# Patient Record
Sex: FEMALE | Race: WHITE | NOT HISPANIC OR LATINO | Employment: OTHER | URBAN - METROPOLITAN AREA
[De-identification: names, ages, dates, MRNs, and addresses within clinical notes are randomized per-mention and may not be internally consistent; named-entity substitution may affect disease eponyms.]

---

## 2017-01-01 ENCOUNTER — LAB VISIT (OUTPATIENT)
Dept: LAB | Facility: HOSPITAL | Age: 77
End: 2017-01-01
Attending: FAMILY MEDICINE
Payer: MEDICARE

## 2017-01-01 ENCOUNTER — IMMUNIZATION (OUTPATIENT)
Dept: INTERNAL MEDICINE | Facility: CLINIC | Age: 77
End: 2017-01-01
Payer: MEDICARE

## 2017-01-01 ENCOUNTER — SURGERY (OUTPATIENT)
Age: 77
End: 2017-01-01

## 2017-01-01 ENCOUNTER — OFFICE VISIT (OUTPATIENT)
Dept: INTERNAL MEDICINE | Facility: CLINIC | Age: 77
End: 2017-01-01
Payer: MEDICARE

## 2017-01-01 ENCOUNTER — HOSPITAL ENCOUNTER (OUTPATIENT)
Dept: RADIOLOGY | Facility: HOSPITAL | Age: 77
Discharge: HOME | End: 2017-11-14
Attending: INTERNAL MEDICINE
Payer: MEDICARE

## 2017-01-01 ENCOUNTER — LAB VISIT (OUTPATIENT)
Dept: LAB | Facility: HOSPITAL | Age: 77
End: 2017-01-01
Attending: INTERNAL MEDICINE
Payer: MEDICARE

## 2017-01-01 ENCOUNTER — TELEPHONE (OUTPATIENT)
Dept: INTERNAL MEDICINE | Facility: CLINIC | Age: 77
End: 2017-01-01

## 2017-01-01 ENCOUNTER — OFFICE VISIT (OUTPATIENT)
Dept: GASTROENTEROLOGY | Facility: CLINIC | Age: 77
End: 2017-01-01
Payer: MEDICARE

## 2017-01-01 ENCOUNTER — ANESTHESIA EVENT (OUTPATIENT)
Dept: CARDIOLOGY | Facility: HOSPITAL | Age: 77
End: 2017-01-01
Payer: MEDICARE

## 2017-01-01 ENCOUNTER — CLINICAL SUPPORT (OUTPATIENT)
Dept: CARDIOLOGY | Facility: CLINIC | Age: 77
End: 2017-01-01
Payer: MEDICARE

## 2017-01-01 ENCOUNTER — TELEPHONE (OUTPATIENT)
Dept: RADIOLOGY | Facility: HOSPITAL | Age: 77
End: 2017-01-01

## 2017-01-01 ENCOUNTER — OFFICE VISIT (OUTPATIENT)
Dept: PULMONOLOGY | Facility: CLINIC | Age: 77
End: 2017-01-01
Payer: MEDICARE

## 2017-01-01 ENCOUNTER — HOSPITAL ENCOUNTER (OUTPATIENT)
Facility: HOSPITAL | Age: 77
Discharge: HOME | End: 2017-06-26
Attending: INTERNAL MEDICINE | Admitting: INTERNAL MEDICINE
Payer: MEDICARE

## 2017-01-01 ENCOUNTER — TELEPHONE (OUTPATIENT)
Dept: PULMONOLOGY | Facility: CLINIC | Age: 77
End: 2017-01-01

## 2017-01-01 ENCOUNTER — TELEPHONE (OUTPATIENT)
Dept: CARDIOLOGY | Facility: CLINIC | Age: 77
End: 2017-01-01

## 2017-01-01 ENCOUNTER — PROCEDURE VISIT (OUTPATIENT)
Dept: PULMONOLOGY | Facility: CLINIC | Age: 77
End: 2017-01-01
Payer: MEDICARE

## 2017-01-01 ENCOUNTER — OFFICE VISIT (OUTPATIENT)
Dept: CARDIOLOGY | Facility: CLINIC | Age: 77
End: 2017-01-01
Payer: MEDICARE

## 2017-01-01 ENCOUNTER — CLINICAL SUPPORT (OUTPATIENT)
Dept: CARDIOLOGY | Facility: CLINIC | Age: 77
End: 2017-01-01
Attending: INTERNAL MEDICINE
Payer: MEDICARE

## 2017-01-01 ENCOUNTER — ANESTHESIA (OUTPATIENT)
Dept: CARDIOLOGY | Facility: HOSPITAL | Age: 77
End: 2017-01-01
Payer: MEDICARE

## 2017-01-01 ENCOUNTER — PATIENT MESSAGE (OUTPATIENT)
Dept: GASTROENTEROLOGY | Facility: CLINIC | Age: 77
End: 2017-01-01

## 2017-01-01 ENCOUNTER — HOSPITAL ENCOUNTER (OUTPATIENT)
Dept: RADIOLOGY | Facility: HOSPITAL | Age: 77
Discharge: HOME | End: 2017-08-16
Attending: INTERNAL MEDICINE
Payer: MEDICARE

## 2017-01-01 ENCOUNTER — HOSPITAL ENCOUNTER (OUTPATIENT)
Dept: RADIOLOGY | Facility: HOSPITAL | Age: 77
Discharge: HOME | End: 2017-10-03
Attending: INTERNAL MEDICINE
Payer: MEDICARE

## 2017-01-01 ENCOUNTER — TELEPHONE (OUTPATIENT)
Dept: GASTROENTEROLOGY | Facility: CLINIC | Age: 77
End: 2017-01-01

## 2017-01-01 ENCOUNTER — OFFICE VISIT (OUTPATIENT)
Dept: RHEUMATOLOGY | Facility: CLINIC | Age: 77
End: 2017-01-01
Payer: MEDICARE

## 2017-01-01 ENCOUNTER — APPOINTMENT (OUTPATIENT)
Dept: RADIOLOGY | Facility: CLINIC | Age: 77
End: 2017-01-01
Attending: INTERNAL MEDICINE
Payer: MEDICARE

## 2017-01-01 ENCOUNTER — OFFICE VISIT (OUTPATIENT)
Dept: OPHTHALMOLOGY | Facility: CLINIC | Age: 77
End: 2017-01-01
Payer: MEDICARE

## 2017-01-01 VITALS
TEMPERATURE: 98 F | WEIGHT: 125.25 LBS | HEART RATE: 56 BPM | BODY MASS INDEX: 23.05 KG/M2 | HEIGHT: 62 IN | SYSTOLIC BLOOD PRESSURE: 138 MMHG | DIASTOLIC BLOOD PRESSURE: 66 MMHG

## 2017-01-01 VITALS
HEIGHT: 62 IN | SYSTOLIC BLOOD PRESSURE: 149 MMHG | BODY MASS INDEX: 23.19 KG/M2 | DIASTOLIC BLOOD PRESSURE: 70 MMHG | TEMPERATURE: 98 F | HEART RATE: 85 BPM | RESPIRATION RATE: 18 BRPM | OXYGEN SATURATION: 95 % | WEIGHT: 126 LBS

## 2017-01-01 VITALS
HEIGHT: 62 IN | OXYGEN SATURATION: 94 % | RESPIRATION RATE: 18 BRPM | SYSTOLIC BLOOD PRESSURE: 120 MMHG | BODY MASS INDEX: 23.55 KG/M2 | WEIGHT: 128 LBS | DIASTOLIC BLOOD PRESSURE: 64 MMHG | HEART RATE: 61 BPM

## 2017-01-01 VITALS — RESPIRATION RATE: 18 BRPM

## 2017-01-01 VITALS
WEIGHT: 129 LBS | HEIGHT: 63 IN | BODY MASS INDEX: 22.86 KG/M2 | HEART RATE: 61 BPM | SYSTOLIC BLOOD PRESSURE: 122 MMHG | DIASTOLIC BLOOD PRESSURE: 68 MMHG

## 2017-01-01 VITALS
BODY MASS INDEX: 23.53 KG/M2 | DIASTOLIC BLOOD PRESSURE: 50 MMHG | HEART RATE: 60 BPM | HEIGHT: 62 IN | SYSTOLIC BLOOD PRESSURE: 162 MMHG | WEIGHT: 127.88 LBS

## 2017-01-01 VITALS
DIASTOLIC BLOOD PRESSURE: 68 MMHG | HEART RATE: 83 BPM | SYSTOLIC BLOOD PRESSURE: 150 MMHG | BODY MASS INDEX: 23.29 KG/M2 | OXYGEN SATURATION: 96 % | HEIGHT: 62 IN | WEIGHT: 126.56 LBS

## 2017-01-01 VITALS
DIASTOLIC BLOOD PRESSURE: 40 MMHG | HEART RATE: 55 BPM | WEIGHT: 129.63 LBS | BODY MASS INDEX: 23.71 KG/M2 | SYSTOLIC BLOOD PRESSURE: 124 MMHG

## 2017-01-01 VITALS
DIASTOLIC BLOOD PRESSURE: 68 MMHG | WEIGHT: 133 LBS | HEIGHT: 62 IN | HEART RATE: 56 BPM | SYSTOLIC BLOOD PRESSURE: 150 MMHG | BODY MASS INDEX: 24.51 KG/M2 | RESPIRATION RATE: 18 BRPM | BODY MASS INDEX: 24.48 KG/M2 | WEIGHT: 133.19 LBS | OXYGEN SATURATION: 98 % | HEIGHT: 62 IN

## 2017-01-01 VITALS
SYSTOLIC BLOOD PRESSURE: 122 MMHG | DIASTOLIC BLOOD PRESSURE: 62 MMHG | HEIGHT: 62 IN | BODY MASS INDEX: 22.96 KG/M2 | WEIGHT: 124.75 LBS | HEART RATE: 78 BPM

## 2017-01-01 VITALS
WEIGHT: 125.69 LBS | HEART RATE: 58 BPM | HEIGHT: 62 IN | BODY MASS INDEX: 23.13 KG/M2 | DIASTOLIC BLOOD PRESSURE: 66 MMHG | TEMPERATURE: 98 F | SYSTOLIC BLOOD PRESSURE: 126 MMHG

## 2017-01-01 DIAGNOSIS — I35.1 NONRHEUMATIC AORTIC VALVE INSUFFICIENCY: ICD-10-CM

## 2017-01-01 DIAGNOSIS — I50.9 CONGESTIVE HEART FAILURE, UNSPECIFIED CONGESTIVE HEART FAILURE CHRONICITY, UNSPECIFIED CONGESTIVE HEART FAILURE TYPE: ICD-10-CM

## 2017-01-01 DIAGNOSIS — I48.0 PAROXYSMAL ATRIAL FIBRILLATION: ICD-10-CM

## 2017-01-01 DIAGNOSIS — K21.00 GASTROESOPHAGEAL REFLUX DISEASE WITH ESOPHAGITIS: ICD-10-CM

## 2017-01-01 DIAGNOSIS — Z95.1 S/P CABG (CORONARY ARTERY BYPASS GRAFT): ICD-10-CM

## 2017-01-01 DIAGNOSIS — J45.909 UNCOMPLICATED ASTHMA, UNSPECIFIED ASTHMA SEVERITY: ICD-10-CM

## 2017-01-01 DIAGNOSIS — N18.30 STAGE 3 CHRONIC KIDNEY DISEASE: ICD-10-CM

## 2017-01-01 DIAGNOSIS — I48.20 CHRONIC ATRIAL FIBRILLATION: Chronic | ICD-10-CM

## 2017-01-01 DIAGNOSIS — I48.20 CHRONIC ATRIAL FIBRILLATION: Primary | Chronic | ICD-10-CM

## 2017-01-01 DIAGNOSIS — I48.19 PERSISTENT ATRIAL FIBRILLATION: ICD-10-CM

## 2017-01-01 DIAGNOSIS — M81.0 OSTEOPOROSIS, POSTMENOPAUSAL: ICD-10-CM

## 2017-01-01 DIAGNOSIS — I10 ESSENTIAL HYPERTENSION: Chronic | ICD-10-CM

## 2017-01-01 DIAGNOSIS — Z96.1 PSEUDOPHAKIA: ICD-10-CM

## 2017-01-01 DIAGNOSIS — I48.0 PAF (PAROXYSMAL ATRIAL FIBRILLATION): ICD-10-CM

## 2017-01-01 DIAGNOSIS — I25.810 CORONARY ARTERY DISEASE INVOLVING CORONARY BYPASS GRAFT OF NATIVE HEART WITHOUT ANGINA PECTORIS: Primary | Chronic | ICD-10-CM

## 2017-01-01 DIAGNOSIS — M35.09 SJOGREN'S SYNDROME WITH OTHER ORGAN INVOLVEMENT: ICD-10-CM

## 2017-01-01 DIAGNOSIS — R74.8 ELEVATED ALKALINE PHOSPHATASE LEVEL: ICD-10-CM

## 2017-01-01 DIAGNOSIS — I48.19 PERSISTENT ATRIAL FIBRILLATION: Primary | ICD-10-CM

## 2017-01-01 DIAGNOSIS — Z51.81 MEDICATION MONITORING ENCOUNTER: ICD-10-CM

## 2017-01-01 DIAGNOSIS — M35.09 SJOGREN'S SYNDROME WITH OTHER ORGAN INVOLVEMENT: Primary | ICD-10-CM

## 2017-01-01 DIAGNOSIS — R74.8 ELEVATED SERUM ALKALINE PHOSPHATASE LEVEL: Primary | Chronic | ICD-10-CM

## 2017-01-01 DIAGNOSIS — I50.32 CHRONIC DIASTOLIC HEART FAILURE: Chronic | ICD-10-CM

## 2017-01-01 DIAGNOSIS — Z98.890 S/P CAROTID ENDARTERECTOMY: ICD-10-CM

## 2017-01-01 DIAGNOSIS — I25.810 CORONARY ARTERY DISEASE INVOLVING CORONARY BYPASS GRAFT OF NATIVE HEART WITHOUT ANGINA PECTORIS: Chronic | ICD-10-CM

## 2017-01-01 DIAGNOSIS — E11.9 TYPE 2 DIABETES MELLITUS WITHOUT COMPLICATION, WITHOUT LONG-TERM CURRENT USE OF INSULIN: ICD-10-CM

## 2017-01-01 DIAGNOSIS — I48.3 TYPICAL ATRIAL FLUTTER: Chronic | ICD-10-CM

## 2017-01-01 DIAGNOSIS — Z00.00 MEDICARE ANNUAL WELLNESS VISIT, SUBSEQUENT: ICD-10-CM

## 2017-01-01 DIAGNOSIS — E78.5 HYPERLIPIDEMIA, UNSPECIFIED HYPERLIPIDEMIA TYPE: ICD-10-CM

## 2017-01-01 DIAGNOSIS — Z79.01 LONG-TERM (CURRENT) USE OF ANTICOAGULANTS: ICD-10-CM

## 2017-01-01 DIAGNOSIS — J44.89 ASTHMA WITH COPD: Primary | ICD-10-CM

## 2017-01-01 DIAGNOSIS — Z99.81 HYPOXEMIA REQUIRING SUPPLEMENTAL OXYGEN: Primary | ICD-10-CM

## 2017-01-01 DIAGNOSIS — Z79.899 ENCOUNTER FOR EYE EXAM DUE TO HIGH RISK MEDICATION: ICD-10-CM

## 2017-01-01 DIAGNOSIS — N17.9 AKI (ACUTE KIDNEY INJURY): ICD-10-CM

## 2017-01-01 DIAGNOSIS — M35.01 SJOGREN'S SYNDROME WITH KERATOCONJUNCTIVITIS SICCA: ICD-10-CM

## 2017-01-01 DIAGNOSIS — R06.02 SHORTNESS OF BREATH ON EXERTION: ICD-10-CM

## 2017-01-01 DIAGNOSIS — R74.8 ELEVATED ALKALINE PHOSPHATASE LEVEL: Primary | ICD-10-CM

## 2017-01-01 DIAGNOSIS — M35.00 SJOGREN'S SYNDROME: ICD-10-CM

## 2017-01-01 DIAGNOSIS — I25.10 CORONARY ARTERY DISEASE INVOLVING NATIVE CORONARY ARTERY, ANGINA PRESENCE UNSPECIFIED, UNSPECIFIED WHETHER NATIVE OR TRANSPLANTED HEART: ICD-10-CM

## 2017-01-01 DIAGNOSIS — K62.5 RECTAL BLEEDING: ICD-10-CM

## 2017-01-01 DIAGNOSIS — S30.1XXS ABDOMINAL WALL HEMATOMA, SEQUELA: ICD-10-CM

## 2017-01-01 DIAGNOSIS — Z98.61 S/P PTCA (PERCUTANEOUS TRANSLUMINAL CORONARY ANGIOPLASTY): ICD-10-CM

## 2017-01-01 DIAGNOSIS — Z79.01 ON ANTICOAGULANT THERAPY: ICD-10-CM

## 2017-01-01 DIAGNOSIS — J45.909 UNCOMPLICATED ASTHMA, UNSPECIFIED ASTHMA SEVERITY: Primary | ICD-10-CM

## 2017-01-01 DIAGNOSIS — I48.0 PAF (PAROXYSMAL ATRIAL FIBRILLATION): Primary | ICD-10-CM

## 2017-01-01 DIAGNOSIS — S30.1XXD ABDOMINAL WALL HEMATOMA, SUBSEQUENT ENCOUNTER: ICD-10-CM

## 2017-01-01 DIAGNOSIS — M81.0 OSTEOPOROSIS, POSTMENOPAUSAL: Primary | ICD-10-CM

## 2017-01-01 DIAGNOSIS — J44.89 ASTHMA WITH COPD: ICD-10-CM

## 2017-01-01 DIAGNOSIS — I49.8 PERIODIC HEART FLUTTER: ICD-10-CM

## 2017-01-01 DIAGNOSIS — R00.1 BRADYCARDIA: ICD-10-CM

## 2017-01-01 DIAGNOSIS — I65.23 BILATERAL CAROTID ARTERY STENOSIS: ICD-10-CM

## 2017-01-01 DIAGNOSIS — R09.02 HYPOXEMIA REQUIRING SUPPLEMENTAL OXYGEN: ICD-10-CM

## 2017-01-01 DIAGNOSIS — D62 ACUTE BLOOD LOSS ANEMIA: ICD-10-CM

## 2017-01-01 DIAGNOSIS — R74.8 ELEVATED ALKALINE PHOSPHATASE MEASUREMENT: ICD-10-CM

## 2017-01-01 DIAGNOSIS — J32.0 CHRONIC MAXILLARY SINUSITIS: ICD-10-CM

## 2017-01-01 DIAGNOSIS — I50.1 PULMONARY EDEMA CARDIAC CAUSE: ICD-10-CM

## 2017-01-01 DIAGNOSIS — M17.0 PRIMARY OSTEOARTHRITIS OF BOTH KNEES: ICD-10-CM

## 2017-01-01 DIAGNOSIS — I48.91 ATRIAL FIBRILLATION, UNSPECIFIED TYPE: ICD-10-CM

## 2017-01-01 DIAGNOSIS — K64.9 HEMORRHOIDS, UNSPECIFIED HEMORRHOID TYPE: ICD-10-CM

## 2017-01-01 DIAGNOSIS — I25.10 CORONARY ARTERY DISEASE, ANGINA PRESENCE UNSPECIFIED, UNSPECIFIED VESSEL OR LESION TYPE, UNSPECIFIED WHETHER NATIVE OR TRANSPLANTED HEART: ICD-10-CM

## 2017-01-01 DIAGNOSIS — R09.02 HYPOXEMIA REQUIRING SUPPLEMENTAL OXYGEN: Primary | ICD-10-CM

## 2017-01-01 DIAGNOSIS — R74.8 ELEVATED SERUM ALKALINE PHOSPHATASE LEVEL: Chronic | ICD-10-CM

## 2017-01-01 DIAGNOSIS — D84.9 IMMUNOCOMPROMISED: ICD-10-CM

## 2017-01-01 DIAGNOSIS — I48.91 ATRIAL FIBRILLATION STATUS POST CARDIOVERSION: ICD-10-CM

## 2017-01-01 DIAGNOSIS — H04.123 DRY EYES, BILATERAL: ICD-10-CM

## 2017-01-01 DIAGNOSIS — Z99.81 HYPOXEMIA REQUIRING SUPPLEMENTAL OXYGEN: ICD-10-CM

## 2017-01-01 DIAGNOSIS — I50.32 CHRONIC DIASTOLIC HEART FAILURE: Primary | Chronic | ICD-10-CM

## 2017-01-01 DIAGNOSIS — F33.1 MODERATE EPISODE OF RECURRENT MAJOR DEPRESSIVE DISORDER: ICD-10-CM

## 2017-01-01 DIAGNOSIS — D64.9 ANEMIA, UNSPECIFIED TYPE: ICD-10-CM

## 2017-01-01 DIAGNOSIS — K21.9 GASTROESOPHAGEAL REFLUX DISEASE, ESOPHAGITIS PRESENCE NOT SPECIFIED: ICD-10-CM

## 2017-01-01 LAB
ALBUMIN SERPL BCP-MCNC: 4 G/DL
ALBUMIN SERPL BCP-MCNC: 4.2 G/DL
ALP BONE CFR SERPL: 19 U/L (ref 5–58)
ALP BONE SERPL-CCNC: 12 % (ref 16–56)
ALP INTEST CFR SERPL: 0 U/L
ALP INTEST SERPL-CCNC: 0 %
ALP LIVER CFR SERPL: 140 U/L (ref 5–93)
ALP LIVER SERPL-CCNC: 88 % (ref 44–84)
ALP SERPL-CCNC: 159 U/L (ref 33–130)
ALP SERPL-CCNC: 160 U/L
ALP SERPL-CCNC: 219 U/L
ALT SERPL W/O P-5'-P-CCNC: 30 U/L
ALT SERPL W/O P-5'-P-CCNC: 36 U/L
ANION GAP SERPL CALC-SCNC: 11 MMOL/L
ANION GAP SERPL CALC-SCNC: 11 MMOL/L
ANION GAP SERPL CALC-SCNC: 9 MMOL/L
AORTIC VALVE REGURGITATION: ABNORMAL
AST SERPL-CCNC: 32 U/L
AST SERPL-CCNC: 37 U/L
BASOPHILS # BLD AUTO: 0.04 K/UL
BASOPHILS # BLD AUTO: 0.05 K/UL
BASOPHILS NFR BLD: 0.8 %
BASOPHILS NFR BLD: 0.8 %
BILIRUB SERPL-MCNC: 0.8 MG/DL
BILIRUB SERPL-MCNC: 1 MG/DL
BNP SERPL-MCNC: 2662 PG/ML
BUN SERPL-MCNC: 18 MG/DL
BUN SERPL-MCNC: 23 MG/DL
BUN SERPL-MCNC: 25 MG/DL
CALCIUM SERPL-MCNC: 9.8 MG/DL
CALCIUM SERPL-MCNC: 9.9 MG/DL
CALCIUM SERPL-MCNC: 9.9 MG/DL
CHLORIDE SERPL-SCNC: 104 MMOL/L
CHLORIDE SERPL-SCNC: 104 MMOL/L
CHLORIDE SERPL-SCNC: 106 MMOL/L
CO2 SERPL-SCNC: 25 MMOL/L
CO2 SERPL-SCNC: 27 MMOL/L
CO2 SERPL-SCNC: 28 MMOL/L
CREAT SERPL-MCNC: 1.2 MG/DL
CREAT SERPL-MCNC: 1.2 MG/DL
CREAT SERPL-MCNC: 1.5 MG/DL
DIASTOLIC DYSFUNCTION: NO
DIASTOLIC DYSFUNCTION: YES
DIFFERENTIAL METHOD: ABNORMAL
DIFFERENTIAL METHOD: ABNORMAL
EOSINOPHIL # BLD AUTO: 0.2 K/UL
EOSINOPHIL # BLD AUTO: 0.2 K/UL
EOSINOPHIL NFR BLD: 3.4 %
EOSINOPHIL NFR BLD: 3.5 %
ERYTHROCYTE [DISTWIDTH] IN BLOOD BY AUTOMATED COUNT: 15.6 %
ERYTHROCYTE [DISTWIDTH] IN BLOOD BY AUTOMATED COUNT: 15.8 %
EST. GFR  (AFRICAN AMERICAN): 38.2 ML/MIN/1.73 M^2
EST. GFR  (AFRICAN AMERICAN): 50 ML/MIN/1.73 M^2
EST. GFR  (AFRICAN AMERICAN): 50 ML/MIN/1.73 M^2
EST. GFR  (NON AFRICAN AMERICAN): 33.1 ML/MIN/1.73 M^2
EST. GFR  (NON AFRICAN AMERICAN): 43 ML/MIN/1.73 M^2
EST. GFR  (NON AFRICAN AMERICAN): 43.4 ML/MIN/1.73 M^2
ESTIMATED AVG GLUCOSE: 131 MG/DL
ESTIMATED PA SYSTOLIC PRESSURE: 49.73
FLECAINIDE SERPL-MCNC: 1 MCG/ML (ref 0.2–1)
GLUCOSE SERPL-MCNC: 129 MG/DL
GLUCOSE SERPL-MCNC: 92 MG/DL
GLUCOSE SERPL-MCNC: 93 MG/DL
HBA1C MFR BLD HPLC: 6.2 %
HCT VFR BLD AUTO: 36.1 %
HCT VFR BLD AUTO: 36.9 %
HGB BLD-MCNC: 11.2 G/DL
HGB BLD-MCNC: 11.6 G/DL
LYMPHOCYTES # BLD AUTO: 1 K/UL
LYMPHOCYTES # BLD AUTO: 1 K/UL
LYMPHOCYTES NFR BLD: 15.1 %
LYMPHOCYTES NFR BLD: 19.6 %
MCH RBC QN AUTO: 30 PG
MCH RBC QN AUTO: 30.4 PG
MCHC RBC AUTO-ENTMCNC: 31 G/DL
MCHC RBC AUTO-ENTMCNC: 31.4 G/DL
MCV RBC AUTO: 95 FL
MCV RBC AUTO: 98 FL
MITRAL VALVE REGURGITATION: ABNORMAL
MONOCYTES # BLD AUTO: 0.5 K/UL
MONOCYTES # BLD AUTO: 0.6 K/UL
MONOCYTES NFR BLD: 10.4 %
MONOCYTES NFR BLD: 9.9 %
NEUTROPHILS # BLD AUTO: 3.2 K/UL
NEUTROPHILS # BLD AUTO: 4.6 K/UL
NEUTROPHILS NFR BLD: 65.5 %
NEUTROPHILS NFR BLD: 70.8 %
PLATELET # BLD AUTO: 178 K/UL
PLATELET # BLD AUTO: 179 K/UL
PMV BLD AUTO: 12.5 FL
PMV BLD AUTO: 12.8 FL
POTASSIUM SERPL-SCNC: 4 MMOL/L
POTASSIUM SERPL-SCNC: 4 MMOL/L
POTASSIUM SERPL-SCNC: 4.4 MMOL/L
PROT SERPL-MCNC: 6.9 G/DL
PROT SERPL-MCNC: 7.3 G/DL
RBC # BLD AUTO: 3.68 M/UL
RBC # BLD AUTO: 3.87 M/UL
RETIRED EF AND QEF - SEE NOTES: 55 (ref 55–65)
SODIUM SERPL-SCNC: 141 MMOL/L
SODIUM SERPL-SCNC: 142 MMOL/L
SODIUM SERPL-SCNC: 142 MMOL/L
TRICUSPID VALVE REGURGITATION: ABNORMAL
WBC # BLD AUTO: 4.89 K/UL
WBC # BLD AUTO: 6.48 K/UL

## 2017-01-01 PROCEDURE — 1125F AMNT PAIN NOTED PAIN PRSNT: CPT | Mod: ,,, | Performed by: FAMILY MEDICINE

## 2017-01-01 PROCEDURE — 1157F ADVNC CARE PLAN IN RCRD: CPT | Mod: ,,, | Performed by: FAMILY MEDICINE

## 2017-01-01 PROCEDURE — 99999 PR PBB SHADOW E&M-EST. PATIENT-LVL III: CPT | Mod: PBBFAC,,, | Performed by: INTERNAL MEDICINE

## 2017-01-01 PROCEDURE — 36415 COLL VENOUS BLD VENIPUNCTURE: CPT | Mod: PO

## 2017-01-01 PROCEDURE — 1157F ADVNC CARE PLAN IN RCRD: CPT | Mod: ,,, | Performed by: INTERNAL MEDICINE

## 2017-01-01 PROCEDURE — 99215 OFFICE O/P EST HI 40 MIN: CPT | Mod: S$PBB,,, | Performed by: FAMILY MEDICINE

## 2017-01-01 PROCEDURE — 71020 XR CHEST PA AND LATERAL: CPT | Mod: 26,,, | Performed by: RADIOLOGY

## 2017-01-01 PROCEDURE — 94620 PR PULMONARY STRESS TESTING,SIMPLE: CPT | Mod: 26,S$PBB,, | Performed by: INTERNAL MEDICINE

## 2017-01-01 PROCEDURE — 80053 COMPREHEN METABOLIC PANEL: CPT

## 2017-01-01 PROCEDURE — 63600175 PHARM REV CODE 636 W HCPCS: Performed by: NURSE ANESTHETIST, CERTIFIED REGISTERED

## 2017-01-01 PROCEDURE — 99212 OFFICE O/P EST SF 10 MIN: CPT | Mod: PBBFAC,PO | Performed by: OPHTHALMOLOGY

## 2017-01-01 PROCEDURE — 99215 OFFICE O/P EST HI 40 MIN: CPT | Mod: S$PBB,,, | Performed by: INTERNAL MEDICINE

## 2017-01-01 PROCEDURE — 92960 CARDIOVERSION ELECTRIC EXT: CPT

## 2017-01-01 PROCEDURE — 99214 OFFICE O/P EST MOD 30 MIN: CPT | Mod: S$PBB,,, | Performed by: INTERNAL MEDICINE

## 2017-01-01 PROCEDURE — 92960 CARDIOVERSION ELECTRIC EXT: CPT | Mod: ,,, | Performed by: INTERNAL MEDICINE

## 2017-01-01 PROCEDURE — 85025 COMPLETE CBC W/AUTO DIFF WBC: CPT

## 2017-01-01 PROCEDURE — 99214 OFFICE O/P EST MOD 30 MIN: CPT | Mod: S$PBB,,, | Performed by: PHYSICIAN ASSISTANT

## 2017-01-01 PROCEDURE — 94060 EVALUATION OF WHEEZING: CPT | Mod: 26,S$PBB,, | Performed by: INTERNAL MEDICINE

## 2017-01-01 PROCEDURE — 93018 CV STRESS TEST I&R ONLY: CPT | Mod: S$PBB,,, | Performed by: INTERNAL MEDICINE

## 2017-01-01 PROCEDURE — 93010 ELECTROCARDIOGRAM REPORT: CPT | Mod: S$PBB,,, | Performed by: INTERNAL MEDICINE

## 2017-01-01 PROCEDURE — A9502 TC99M TETROFOSMIN: HCPCS | Mod: PO

## 2017-01-01 PROCEDURE — 99214 OFFICE O/P EST MOD 30 MIN: CPT | Mod: 25,S$PBB,, | Performed by: NURSE PRACTITIONER

## 2017-01-01 PROCEDURE — 76705 ECHO EXAM OF ABDOMEN: CPT | Mod: TC,PO

## 2017-01-01 PROCEDURE — 1159F MED LIST DOCD IN RCRD: CPT | Mod: ,,, | Performed by: FAMILY MEDICINE

## 2017-01-01 PROCEDURE — 80048 BASIC METABOLIC PNL TOTAL CA: CPT

## 2017-01-01 PROCEDURE — 93306 TTE W/DOPPLER COMPLETE: CPT | Mod: PBBFAC,PO | Performed by: INTERNAL MEDICINE

## 2017-01-01 PROCEDURE — 3078F DIAST BP <80 MM HG: CPT | Mod: ,,, | Performed by: FAMILY MEDICINE

## 2017-01-01 PROCEDURE — 1159F MED LIST DOCD IN RCRD: CPT | Mod: ,,, | Performed by: INTERNAL MEDICINE

## 2017-01-01 PROCEDURE — 1157F ADVNC CARE PLAN IN RCRD: CPT | Mod: ,,, | Performed by: PHYSICIAN ASSISTANT

## 2017-01-01 PROCEDURE — 3074F SYST BP LT 130 MM HG: CPT | Mod: ,,, | Performed by: FAMILY MEDICINE

## 2017-01-01 PROCEDURE — 99213 OFFICE O/P EST LOW 20 MIN: CPT | Mod: PBBFAC,PO | Performed by: INTERNAL MEDICINE

## 2017-01-01 PROCEDURE — 94620 PR PULMONARY STRESS TESTING,SIMPLE: CPT | Mod: PBBFAC

## 2017-01-01 PROCEDURE — G0008 ADMIN INFLUENZA VIRUS VAC: HCPCS | Mod: PBBFAC,PO

## 2017-01-01 PROCEDURE — 99215 OFFICE O/P EST HI 40 MIN: CPT | Mod: 25,S$PBB,, | Performed by: INTERNAL MEDICINE

## 2017-01-01 PROCEDURE — 1126F AMNT PAIN NOTED NONE PRSNT: CPT | Mod: ,,, | Performed by: INTERNAL MEDICINE

## 2017-01-01 PROCEDURE — 99214 OFFICE O/P EST MOD 30 MIN: CPT | Mod: S$PBB,,, | Performed by: FAMILY MEDICINE

## 2017-01-01 PROCEDURE — 93010 ELECTROCARDIOGRAM REPORT: CPT | Mod: ,,, | Performed by: INTERNAL MEDICINE

## 2017-01-01 PROCEDURE — 92012 INTRM OPH EXAM EST PATIENT: CPT | Mod: S$PBB,,, | Performed by: OPHTHALMOLOGY

## 2017-01-01 PROCEDURE — 36415 COLL VENOUS BLD VENIPUNCTURE: CPT

## 2017-01-01 PROCEDURE — 93005 ELECTROCARDIOGRAM TRACING: CPT | Mod: PBBFAC,PO | Performed by: FAMILY MEDICINE

## 2017-01-01 PROCEDURE — 3008F BODY MASS INDEX DOCD: CPT | Mod: ,,, | Performed by: FAMILY MEDICINE

## 2017-01-01 PROCEDURE — 83036 HEMOGLOBIN GLYCOSYLATED A1C: CPT

## 2017-01-01 PROCEDURE — 71020 XR CHEST PA AND LATERAL: CPT | Mod: TC,PO

## 2017-01-01 PROCEDURE — 94060 EVALUATION OF WHEEZING: CPT | Mod: PBBFAC,PO | Performed by: GENERAL PRACTICE

## 2017-01-01 PROCEDURE — 93005 ELECTROCARDIOGRAM TRACING: CPT | Mod: PBBFAC,PO | Performed by: INTERNAL MEDICINE

## 2017-01-01 PROCEDURE — 99999 PR PBB SHADOW E&M-EST. PATIENT-LVL III: CPT | Mod: PBBFAC,,, | Performed by: FAMILY MEDICINE

## 2017-01-01 PROCEDURE — 83880 ASSAY OF NATRIURETIC PEPTIDE: CPT

## 2017-01-01 PROCEDURE — 37000009 HC ANESTHESIA EA ADD 15 MINS: Performed by: INTERNAL MEDICINE

## 2017-01-01 PROCEDURE — 99999 PR PBB SHADOW E&M-EST. PATIENT-LVL II: CPT | Mod: PBBFAC,,, | Performed by: INTERNAL MEDICINE

## 2017-01-01 PROCEDURE — 93010 ELECTROCARDIOGRAM REPORT: CPT | Mod: S$PBB,,, | Performed by: NUCLEAR MEDICINE

## 2017-01-01 PROCEDURE — 37000008 HC ANESTHESIA 1ST 15 MINUTES: Performed by: INTERNAL MEDICINE

## 2017-01-01 PROCEDURE — 93005 ELECTROCARDIOGRAM TRACING: CPT

## 2017-01-01 PROCEDURE — 99999 PR PBB SHADOW E&M-EST. PATIENT-LVL III: CPT | Mod: PBBFAC,,, | Performed by: PHYSICIAN ASSISTANT

## 2017-01-01 PROCEDURE — 99215 OFFICE O/P EST HI 40 MIN: CPT | Mod: PBBFAC,25,PO | Performed by: INTERNAL MEDICINE

## 2017-01-01 PROCEDURE — 3074F SYST BP LT 130 MM HG: CPT | Mod: ,,, | Performed by: INTERNAL MEDICINE

## 2017-01-01 PROCEDURE — 93016 CV STRESS TEST SUPVJ ONLY: CPT | Mod: S$PBB,,, | Performed by: INTERNAL MEDICINE

## 2017-01-01 PROCEDURE — 99215 OFFICE O/P EST HI 40 MIN: CPT | Mod: PBBFAC,25 | Performed by: NURSE PRACTITIONER

## 2017-01-01 PROCEDURE — 78452 HT MUSCLE IMAGE SPECT MULT: CPT | Mod: 26,,, | Performed by: INTERNAL MEDICINE

## 2017-01-01 PROCEDURE — 92134 CPTRZ OPH DX IMG PST SGM RTA: CPT | Mod: PBBFAC,PO | Performed by: OPHTHALMOLOGY

## 2017-01-01 PROCEDURE — 96372 THER/PROPH/DIAG INJ SC/IM: CPT | Mod: PBBFAC,PO

## 2017-01-01 PROCEDURE — 25000003 PHARM REV CODE 250

## 2017-01-01 PROCEDURE — 77080 DXA BONE DENSITY AXIAL: CPT | Mod: 26,,, | Performed by: INTERNAL MEDICINE

## 2017-01-01 PROCEDURE — 84075 ASSAY ALKALINE PHOSPHATASE: CPT

## 2017-01-01 PROCEDURE — 99213 OFFICE O/P EST LOW 20 MIN: CPT | Mod: PBBFAC,25,PO | Performed by: PHYSICIAN ASSISTANT

## 2017-01-01 PROCEDURE — 3075F SYST BP GE 130 - 139MM HG: CPT | Mod: ,,, | Performed by: FAMILY MEDICINE

## 2017-01-01 PROCEDURE — 3078F DIAST BP <80 MM HG: CPT | Mod: ,,, | Performed by: INTERNAL MEDICINE

## 2017-01-01 PROCEDURE — 1125F AMNT PAIN NOTED PAIN PRSNT: CPT | Mod: ,,, | Performed by: PHYSICIAN ASSISTANT

## 2017-01-01 PROCEDURE — 78452 HT MUSCLE IMAGE SPECT MULT: CPT | Mod: TC,PO

## 2017-01-01 PROCEDURE — 99213 OFFICE O/P EST LOW 20 MIN: CPT | Mod: PBBFAC,PO | Performed by: FAMILY MEDICINE

## 2017-01-01 PROCEDURE — 1159F MED LIST DOCD IN RCRD: CPT | Mod: ,,, | Performed by: PHYSICIAN ASSISTANT

## 2017-01-01 PROCEDURE — 63600175 PHARM REV CODE 636 W HCPCS

## 2017-01-01 PROCEDURE — 76705 ECHO EXAM OF ABDOMEN: CPT | Mod: 26,,, | Performed by: RADIOLOGY

## 2017-01-01 PROCEDURE — 99213 OFFICE O/P EST LOW 20 MIN: CPT | Mod: PBBFAC,PO,25 | Performed by: FAMILY MEDICINE

## 2017-01-01 PROCEDURE — 99999 PR PBB SHADOW E&M-EST. PATIENT-LVL V: CPT | Mod: PBBFAC,,, | Performed by: NURSE PRACTITIONER

## 2017-01-01 PROCEDURE — 25000003 PHARM REV CODE 250: Performed by: NURSE ANESTHETIST, CERTIFIED REGISTERED

## 2017-01-01 PROCEDURE — 94762 N-INVAS EAR/PLS OXIMTRY CONT: CPT | Mod: PBBFAC

## 2017-01-01 PROCEDURE — 99999 PR PBB SHADOW E&M-EST. PATIENT-LVL V: CPT | Mod: PBBFAC,,, | Performed by: INTERNAL MEDICINE

## 2017-01-01 PROCEDURE — 99999 PR PBB SHADOW E&M-EST. PATIENT-LVL II: CPT | Mod: PBBFAC,,, | Performed by: OPHTHALMOLOGY

## 2017-01-01 RX ORDER — ALBUTEROL SULFATE 90 UG/1
2 AEROSOL, METERED RESPIRATORY (INHALATION) EVERY 6 HOURS PRN
Qty: 1 INHALER | Refills: 11 | Status: SHIPPED | OUTPATIENT
Start: 2017-01-01 | End: 2018-01-01 | Stop reason: SDUPTHER

## 2017-01-01 RX ORDER — PROPOFOL 10 MG/ML
VIAL (ML) INTRAVENOUS
Status: DISCONTINUED | OUTPATIENT
Start: 2017-01-01 | End: 2017-01-01

## 2017-01-01 RX ORDER — FUROSEMIDE 20 MG/1
20 TABLET ORAL DAILY
Qty: 90 TABLET | Refills: 3 | Status: SHIPPED | OUTPATIENT
Start: 2017-01-01 | End: 2018-01-01 | Stop reason: SDUPTHER

## 2017-01-01 RX ORDER — BIOTIN 1 MG
1000 TABLET ORAL 3 TIMES DAILY
COMMUNITY

## 2017-01-01 RX ORDER — MINERAL OIL
180 ENEMA (ML) RECTAL DAILY
COMMUNITY
End: 2018-01-01

## 2017-01-01 RX ORDER — NITROGLYCERIN 0.4 MG/1
0.4 TABLET SUBLINGUAL EVERY 5 MIN PRN
Qty: 25 TABLET | Refills: 6 | Status: SHIPPED | OUTPATIENT
Start: 2017-01-01

## 2017-01-01 RX ORDER — ALBUTEROL SULFATE 0.83 MG/ML
SOLUTION RESPIRATORY (INHALATION)
Qty: 300 EACH | Refills: 12 | Status: SHIPPED | OUTPATIENT
Start: 2017-01-01 | End: 2018-01-01 | Stop reason: SDUPTHER

## 2017-01-01 RX ORDER — BUDESONIDE AND FORMOTEROL FUMARATE DIHYDRATE 160; 4.5 UG/1; UG/1
2 AEROSOL RESPIRATORY (INHALATION) EVERY 12 HOURS
Qty: 1 INHALER | Refills: 12 | Status: SHIPPED | OUTPATIENT
Start: 2017-01-01 | End: 2018-01-01 | Stop reason: SDUPTHER

## 2017-01-01 RX ORDER — POTASSIUM CHLORIDE 750 MG/1
10 TABLET, EXTENDED RELEASE ORAL DAILY
Qty: 90 TABLET | Refills: 3 | Status: SHIPPED | OUTPATIENT
Start: 2017-01-01

## 2017-01-01 RX ORDER — FLECAINIDE ACETATE 100 MG/1
TABLET ORAL
Qty: 60 TABLET | Refills: 6 | Status: SHIPPED | OUTPATIENT
Start: 2017-01-01

## 2017-01-01 RX ORDER — PANTOPRAZOLE SODIUM 40 MG/1
40 TABLET, DELAYED RELEASE ORAL 2 TIMES DAILY
Qty: 60 TABLET | Refills: 6 | Status: SHIPPED | OUTPATIENT
Start: 2017-01-01 | End: 2018-01-01

## 2017-01-01 RX ORDER — LIDOCAINE HYDROCHLORIDE 10 MG/ML
INJECTION INFILTRATION; PERINEURAL
Status: DISCONTINUED | OUTPATIENT
Start: 2017-01-01 | End: 2017-01-01

## 2017-01-01 RX ORDER — HYDROXYCHLOROQUINE SULFATE 200 MG/1
TABLET, FILM COATED ORAL
Qty: 90 TABLET | Refills: 1 | Status: SHIPPED | OUTPATIENT
Start: 2017-01-01 | End: 2018-01-01

## 2017-01-01 RX ORDER — SODIUM CHLORIDE, SODIUM LACTATE, POTASSIUM CHLORIDE, CALCIUM CHLORIDE 600; 310; 30; 20 MG/100ML; MG/100ML; MG/100ML; MG/100ML
INJECTION, SOLUTION INTRAVENOUS CONTINUOUS PRN
Status: DISCONTINUED | OUTPATIENT
Start: 2017-01-01 | End: 2017-01-01

## 2017-01-01 RX ORDER — FLECAINIDE ACETATE 100 MG/1
100 TABLET ORAL EVERY 12 HOURS
COMMUNITY
End: 2017-01-01 | Stop reason: SDUPTHER

## 2017-01-01 RX ADMIN — PROPOFOL 30 MG: 10 INJECTION, EMULSION INTRAVENOUS at 12:06

## 2017-01-01 RX ADMIN — LIDOCAINE HYDROCHLORIDE 60 MG: 10 INJECTION, SOLUTION INFILTRATION; PERINEURAL at 12:06

## 2017-01-01 RX ADMIN — PROPOFOL 60 MG: 10 INJECTION, EMULSION INTRAVENOUS at 12:06

## 2017-01-01 RX ADMIN — SODIUM CHLORIDE, SODIUM LACTATE, POTASSIUM CHLORIDE, AND CALCIUM CHLORIDE: 600; 310; 30; 20 INJECTION, SOLUTION INTRAVENOUS at 12:06

## 2017-01-01 RX ADMIN — DENOSUMAB 60 MG: 60 INJECTION SUBCUTANEOUS at 10:07

## 2017-01-02 DIAGNOSIS — M35.00 SJOGREN'S SYNDROME: ICD-10-CM

## 2017-01-02 RX ORDER — ATORVASTATIN CALCIUM 40 MG/1
TABLET, FILM COATED ORAL
Qty: 90 TABLET | Refills: 0 | Status: SHIPPED | OUTPATIENT
Start: 2017-01-02 | End: 2017-04-11 | Stop reason: SDUPTHER

## 2017-01-03 RX ORDER — HYDROXYCHLOROQUINE SULFATE 200 MG/1
TABLET, FILM COATED ORAL
Qty: 90 TABLET | Refills: 0 | Status: SHIPPED | OUTPATIENT
Start: 2017-01-03 | End: 2017-04-11 | Stop reason: SDUPTHER

## 2017-01-06 ENCOUNTER — LAB VISIT (OUTPATIENT)
Dept: LAB | Facility: HOSPITAL | Age: 77
End: 2017-01-06
Attending: FAMILY MEDICINE
Payer: MEDICARE

## 2017-01-06 DIAGNOSIS — M35.00 SJOGREN'S SYNDROME: ICD-10-CM

## 2017-01-06 LAB
ALBUMIN SERPL BCP-MCNC: 4 G/DL
ALP SERPL-CCNC: 89 U/L
ALT SERPL W/O P-5'-P-CCNC: 27 U/L
ANION GAP SERPL CALC-SCNC: 8 MMOL/L
AST SERPL-CCNC: 30 U/L
BASOPHILS # BLD AUTO: 0.09 K/UL
BASOPHILS NFR BLD: 1.5 %
BILIRUB SERPL-MCNC: 0.6 MG/DL
BUN SERPL-MCNC: 20 MG/DL
CALCIUM SERPL-MCNC: 10.3 MG/DL
CHLORIDE SERPL-SCNC: 102 MMOL/L
CO2 SERPL-SCNC: 30 MMOL/L
CREAT SERPL-MCNC: 1.2 MG/DL
CRP SERPL-MCNC: 0.4 MG/L
DIFFERENTIAL METHOD: ABNORMAL
EOSINOPHIL # BLD AUTO: 0.5 K/UL
EOSINOPHIL NFR BLD: 8.8 %
ERYTHROCYTE [DISTWIDTH] IN BLOOD BY AUTOMATED COUNT: 17.9 %
ERYTHROCYTE [SEDIMENTATION RATE] IN BLOOD BY WESTERGREN METHOD: 15 MM/HR
EST. GFR  (AFRICAN AMERICAN): 50.4 ML/MIN/1.73 M^2
EST. GFR  (NON AFRICAN AMERICAN): 43.7 ML/MIN/1.73 M^2
GLUCOSE SERPL-MCNC: 115 MG/DL
HCT VFR BLD AUTO: 36.9 %
HGB BLD-MCNC: 11.5 G/DL
LYMPHOCYTES # BLD AUTO: 1.8 K/UL
LYMPHOCYTES NFR BLD: 29.4 %
MCH RBC QN AUTO: 27.5 PG
MCHC RBC AUTO-ENTMCNC: 31.2 %
MCV RBC AUTO: 88 FL
MONOCYTES # BLD AUTO: 0.6 K/UL
MONOCYTES NFR BLD: 9.6 %
NEUTROPHILS # BLD AUTO: 3.1 K/UL
NEUTROPHILS NFR BLD: 50.7 %
PLATELET # BLD AUTO: 198 K/UL
PMV BLD AUTO: 10.9 FL
POTASSIUM SERPL-SCNC: 4.5 MMOL/L
PROT SERPL-MCNC: 7.7 G/DL
RBC # BLD AUTO: 4.18 M/UL
SODIUM SERPL-SCNC: 140 MMOL/L
WBC # BLD AUTO: 6.13 K/UL

## 2017-01-06 PROCEDURE — 36415 COLL VENOUS BLD VENIPUNCTURE: CPT | Mod: PO

## 2017-01-06 PROCEDURE — 80053 COMPREHEN METABOLIC PANEL: CPT

## 2017-01-06 PROCEDURE — 86140 C-REACTIVE PROTEIN: CPT

## 2017-01-06 PROCEDURE — 85651 RBC SED RATE NONAUTOMATED: CPT

## 2017-01-06 PROCEDURE — 85025 COMPLETE CBC W/AUTO DIFF WBC: CPT

## 2017-01-10 ENCOUNTER — OFFICE VISIT (OUTPATIENT)
Dept: RHEUMATOLOGY | Facility: CLINIC | Age: 77
End: 2017-01-10
Payer: MEDICARE

## 2017-01-10 VITALS
HEIGHT: 62 IN | WEIGHT: 134.5 LBS | BODY MASS INDEX: 24.75 KG/M2 | SYSTOLIC BLOOD PRESSURE: 134 MMHG | HEART RATE: 75 BPM | DIASTOLIC BLOOD PRESSURE: 57 MMHG

## 2017-01-10 DIAGNOSIS — M81.0 OSTEOPOROSIS, POSTMENOPAUSAL: Primary | ICD-10-CM

## 2017-01-10 DIAGNOSIS — M35.00 SJOGREN'S SYNDROME: ICD-10-CM

## 2017-01-10 DIAGNOSIS — M19.042 PRIMARY OSTEOARTHRITIS OF BOTH HANDS: Chronic | ICD-10-CM

## 2017-01-10 DIAGNOSIS — M54.10 RADICULOPATHY AFFECTING UPPER EXTREMITY: Chronic | ICD-10-CM

## 2017-01-10 DIAGNOSIS — M54.2 CHRONIC NECK PAIN: Chronic | ICD-10-CM

## 2017-01-10 DIAGNOSIS — M19.041 PRIMARY OSTEOARTHRITIS OF BOTH HANDS: Chronic | ICD-10-CM

## 2017-01-10 DIAGNOSIS — Z51.81 MEDICATION MONITORING ENCOUNTER: ICD-10-CM

## 2017-01-10 DIAGNOSIS — G89.29 CHRONIC NECK PAIN: Chronic | ICD-10-CM

## 2017-01-10 PROCEDURE — 99213 OFFICE O/P EST LOW 20 MIN: CPT | Mod: S$PBB,,, | Performed by: INTERNAL MEDICINE

## 2017-01-10 PROCEDURE — 96372 THER/PROPH/DIAG INJ SC/IM: CPT | Mod: PBBFAC,PO

## 2017-01-10 PROCEDURE — 99999 PR PBB SHADOW E&M-EST. PATIENT-LVL III: CPT | Mod: PBBFAC,,, | Performed by: INTERNAL MEDICINE

## 2017-01-10 PROCEDURE — 99213 OFFICE O/P EST LOW 20 MIN: CPT | Mod: PBBFAC,PO | Performed by: INTERNAL MEDICINE

## 2017-01-10 RX ADMIN — DENOSUMAB 60 MG: 60 INJECTION SUBCUTANEOUS at 10:01

## 2017-01-10 NOTE — MR AVS SNAPSHOT
[facility]  [address] [place] [address]  Phone: [phone]  Fax: [phone]                  Marcel Montalvo   1/10/2017 9:00 AM   Office Visit    Description:  Female : 1940   Provider:  Scotty Lewis MD   Department:  [facility] - Rheumatology           Reason for Visit     Osteoarthritis     Osteoporosis     Pain           Diagnoses this Visit        Comments    Osteoporosis, postmenopausal    -  Primary     Sjogren's syndrome         Medication monitoring encounter         Primary osteoarthritis of both hands         Radiculopathy affecting upper extremity         Chronic neck pain                To Do List           Future Appointments        Provider Department Dept Phone    3/20/2017 8:00 AM FIELDS, VISUAL-ONE [facility] [phone]    3/20/2017 8:30 AM Varghese Arambula MD [facility] [phone]    3/30/2017 10:00 AM [facility] XR2 [facility] [phone]    3/30/2017 10:20 AM PULMONARY LAB, [facility]- Pulmonary Function Svcs [phone]    3/30/2017 10:40 AM Artem Walter MD [facility] [phone]      Goals (5 Years of Data)     None      Follow-Up and Disposition     Return in about 6 months (around 7/10/2017) for prolia, bmp, vera, dexa before next appt.      [facility]     [facility] - 24/ Assistance  Registered nurses in the [facility] provide clinical advisement, health education, appointment booking, and other advisory services.  Call for this free service at [phone].             Medications           Message regarding Medications     Verify the changes and/or additions to your medication regime listed below are the same as discussed with your clinician today.  If any of these changes or additions are incorrect, please notify your healthcare provider.             Verify that the below list of medications is an accurate representation of the medications  you are currently taking.  If none reported, the list may be blank. If incorrect, please contact your healthcare provider. Carry this list with you in case of emergency.           Current Medications     albuterol (PROAIR HFA) 90 mcg/actuation inhaler Inhale 2 puffs into the lungs every 6 (six) hours as needed.    aspirin (ECOTRIN) 81 MG EC tablet Take 1 tablet (81 mg total) by mouth once daily.    atorvastatin (LIPITOR) 40 MG tablet TAKE ONE TABLET BY MOUTH NIGHTLY    azelastine (ASTELIN) 137 mcg (0.1 %) nasal spray 2 sprays (274 mcg total) by Nasal route 2 (two) times daily.    budesonide-formoterol 160-4.5 mcg (SYMBICORT) 160-4.5 mcg/actuation HFAA Inhale 2 puffs into the lungs every 12 (twelve) hours. Wash out mouth after using    BYSTOLIC 5 mg Tab TAKE ONE TABLET BY MOUTH TWICE DAILY    carboxymethylcellulose (REFRESH PLUS) 0.5 % Dpet Place 1 drop into both eyes 3 (three) times daily as needed.    cevimeline (EVOXAC) 30 mg capsule Take 1 capsule (30 mg total) by mouth 3 (three) times daily.    diclofenac sodium (VOLTAREN) 1 % Gel Apply 2 g topically once daily.    escitalopram oxalate (LEXAPRO) 10 MG tablet TAKE ONE-HALF TO ONE TABLET BY MOUTH EVERY DAY    fluticasone (FLONASE) 50 mcg/actuation nasal spray 2 sprays by Each Nare route once daily.    guaifenesin (MUCINEX) 600 mg 12 hr tablet Take 2 tablets (1,200 mg total) by mouth 2 (two) times daily.    hydroxychloroquine (PLAQUENIL) 200 mg tablet TAKE ONE TABLET BY MOUTH ONCE DAILY    pantoprazole (PROTONIX) 40 MG tablet Take 1 tablet (40 mg total) by mouth once daily.    phenobarb-hyoscy-atropine-scop (BELLADONNA-PHENOBARBITAL) 16.2-0.1037 -0.0194 mg/5 mL Elix Take 5 mLs by mouth daily as needed (abdominal pain).    potassium chloride (KLOR-CON) 10 MEQ TbSR Take 1 tablet (10 mEq total) by mouth once daily.    pramoxine 1 % Foam Place rectally 3 (three) times daily as needed.    PREMARIN vaginal cream PLACE 1 GRAM VAGINALLY TWICE A WEEK    RESTASIS 0.05 %  "ophthalmic emulsion INSTILL ONE DROP INTO EACH EYE TWICE DAILY    rivaroxaban (XARELTO) 20 mg Tab Take 1 tablet (20 mg total) by mouth daily with dinner or evening meal.    tramadol (ULTRAM) 50 mg tablet Take 1 tablet (50 mg total) by mouth 3 (three) times daily as needed.    triamcinolone acetonide 0.1% (KENALOG) 0.1 % ointment AAA bid prn    vitamin D 1000 units Tab Take 2,000 Units by mouth once daily.    benzonatate (TESSALON) 100 MG capsule Take 1 capsule (100 mg total) by mouth 3 (three) times daily as needed for Cough.    dofetilide (TIKOSYN) 125 MCG Cap Take 1 capsule (125 mcg total) by mouth every 12 (twelve) hours.    fish oil-omega-3 fatty acids 300-1,000 mg capsule Take 2 g by mouth once daily.    furosemide (LASIX) 20 MG tablet Take 1 tablet (20 mg total) by mouth as directed.    hyoscyamine (ANASPAZ) 0.125 mg TbDL Take 0.125 mg by mouth every 6 (six) hours as needed for Cramping.    nitrofurantoin (MACRODANTIN) 100 MG capsule TAKE ONE CAPSULE BY MOUTH TWICE DAILY    nitroGLYCERIN (NITROSTAT) 0.4 MG SL tablet Place 1 tablet (0.4 mg total) under the tongue every 5 (five) minutes as needed for Chest pain.           Clinical Reference Information           Vital Signs - Last Recorded  Most recent update: 1/10/2017  9:14 AM by Caleb Carolina LPN    BP Pulse Ht Wt BMI    (!) 134/57 75 5' 2" (1.575 m) 61 kg (134 lb 7.7 oz) 24.6 kg/m2      Blood Pressure          Most Recent Value    BP  (!)  134/57      Allergies as of 1/10/2017     Codeine    Lisinopril    Pacerone [Amiodarone]    Sulfa (Sulfonamide Antibiotics)    Celecoxib    Ciprofloxacin    Colesevelam    Doxycycline    Statins-hmg-coa Reductase Inhibitors      Immunizations Administered on Date of Encounter - 1/10/2017     None      Orders Placed During Today's Visit      Normal Orders This Visit    Ambulatory consult to Physical Therapy     Prior Authorization Order     Future Labs/Procedures Expected by Expires    DXA Bone Density Spine And " Hip_Axial Skeleton  7/10/2017 (Approximate) 3/12/2018    Recurring Lab Work Interval Expires    Basic metabolic panel   3/11/2018      Administrations This Visit     denosumab (PROLIA) injection 60 mg     Admin Date Action Dose Route Administered By             01/10/2017 Given 60 mg Subcutaneous Caleb Carolina, LPN                      Instructions    Biotin for your hair loss    Cont vit D 2000units daily    Cont the plaquenil 200 mg daily     calmag tablets one daily for muscle cramps, also could try tonic water at night 3-4 ounces    Will return in 6 mos for new prolia shot and bone scan    Call your cardiologist if dizzy spells cont or worsen

## 2017-01-10 NOTE — PATIENT INSTRUCTIONS
Biotin for your hair loss    Cont vit D 2000units daily    Cont the plaquenil 200 mg daily     calmag tablets one daily for muscle cramps, also could try tonic water at night 3-4 ounces    Will return in 6 mos for new prolia shot and bone scan    Call your cardiologist if dizzy spells cont or worsen

## 2017-01-10 NOTE — PROGRESS NOTES
Administered 1cc Prolia 60mg/cc to RLQ of abdomen. Labs reviewed: Calcium 10.3 , Creatinine 1.2 . Pt tolerated well. No acute reaction noted to site. Pt instructed on S/S to report. Pt verbalized understanding.     Lot:[id number]  Exp:12/31/2018  Manu:Haresh

## 2017-01-10 NOTE — PROGRESS NOTES
Subjective:       Patient ID: Marcel Montalvo is a 76 y.o. female.    Chief Complaint: Osteoarthritis; Osteoporosis; and Pain    HPI Comments: Mrs. Montalvo is here for follow up today for Sjogrens, Osteoporosis, and osteoarthritis.  She has a positive JACKELYN with negative sjogrens antibodies. She is currently taking HCQ 200mg daily and evoxac 30 mg 3x daily, as well as using restasis eye drops.  She took a break from the evoxac because she wasn't sure if it was causing her stomach upset plus she was having a cardiac ablation (afib/flutter) done and wanted to hold some of her meds while she was taking Tikosyn.  She recently resumed the evoxac and so far is tolerating it well.   Regarding her Osteoporosis she is on Prolia q 6 mos.   Her last Dexa in 2/2015 showed OP with a neck mean T score of -2.5.  She has been on prolia for about a year, previously on fosamax but had significant joint pain and swelling on this medication so was switched to Prolia. She tolerates the injection well.  No recent falls or fractures.    She complains today of neck pain with radiation into her right shoulder and arm, she says she knows she has arthritis in her neck and isn't ready for epidural steroid injections yet.  She also has been having some increased muscle cramps lately. She also states she has had dizzy spells recently while she is standing.  She admits to not drinking much water.  She has bilateral hand morning stiffness that lasts less than 30 minutes.  No new rashes, chest pain, shortness of breath.  No issues with her vision. She started losing some of her hair and wants to know if there is a vitamin she can take for this.     Review of Systems   Constitutional:        Dizzy spells   HENT:        Dry mouth   Eyes:        Dry eyes     Cardiovascular: Negative.    Endocrine: Negative.    Genitourinary: Negative.    Musculoskeletal:        Muscle cramps   Neurological: Positive for dizziness.         Objective:     Visit Vitals    BP  "(!) 134/57    Pulse 75    Ht 5' 2" (1.575 m)    Wt 61 kg (134 lb 7.7 oz)    BMI 24.6 kg/m2        Physical Exam   Constitutional: She is oriented to person, place, and time and well-developed, well-nourished, and in no distress. No distress.   HENT:   Head: Normocephalic.   Eyes: EOM are normal. Pupils are equal, round, and reactive to light.   Neck: Normal range of motion. Neck supple. No thyromegaly present.   No LAD noted   Cardiovascular: Normal rate, regular rhythm and normal heart sounds.  Exam reveals no gallop and no friction rub.    No murmur heard.  Pulmonary/Chest: Breath sounds normal. She has no wheezes. She has no rales. She exhibits no tenderness.   Abdominal: Soft. There is no tenderness. There is no rebound.       Right Side Rheumatological Exam     Examination finds the shoulder, elbow, wrist and knee normal.    Hip Exam   Tenderness Location: no tenderness    Left Side Rheumatological Exam     Examination finds the shoulder, elbow, wrist and knee normal.      Lymphadenopathy:     She has no cervical adenopathy.   Neurological: She is alert and oriented to person, place, and time. She displays normal reflexes. She exhibits normal muscle tone. Gait normal.   Skin: Skin is warm and dry. No rash noted. No erythema.     Psychiatric: Mood, memory and affect normal.   Musculoskeletal: Normal range of motion. She exhibits no edema or tenderness.   maki hands with some Oa changes to dips bilaterally.  Good motion to her maki wrists, mcps, pips.  Good rom to maki knees.           Results for orders placed or performed in visit on 01/06/17   CBC auto differential   Result Value Ref Range    WBC 6.13 3.90 - 12.70 K/uL    RBC 4.18 4.00 - 5.40 M/uL    Hemoglobin 11.5 (L) 12.0 - 16.0 g/dL    Hematocrit 36.9 (L) 37.0 - 48.5 %    MCV 88 82 - 98 fL    MCH 27.5 27.0 - 31.0 pg    MCHC 31.2 (L) 32.0 - 36.0 %    RDW 17.9 (H) 11.5 - 14.5 %    Platelets 198 150 - 350 K/uL    MPV 10.9 9.2 - 12.9 fL    Gran # 3.1 1.8 - " 7.7 K/uL    Lymph # 1.8 1.0 - 4.8 K/uL    Mono # 0.6 0.3 - 1.0 K/uL    Eos # 0.5 0.0 - 0.5 K/uL    Baso # 0.09 0.00 - 0.20 K/uL    Gran% 50.7 38.0 - 73.0 %    Lymph% 29.4 18.0 - 48.0 %    Mono% 9.6 4.0 - 15.0 %    Eosinophil% 8.8 (H) 0.0 - 8.0 %    Basophil% 1.5 0.0 - 1.9 %    Differential Method Automated    Comprehensive metabolic panel   Result Value Ref Range    Sodium 140 136 - 145 mmol/L    Potassium 4.5 3.5 - 5.1 mmol/L    Chloride 102 95 - 110 mmol/L    CO2 30 (H) 23 - 29 mmol/L    Glucose 115 (H) 70 - 110 mg/dL    BUN, Bld 20 8 - 23 mg/dL    Creatinine 1.2 0.5 - 1.4 mg/dL    Calcium 10.3 8.7 - 10.5 mg/dL    Total Protein 7.7 6.0 - 8.4 g/dL    Albumin 4.0 3.5 - 5.2 g/dL    Total Bilirubin 0.6 0.1 - 1.0 mg/dL    Alkaline Phosphatase 89 55 - 135 U/L    AST 30 10 - 40 U/L    ALT 27 10 - 44 U/L    Anion Gap 8 8 - 16 mmol/L    eGFR if African American 50.4 (A) >60 mL/min/1.73 m^2    eGFR if non  43.7 (A) >60 mL/min/1.73 m^2   Sedimentation rate, manual   Result Value Ref Range    Sed Rate 15 0 - 20 mm/Hr   C-reactive protein   Result Value Ref Range    CRP 0.4 0.0 - 8.2 mg/L        Assessment:       1. Osteoporosis, postmenopausal    2. Sjogren's syndrome    3. Medication monitoring encounter    4. Primary osteoarthritis of both hands    5. Radiculopathy affecting upper extremity    6. Chronic neck pain          1. Osteoporosis:  On prolia q 6 mos, previously on fosamax but stopped due to side effects, due today for injection, DEXA 2015 Tscore neck mean -2.5; takes 2000units vit D daily  2. Sjogrens syndrome (+kandy low titer, neg ssa, neg ssb): stable on HCQ 200mg daily, evoxac 30mg daily, restasis eye drops  3. Med monitoring: ok to get prolia, no toxicity from meds- see above- Calcium nl  4. Neck pain with radiation into right arm:  Takes tylenol prn for pain, DDD in c spine  5.  OA of maki hands stable, no acute swollen or tender joints  Plan:       1.  OK for prolia today, cont q 6 mos, new dexa  before next visit in july   2. Continue hcq 200 mg daily and evoxac 30mg tid, if start to develop upset stomach dc evoxac, will cont hcq for now  3. rec tonic water 3-4 oz at bedtime or calmag for muscle cramps  4.  Referral to Physical Therapy for balance training  5.  Check with cardiologist may need to eval for dizziness, drink more water    Return in 6 mos for prolia, bmp, and bone scan, see vera, will review dexa next visit

## 2017-03-08 ENCOUNTER — OFFICE VISIT (OUTPATIENT)
Dept: CARDIOLOGY | Facility: CLINIC | Age: 77
End: 2017-03-08
Payer: MEDICARE

## 2017-03-08 VITALS
HEIGHT: 62 IN | SYSTOLIC BLOOD PRESSURE: 158 MMHG | WEIGHT: 135.13 LBS | DIASTOLIC BLOOD PRESSURE: 84 MMHG | HEART RATE: 72 BPM | BODY MASS INDEX: 24.87 KG/M2

## 2017-03-08 DIAGNOSIS — Z79.01 LONG-TERM (CURRENT) USE OF ANTICOAGULANTS: ICD-10-CM

## 2017-03-08 DIAGNOSIS — Z98.890 S/P CAROTID ENDARTERECTOMY: ICD-10-CM

## 2017-03-08 DIAGNOSIS — R00.2 PALPITATIONS: Primary | ICD-10-CM

## 2017-03-08 DIAGNOSIS — E11.9 TYPE 2 DIABETES MELLITUS WITHOUT COMPLICATION, WITHOUT LONG-TERM CURRENT USE OF INSULIN: ICD-10-CM

## 2017-03-08 DIAGNOSIS — I65.23 BILATERAL CAROTID ARTERY STENOSIS: ICD-10-CM

## 2017-03-08 DIAGNOSIS — I48.19 PERSISTENT ATRIAL FIBRILLATION: ICD-10-CM

## 2017-03-08 DIAGNOSIS — E11.9 DIABETES MELLITUS TYPE 2 WITHOUT RETINOPATHY: ICD-10-CM

## 2017-03-08 DIAGNOSIS — I35.1 NONRHEUMATIC AORTIC VALVE INSUFFICIENCY: ICD-10-CM

## 2017-03-08 DIAGNOSIS — Z79.01 ON ANTICOAGULANT THERAPY: ICD-10-CM

## 2017-03-08 DIAGNOSIS — Z98.61 S/P PTCA (PERCUTANEOUS TRANSLUMINAL CORONARY ANGIOPLASTY): ICD-10-CM

## 2017-03-08 DIAGNOSIS — Z95.1 S/P CABG (CORONARY ARTERY BYPASS GRAFT): ICD-10-CM

## 2017-03-08 DIAGNOSIS — I25.810 CORONARY ARTERY DISEASE INVOLVING CORONARY BYPASS GRAFT OF NATIVE HEART WITHOUT ANGINA PECTORIS: ICD-10-CM

## 2017-03-08 DIAGNOSIS — I10 ESSENTIAL HYPERTENSION: ICD-10-CM

## 2017-03-08 PROCEDURE — 99214 OFFICE O/P EST MOD 30 MIN: CPT | Mod: S$PBB,,, | Performed by: INTERNAL MEDICINE

## 2017-03-08 PROCEDURE — 99213 OFFICE O/P EST LOW 20 MIN: CPT | Mod: PBBFAC,PO,25 | Performed by: INTERNAL MEDICINE

## 2017-03-08 PROCEDURE — 93010 ELECTROCARDIOGRAM REPORT: CPT | Mod: S$PBB,,, | Performed by: INTERNAL MEDICINE

## 2017-03-08 PROCEDURE — 99999 PR PBB SHADOW E&M-EST. PATIENT-LVL III: CPT | Mod: PBBFAC,,, | Performed by: INTERNAL MEDICINE

## 2017-03-08 PROCEDURE — 93005 ELECTROCARDIOGRAM TRACING: CPT | Mod: PBBFAC,PO | Performed by: INTERNAL MEDICINE

## 2017-03-08 NOTE — MR AVS SNAPSHOT
[facility] - Card[facility]  [address] [facility] [place] [address]  Phone: [phone]  Fax: [phone]                  Marcel Montalvo   3/8/2017 9:15 AM   Office Visit    Description:  Female : 1940   Provider:  Kinjal Fltecher MD   Department:  [facility] - Card[facility]           Reason for Visit     Palpitations     Shortness of Breath     Fatigue           Diagnoses this Visit        Comments    Palpitations    -  Primary     Nonrheumatic aortic valve insufficiency         S/P PTCA (percutaneous transluminal coronary angioplasty)         Long-term (current) use of anticoagulants         Essential hypertension         S/P CABG (coronary artery bypass graft)         S/P carotid endarterectomy         Persistent atrial fibrillation         Bilateral carotid artery stenosis         Coronary artery disease involving coronary bypass graft of native heart without angina pectoris         Type 2 diabetes mellitus without complication, without long-term current use of insulin         On anticoagulant therapy         Diabetes mellitus type 2 without retinopathy                To Do List           Future Appointments        Provider Department Dept Phone    3/20/2017 8:00 AM FIELDS, VISUAL-ONE [facility] Ophthalmology [phone]    3/20/2017 8:30 AM Varghese Arambula MD [facility] Ophthalmology [phone]    3/30/2017 10:00 AM [facility] XR2 [facility] [phone]    3/30/2017 10:20 AM PULMONARY LAB, [facility]- Pulmonary Function Svcs [phone]    3/30/2017 10:40 AM Artem Walter MD [facility] - Pulmonary Services [phone]      Goals (5 Years of Data)     None      Follow-Up and Disposition     Return in about 6 months (around 2017).      [facility]     [facility] -  Assistance  Registered nurses in the [facility] provide clinical advisement, health education, appointment booking, and other advisory services.  Call for this free service at  [phone].             Medications           Message regarding Medications     Verify the changes and/or additions to your medication regime listed below are the same as discussed with your clinician today.  If any of these changes or additions are incorrect, please notify your healthcare provider.        STOP taking these medications     cevimeline (EVOXAC) 30 mg capsule Take 1 capsule (30 mg total) by mouth 3 (three) times daily.    fish oil-omega-3 fatty acids 300-1,000 mg capsule Take 2 g by mouth once daily.    nitrofurantoin (MACRODANTIN) 100 MG capsule TAKE ONE CAPSULE BY MOUTH TWICE DAILY           Verify that the below list of medications is an accurate representation of the medications you are currently taking.  If none reported, the list may be blank. If incorrect, please contact your healthcare provider. Carry this list with you in case of emergency.           Current Medications     albuterol (PROAIR HFA) 90 mcg/actuation inhaler Inhale 2 puffs into the lungs every 6 (six) hours as needed.    aspirin (ECOTRIN) 81 MG EC tablet Take 1 tablet (81 mg total) by mouth once daily.    atorvastatin (LIPITOR) 40 MG tablet TAKE ONE TABLET BY MOUTH NIGHTLY    azelastine (ASTELIN) 137 mcg (0.1 %) nasal spray 2 sprays (274 mcg total) by Nasal route 2 (two) times daily.    benzonatate (TESSALON) 100 MG capsule Take 1 capsule (100 mg total) by mouth 3 (three) times daily as needed for Cough.    budesonide-formoterol 160-4.5 mcg (SYMBICORT) 160-4.5 mcg/actuation HFAA Inhale 2 puffs into the lungs every 12 (twelve) hours. Wash out mouth after using    BYSTOLIC 5 mg Tab TAKE ONE TABLET BY MOUTH TWICE DAILY    carboxymethylcellulose (REFRESH PLUS) 0.5 % Dpet Place 1 drop into both eyes 3 (three) times daily as needed.    diclofenac sodium (VOLTAREN) 1 % Gel Apply 2 g topically once daily.    escitalopram oxalate (LEXAPRO) 10 MG tablet TAKE ONE-HALF TO ONE TABLET BY MOUTH EVERY DAY    fluticasone (FLONASE) 50  "mcg/actuation nasal spray 2 sprays by Each Nare route once daily.    furosemide (LASIX) 20 MG tablet Take 1 tablet (20 mg total) by mouth as directed.    hydroxychloroquine (PLAQUENIL) 200 mg tablet TAKE ONE TABLET BY MOUTH ONCE DAILY    hyoscyamine (ANASPAZ) 0.125 mg TbDL Take 0.125 mg by mouth every 6 (six) hours as needed for Cramping.    nitroGLYCERIN (NITROSTAT) 0.4 MG SL tablet Place 1 tablet (0.4 mg total) under the tongue every 5 (five) minutes as needed for Chest pain.    pantoprazole (PROTONIX) 40 MG tablet Take 1 tablet (40 mg total) by mouth once daily.    phenobarb-hyoscy-atropine-scop (BELLADONNA-PHENOBARBITAL) 16.2-0.1037 -0.0194 mg/5 mL Elix Take 5 mLs by mouth daily as needed (abdominal pain).    potassium chloride (KLOR-CON) 10 MEQ TbSR Take 1 tablet (10 mEq total) by mouth once daily.    pramoxine 1 % Foam Place rectally 3 (three) times daily as needed.    PREMARIN vaginal cream PLACE 1 GRAM VAGINALLY TWICE A WEEK    RESTASIS 0.05 % ophthalmic emulsion INSTILL ONE DROP INTO EACH EYE TWICE DAILY    rivaroxaban (XARELTO) 20 mg Tab Take 1 tablet (20 mg total) by mouth daily with dinner or evening meal.    tramadol (ULTRAM) 50 mg tablet Take 1 tablet (50 mg total) by mouth 3 (three) times daily as needed.    triamcinolone acetonide 0.1% (KENALOG) 0.1 % ointment AAA bid prn    vitamin D 1000 units Tab Take 2,000 Units by mouth once daily.    guaifenesin (MUCINEX) 600 mg 12 hr tablet Take 2 tablets (1,200 mg total) by mouth 2 (two) times daily.           Clinical Reference Information           Your Vitals Were     BP Pulse Height Weight BMI    158/84 (BP Location: Left arm, Patient Position: Sitting) 72 5' 2" (1.575 m) 61.3 kg (135 lb 2.3 oz) 24.72 kg/m2      Blood Pressure          Most Recent Value    Right Arm BP - Sitting  170/82    Left Arm BP - Sitting  158/84    BP  (!)  158/84      Allergies as of 3/8/2017     Codeine    Lisinopril    Pacerone [Amiodarone]    Sulfa (Sulfonamide Antibiotics)    " Celecoxib    Ciprofloxacin    Colesevelam    Doxycycline    Statins-hmg-coa Reductase Inhibitors      Immunizations Administered on Date of Encounter - 3/8/2017     None      Orders Placed During Today's Visit      Normal Orders This Visit    IN OFFICE EKG 12-LEAD (to Muse)       Language Assistance Services     ATTENTION: Language assistance services are available, free of charge. Please call [phone].      ATENCIÓN: Si habla español, tiene a marte disposición servicios gratuitos de asistencia lingüística. Llame al [phone].     CHÚ Ý: N?u b?n nói Ti?ng Vi?t, có các d?ch v? h? tr? ngôn ng? mi?n phí dành cho b?n. G?i s? [phone].         [facility] complies with applicable Federal civil rights laws and does not discriminate on the basis of race, color, national origin, age, disability, or sex.

## 2017-03-08 NOTE — PROGRESS NOTES
Subjective:   Patient ID:  Marcel Montalvo is a 76 y.o. female who presents for follow up of Palpitations; Shortness of Breath; and Fatigue      HPI  A 77 yo female with h/o aflutter ablation afib s/p cardioversion has been off tikosyn since December. She has been feeling bad tired fatigued short of breath with decrease exercise tolerance over the past few weeks. She thinks this occurred 2 weeks post stopping tikosyn.has chest pain that she took nitro s/l for it last dose 2 weeks ago. She has no syncope near syncope. She ahs no  Issues sleeping at nite no orthopnea pnd. She has heart burn.no leg swelling except intermittently has joint pain.she is not  compliant with diet . Has elevated bp because she is here in the office(bp usually goes high).  Past Medical History:   Diagnosis Date    *Atrial fibrillation     AF (atrial fibrillation)     Palpation: atrial fibrillation, on Coumadin and followed by cardiologist.    Anticoagulant long-term use     Anxiety     Aortic insufficiency 3/19/2014    Aortic regurgitation     Aortic regurgitation/tricuspid regurgitation per MARIO in April 2007.     Asthma     Carotid artery stenosis     CEA on the left by Dr. Maciel    Coronary artery disease     status post CABG X 3.     Diabetes mellitus     GERD (gastroesophageal reflux disease)     H. pylori infection     treated    Hyperlipidemia     intolerant to statins.    Hypertension 8/7/2013    IBS (irritable bowel syndrome)     Leg pain 3/19/2014    Long-term (current) use of anticoagulants 8/7/2013    Osteoarthritis     Osteoarthritis     Osteopenia     DEXA scan done on 06/20/12     Pancreatitis     resolved    S/P CABG (coronary artery bypass graft) 8/7/2013    S/P carotid endarterectomy 8/7/2013    S/P PTCA (percutaneous transluminal coronary angioplasty) 3/19/2014    Sjogren's syndrome     Stroke     Vitamin D deficiency disease        Past Surgical History:   Procedure Laterality Date    ABDOMINAL  SURGERY      APPENDECTOMY      bilateral cataract surgery      CARPAL TUNNEL RELEASE      RT    CATARACT EXTRACTION      CEA      left    CHOLECYSTECTOMY      CORONARY ARTERY BYPASS GRAFT      2 stents    EYE SURGERY      HYSTERECTOMY      TONSILLECTOMY         Social History   Substance Use Topics    Smoking status: Former Smoker     Packs/day: 0.50     Years: 25.00     Quit date: 12/17/1988    Smokeless tobacco: Never Used    Alcohol use 0.0 oz/week     0 Standard drinks or equivalent per week      Comment: wine occasionally       Family History   Problem Relation Age of Onset    Alzheimer's disease Mother     Hyperlipidemia Mother     Cancer Father      lung    Heart disease Maternal Uncle     Cancer Brother      stomach    Cancer Paternal Grandmother     Cancer Paternal Grandfather      stomach       Current Outpatient Prescriptions   Medication Sig    albuterol (PROAIR HFA) 90 mcg/actuation inhaler Inhale 2 puffs into the lungs every 6 (six) hours as needed.    aspirin (ECOTRIN) 81 MG EC tablet Take 1 tablet (81 mg total) by mouth once daily.    atorvastatin (LIPITOR) 40 MG tablet TAKE ONE TABLET BY MOUTH NIGHTLY    azelastine (ASTELIN) 137 mcg (0.1 %) nasal spray 2 sprays (274 mcg total) by Nasal route 2 (two) times daily.    benzonatate (TESSALON) 100 MG capsule Take 1 capsule (100 mg total) by mouth 3 (three) times daily as needed for Cough.    budesonide-formoterol 160-4.5 mcg (SYMBICORT) 160-4.5 mcg/actuation HFAA Inhale 2 puffs into the lungs every 12 (twelve) hours. Wash out mouth after using    BYSTOLIC 5 mg Tab TAKE ONE TABLET BY MOUTH TWICE DAILY (Patient taking differently: TAKE ONE TABLET BY MOUTH DAILY)    carboxymethylcellulose (REFRESH PLUS) 0.5 % Dpet Place 1 drop into both eyes 3 (three) times daily as needed.    diclofenac sodium (VOLTAREN) 1 % Gel Apply 2 g topically once daily.    escitalopram oxalate (LEXAPRO) 10 MG tablet TAKE ONE-HALF TO ONE TABLET BY MOUTH  EVERY DAY    fluticasone (FLONASE) 50 mcg/actuation nasal spray 2 sprays by Each Nare route once daily.    furosemide (LASIX) 20 MG tablet Take 1 tablet (20 mg total) by mouth as directed.    hydroxychloroquine (PLAQUENIL) 200 mg tablet TAKE ONE TABLET BY MOUTH ONCE DAILY    hyoscyamine (ANASPAZ) 0.125 mg TbDL Take 0.125 mg by mouth every 6 (six) hours as needed for Cramping.    nitroGLYCERIN (NITROSTAT) 0.4 MG SL tablet Place 1 tablet (0.4 mg total) under the tongue every 5 (five) minutes as needed for Chest pain.    pantoprazole (PROTONIX) 40 MG tablet Take 1 tablet (40 mg total) by mouth once daily.    phenobarb-hyoscy-atropine-scop (BELLADONNA-PHENOBARBITAL) 16.2-0.1037 -0.0194 mg/5 mL Elix Take 5 mLs by mouth daily as needed (abdominal pain).    potassium chloride (KLOR-CON) 10 MEQ TbSR Take 1 tablet (10 mEq total) by mouth once daily.    pramoxine 1 % Foam Place rectally 3 (three) times daily as needed.    PREMARIN vaginal cream PLACE 1 GRAM VAGINALLY TWICE A WEEK    RESTASIS 0.05 % ophthalmic emulsion INSTILL ONE DROP INTO EACH EYE TWICE DAILY    rivaroxaban (XARELTO) 20 mg Tab Take 1 tablet (20 mg total) by mouth daily with dinner or evening meal.    tramadol (ULTRAM) 50 mg tablet Take 1 tablet (50 mg total) by mouth 3 (three) times daily as needed.    triamcinolone acetonide 0.1% (KENALOG) 0.1 % ointment AAA bid prn    vitamin D 1000 units Tab Take 2,000 Units by mouth once daily.    guaifenesin (MUCINEX) 600 mg 12 hr tablet Take 2 tablets (1,200 mg total) by mouth 2 (two) times daily. (Patient taking differently: Take 1,200 mg by mouth 2 (two) times daily. As needed for flares)     Current Facility-Administered Medications   Medication    denosumab (PROLIA) injection 60 mg     Current Outpatient Prescriptions on File Prior to Visit   Medication Sig    albuterol (PROAIR HFA) 90 mcg/actuation inhaler Inhale 2 puffs into the lungs every 6 (six) hours as needed.    aspirin (ECOTRIN) 81 MG EC  tablet Take 1 tablet (81 mg total) by mouth once daily.    atorvastatin (LIPITOR) 40 MG tablet TAKE ONE TABLET BY MOUTH NIGHTLY    azelastine (ASTELIN) 137 mcg (0.1 %) nasal spray 2 sprays (274 mcg total) by Nasal route 2 (two) times daily.    benzonatate (TESSALON) 100 MG capsule Take 1 capsule (100 mg total) by mouth 3 (three) times daily as needed for Cough.    budesonide-formoterol 160-4.5 mcg (SYMBICORT) 160-4.5 mcg/actuation HFAA Inhale 2 puffs into the lungs every 12 (twelve) hours. Wash out mouth after using    BYSTOLIC 5 mg Tab TAKE ONE TABLET BY MOUTH TWICE DAILY (Patient taking differently: TAKE ONE TABLET BY MOUTH DAILY)    carboxymethylcellulose (REFRESH PLUS) 0.5 % Dpet Place 1 drop into both eyes 3 (three) times daily as needed.    diclofenac sodium (VOLTAREN) 1 % Gel Apply 2 g topically once daily.    escitalopram oxalate (LEXAPRO) 10 MG tablet TAKE ONE-HALF TO ONE TABLET BY MOUTH EVERY DAY    fluticasone (FLONASE) 50 mcg/actuation nasal spray 2 sprays by Each Nare route once daily.    furosemide (LASIX) 20 MG tablet Take 1 tablet (20 mg total) by mouth as directed.    hydroxychloroquine (PLAQUENIL) 200 mg tablet TAKE ONE TABLET BY MOUTH ONCE DAILY    hyoscyamine (ANASPAZ) 0.125 mg TbDL Take 0.125 mg by mouth every 6 (six) hours as needed for Cramping.    nitroGLYCERIN (NITROSTAT) 0.4 MG SL tablet Place 1 tablet (0.4 mg total) under the tongue every 5 (five) minutes as needed for Chest pain.    pantoprazole (PROTONIX) 40 MG tablet Take 1 tablet (40 mg total) by mouth once daily.    phenobarb-hyoscy-atropine-scop (BELLADONNA-PHENOBARBITAL) 16.2-0.1037 -0.0194 mg/5 mL Elix Take 5 mLs by mouth daily as needed (abdominal pain).    potassium chloride (KLOR-CON) 10 MEQ TbSR Take 1 tablet (10 mEq total) by mouth once daily.    pramoxine 1 % Foam Place rectally 3 (three) times daily as needed.    PREMARIN vaginal cream PLACE 1 GRAM VAGINALLY TWICE A WEEK    RESTASIS 0.05 % ophthalmic  emulsion INSTILL ONE DROP INTO EACH EYE TWICE DAILY    rivaroxaban (XARELTO) 20 mg Tab Take 1 tablet (20 mg total) by mouth daily with dinner or evening meal.    tramadol (ULTRAM) 50 mg tablet Take 1 tablet (50 mg total) by mouth 3 (three) times daily as needed.    triamcinolone acetonide 0.1% (KENALOG) 0.1 % ointment AAA bid prn    vitamin D 1000 units Tab Take 2,000 Units by mouth once daily.    guaifenesin (MUCINEX) 600 mg 12 hr tablet Take 2 tablets (1,200 mg total) by mouth 2 (two) times daily. (Patient taking differently: Take 1,200 mg by mouth 2 (two) times daily. As needed for flares)    [DISCONTINUED] cevimeline (EVOXAC) 30 mg capsule Take 1 capsule (30 mg total) by mouth 3 (three) times daily.    [DISCONTINUED] fish oil-omega-3 fatty acids 300-1,000 mg capsule Take 2 g by mouth once daily.    [DISCONTINUED] nitrofurantoin (MACRODANTIN) 100 MG capsule TAKE ONE CAPSULE BY MOUTH TWICE DAILY     Current Facility-Administered Medications on File Prior to Visit   Medication    denosumab (PROLIA) injection 60 mg     Review of patient's allergies indicates:   Allergen Reactions    Codeine Nausea And Vomiting    Lisinopril      Other reaction(s): cough    Pacerone [amiodarone] Nausea And Vomiting    Sulfa (sulfonamide antibiotics) Nausea And Vomiting    Celecoxib Palpitations    Ciprofloxacin Hives, Itching and Rash    Colesevelam Rash and Nausea And Vomiting    Doxycycline Rash    Statins-hmg-coa reductase inhibitors Rash     Myalgia, stomach upset       ROS  Constitution: Negative for chills, decreased appetite, fever, weakness and malaise/fatigue.   HENT: Positive for headaches. Negative for congestion, hoarse voice and sore throat.   Post-nasal drip  Eyes: Negative for blurred vision and discharge.   Cardiovascular: Positive for dyspnea on exertion chest pain and palpitation . Negative for , claudication, cyanosis, leg swelling, near-syncope, orthopnea, and paroxysmal nocturnal dyspnea.  "  Respiratory: Positive for cough and shortness of breath. Negative for hemoptysis, snoring, sputum production and wheezing.   Endocrine: Negative for cold intolerance and heat intolerance.   Hot flashes  Hematologic/Lymphatic: Negative for bleeding problem. Does not bruise/bleed easily.   Skin: Negative for rash.   Musculoskeletal: Positive for arthritis, back pain, joint pain and neck pain. Negative for joint swelling, muscle cramps, muscle weakness and myalgias.   Chest discomfort muscular in nature, soreness   Gastrointestinal: Negative for abdominal pain, constipation, diarrhea, heartburn, melena and nausea. Positive reflux symptoms.  Genitourinary: Negative for hematuria.   Neurological: Negative for dizziness, focal weakness, light-headedness, loss of balance, numbness, paresthesias and seizures.   Psychiatric/Behavioral: Negative for memory loss. The patient does not have insomnia.   Allergic/Immunologic: Negative for hives.   Objective:   Physical Exam  Vitals:    03/08/17 0951 03/08/17 0958   BP: (!) 170/82 (!) 158/84   BP Location: Right arm Left arm   Patient Position: Sitting Sitting   Pulse: 72    Weight: 61.3 kg (135 lb 2.3 oz)    Height: 5' 2" (1.575 m)    Constitutional: She is oriented to person, place, and time. She appears well-developed and well-nourished. She does not appear ill. No distress.   HENT:   Head: Normocephalic and atraumatic.   Eyes: EOM are normal. Pupils are equal, round, and reactive to light. Right eye exhibits no discharge. Left eye exhibits no discharge. No scleral icterus.   Neck: Neck supple. Normal carotid pulses, no hepatojugular reflux and no JVD present. Carotid bruit is not present. No tracheal deviation present. No thyromegaly present.   Scar cea well healed.   Cardiovascular: Normal rate, irregularily irregular rhythm, intact distal pulses and normal pulses. Exam reveals no gallop and no friction rub.   Murmur heard.   High-pitched blowing decrescendo early diastolic " murmur is present with a grade of 1/6 at the upper right sternal border radiating to the apex   Pulses:   Carotid pulses are on the right side with bruit, and on the left side with bruit.   Femoral pulses are on the right side with bruit, and on the left side with bruit.  Abdominal bruits noted.   Pulmonary/Chest: Effort normal and breath sounds normal. No respiratory distress. She has no wheezes. She has no rhonchi. She has no rales. She exhibits no tenderness.   Scar cabg well healed.   Abdominal: Soft. Normal appearance, normal aorta and bowel sounds are normal. She exhibits no distension, no abdominal bruit, no ascites and no pulsatile midline mass. There is no hepatomegaly. There is no tenderness.   Musculoskeletal: She exhibits no edema.   Right shoulder: She exhibits no deformity.   Neurological: She is alert and oriented to person, place, and time. She has normal strength. No cranial nerve deficit. Coordination normal.   Skin: Skin is warm and dry. No rash noted. She is not diaphoretic. No cyanosis or erythema. Nails show no clubbing.   Psychiatric: She has a normal mood and affect. Her speech is normal and behavior is normal.   Lab Results   Component Value Date    CHOL 187 03/14/2016    CHOL 199 01/05/2016    CHOL 203 (H) 09/18/2015     Lab Results   Component Value Date    HDL 55 03/14/2016    HDL 64 01/05/2016    HDL 57 09/18/2015     Lab Results   Component Value Date    LDLCALC 103.0 03/14/2016    LDLCALC 109.8 01/05/2016    LDLCALC 120.2 09/18/2015     Lab Results   Component Value Date    TRIG 145 03/14/2016    TRIG 126 01/05/2016    TRIG 129 09/18/2015     Lab Results   Component Value Date    CHOLHDL 29.4 03/14/2016    CHOLHDL 32.2 01/05/2016    CHOLHDL 28.1 09/18/2015       Chemistry        Component Value Date/Time     01/06/2017 0836    K 4.5 01/06/2017 0836     01/06/2017 0836    CO2 30 (H) 01/06/2017 0836    BUN 20 01/06/2017 0836    CREATININE 1.2 01/06/2017 0836     (H)  01/06/2017 0836        Component Value Date/Time    CALCIUM 10.3 01/06/2017 0836    ALKPHOS 89 01/06/2017 0836    AST 30 01/06/2017 0836    ALT 27 01/06/2017 0836    BILITOT 0.6 01/06/2017 0836          Lab Results   Component Value Date    TSH 3.720 09/06/2016     Lab Results   Component Value Date    INR 1.8 (H) 09/27/2016    INR 1.4 (H) 09/26/2016    INR 1.3 (H) 09/26/2016     Lab Results   Component Value Date    WBC 6.13 01/06/2017    HGB 11.5 (L) 01/06/2017    HCT 36.9 (L) 01/06/2017    MCV 88 01/06/2017     01/06/2017     BMP  Sodium   Date Value Ref Range Status   01/06/2017 140 136 - 145 mmol/L Final     Potassium   Date Value Ref Range Status   01/06/2017 4.5 3.5 - 5.1 mmol/L Final     Chloride   Date Value Ref Range Status   01/06/2017 102 95 - 110 mmol/L Final     CO2   Date Value Ref Range Status   01/06/2017 30 (H) 23 - 29 mmol/L Final     BUN, Bld   Date Value Ref Range Status   01/06/2017 20 8 - 23 mg/dL Final     Creatinine   Date Value Ref Range Status   01/06/2017 1.2 0.5 - 1.4 mg/dL Final     Calcium   Date Value Ref Range Status   01/06/2017 10.3 8.7 - 10.5 mg/dL Final     Anion Gap   Date Value Ref Range Status   01/06/2017 8 8 - 16 mmol/L Final     eGFR if    Date Value Ref Range Status   01/06/2017 50.4 (A) >60 mL/min/1.73 m^2 Final     eGFR if non    Date Value Ref Range Status   01/06/2017 43.7 (A) >60 mL/min/1.73 m^2 Final     Comment:     Calculation used to obtain the estimated glomerular filtration  rate (eGFR) is the CKD-EPI equation. Since race is unknown   in our information system, the eGFR values for   -American and Non--American patients are given   for each creatinine result.       CrCl cannot be calculated (Patient has no serum creatinine result on file.).    Assessment:     1. Palpitations    2. Nonrheumatic aortic valve insufficiency    3. S/P PTCA (percutaneous transluminal coronary angioplasty)    4. Long-term (current)  use of anticoagulants    5. Essential hypertension    6. S/P CABG (coronary artery bypass graft)    7. S/P carotid endarterectomy    8. Persistent atrial fibrillation    9. Bilateral carotid artery stenosis    10. Coronary artery disease involving coronary bypass graft of native heart without angina pectoris    11. Type 2 diabetes mellitus without complication, without long-term current use of insulin    12. On anticoagulant therapy    13. Diabetes mellitus type 2 without retinopathy      Back in symptomatic afib. Will continue anticoagulation get her compliant with salt intake and discuss with ep cardioversion with using tikosymn again.  Plan:   Continue current therapy  Low salt diet   Discuss with DR PRASAD NEXT STEP IN TEHRAPY WITH TIKOSYN LOADING AGAIN AND CARDIOVERSION VS CARDIOVERSION.VS ABLATION

## 2017-03-09 DIAGNOSIS — I48.0 PAF (PAROXYSMAL ATRIAL FIBRILLATION): Primary | ICD-10-CM

## 2017-03-09 RX ORDER — DOFETILIDE 0.12 MG/1
125 CAPSULE ORAL EVERY 12 HOURS
Qty: 60 CAPSULE | Refills: 6 | Status: SHIPPED | OUTPATIENT
Start: 2017-03-09 | End: 2017-03-14 | Stop reason: ALTCHOICE

## 2017-03-09 RX ORDER — DOFETILIDE 0.12 MG/1
125 CAPSULE ORAL EVERY 12 HOURS
COMMUNITY
End: 2017-03-09 | Stop reason: SDUPTHER

## 2017-03-09 NOTE — TELEPHONE ENCOUNTER
----- Message from Kinjal Fletcher MD sent at 3/8/2017  8:48 PM CST -----  Ok KIRT WE CAN MAKE IT HAPPEN PLEASE.. PLEASE CALL PATIENT   ----- Message -----     From: Kirt Cardoso LPN     Sent: 3/8/2017   6:27 PM       To: Kinjal Fletcher MD        ----- Message -----     From: Virgilio Perez MD     Sent: 3/8/2017   6:06 PM       To: Kirt Cardoso LPN    We can restart Tikosyn 125 bid  First dose Monday in clinic. ECG before and 2 hrs after   Do the same Tuesday and wenesday  ----- Message -----     From: Kirt Cardoso LPN     Sent: 3/8/2017  10:28 AM       To: MD Dr. Cabrera Solis  Please call Dr. Fletcher regarding this lady.  She's in A-fib now  Had to ablations for Fib vs Flutter and CV with Tikosyn stopped in December.  Currently on Bystolic    Thanks,   Kirt

## 2017-03-13 ENCOUNTER — CLINICAL SUPPORT (OUTPATIENT)
Dept: CARDIOLOGY | Facility: CLINIC | Age: 77
DRG: 291 | End: 2017-03-13
Payer: MEDICARE

## 2017-03-13 ENCOUNTER — DOCUMENTATION ONLY (OUTPATIENT)
Dept: CARDIOLOGY | Facility: CLINIC | Age: 77
End: 2017-03-13

## 2017-03-13 DIAGNOSIS — I48.0 PAF (PAROXYSMAL ATRIAL FIBRILLATION): ICD-10-CM

## 2017-03-13 PROCEDURE — 93005 ELECTROCARDIOGRAM TRACING: CPT | Mod: PBBFAC,PO | Performed by: NUCLEAR MEDICINE

## 2017-03-13 PROCEDURE — 93010 ELECTROCARDIOGRAM REPORT: CPT | Mod: 76,S$PBB,, | Performed by: NUCLEAR MEDICINE

## 2017-03-13 PROCEDURE — 93010 ELECTROCARDIOGRAM REPORT: CPT | Mod: S$PBB,,, | Performed by: NUCLEAR MEDICINE

## 2017-03-13 NOTE — PROGRESS NOTES
9:00 AM  Patient here to begin Tikosyn Therapy -Ekg performed prior to starting 125 mg Tikosyn every 12 hours.  Copy of Ekg faxed to both Dr. Fletcher and Cabrera.  9:50 AM   Patient advised will proceed with taking Tikosyn 125 mg and wait for repeat Ekg in 2 hours  11:45 AM   140/70 right arm  150/74 left arm and pulse 72  Ekg performed and patient instructed to return in the morning as planned and continue Tikosyn every 12 hours

## 2017-03-14 ENCOUNTER — HOSPITAL ENCOUNTER (INPATIENT)
Facility: HOSPITAL | Age: 77
LOS: 4 days | Discharge: HOME | DRG: 291 | End: 2017-03-18
Attending: SPECIALIST | Admitting: EMERGENCY MEDICINE
Payer: MEDICARE

## 2017-03-14 DIAGNOSIS — Z79.01 LONG-TERM (CURRENT) USE OF ANTICOAGULANTS: ICD-10-CM

## 2017-03-14 DIAGNOSIS — I48.19 PERSISTENT ATRIAL FIBRILLATION: ICD-10-CM

## 2017-03-14 DIAGNOSIS — K64.9 HEMORRHOIDS, UNSPECIFIED HEMORRHOID TYPE: ICD-10-CM

## 2017-03-14 DIAGNOSIS — Z98.890 S/P CAROTID ENDARTERECTOMY: ICD-10-CM

## 2017-03-14 DIAGNOSIS — F41.1 GAD (GENERALIZED ANXIETY DISORDER): ICD-10-CM

## 2017-03-14 DIAGNOSIS — J96.01 ACUTE RESPIRATORY FAILURE WITH HYPOXIA: ICD-10-CM

## 2017-03-14 DIAGNOSIS — D84.9 IMMUNOCOMPROMISED: ICD-10-CM

## 2017-03-14 DIAGNOSIS — Z95.1 S/P CABG (CORONARY ARTERY BYPASS GRAFT): ICD-10-CM

## 2017-03-14 DIAGNOSIS — M17.0 PRIMARY OSTEOARTHRITIS OF BOTH KNEES: Chronic | ICD-10-CM

## 2017-03-14 DIAGNOSIS — Z79.01 ON ANTICOAGULANT THERAPY: ICD-10-CM

## 2017-03-14 DIAGNOSIS — M54.10 RADICULOPATHY AFFECTING UPPER EXTREMITY: Chronic | ICD-10-CM

## 2017-03-14 DIAGNOSIS — H10.13 ALLERGIC CONJUNCTIVITIS OF BOTH EYES: ICD-10-CM

## 2017-03-14 DIAGNOSIS — G89.29 CHRONIC NECK PAIN: Chronic | ICD-10-CM

## 2017-03-14 DIAGNOSIS — H40.003 GLAUCOMA SUSPECT OF BOTH EYES: ICD-10-CM

## 2017-03-14 DIAGNOSIS — K62.5 RECTAL BLEEDING: ICD-10-CM

## 2017-03-14 DIAGNOSIS — Z96.1 LENS REPLACED BY OTHER MEANS: ICD-10-CM

## 2017-03-14 DIAGNOSIS — R09.02 HYPOXEMIA: ICD-10-CM

## 2017-03-14 DIAGNOSIS — M35.00 SJOGREN'S SYNDROME: ICD-10-CM

## 2017-03-14 DIAGNOSIS — I50.9 HEART FAILURE: ICD-10-CM

## 2017-03-14 DIAGNOSIS — Z96.1 PSEUDOPHAKIA: ICD-10-CM

## 2017-03-14 DIAGNOSIS — I25.810 CORONARY ARTERY DISEASE INVOLVING CORONARY BYPASS GRAFT OF NATIVE HEART WITHOUT ANGINA PECTORIS: ICD-10-CM

## 2017-03-14 DIAGNOSIS — E11.9 DIABETES MELLITUS TYPE 2 WITHOUT RETINOPATHY: ICD-10-CM

## 2017-03-14 DIAGNOSIS — Z98.61 S/P PTCA (PERCUTANEOUS TRANSLUMINAL CORONARY ANGIOPLASTY): ICD-10-CM

## 2017-03-14 DIAGNOSIS — M54.2 CHRONIC NECK PAIN: Chronic | ICD-10-CM

## 2017-03-14 DIAGNOSIS — M19.041 PRIMARY OSTEOARTHRITIS OF BOTH HANDS: Chronic | ICD-10-CM

## 2017-03-14 DIAGNOSIS — R10.9 ABDOMINAL SPASMS: ICD-10-CM

## 2017-03-14 DIAGNOSIS — I50.21 ACUTE SYSTOLIC CONGESTIVE HEART FAILURE: Primary | ICD-10-CM

## 2017-03-14 DIAGNOSIS — I65.23 BILATERAL CAROTID ARTERY STENOSIS: ICD-10-CM

## 2017-03-14 DIAGNOSIS — E78.5 HYPERLIPIDEMIA, UNSPECIFIED HYPERLIPIDEMIA TYPE: ICD-10-CM

## 2017-03-14 DIAGNOSIS — Z51.81 MEDICATION MONITORING ENCOUNTER: ICD-10-CM

## 2017-03-14 DIAGNOSIS — I35.1 NONRHEUMATIC AORTIC VALVE INSUFFICIENCY: ICD-10-CM

## 2017-03-14 DIAGNOSIS — M81.0 OSTEOPOROSIS, POSTMENOPAUSAL: ICD-10-CM

## 2017-03-14 DIAGNOSIS — N95.2 ATROPHIC VAGINITIS: ICD-10-CM

## 2017-03-14 DIAGNOSIS — E11.9 TYPE 2 DIABETES MELLITUS WITHOUT COMPLICATION, WITHOUT LONG-TERM CURRENT USE OF INSULIN: ICD-10-CM

## 2017-03-14 DIAGNOSIS — Z79.899 ENCOUNTER FOR LONG-TERM (CURRENT) USE OF OTHER MEDICATIONS: ICD-10-CM

## 2017-03-14 DIAGNOSIS — I10 ESSENTIAL HYPERTENSION: ICD-10-CM

## 2017-03-14 DIAGNOSIS — T15.11XA FOREIGN BODY IN CONJUNCTIVAL SAC, RIGHT, INITIAL ENCOUNTER: ICD-10-CM

## 2017-03-14 DIAGNOSIS — S05.01XA CORNEAL ABRASION, RIGHT, INITIAL ENCOUNTER: ICD-10-CM

## 2017-03-14 DIAGNOSIS — M19.042 PRIMARY OSTEOARTHRITIS OF BOTH HANDS: Chronic | ICD-10-CM

## 2017-03-14 DIAGNOSIS — I48.3 TYPICAL ATRIAL FLUTTER: ICD-10-CM

## 2017-03-14 DIAGNOSIS — H04.123 BILATERAL DRY EYES: ICD-10-CM

## 2017-03-14 DIAGNOSIS — M25.50 MULTIPLE JOINT PAIN: ICD-10-CM

## 2017-03-14 LAB
ALBUMIN SERPL BCP-MCNC: 4.2 G/DL
ALLENS TEST: ABNORMAL
ALP SERPL-CCNC: 104 U/L
ALT SERPL W/O P-5'-P-CCNC: 38 U/L
ANION GAP SERPL CALC-SCNC: 10 MMOL/L
APTT BLDCRRT: 31.1 SEC
APTT BLDCRRT: 31.6 SEC
APTT BLDCRRT: 60.1 SEC
AST SERPL-CCNC: 47 U/L
BASOPHILS # BLD AUTO: 0.02 K/UL
BASOPHILS # BLD AUTO: 0.03 K/UL
BASOPHILS NFR BLD: 0.3 %
BASOPHILS NFR BLD: 0.4 %
BILIRUB SERPL-MCNC: 0.8 MG/DL
BILIRUB UR QL STRIP: NEGATIVE
BNP SERPL-MCNC: 1543 PG/ML
BUN SERPL-MCNC: 25 MG/DL
CALCIUM SERPL-MCNC: 9.3 MG/DL
CHLORIDE SERPL-SCNC: 103 MMOL/L
CK SERPL-CCNC: 168 U/L
CLARITY UR: CLEAR
CO2 SERPL-SCNC: 25 MMOL/L
COLOR UR: YELLOW
CREAT SERPL-MCNC: 1.2 MG/DL
DELSYS: ABNORMAL
DIFFERENTIAL METHOD: ABNORMAL
DIFFERENTIAL METHOD: ABNORMAL
EOSINOPHIL # BLD AUTO: 0 K/UL
EOSINOPHIL # BLD AUTO: 0.1 K/UL
EOSINOPHIL NFR BLD: 0 %
EOSINOPHIL NFR BLD: 1.6 %
ERYTHROCYTE [DISTWIDTH] IN BLOOD BY AUTOMATED COUNT: 16.2 %
ERYTHROCYTE [DISTWIDTH] IN BLOOD BY AUTOMATED COUNT: 16.5 %
EST. GFR  (AFRICAN AMERICAN): 50 ML/MIN/1.73 M^2
EST. GFR  (NON AFRICAN AMERICAN): 44 ML/MIN/1.73 M^2
FIO2: 21
FLUAV AG SPEC QL IA: NEGATIVE
FLUBV AG SPEC QL IA: NEGATIVE
GLUCOSE SERPL-MCNC: 170 MG/DL
GLUCOSE UR QL STRIP: NEGATIVE
HCO3 UR-SCNC: 24.8 MMOL/L (ref 24–28)
HCT VFR BLD AUTO: 35.9 %
HCT VFR BLD AUTO: 36.3 %
HGB BLD-MCNC: 11.3 G/DL
HGB BLD-MCNC: 11.6 G/DL
HGB UR QL STRIP: NEGATIVE
INR PPP: 1.3
INR PPP: 1.5
KETONES UR QL STRIP: ABNORMAL
LACTATE SERPL-SCNC: 1.3 MMOL/L
LEUKOCYTE ESTERASE UR QL STRIP: NEGATIVE
LYMPHOCYTES # BLD AUTO: 0.3 K/UL
LYMPHOCYTES # BLD AUTO: 0.5 K/UL
LYMPHOCYTES NFR BLD: 4.5 %
LYMPHOCYTES NFR BLD: 6.4 %
MAGNESIUM SERPL-MCNC: 1.8 MG/DL
MCH RBC QN AUTO: 29.7 PG
MCH RBC QN AUTO: 30.3 PG
MCHC RBC AUTO-ENTMCNC: 31.1 %
MCHC RBC AUTO-ENTMCNC: 32.3 %
MCV RBC AUTO: 94 FL
MCV RBC AUTO: 95 FL
MODE: ABNORMAL
MONOCYTES # BLD AUTO: 0.2 K/UL
MONOCYTES # BLD AUTO: 0.9 K/UL
MONOCYTES NFR BLD: 10.8 %
MONOCYTES NFR BLD: 2.8 %
NEUTROPHILS # BLD AUTO: 5.7 K/UL
NEUTROPHILS # BLD AUTO: 6.5 K/UL
NEUTROPHILS NFR BLD: 80.8 %
NEUTROPHILS NFR BLD: 92.4 %
NITRITE UR QL STRIP: NEGATIVE
PCO2 BLDA: 37.2 MMHG (ref 35–45)
PH SMN: 7.43 [PH] (ref 7.35–7.45)
PH UR STRIP: 6 [PH] (ref 5–8)
PLATELET # BLD AUTO: 166 K/UL
PLATELET # BLD AUTO: 166 K/UL
PLATELET # BLD AUTO: 167 K/UL
PMV BLD AUTO: 11.1 FL
PMV BLD AUTO: 11.5 FL
PMV BLD AUTO: 11.5 FL
PO2 BLDA: 56 MMHG (ref 80–100)
POC BE: 0 MMOL/L
POC SATURATED O2: 90 % (ref 95–100)
POCT GLUCOSE: 209 MG/DL (ref 70–110)
POCT GLUCOSE: 281 MG/DL (ref 70–110)
POTASSIUM SERPL-SCNC: 4.6 MMOL/L
PROT SERPL-MCNC: 7.5 G/DL
PROT UR QL STRIP: ABNORMAL
PROTHROMBIN TIME: 13.8 SEC
PROTHROMBIN TIME: 15.6 SEC
RBC # BLD AUTO: 3.81 M/UL
RBC # BLD AUTO: 3.83 M/UL
SAMPLE: ABNORMAL
SITE: ABNORMAL
SODIUM SERPL-SCNC: 138 MMOL/L
SP GR UR STRIP: 1.02 (ref 1–1.03)
SPECIMEN SOURCE: NORMAL
TROPONIN I SERPL DL<=0.01 NG/ML-MCNC: 0.02 NG/ML
TROPONIN I SERPL DL<=0.01 NG/ML-MCNC: 0.02 NG/ML
URN SPEC COLLECT METH UR: ABNORMAL
UROBILINOGEN UR STRIP-ACNC: NEGATIVE EU/DL
WBC # BLD AUTO: 6.17 K/UL
WBC # BLD AUTO: 8.07 K/UL

## 2017-03-14 PROCEDURE — 99285 EMERGENCY DEPT VISIT HI MDM: CPT | Mod: 25

## 2017-03-14 PROCEDURE — 21400001 HC TELEMETRY ROOM

## 2017-03-14 PROCEDURE — 63600175 PHARM REV CODE 636 W HCPCS: Performed by: SPECIALIST

## 2017-03-14 PROCEDURE — 84484 ASSAY OF TROPONIN QUANT: CPT

## 2017-03-14 PROCEDURE — 36415 COLL VENOUS BLD VENIPUNCTURE: CPT

## 2017-03-14 PROCEDURE — 25000003 PHARM REV CODE 250: Performed by: SPECIALIST

## 2017-03-14 PROCEDURE — 25000003 PHARM REV CODE 250: Performed by: NURSE PRACTITIONER

## 2017-03-14 PROCEDURE — 94640 AIRWAY INHALATION TREATMENT: CPT

## 2017-03-14 PROCEDURE — 83880 ASSAY OF NATRIURETIC PEPTIDE: CPT

## 2017-03-14 PROCEDURE — 82803 BLOOD GASES ANY COMBINATION: CPT

## 2017-03-14 PROCEDURE — 87400 INFLUENZA A/B EACH AG IA: CPT | Mod: 59

## 2017-03-14 PROCEDURE — 85730 THROMBOPLASTIN TIME PARTIAL: CPT | Mod: 91

## 2017-03-14 PROCEDURE — 82550 ASSAY OF CK (CPK): CPT

## 2017-03-14 PROCEDURE — 93010 ELECTROCARDIOGRAM REPORT: CPT | Mod: ,,, | Performed by: INTERNAL MEDICINE

## 2017-03-14 PROCEDURE — 85730 THROMBOPLASTIN TIME PARTIAL: CPT

## 2017-03-14 PROCEDURE — 83735 ASSAY OF MAGNESIUM: CPT

## 2017-03-14 PROCEDURE — 85610 PROTHROMBIN TIME: CPT | Mod: 91

## 2017-03-14 PROCEDURE — 83036 HEMOGLOBIN GLYCOSYLATED A1C: CPT

## 2017-03-14 PROCEDURE — 96375 TX/PRO/DX INJ NEW DRUG ADDON: CPT

## 2017-03-14 PROCEDURE — 85610 PROTHROMBIN TIME: CPT

## 2017-03-14 PROCEDURE — 25000003 PHARM REV CODE 250: Performed by: PHYSICIAN ASSISTANT

## 2017-03-14 PROCEDURE — 81003 URINALYSIS AUTO W/O SCOPE: CPT

## 2017-03-14 PROCEDURE — 96374 THER/PROPH/DIAG INJ IV PUSH: CPT

## 2017-03-14 PROCEDURE — 80053 COMPREHEN METABOLIC PANEL: CPT

## 2017-03-14 PROCEDURE — 85025 COMPLETE CBC W/AUTO DIFF WBC: CPT | Mod: 91

## 2017-03-14 PROCEDURE — 63600175 PHARM REV CODE 636 W HCPCS: Performed by: NURSE PRACTITIONER

## 2017-03-14 PROCEDURE — 99900035 HC TECH TIME PER 15 MIN (STAT)

## 2017-03-14 PROCEDURE — 99223 1ST HOSP IP/OBS HIGH 75: CPT | Mod: ,,, | Performed by: INTERNAL MEDICINE

## 2017-03-14 PROCEDURE — 83605 ASSAY OF LACTIC ACID: CPT

## 2017-03-14 PROCEDURE — 25000242 PHARM REV CODE 250 ALT 637 W/ HCPCS: Performed by: NURSE PRACTITIONER

## 2017-03-14 PROCEDURE — 36600 WITHDRAWAL OF ARTERIAL BLOOD: CPT

## 2017-03-14 PROCEDURE — 87040 BLOOD CULTURE FOR BACTERIA: CPT

## 2017-03-14 RX ORDER — INSULIN ASPART 100 [IU]/ML
0-5 INJECTION, SOLUTION INTRAVENOUS; SUBCUTANEOUS
Status: DISCONTINUED | OUTPATIENT
Start: 2017-03-14 | End: 2017-03-18 | Stop reason: HOSPADM

## 2017-03-14 RX ORDER — ACETAMINOPHEN 325 MG/1
650 TABLET ORAL EVERY 6 HOURS PRN
Status: DISCONTINUED | OUTPATIENT
Start: 2017-03-14 | End: 2017-03-18 | Stop reason: HOSPADM

## 2017-03-14 RX ORDER — IBUPROFEN 200 MG
16 TABLET ORAL
Status: DISCONTINUED | OUTPATIENT
Start: 2017-03-14 | End: 2017-03-18 | Stop reason: HOSPADM

## 2017-03-14 RX ORDER — HEPARIN SODIUM,PORCINE/D5W 25000/250
16 INTRAVENOUS SOLUTION INTRAVENOUS CONTINUOUS
Status: DISCONTINUED | OUTPATIENT
Start: 2017-03-14 | End: 2017-03-15

## 2017-03-14 RX ORDER — ONDANSETRON 2 MG/ML
4 INJECTION INTRAMUSCULAR; INTRAVENOUS EVERY 8 HOURS PRN
Status: DISCONTINUED | OUTPATIENT
Start: 2017-03-14 | End: 2017-03-18 | Stop reason: HOSPADM

## 2017-03-14 RX ORDER — ASPIRIN 325 MG
325 TABLET ORAL DAILY
Status: DISCONTINUED | OUTPATIENT
Start: 2017-03-14 | End: 2017-03-18 | Stop reason: HOSPADM

## 2017-03-14 RX ORDER — NEBIVOLOL 5 MG/1
5 TABLET ORAL DAILY
Status: DISCONTINUED | OUTPATIENT
Start: 2017-03-14 | End: 2017-03-18 | Stop reason: HOSPADM

## 2017-03-14 RX ORDER — FUROSEMIDE 10 MG/ML
40 INJECTION INTRAMUSCULAR; INTRAVENOUS 2 TIMES DAILY
Status: DISCONTINUED | OUTPATIENT
Start: 2017-03-14 | End: 2017-03-15

## 2017-03-14 RX ORDER — IBUPROFEN 200 MG
24 TABLET ORAL
Status: DISCONTINUED | OUTPATIENT
Start: 2017-03-14 | End: 2017-03-18 | Stop reason: HOSPADM

## 2017-03-14 RX ORDER — FAMOTIDINE 10 MG/ML
20 INJECTION INTRAVENOUS DAILY
Status: DISCONTINUED | OUTPATIENT
Start: 2017-03-14 | End: 2017-03-15

## 2017-03-14 RX ORDER — IPRATROPIUM BROMIDE AND ALBUTEROL SULFATE 2.5; .5 MG/3ML; MG/3ML
3 SOLUTION RESPIRATORY (INHALATION) EVERY 6 HOURS PRN
Status: DISCONTINUED | OUTPATIENT
Start: 2017-03-14 | End: 2017-03-15

## 2017-03-14 RX ORDER — FUROSEMIDE 10 MG/ML
40 INJECTION INTRAMUSCULAR; INTRAVENOUS ONCE
Status: COMPLETED | OUTPATIENT
Start: 2017-03-14 | End: 2017-03-14

## 2017-03-14 RX ORDER — ATORVASTATIN CALCIUM 40 MG/1
40 TABLET, FILM COATED ORAL NIGHTLY
Status: DISCONTINUED | OUTPATIENT
Start: 2017-03-14 | End: 2017-03-18 | Stop reason: HOSPADM

## 2017-03-14 RX ORDER — GLUCAGON 1 MG
1 KIT INJECTION
Status: DISCONTINUED | OUTPATIENT
Start: 2017-03-14 | End: 2017-03-18 | Stop reason: HOSPADM

## 2017-03-14 RX ORDER — METHYLPREDNISOLONE SOD SUCC 125 MG
80 VIAL (EA) INJECTION EVERY 8 HOURS
Status: DISCONTINUED | OUTPATIENT
Start: 2017-03-14 | End: 2017-03-15

## 2017-03-14 RX ADMIN — NITROGLYCERIN 0.5 INCH: 20 OINTMENT TOPICAL at 12:03

## 2017-03-14 RX ADMIN — HEPARIN SODIUM AND DEXTROSE 16 UNITS/KG/HR: 10000; 5 INJECTION INTRAVENOUS at 02:03

## 2017-03-14 RX ADMIN — ACETAMINOPHEN 650 MG: 325 TABLET ORAL at 08:03

## 2017-03-14 RX ADMIN — FAMOTIDINE 20 MG: 10 INJECTION, SOLUTION INTRAVENOUS at 09:03

## 2017-03-14 RX ADMIN — NEBIVOLOL HYDROCHLORIDE 5 MG: 5 TABLET ORAL at 09:03

## 2017-03-14 RX ADMIN — FUROSEMIDE 40 MG: 10 INJECTION, SOLUTION INTRAMUSCULAR; INTRAVENOUS at 09:03

## 2017-03-14 RX ADMIN — ASPIRIN 325 MG ORAL TABLET 325 MG: 325 PILL ORAL at 09:03

## 2017-03-14 RX ADMIN — METHYLPREDNISOLONE SODIUM SUCCINATE 80 MG: 125 INJECTION, POWDER, FOR SOLUTION INTRAMUSCULAR; INTRAVENOUS at 09:03

## 2017-03-14 RX ADMIN — INSULIN ASPART 3 UNITS: 100 INJECTION, SOLUTION INTRAVENOUS; SUBCUTANEOUS at 06:03

## 2017-03-14 RX ADMIN — NITROGLYCERIN 0.5 INCH: 20 OINTMENT TOPICAL at 05:03

## 2017-03-14 RX ADMIN — FUROSEMIDE 40 MG: 10 INJECTION, SOLUTION INTRAMUSCULAR; INTRAVENOUS at 08:03

## 2017-03-14 RX ADMIN — METHYLPREDNISOLONE SODIUM SUCCINATE 125 MG: 125 INJECTION, POWDER, FOR SOLUTION INTRAMUSCULAR; INTRAVENOUS at 09:03

## 2017-03-14 RX ADMIN — INSULIN ASPART 1 UNITS: 100 INJECTION, SOLUTION INTRAVENOUS; SUBCUTANEOUS at 10:03

## 2017-03-14 RX ADMIN — ATORVASTATIN CALCIUM 40 MG: 40 TABLET, FILM COATED ORAL at 08:03

## 2017-03-14 RX ADMIN — IPRATROPIUM BROMIDE AND ALBUTEROL SULFATE 3 ML: .5; 3 SOLUTION RESPIRATORY (INHALATION) at 07:03

## 2017-03-14 NOTE — ASSESSMENT & PLAN NOTE
-Pt admitted to Inpatient status  -Cardiology following  -IV Lasix (BNP 1543)  -Strict I/Os  -daily weights  -Echo results pending  -supplemental oxygen  -will follow serial troponins

## 2017-03-14 NOTE — SUBJECTIVE & OBJECTIVE
Past Medical History:   Diagnosis Date    *Atrial fibrillation     AF (atrial fibrillation)     Palpation: atrial fibrillation, on Coumadin and followed by cardiologist.    Anticoagulant long-term use     Anxiety     Aortic insufficiency 3/19/2014    Aortic regurgitation     Aortic regurgitation/tricuspid regurgitation per MARIO in April 2007.     Asthma     Carotid artery stenosis     CEA on the left by Dr. Maciel    Coronary artery disease     status post CABG X 3.     Diabetes mellitus     GERD (gastroesophageal reflux disease)     H. pylori infection     treated    Hyperlipidemia     intolerant to statins.    Hypertension 8/7/2013    IBS (irritable bowel syndrome)     Leg pain 3/19/2014    Long-term (current) use of anticoagulants 8/7/2013    Osteoarthritis     Osteoarthritis     Osteopenia     DEXA scan done on 06/20/12     Pancreatitis     resolved    S/P CABG (coronary artery bypass graft) 8/7/2013    S/P carotid endarterectomy 8/7/2013    S/P PTCA (percutaneous transluminal coronary angioplasty) 3/19/2014    Sjogren's syndrome     Stroke     Vitamin D deficiency disease        Past Surgical History:   Procedure Laterality Date    ABDOMINAL SURGERY      APPENDECTOMY      bilateral cataract surgery      CARPAL TUNNEL RELEASE      RT    CATARACT EXTRACTION      CEA      left    CHOLECYSTECTOMY      CORONARY ARTERY BYPASS GRAFT      2 stents    EYE SURGERY      HYSTERECTOMY      TONSILLECTOMY         Review of patient's allergies indicates:   Allergen Reactions    Codeine Nausea And Vomiting    Lisinopril      Other reaction(s): cough    Pacerone [amiodarone] Nausea And Vomiting    Sulfa (sulfonamide antibiotics) Nausea And Vomiting    Celecoxib Palpitations    Ciprofloxacin Hives, Itching and Rash    Colesevelam Rash and Nausea And Vomiting    Doxycycline Rash    Statins-hmg-coa reductase inhibitors Rash     Myalgia(can take atorvastatin ok -no rhabdo)per nurse josé antonio  and patient  / stomach upset       No current facility-administered medications on file prior to encounter.      Current Outpatient Prescriptions on File Prior to Encounter   Medication Sig    aspirin (ECOTRIN) 81 MG EC tablet Take 1 tablet (81 mg total) by mouth once daily.    atorvastatin (LIPITOR) 40 MG tablet TAKE ONE TABLET BY MOUTH NIGHTLY    azelastine (ASTELIN) 137 mcg (0.1 %) nasal spray 2 sprays (274 mcg total) by Nasal route 2 (two) times daily.    benzonatate (TESSALON) 100 MG capsule Take 1 capsule (100 mg total) by mouth 3 (three) times daily as needed for Cough.    budesonide-formoterol 160-4.5 mcg (SYMBICORT) 160-4.5 mcg/actuation HFAA Inhale 2 puffs into the lungs every 12 (twelve) hours. Wash out mouth after using    BYSTOLIC 5 mg Tab TAKE ONE TABLET BY MOUTH TWICE DAILY (Patient taking differently: TAKE ONE TABLET BY MOUTH DAILY)    diclofenac sodium (VOLTAREN) 1 % Gel Apply 2 g topically once daily.    escitalopram oxalate (LEXAPRO) 10 MG tablet TAKE ONE-HALF TO ONE TABLET BY MOUTH EVERY DAY    fluticasone (FLONASE) 50 mcg/actuation nasal spray 2 sprays by Each Nare route once daily.    furosemide (LASIX) 20 MG tablet Take 1 tablet (20 mg total) by mouth as directed.    hydroxychloroquine (PLAQUENIL) 200 mg tablet TAKE ONE TABLET BY MOUTH ONCE DAILY    nitroGLYCERIN (NITROSTAT) 0.4 MG SL tablet Place 1 tablet (0.4 mg total) under the tongue every 5 (five) minutes as needed for Chest pain.    pantoprazole (PROTONIX) 40 MG tablet Take 1 tablet (40 mg total) by mouth once daily.    potassium chloride (KLOR-CON) 10 MEQ TbSR Take 1 tablet (10 mEq total) by mouth once daily.    [DISCONTINUED] dofetilide (TIKOSYN) 125 MCG Cap Take 1 capsule (125 mcg total) by mouth every 12 (twelve) hours.    albuterol (PROAIR HFA) 90 mcg/actuation inhaler Inhale 2 puffs into the lungs every 6 (six) hours as needed.    carboxymethylcellulose (REFRESH PLUS) 0.5 % Dpet Place 1 drop into both eyes 3  (three) times daily as needed.    guaifenesin (MUCINEX) 600 mg 12 hr tablet Take 2 tablets (1,200 mg total) by mouth 2 (two) times daily. (Patient taking differently: Take 1,200 mg by mouth 2 (two) times daily. As needed for flares)    hyoscyamine (ANASPAZ) 0.125 mg TbDL Take 0.125 mg by mouth every 6 (six) hours as needed for Cramping.    phenobarb-hyoscy-atropine-scop (BELLADONNA-PHENOBARBITAL) 16.2-0.1037 -0.0194 mg/5 mL Elix Take 5 mLs by mouth daily as needed (abdominal pain).    pramoxine 1 % Foam Place rectally 3 (three) times daily as needed.    PREMARIN vaginal cream PLACE 1 GRAM VAGINALLY TWICE A WEEK    RESTASIS 0.05 % ophthalmic emulsion INSTILL ONE DROP INTO EACH EYE TWICE DAILY    rivaroxaban (XARELTO) 20 mg Tab Take 1 tablet (20 mg total) by mouth daily with dinner or evening meal.    tramadol (ULTRAM) 50 mg tablet Take 1 tablet (50 mg total) by mouth 3 (three) times daily as needed.    triamcinolone acetonide 0.1% (KENALOG) 0.1 % ointment AAA bid prn    vitamin D 1000 units Tab Take 2,000 Units by mouth once daily.     Family History     Problem Relation (Age of Onset)    Alzheimer's disease Mother    Cancer Father, Brother, Paternal Grandmother, Paternal Grandfather    Heart disease Maternal Uncle    Hyperlipidemia Mother        Social History Main Topics    Smoking status: Former Smoker     Packs/day: 0.50     Years: 25.00     Quit date: 12/17/1988    Smokeless tobacco: Never Used    Alcohol use 0.0 oz/week     0 Standard drinks or equivalent per week      Comment: wine occasionally    Drug use: No    Sexual activity: Yes     Partners: Male     Review of Systems   Constitutional: Positive for activity change and fever. Negative for chills and fatigue.   HENT: Negative for congestion, rhinorrhea, sinus pressure, sneezing, sore throat and trouble swallowing.    Eyes: Negative for redness, itching and visual disturbance.   Respiratory: Positive for chest tightness and shortness of  breath. Negative for cough.    Cardiovascular: Negative for chest pain, palpitations and leg swelling.   Gastrointestinal: Positive for nausea. Negative for abdominal distention, abdominal pain, diarrhea and vomiting.   Endocrine: Negative for cold intolerance and heat intolerance.   Genitourinary: Negative for decreased urine volume, difficulty urinating, dysuria, flank pain, frequency and urgency.   Musculoskeletal: Positive for myalgias. Negative for arthralgias and back pain.   Skin: Negative for color change and wound.   Allergic/Immunologic: Negative for environmental allergies and food allergies.   Neurological: Negative for dizziness, syncope, weakness and headaches.   Hematological: Does not bruise/bleed easily.   Psychiatric/Behavioral: Negative for agitation, confusion and sleep disturbance. The patient is not nervous/anxious.      Objective:     Vital Signs (Most Recent):  Temp: 98.1 °F (36.7 °C) (03/14/17 1500)  Pulse: 103 (03/14/17 1500)  Resp: 16 (03/14/17 1500)  BP: (!) 144/74 (03/14/17 1500)  SpO2: 95 % (03/14/17 1500) Vital Signs (24h Range):  Temp:  [98.1 °F (36.7 °C)-98.3 °F (36.8 °C)] 98.1 °F (36.7 °C)  Pulse:  [] 103  Resp:  [15-22] 16  SpO2:  [85 %-97 %] 95 %  BP: (125-176)/(57-81) 144/74     Weight: 56.4 kg (124 lb 5.4 oz)  Body mass index is 22.74 kg/(m^2).    Physical Exam   Constitutional: She is oriented to person, place, and time. She appears well-developed and well-nourished.   HENT:   Head: Normocephalic.   Nose: Nose normal.   Mouth/Throat: Oropharynx is clear and moist.   Eyes: Conjunctivae and EOM are normal.   Neck: Normal range of motion. JVD present.   Cardiovascular: An irregularly irregular rhythm present.   Pulses:       Dorsalis pedis pulses are 2+ on the right side, and 2+ on the left side.        Posterior tibial pulses are 2+ on the right side, and 2+ on the left side.   Pulmonary/Chest: Effort normal. No respiratory distress. She has wheezes.   Abdominal: Soft.  Bowel sounds are normal. She exhibits no distension. There is no tenderness.   Musculoskeletal: Normal range of motion. She exhibits no edema.   Neurological: She is alert and oriented to person, place, and time.   Skin: Skin is warm and dry.        Significant Labs:   CBC:   Recent Labs  Lab 03/14/17  0628 03/14/17  1401   WBC 8.07 6.17   HGB 11.3* 11.6*   HCT 36.3* 35.9*    166  166     CMP:   Recent Labs  Lab 03/14/17  0628      K 4.6      CO2 25   *   BUN 25*   CREATININE 1.2   CALCIUM 9.3   PROT 7.5   ALBUMIN 4.2   BILITOT 0.8   ALKPHOS 104   AST 47*   ALT 38   ANIONGAP 10   EGFRNONAA 44*     Troponin:   Recent Labs  Lab 03/14/17  0628 03/14/17  1401   TROPONINI 0.016 0.024       Significant Imaging:   Imaging Results         X-Ray Chest 1 View (Final result) Result time:  03/14/17 08:01:10    Final result by CHRISTIANO Tejada Sr., MD (03/14/17 08:01:10)    Impression:        1.  There has been interval development of a mild amount of interstitial opacities seen in both lungs with Kerley B lines visualized bilaterally.  This is characteristic of pulmonary edema.  2.  There is mild cardiomegaly.    3.  There is opacification of the left costophrenic angle.  There is blunting of the right costophrenic angle.  These are characteristic of tiny pleural effusions.  4.  Surgical changes      Electronically signed by: CHRISTIANO TEJADA MD  Date:     03/14/17  Time:    08:01     Narrative:    One-view chest x-ray    Clinical History: Shortness of breath    Finding: Comparison was made to a prior examination performed on 10/2/2015.  There are multiple sternotomy wires in place.  There are surgical clips projected over the mediastinum and the left side of the neck.  There is mild cardiomegaly.  There has been interval development of a mild amount of interstitial opacities seen in both lungs with Kerley B lines visualized bilaterally.  There is no pneumothorax.  There is opacification of the left  costophrenic angle.  There is blunting of the right costophrenic angle.

## 2017-03-14 NOTE — ASSESSMENT & PLAN NOTE
-Cardiology cosulted  -recently started on Tikosyn  -Tikosyn and Eliquis stopped  -Heparin infusion continued  -Nebivolol continued  -Echo results pending  -will need LHC when euvolemic per Cardiology

## 2017-03-14 NOTE — ED NOTES
Pt. Assisted to bedside commode, pt. Reports feeling better than before, pt. Appears not to be as SOB with exertion. Will continue to monitor.

## 2017-03-14 NOTE — ED PROVIDER NOTES
"SCRIBE #1 NOTE: I, Matias Carballocoran, am scribing for, and in the presence of, Kayy Moreno MD. I have scribed the entire note.      History      Chief Complaint   Patient presents with    Shortness of Breath     pt reports she received treatment of tikosyn yesterday; today she woke up with shortness of breath, bad cough and wheezing and body aches       Review of patient's allergies indicates:   Allergen Reactions    Codeine Nausea And Vomiting    Lisinopril      Other reaction(s): cough    Pacerone [amiodarone] Nausea And Vomiting    Sulfa (sulfonamide antibiotics) Nausea And Vomiting    Celecoxib Palpitations    Ciprofloxacin Hives, Itching and Rash    Colesevelam Rash and Nausea And Vomiting    Doxycycline Rash    Statins-hmg-coa reductase inhibitors Rash     Myalgia, stomach upset        HPI   HPI    3/14/2017, 6:25 AM   History obtained from the patient      History of Present Illness: Marcel Montalvo is a 76 y.o. female patient who presents to the Emergency Department for SOB which onset gradually this AM when she woke up. Symptoms are constant and moderate in severity. Sx are exacerbated by nothing and relieved by nothing. Pt denies being on any oxygen at home and states she received a treatment of Tikosyn yesterday. Pt also states "she she just wasn't feeling good" 2 days ago and reports a fever (TMAX 101) yesterday. Associated sxs include nonproductive cough, wheezing, and generalized body aches. Patient denies any N/V/D, chills, abd pain, CP, chest tightness, palpitations, leg swelling, weakness/numbness, lightheadedness, dizziness, congestion, rhinorrhea, ear pain, sore throat, dysuria, difficulty urinating and all other sxs at this time. No further complaints or concerns at this time.       Arrival mode: Personal vehicle     PCP: Alexandra Moreno MD       Past Medical History:  Past Medical History:   Diagnosis Date    *Atrial fibrillation     AF (atrial fibrillation)     Palpation: atrial " fibrillation, on Coumadin and followed by cardiologist.    Anticoagulant long-term use     Anxiety     Aortic insufficiency 3/19/2014    Aortic regurgitation     Aortic regurgitation/tricuspid regurgitation per MARIO in April 2007.     Asthma     Carotid artery stenosis     CEA on the left by Dr. Maciel    Coronary artery disease     status post CABG X 3.     Diabetes mellitus     GERD (gastroesophageal reflux disease)     H. pylori infection     treated    Hyperlipidemia     intolerant to statins.    Hypertension 8/7/2013    IBS (irritable bowel syndrome)     Leg pain 3/19/2014    Long-term (current) use of anticoagulants 8/7/2013    Osteoarthritis     Osteoarthritis     Osteopenia     DEXA scan done on 06/20/12     Pancreatitis     resolved    S/P CABG (coronary artery bypass graft) 8/7/2013    S/P carotid endarterectomy 8/7/2013    S/P PTCA (percutaneous transluminal coronary angioplasty) 3/19/2014    Sjogren's syndrome     Stroke     Vitamin D deficiency disease        Past Surgical History:  Past Surgical History:   Procedure Laterality Date    ABDOMINAL SURGERY      APPENDECTOMY      bilateral cataract surgery      CARPAL TUNNEL RELEASE      RT    CATARACT EXTRACTION      CEA      left    CHOLECYSTECTOMY      CORONARY ARTERY BYPASS GRAFT      2 stents    EYE SURGERY      HYSTERECTOMY      TONSILLECTOMY           Family History:  Family History   Problem Relation Age of Onset    Alzheimer's disease Mother     Hyperlipidemia Mother     Cancer Father      lung    Heart disease Maternal Uncle     Cancer Brother      stomach    Cancer Paternal Grandmother     Cancer Paternal Grandfather      stomach       Social History:  Social History     Social History Main Topics    Smoking status: Former Smoker     Packs/day: 0.50     Years: 25.00     Quit date: 12/17/1988    Smokeless tobacco: Never Used    Alcohol use 0.0 oz/week     0 Standard drinks or equivalent per week       Comment: wine occasionally    Drug use: No    Sexual activity: Yes     Partners: Male       ROS   Review of Systems   Constitutional: Positive for fever. Negative for chills.        (+)gneralized body aches (+)malaise   HENT: Negative for congestion and sore throat.    Respiratory: Positive for cough, shortness of breath and wheezing. Negative for chest tightness.    Cardiovascular: Negative for chest pain.   Gastrointestinal: Negative for abdominal pain, constipation, diarrhea, nausea and vomiting.   Genitourinary: Negative for difficulty urinating and dysuria.   Musculoskeletal: Negative for back pain and neck pain.   Skin: Negative for rash.   Neurological: Negative for dizziness, weakness, light-headedness, numbness and headaches.   Psychiatric/Behavioral: Negative for agitation and confusion.   All other systems reviewed and are negative.      Physical Exam    Initial Vitals   BP Pulse Resp Temp SpO2   03/14/17 0614 03/14/17 0614 03/14/17 0614 03/14/17 0614 03/14/17 0614   161/72 109 20 98.3 °F (36.8 °C) 85 %      Physical Exam  Nursing Notes and Vital Signs Reviewed.  Constitutional: Patient is in moderate distress. Awake and alert. Well-developed and well-nourished.  Head: Atraumatic. Normocephalic.  Eyes: PERRL. EOM intact. Conjunctivae are not pale. No scleral icterus.  ENT: Mucous membranes are moist. Oropharynx is clear and symmetric.    Neck: Supple. Full ROM. No lymphadenopathy.  Cardiovascular: Regular rate. Regular rhythm. No murmurs, rubs, or gallops. Distal pulses are 2+ and symmetric.  Pulmonary/Chest: Moderate respiratory distress. Coarse breath sounds and diffuse wheezing noted bilaterally. No rales or rhonchi appreciated.  Abdominal: Soft and non-distended.  There is no tenderness.  No rebound, guarding, or rigidity. Good bowel sounds.  Musculoskeletal: Moves all extremities. No obvious deformities. No edema. No calf tenderness.  Skin: Warm and dry.  Neurological:  Alert, awake, and  "appropriate.  Normal speech.  No acute focal neurological deficits are appreciated.  Psychiatric: Normal affect. Good eye contact. Appropriate in content.    ED Course    Procedures  ED Vital Signs:  Vitals:    03/14/17 0614 03/14/17 0633 03/14/17 0640 03/14/17 0641   BP: (!) 161/72 (!) 176/76     Pulse: 109  103 98   Resp: 20   (!) 22   Temp: 98.3 °F (36.8 °C)      TempSrc: Oral      SpO2: (!) 85%   96%   Weight: 61.2 kg (135 lb)      Height: 5' 2" (1.575 m)       03/14/17 0801 03/14/17 0932 03/14/17 1016 03/14/17 1246   BP: (!) 152/81 (!) 153/72 139/78 (!) 141/59   Pulse: 92 99 (!) 117 106   Resp: 18 20 20 (!) 22   Temp:       TempSrc:       SpO2: 97% 96% 95% 97%   Weight:       Height:        03/14/17 1301 03/14/17 1346 03/14/17 1401 03/14/17 1440   BP: 132/74 (!) 125/57 (!) 140/79 (!) 146/72   Pulse: 102 100 106 98   Resp: 15 20 19 (!) 22   Temp:    98.1 °F (36.7 °C)   TempSrc:    Oral   SpO2: 97% 96% 95% 95%   Weight:    56.4 kg (124 lb 5.4 oz)   Height:        03/14/17 1500   BP: (!) 144/74   Pulse: 103   Resp: 16   Temp: 98.1 °F (36.7 °C)   TempSrc: Oral   SpO2: 95%   Weight:    Height:        Abnormal Lab Results:  Labs Reviewed   CBC W/ AUTO DIFFERENTIAL - Abnormal; Notable for the following:        Result Value    RBC 3.81 (*)     Hemoglobin 11.3 (*)     Hematocrit 36.3 (*)     MCHC 31.1 (*)     RDW 16.5 (*)     Lymph # 0.5 (*)     Gran% 80.8 (*)     Lymph% 6.4 (*)     All other components within normal limits   COMPREHENSIVE METABOLIC PANEL - Abnormal; Notable for the following:     Glucose 170 (*)     BUN, Bld 25 (*)     AST 47 (*)     eGFR if  50 (*)     eGFR if non  44 (*)     All other components within normal limits   URINALYSIS - Abnormal; Notable for the following:     Protein, UA Trace (*)     Ketones, UA 1+ (*)     All other components within normal limits   PROTIME-INR - Abnormal; Notable for the following:     Prothrombin Time 15.6 (*)     INR 1.5 (*)     All " other components within normal limits   B-TYPE NATRIURETIC PEPTIDE - Abnormal; Notable for the following:     BNP 1543 (*)     All other components within normal limits   CBC W/ AUTO DIFFERENTIAL - Abnormal; Notable for the following:     RBC 3.83 (*)     Hemoglobin 11.6 (*)     Hematocrit 35.9 (*)     RDW 16.2 (*)     Lymph # 0.3 (*)     Mono # 0.2 (*)     Gran% 92.4 (*)     Lymph% 4.5 (*)     Mono% 2.8 (*)     All other components within normal limits    Narrative:     INR if patient is on warfarin prior to heparin therapy if not  ordered already   ISTAT PROCEDURE - Abnormal; Notable for the following:     POC PO2 56 (*)     POC SATURATED O2 90 (*)     All other components within normal limits   CULTURE, BLOOD   MAGNESIUM   CK   TROPONIN I   APTT   LACTIC ACID, PLASMA   PLATELET COUNT    Narrative:     INR if patient is on warfarin prior to heparin therapy if not  ordered already   TROPONIN I        All Lab Results:  Results for orders placed or performed during the hospital encounter of 03/14/17   CBC auto differential   Result Value Ref Range    WBC 8.07 3.90 - 12.70 K/uL    RBC 3.81 (L) 4.00 - 5.40 M/uL    Hemoglobin 11.3 (L) 12.0 - 16.0 g/dL    Hematocrit 36.3 (L) 37.0 - 48.5 %    MCV 95 82 - 98 fL    MCH 29.7 27.0 - 31.0 pg    MCHC 31.1 (L) 32.0 - 36.0 %    RDW 16.5 (H) 11.5 - 14.5 %    Platelets 167 150 - 350 K/uL    MPV 11.1 9.2 - 12.9 fL    Gran # 6.5 1.8 - 7.7 K/uL    Lymph # 0.5 (L) 1.0 - 4.8 K/uL    Mono # 0.9 0.3 - 1.0 K/uL    Eos # 0.1 0.0 - 0.5 K/uL    Baso # 0.03 0.00 - 0.20 K/uL    Gran% 80.8 (H) 38.0 - 73.0 %    Lymph% 6.4 (L) 18.0 - 48.0 %    Mono% 10.8 4.0 - 15.0 %    Eosinophil% 1.6 0.0 - 8.0 %    Basophil% 0.4 0.0 - 1.9 %    Differential Method Automated    Comprehensive metabolic panel   Result Value Ref Range    Sodium 138 136 - 145 mmol/L    Potassium 4.6 3.5 - 5.1 mmol/L    Chloride 103 95 - 110 mmol/L    CO2 25 23 - 29 mmol/L    Glucose 170 (H) 70 - 110 mg/dL    BUN, Bld 25 (H) 8 - 23  mg/dL    Creatinine 1.2 0.5 - 1.4 mg/dL    Calcium 9.3 8.7 - 10.5 mg/dL    Total Protein 7.5 6.0 - 8.4 g/dL    Albumin 4.2 3.5 - 5.2 g/dL    Total Bilirubin 0.8 0.1 - 1.0 mg/dL    Alkaline Phosphatase 104 55 - 135 U/L    AST 47 (H) 10 - 40 U/L    ALT 38 10 - 44 U/L    Anion Gap 10 8 - 16 mmol/L    eGFR if African American 50 (A) >60 mL/min/1.73 m^2    eGFR if non African American 44 (A) >60 mL/min/1.73 m^2   Magnesium   Result Value Ref Range    Magnesium 1.8 1.6 - 2.6 mg/dL   CK   Result Value Ref Range     20 - 180 U/L   Troponin I   Result Value Ref Range    Troponin I 0.016 0.000 - 0.026 ng/mL   Urinalysis   Result Value Ref Range    Specimen UA Urine, Clean Catch     Color, UA Yellow Yellow, Straw, Saira    Appearance, UA Clear Clear    pH, UA 6.0 5.0 - 8.0    Specific Gravity, UA 1.025 1.005 - 1.030    Protein, UA Trace (A) Negative    Glucose, UA Negative Negative    Ketones, UA 1+ (A) Negative    Bilirubin (UA) Negative Negative    Occult Blood UA Negative Negative    Nitrite, UA Negative Negative    Urobilinogen, UA Negative <2.0 EU/dL    Leukocytes, UA Negative Negative   Protime-INR   Result Value Ref Range    Prothrombin Time 15.6 (H) 9.0 - 12.5 sec    INR 1.5 (H) 0.8 - 1.2   APTT   Result Value Ref Range    aPTT 31.6 21.0 - 32.0 sec   Brain natriuretic peptide   Result Value Ref Range    BNP 1543 (H) 0 - 99 pg/mL   Lactic acid, plasma   Result Value Ref Range    Lactate (Lactic Acid) 1.3 0.5 - 2.2 mmol/L   Troponin I   Result Value Ref Range    Troponin I 0.024 0.000 - 0.026 ng/mL   Influenza antigen Nasal Swab   Result Value Ref Range    Influenza A Ag, EIA Negative Negative    Influenza B Ag, EIA Negative Negative    Flu A & B Source Nasal Swab    APTT   Result Value Ref Range    aPTT 31.1 21.0 - 32.0 sec   Protime-INR   Result Value Ref Range    Prothrombin Time 13.8 (H) 9.0 - 12.5 sec    INR 1.3 (H) 0.8 - 1.2   Platelet count   Result Value Ref Range    Platelets 166 150 - 350 K/uL    MPV  11.5 9.2 - 12.9 fL   CBC auto differential   Result Value Ref Range    WBC 6.17 3.90 - 12.70 K/uL    RBC 3.83 (L) 4.00 - 5.40 M/uL    Hemoglobin 11.6 (L) 12.0 - 16.0 g/dL    Hematocrit 35.9 (L) 37.0 - 48.5 %    MCV 94 82 - 98 fL    MCH 30.3 27.0 - 31.0 pg    MCHC 32.3 32.0 - 36.0 %    RDW 16.2 (H) 11.5 - 14.5 %    Platelets 166 150 - 350 K/uL    MPV 11.5 9.2 - 12.9 fL    Gran # 5.7 1.8 - 7.7 K/uL    Lymph # 0.3 (L) 1.0 - 4.8 K/uL    Mono # 0.2 (L) 0.3 - 1.0 K/uL    Eos # 0.0 0.0 - 0.5 K/uL    Baso # 0.02 0.00 - 0.20 K/uL    Gran% 92.4 (H) 38.0 - 73.0 %    Lymph% 4.5 (L) 18.0 - 48.0 %    Mono% 2.8 (L) 4.0 - 15.0 %    Eosinophil% 0.0 0.0 - 8.0 %    Basophil% 0.3 0.0 - 1.9 %    Differential Method Automated    ISTAT PROCEDURE   Result Value Ref Range    POC PH 7.431 7.35 - 7.45    POC PCO2 37.2 35 - 45 mmHg    POC PO2 56 (LL) 80 - 100 mmHg    POC HCO3 24.8 24 - 28 mmol/L    POC BE 0 -2 to 2 mmol/L    POC SATURATED O2 90 (L) 95 - 100 %    Sample ARTERIAL     Site RR     Allens Test Pass     DelSys Room Air     Mode SPONT     FiO2 21          Imaging Results:  Imaging Results         X-Ray Chest 1 View (Final result) Result time:  03/14/17 08:01:10    Final result by CHRISTIANO Tejada Sr., MD (03/14/17 08:01:10)    Impression:        1.  There has been interval development of a mild amount of interstitial opacities seen in both lungs with Kerley B lines visualized bilaterally.  This is characteristic of pulmonary edema.  2.  There is mild cardiomegaly.    3.  There is opacification of the left costophrenic angle.  There is blunting of the right costophrenic angle.  These are characteristic of tiny pleural effusions.  4.  Surgical changes      Electronically signed by: CHRISTIANO TEJADA MD  Date:     03/14/17  Time:    08:01     Narrative:    One-view chest x-ray    Clinical History: Shortness of breath    Finding: Comparison was made to a prior examination performed on 10/2/2015.  There are multiple sternotomy wires in place.   There are surgical clips projected over the mediastinum and the left side of the neck.  There is mild cardiomegaly.  There has been interval development of a mild amount of interstitial opacities seen in both lungs with Kerley B lines visualized bilaterally.  There is no pneumothorax.  There is opacification of the left costophrenic angle.  There is blunting of the right costophrenic angle.               The EKG was ordered, reviewed, and independently interpreted by the ED provider.  Interpretation time: 0537  Rate: 98 BPM  Rhythm: atrial fibrillation  Interpretation: R axis deviation. Septal infarct. No STEMI.           The Emergency Provider reviewed the vital signs and test results, which are outlined above.    ED Discussion     8:35 AM: Re-evaluated pt. I have discussed test results, shared treatment plan, and the need for admission with patient and family at bedside. Pt and family express understanding at this time and agree with all information. All questions answered. Pt and family have no further questions or concerns at this time. Pt is ready for admit.    8:37 AM: Discussed case with Dr. Malik ([facility]). Dr. Sanderson agrees with current care and management of pt and accepts admission.   Admitting Service: Hospital medicine   Admitting Physician: Dr. Sanderson  Admit to: Tele    8:38 AM: Dr. Moreno discussed the pt's case with Dr. Fletcher (Cardiology) who will come see pt in the ED.    9:07 AM: Dr. Fletcher (Cardiology) is at pt's bedside at this time.    ED Medication(s):  Medications   nitroGLYCERIN 2% TD oint ointment 0.5 inch (0.5 inches Topical Given 3/14/17 1249)   aspirin tablet 325 mg (325 mg Oral Given 3/14/17 0937)   atorvastatin tablet 40 mg (not administered)   nebivolol tablet 5 mg (5 mg Oral Given 3/14/17 0936)   famotidine (PF) 20 mg/2 mL injection 20 mg (20 mg Intravenous Given 3/14/17 0935)   furosemide injection 40 mg (40 mg Intravenous Not Given 3/14/17 0930)   heparin 25,000 units in  dextrose 5% 250 mL (100 units/mL) bolus from bag; PRN BOLUS (not administered)   heparin 25,000 units in dextrose 5% 250 mL (100 units/mL) bolus from bag; PRN BOLUS (not administered)   heparin 25,000 units in dextrose 5% 250 mL (100 units/mL) infusion; FEMALE (16 Units/kg/hr × 61.2 kg Intravenous Verify Only 3/14/17 1500)   furosemide injection 40 mg (40 mg Intravenous Given 3/14/17 0935)   methylPREDNISolone sod suc(PF) 125 mg/2 mL injection 125 mg (125 mg Intravenous Given 3/14/17 0935)       Current Discharge Medication List                Medical Decision Making    Medical Decision Making:   Clinical Tests:   Lab Tests: Reviewed and Ordered  Radiological Study: Reviewed and Ordered  Medical Tests: Reviewed and Ordered           Scribe Attestation:   Scribe #1: I performed the above scribed service and the documentation accurately describes the services I performed. I attest to the accuracy of the note.    Attending:   Physician Attestation Statement for Scribe #1: I, Kayy Moreno MD, personally performed the services described in this documentation, as scribed by Matias Norwood, in my presence, and it is both accurate and complete.          Clinical Impression       ICD-10-CM ICD-9-CM   1. Acute systolic congestive heart failure I50.21 428.21     428.0   2. Hypoxemia R09.02 799.02       Disposition:   Disposition: Admitted (Tele)  Condition: Fair          Kayy Moreno MD  03/14/17 2139

## 2017-03-14 NOTE — ED NOTES
Pt. Lying in bed, no acute distress noted, o2 in progress, will continue to monitor. Call light in reach.

## 2017-03-14 NOTE — H&P
"[facility] Hospital Medicine  History & Physical    Patient Name: Marcel Montalvo  MRN: [id number]  Admission Date: 3/14/2017  Attending Physician: Moy Sanderson MD   Primary Care Provider: Alexandra Moreno MD         Patient information was obtained from patient, spouse/SO and ER records.     Subjective:     Principal Problem:Acute systolic congestive heart failure    Chief Complaint:   Chief Complaint   Patient presents with    Shortness of Breath     pt reports she received treatment of tikosyn yesterday; today she woke up with shortness of breath, bad cough and wheezing and body aches        HPI: Marcel Montalvo is a 76 y.o. female patient who presents to the Emergency Department for SOB which onset gradually this AM when she woke up. Symptoms are constant and moderate in severity. Sx are exacerbated by nothing and relieved by nothing. Pt denies being on any oxygen at home and states she received a treatment of Tikosyn yesterday. Pt also states "she she just wasn't feeling good" 2 days ago and reports a fever (TMAX 101) yesterday. Associated sxs include chest tightness, nausea, nonproductive cough, wheezing, and generalized body aches. Patient denies any vomiting, diarrhea, chills, abd pain, CP, chest tightness, palpitations, leg swelling, weakness/numbness, lightheadedness, dizziness, congestion, rhinorrhea, ear pain, sore throat, dysuria, difficulty urinating and all other sxs at this time. No further complaints or concerns at this time.          Past Medical History:   Diagnosis Date    *Atrial fibrillation     AF (atrial fibrillation)     Palpation: atrial fibrillation, on Coumadin and followed by cardiologist.    Anticoagulant long-term use     Anxiety     Aortic insufficiency 3/19/2014    Aortic regurgitation     Aortic regurgitation/tricuspid regurgitation per MARIO in April 2007.     Asthma     Carotid artery stenosis     CEA on the left by Dr. Maciel    Coronary artery disease     " status post CABG X 3.     Diabetes mellitus     GERD (gastroesophageal reflux disease)     H. pylori infection     treated    Hyperlipidemia     intolerant to statins.    Hypertension 8/7/2013    IBS (irritable bowel syndrome)     Leg pain 3/19/2014    Long-term (current) use of anticoagulants 8/7/2013    Osteoarthritis     Osteoarthritis     Osteopenia     DEXA scan done on 06/20/12     Pancreatitis     resolved    S/P CABG (coronary artery bypass graft) 8/7/2013    S/P carotid endarterectomy 8/7/2013    S/P PTCA (percutaneous transluminal coronary angioplasty) 3/19/2014    Sjogren's syndrome     Stroke     Vitamin D deficiency disease        Past Surgical History:   Procedure Laterality Date    ABDOMINAL SURGERY      APPENDECTOMY      bilateral cataract surgery      CARPAL TUNNEL RELEASE      RT    CATARACT EXTRACTION      CEA      left    CHOLECYSTECTOMY      CORONARY ARTERY BYPASS GRAFT      2 stents    EYE SURGERY      HYSTERECTOMY      TONSILLECTOMY         Review of patient's allergies indicates:   Allergen Reactions    Codeine Nausea And Vomiting    Lisinopril      Other reaction(s): cough    Pacerone [amiodarone] Nausea And Vomiting    Sulfa (sulfonamide antibiotics) Nausea And Vomiting    Celecoxib Palpitations    Ciprofloxacin Hives, Itching and Rash    Colesevelam Rash and Nausea And Vomiting    Doxycycline Rash    Statins-hmg-coa reductase inhibitors Rash     Myalgia(can take atorvastatin ok -no rhabdo)per nurse josé antonio and patient  / stomach upset       No current facility-administered medications on file prior to encounter.      Current Outpatient Prescriptions on File Prior to Encounter   Medication Sig    aspirin (ECOTRIN) 81 MG EC tablet Take 1 tablet (81 mg total) by mouth once daily.    atorvastatin (LIPITOR) 40 MG tablet TAKE ONE TABLET BY MOUTH NIGHTLY    azelastine (ASTELIN) 137 mcg (0.1 %) nasal spray 2 sprays (274 mcg total) by Nasal route 2 (two) times  daily.    benzonatate (TESSALON) 100 MG capsule Take 1 capsule (100 mg total) by mouth 3 (three) times daily as needed for Cough.    budesonide-formoterol 160-4.5 mcg (SYMBICORT) 160-4.5 mcg/actuation HFAA Inhale 2 puffs into the lungs every 12 (twelve) hours. Wash out mouth after using    BYSTOLIC 5 mg Tab TAKE ONE TABLET BY MOUTH TWICE DAILY (Patient taking differently: TAKE ONE TABLET BY MOUTH DAILY)    diclofenac sodium (VOLTAREN) 1 % Gel Apply 2 g topically once daily.    escitalopram oxalate (LEXAPRO) 10 MG tablet TAKE ONE-HALF TO ONE TABLET BY MOUTH EVERY DAY    fluticasone (FLONASE) 50 mcg/actuation nasal spray 2 sprays by Each Nare route once daily.    furosemide (LASIX) 20 MG tablet Take 1 tablet (20 mg total) by mouth as directed.    hydroxychloroquine (PLAQUENIL) 200 mg tablet TAKE ONE TABLET BY MOUTH ONCE DAILY    nitroGLYCERIN (NITROSTAT) 0.4 MG SL tablet Place 1 tablet (0.4 mg total) under the tongue every 5 (five) minutes as needed for Chest pain.    pantoprazole (PROTONIX) 40 MG tablet Take 1 tablet (40 mg total) by mouth once daily.    potassium chloride (KLOR-CON) 10 MEQ TbSR Take 1 tablet (10 mEq total) by mouth once daily.    [DISCONTINUED] dofetilide (TIKOSYN) 125 MCG Cap Take 1 capsule (125 mcg total) by mouth every 12 (twelve) hours.    albuterol (PROAIR HFA) 90 mcg/actuation inhaler Inhale 2 puffs into the lungs every 6 (six) hours as needed.    carboxymethylcellulose (REFRESH PLUS) 0.5 % Dpet Place 1 drop into both eyes 3 (three) times daily as needed.    guaifenesin (MUCINEX) 600 mg 12 hr tablet Take 2 tablets (1,200 mg total) by mouth 2 (two) times daily. (Patient taking differently: Take 1,200 mg by mouth 2 (two) times daily. As needed for flares)    hyoscyamine (ANASPAZ) 0.125 mg TbDL Take 0.125 mg by mouth every 6 (six) hours as needed for Cramping.    phenobarb-hyoscy-atropine-scop (BELLADONNA-PHENOBARBITAL) 16.2-0.1037 -0.0194 mg/5 mL Elix Take 5 mLs by mouth daily  as needed (abdominal pain).    pramoxine 1 % Foam Place rectally 3 (three) times daily as needed.    PREMARIN vaginal cream PLACE 1 GRAM VAGINALLY TWICE A WEEK    RESTASIS 0.05 % ophthalmic emulsion INSTILL ONE DROP INTO EACH EYE TWICE DAILY    rivaroxaban (XARELTO) 20 mg Tab Take 1 tablet (20 mg total) by mouth daily with dinner or evening meal.    tramadol (ULTRAM) 50 mg tablet Take 1 tablet (50 mg total) by mouth 3 (three) times daily as needed.    triamcinolone acetonide 0.1% (KENALOG) 0.1 % ointment AAA bid prn    vitamin D 1000 units Tab Take 2,000 Units by mouth once daily.     Family History     Problem Relation (Age of Onset)    Alzheimer's disease Mother    Cancer Father, Brother, Paternal Grandmother, Paternal Grandfather    Heart disease Maternal Uncle    Hyperlipidemia Mother        Social History Main Topics    Smoking status: Former Smoker     Packs/day: 0.50     Years: 25.00     Quit date: 12/17/1988    Smokeless tobacco: Never Used    Alcohol use 0.0 oz/week     0 Standard drinks or equivalent per week      Comment: wine occasionally    Drug use: No    Sexual activity: Yes     Partners: Male     Review of Systems   Constitutional: Positive for activity change and fever. Negative for chills and fatigue.   HENT: Negative for congestion, rhinorrhea, sinus pressure, sneezing, sore throat and trouble swallowing.    Eyes: Negative for redness, itching and visual disturbance.   Respiratory: Positive for chest tightness and shortness of breath. Negative for cough.    Cardiovascular: Negative for chest pain, palpitations and leg swelling.   Gastrointestinal: Positive for nausea. Negative for abdominal distention, abdominal pain, diarrhea and vomiting.   Endocrine: Negative for cold intolerance and heat intolerance.   Genitourinary: Negative for decreased urine volume, difficulty urinating, dysuria, flank pain, frequency and urgency.   Musculoskeletal: Positive for myalgias. Negative for  arthralgias and back pain.   Skin: Negative for color change and wound.   Allergic/Immunologic: Negative for environmental allergies and food allergies.   Neurological: Negative for dizziness, syncope, weakness and headaches.   Hematological: Does not bruise/bleed easily.   Psychiatric/Behavioral: Negative for agitation, confusion and sleep disturbance. The patient is not nervous/anxious.      Objective:     Vital Signs (Most Recent):  Temp: 98.1 °F (36.7 °C) (03/14/17 1500)  Pulse: 103 (03/14/17 1500)  Resp: 16 (03/14/17 1500)  BP: (!) 144/74 (03/14/17 1500)  SpO2: 95 % (03/14/17 1500) Vital Signs (24h Range):  Temp:  [98.1 °F (36.7 °C)-98.3 °F (36.8 °C)] 98.1 °F (36.7 °C)  Pulse:  [] 103  Resp:  [15-22] 16  SpO2:  [85 %-97 %] 95 %  BP: (125-176)/(57-81) 144/74     Weight: 56.4 kg (124 lb 5.4 oz)  Body mass index is 22.74 kg/(m^2).    Physical Exam   Constitutional: She is oriented to person, place, and time. She appears well-developed and well-nourished.   HENT:   Head: Normocephalic.   Nose: Nose normal.   Mouth/Throat: Oropharynx is clear and moist.   Eyes: Conjunctivae and EOM are normal.   Neck: Normal range of motion. JVD present.   Cardiovascular: An irregularly irregular rhythm present.   Pulses:       Dorsalis pedis pulses are 2+ on the right side, and 2+ on the left side.        Posterior tibial pulses are 2+ on the right side, and 2+ on the left side.   Pulmonary/Chest: Effort normal. No respiratory distress. She has wheezes.   Abdominal: Soft. Bowel sounds are normal. She exhibits no distension. There is no tenderness.   Musculoskeletal: Normal range of motion. She exhibits no edema.   Neurological: She is alert and oriented to person, place, and time.   Skin: Skin is warm and dry.        Significant Labs:   CBC:   Recent Labs  Lab 03/14/17  0628 03/14/17  1401   WBC 8.07 6.17   HGB 11.3* 11.6*   HCT 36.3* 35.9*    166  166     CMP:   Recent Labs  Lab 03/14/17  0628      K 4.6   CL  103   CO2 25   *   BUN 25*   CREATININE 1.2   CALCIUM 9.3   PROT 7.5   ALBUMIN 4.2   BILITOT 0.8   ALKPHOS 104   AST 47*   ALT 38   ANIONGAP 10   EGFRNONAA 44*     Troponin:   Recent Labs  Lab 03/14/17  0628 03/14/17  1401   TROPONINI 0.016 0.024       Significant Imaging:   Imaging Results         X-Ray Chest 1 View (Final result) Result time:  03/14/17 08:01:10    Final result by CHRISTIANO Tejada Sr., MD (03/14/17 08:01:10)    Impression:        1.  There has been interval development of a mild amount of interstitial opacities seen in both lungs with Kerley B lines visualized bilaterally.  This is characteristic of pulmonary edema.  2.  There is mild cardiomegaly.    3.  There is opacification of the left costophrenic angle.  There is blunting of the right costophrenic angle.  These are characteristic of tiny pleural effusions.  4.  Surgical changes      Electronically signed by: CHRISTIANO TEJADA MD  Date:     03/14/17  Time:    08:01     Narrative:    One-view chest x-ray    Clinical History: Shortness of breath    Finding: Comparison was made to a prior examination performed on 10/2/2015.  There are multiple sternotomy wires in place.  There are surgical clips projected over the mediastinum and the left side of the neck.  There is mild cardiomegaly.  There has been interval development of a mild amount of interstitial opacities seen in both lungs with Kerley B lines visualized bilaterally.  There is no pneumothorax.  There is opacification of the left costophrenic angle.  There is blunting of the right costophrenic angle.            Assessment/Plan:     * Acute systolic congestive heart failure  -Pt admitted to Inpatient status  -Cardiology following  -IV Lasix (BNP 1543)  -Strict I/Os  -daily weights  -Echo results pending  -supplemental oxygen  -will follow serial troponins      Multiple joint pain  -secondary to OA  -analgesia prn      Type 2 diabetes mellitus without complication  -Accuchecks and SSI  continued      Hyperlipidemia  -Statin continued      Persistent atrial fibrillation  -Cardiology cosulted  -recently started on Tikosyn  -Tikosyn and Eliquis stopped  -Heparin infusion continued  -Nebivolol continued  -Echo results pending  -will need LHC when euvolemic per Cardiology      Hypertension  -will resume home medications      Asthma  -hx of  -Duonebs prn  -IV steroids given in ED      Acute respiratory failure with hypoxia  -supplemental oxygen  -Duonebs prn   -will consider pulmonology consult if no improvement       VTE Risk Mitigation         Ordered     Medium Risk of VTE  Once      03/14/17 1527     Reason for No Pharmacological VTE Prophylaxis  Once      03/14/17 1527        Kathi Mcdaniels NP  Department of Hospital Medicine   [facility]

## 2017-03-14 NOTE — PLAN OF CARE
Problem: Patient Care Overview  Goal: Plan of Care Review  Outcome: Ongoing (interventions implemented as appropriate)  Patient admitted to inpatient unit from ER. Ongoing cardiac monitoring with VSS. Patient oriented to unit, call light and encouraged to use for any needs or when getting up. Bleeding precautions reviewed with patient who verbalizes understanding. Will continue to monitor.

## 2017-03-14 NOTE — PROGRESS NOTES
Patient was scheduled for Ekg this morning and third dose of Tikosyn.  Noted admitted to [facility] ER spoke with Dr. Fletcher and will cancel Tikosyn therapy for now

## 2017-03-14 NOTE — PLAN OF CARE
CM met with pt for d/c planning assessment.  Pt was started on Tikosyn secondary to heart being afib and having fever.  Pt began Tikosyn on Monday and that night experienced SOB.  Pt does have history of SOB, but SOB may have been exacerbated by Tikosyn.  Pt stopped Tikosyn in case it contributed to SOB.  Pt is being followed by cardiogist Kinjal Fletcher MD while in the hospital.    Pt unable to say what d/c needs will be at this point in treatment.  Pt has not utilized home health in approximately ten years, and is still physically active, including traveling to different states with her .  Pt's  is retired and provides care and support to pt.  Pt does still drive but not frequently.  Currently pt uses a nebulizer at home.  At last appointment with pulmonologist Artem Walter MD pt told she may need home oxygen soon.  Oxygen evaluation should be done during pt admission to assess for need.  CM will continue to follow pt and assess for d/c needs.        03/14/17 1548   Discharge Assessment   Assessment Type Discharge Planning Assessment   Confirmed/corrected address and phone number on facesheet? Yes   Assessment information obtained from? Patient;Medical Record   Expected Length of Stay (days) (TBD)   Prior to hospitilization cognitive status: Alert/Oriented   Prior to hospitalization functional status: Independent   Current cognitive status: Alert/Oriented   Current Functional Status: Independent   Arrived From home or self-care   Lives With spouse   Able to Return to Prior Arrangements yes   Is patient able to care for self after discharge? Yes   How many people do you have in your home that can help with your care after discharge? 1   Who are your caregiver(s) and their phone number(s)? Anibal Montalvo, spouse: [phone]   Patient's perception of discharge disposition home or selfcare   Readmission Within The Last 30 Days no previous admission in last 30 days   Patient currently being followed by  outpatient case management? No   Patient currently receives home health services? No   Does the patient currently use HME? Yes   Patient currently receives private duty nursing? No   Patient currently receives any other outside agency services? No   Equipment Currently Used at Home respiratory supplies   Do you have any problems affording any of your prescribed medications? No   Is the patient taking medications as prescribed? yes   Do you have any financial concerns preventing you from receiving the healthcare you need? No   Does the patient have transportation to healthcare appointments? Yes   Transportation Available car;family or friend will provide   On Dialysis? No   Does the patient receive services at the Coumadin Clinic? No   Are there any open cases? No   Discharge Plan A Home   Discharge Plan B Home   Patient/Family In Agreement With Plan yes

## 2017-03-14 NOTE — CONSULTS
Consult Note  Cardiology    Consult Requested By: Dr. Shaista Zheng  Reason for Consult: SOB, CHF    SUBJECTIVE:     History of Present Illness:  Patient is a 76 y.o. female with a PMHx of atrial flutter s/p ablation, atrial fibrillation s/p DCCV and Tikosyn therapy, DM type II, carotid artery disease s/p L CEA, CAD s/p CABG x 3, HTN, and CVA who presented to [facility] ED this AM with a chief complaint of progressively worsening SOB. Associated symptoms included malaise, fatigue, wheezing, cough, orthopnea, and fever on Saturday (temp-101). Patient denied any associated nausea, vomiting, or valorie chest pain. Workup in ED revealed hypoxia (O2 sat 85%), BNP of 1543, and CXR showed pulmonary edema. Initial EKG in ED showed atrial fibrillation and patient was subsequently admitted for further evaluation and treatment. Patient seen and examined today. She is well known to cardiology service and followed routinely in clinic by Dr. Fletcher. She was recently re-started on Tikoysn therapy due to recurrence of symptomatic atrial fibrillation. At time of exam, she still complains of significant SOB. Reports she has been feeling poorly over the last week and ran fever on Saturday. Remains chest pain free. She reports compliance with her medications. Chart reviewed. 2D echo pending. Troponin x 1 negative.     Review of patient's allergies indicates:   Allergen Reactions    Codeine Nausea And Vomiting    Lisinopril      Other reaction(s): cough    Pacerone [amiodarone] Nausea And Vomiting    Sulfa (sulfonamide antibiotics) Nausea And Vomiting    Celecoxib Palpitations    Ciprofloxacin Hives, Itching and Rash    Colesevelam Rash and Nausea And Vomiting    Doxycycline Rash    Statins-hmg-coa reductase inhibitors Rash     Myalgia, stomach upset       Past Medical History:   Diagnosis Date    *Atrial fibrillation     AF (atrial fibrillation)     Palpation: atrial fibrillation, on Coumadin and followed by cardiologist.     Anticoagulant long-term use     Anxiety     Aortic insufficiency 3/19/2014    Aortic regurgitation     Aortic regurgitation/tricuspid regurgitation per MARIO in April 2007.     Asthma     Carotid artery stenosis     CEA on the left by Dr. Maciel    Coronary artery disease     status post CABG X 3.     Diabetes mellitus     GERD (gastroesophageal reflux disease)     H. pylori infection     treated    Hyperlipidemia     intolerant to statins.    Hypertension 8/7/2013    IBS (irritable bowel syndrome)     Leg pain 3/19/2014    Long-term (current) use of anticoagulants 8/7/2013    Osteoarthritis     Osteoarthritis     Osteopenia     DEXA scan done on 06/20/12     Pancreatitis     resolved    S/P CABG (coronary artery bypass graft) 8/7/2013    S/P carotid endarterectomy 8/7/2013    S/P PTCA (percutaneous transluminal coronary angioplasty) 3/19/2014    Sjogren's syndrome     Stroke     Vitamin D deficiency disease      Past Surgical History:   Procedure Laterality Date    ABDOMINAL SURGERY      APPENDECTOMY      bilateral cataract surgery      CARPAL TUNNEL RELEASE      RT    CATARACT EXTRACTION      CEA      left    CHOLECYSTECTOMY      CORONARY ARTERY BYPASS GRAFT      2 stents    EYE SURGERY      HYSTERECTOMY      TONSILLECTOMY       Family History   Problem Relation Age of Onset    Alzheimer's disease Mother     Hyperlipidemia Mother     Cancer Father      lung    Heart disease Maternal Uncle     Cancer Brother      stomach    Cancer Paternal Grandmother     Cancer Paternal Grandfather      stomach     Social History   Substance Use Topics    Smoking status: Former Smoker     Packs/day: 0.50     Years: 25.00     Quit date: 12/17/1988    Smokeless tobacco: Never Used    Alcohol use 0.0 oz/week     0 Standard drinks or equivalent per week      Comment: wine occasionally        Review of Systems:  Constitutional: +malaise, fatigue, fever  Eyes: negative  Ears, nose, mouth,  throat, and face: negative  Respiratory: +cough with sputum, SOB  Cardiovascular: +RODARTE, orthopnea  Gastrointestinal: negative  Genitourinary:negative  Hematologic/lymphatic: negative  Musculoskeletal:negative,   Neurological: negative  Behavioral/Psych: negative  Endocrine: negative  Allergic/Immunologic: negative    OBJECTIVE:     Vital Signs (Most Recent)  Temp: 98.3 °F (36.8 °C) (03/14/17 0614)  Pulse: 102 (03/14/17 1301)  Resp: 15 (03/14/17 1301)  BP: 132/74 (03/14/17 1301)  SpO2: 97 % (03/14/17 1301)    Vital Signs Range (Last 24H):  Temp:  [98.3 °F (36.8 °C)]   Pulse:  []   Resp:  [15-22]   BP: (132-176)/(59-81)   SpO2:  [85 %-97 %]     Current Facility-Administered Medications   Medication    aspirin tablet 325 mg    denosumab (PROLIA) injection 60 mg    famotidine (PF) 20 mg/2 mL injection 20 mg    furosemide injection 40 mg    nebivolol tablet 5 mg    nitroGLYCERIN 2% TD oint ointment 0.5 inch     Current Outpatient Prescriptions   Medication Sig    aspirin (ECOTRIN) 81 MG EC tablet Take 1 tablet (81 mg total) by mouth once daily.    atorvastatin (LIPITOR) 40 MG tablet TAKE ONE TABLET BY MOUTH NIGHTLY    azelastine (ASTELIN) 137 mcg (0.1 %) nasal spray 2 sprays (274 mcg total) by Nasal route 2 (two) times daily.    benzonatate (TESSALON) 100 MG capsule Take 1 capsule (100 mg total) by mouth 3 (three) times daily as needed for Cough.    budesonide-formoterol 160-4.5 mcg (SYMBICORT) 160-4.5 mcg/actuation HFAA Inhale 2 puffs into the lungs every 12 (twelve) hours. Wash out mouth after using    BYSTOLIC 5 mg Tab TAKE ONE TABLET BY MOUTH TWICE DAILY (Patient taking differently: TAKE ONE TABLET BY MOUTH DAILY)    diclofenac sodium (VOLTAREN) 1 % Gel Apply 2 g topically once daily.    escitalopram oxalate (LEXAPRO) 10 MG tablet TAKE ONE-HALF TO ONE TABLET BY MOUTH EVERY DAY    fluticasone (FLONASE) 50 mcg/actuation nasal spray 2 sprays by Each Nare route once daily.    furosemide (LASIX) 20  MG tablet Take 1 tablet (20 mg total) by mouth as directed.    hydroxychloroquine (PLAQUENIL) 200 mg tablet TAKE ONE TABLET BY MOUTH ONCE DAILY    nitroGLYCERIN (NITROSTAT) 0.4 MG SL tablet Place 1 tablet (0.4 mg total) under the tongue every 5 (five) minutes as needed for Chest pain.    pantoprazole (PROTONIX) 40 MG tablet Take 1 tablet (40 mg total) by mouth once daily.    potassium chloride (KLOR-CON) 10 MEQ TbSR Take 1 tablet (10 mEq total) by mouth once daily.    albuterol (PROAIR HFA) 90 mcg/actuation inhaler Inhale 2 puffs into the lungs every 6 (six) hours as needed.    carboxymethylcellulose (REFRESH PLUS) 0.5 % Dpet Place 1 drop into both eyes 3 (three) times daily as needed.    guaifenesin (MUCINEX) 600 mg 12 hr tablet Take 2 tablets (1,200 mg total) by mouth 2 (two) times daily. (Patient taking differently: Take 1,200 mg by mouth 2 (two) times daily. As needed for flares)    hyoscyamine (ANASPAZ) 0.125 mg TbDL Take 0.125 mg by mouth every 6 (six) hours as needed for Cramping.    phenobarb-hyoscy-atropine-scop (BELLADONNA-PHENOBARBITAL) 16.2-0.1037 -0.0194 mg/5 mL Elix Take 5 mLs by mouth daily as needed (abdominal pain).    pramoxine 1 % Foam Place rectally 3 (three) times daily as needed.    PREMARIN vaginal cream PLACE 1 GRAM VAGINALLY TWICE A WEEK    RESTASIS 0.05 % ophthalmic emulsion INSTILL ONE DROP INTO EACH EYE TWICE DAILY    rivaroxaban (XARELTO) 20 mg Tab Take 1 tablet (20 mg total) by mouth daily with dinner or evening meal.    tramadol (ULTRAM) 50 mg tablet Take 1 tablet (50 mg total) by mouth 3 (three) times daily as needed.    triamcinolone acetonide 0.1% (KENALOG) 0.1 % ointment AAA bid prn    vitamin D 1000 units Tab Take 2,000 Units by mouth once daily.     Current Facility-Administered Medications on File Prior to Encounter   Medication    denosumab (PROLIA) injection 60 mg     Current Outpatient Prescriptions on File Prior to Encounter   Medication Sig    aspirin  (ECOTRIN) 81 MG EC tablet Take 1 tablet (81 mg total) by mouth once daily.    atorvastatin (LIPITOR) 40 MG tablet TAKE ONE TABLET BY MOUTH NIGHTLY    azelastine (ASTELIN) 137 mcg (0.1 %) nasal spray 2 sprays (274 mcg total) by Nasal route 2 (two) times daily.    benzonatate (TESSALON) 100 MG capsule Take 1 capsule (100 mg total) by mouth 3 (three) times daily as needed for Cough.    budesonide-formoterol 160-4.5 mcg (SYMBICORT) 160-4.5 mcg/actuation HFAA Inhale 2 puffs into the lungs every 12 (twelve) hours. Wash out mouth after using    BYSTOLIC 5 mg Tab TAKE ONE TABLET BY MOUTH TWICE DAILY (Patient taking differently: TAKE ONE TABLET BY MOUTH DAILY)    diclofenac sodium (VOLTAREN) 1 % Gel Apply 2 g topically once daily.    escitalopram oxalate (LEXAPRO) 10 MG tablet TAKE ONE-HALF TO ONE TABLET BY MOUTH EVERY DAY    fluticasone (FLONASE) 50 mcg/actuation nasal spray 2 sprays by Each Nare route once daily.    furosemide (LASIX) 20 MG tablet Take 1 tablet (20 mg total) by mouth as directed.    hydroxychloroquine (PLAQUENIL) 200 mg tablet TAKE ONE TABLET BY MOUTH ONCE DAILY    nitroGLYCERIN (NITROSTAT) 0.4 MG SL tablet Place 1 tablet (0.4 mg total) under the tongue every 5 (five) minutes as needed for Chest pain.    pantoprazole (PROTONIX) 40 MG tablet Take 1 tablet (40 mg total) by mouth once daily.    potassium chloride (KLOR-CON) 10 MEQ TbSR Take 1 tablet (10 mEq total) by mouth once daily.    [DISCONTINUED] dofetilide (TIKOSYN) 125 MCG Cap Take 1 capsule (125 mcg total) by mouth every 12 (twelve) hours.    albuterol (PROAIR HFA) 90 mcg/actuation inhaler Inhale 2 puffs into the lungs every 6 (six) hours as needed.    carboxymethylcellulose (REFRESH PLUS) 0.5 % Dpet Place 1 drop into both eyes 3 (three) times daily as needed.    guaifenesin (MUCINEX) 600 mg 12 hr tablet Take 2 tablets (1,200 mg total) by mouth 2 (two) times daily. (Patient taking differently: Take 1,200 mg by mouth 2 (two) times  daily. As needed for flares)    hyoscyamine (ANASPAZ) 0.125 mg TbDL Take 0.125 mg by mouth every 6 (six) hours as needed for Cramping.    phenobarb-hyoscy-atropine-scop (BELLADONNA-PHENOBARBITAL) 16.2-0.1037 -0.0194 mg/5 mL Elix Take 5 mLs by mouth daily as needed (abdominal pain).    pramoxine 1 % Foam Place rectally 3 (three) times daily as needed.    PREMARIN vaginal cream PLACE 1 GRAM VAGINALLY TWICE A WEEK    RESTASIS 0.05 % ophthalmic emulsion INSTILL ONE DROP INTO EACH EYE TWICE DAILY    rivaroxaban (XARELTO) 20 mg Tab Take 1 tablet (20 mg total) by mouth daily with dinner or evening meal.    tramadol (ULTRAM) 50 mg tablet Take 1 tablet (50 mg total) by mouth 3 (three) times daily as needed.    triamcinolone acetonide 0.1% (KENALOG) 0.1 % ointment AAA bid prn    vitamin D 1000 units Tab Take 2,000 Units by mouth once daily.       Physical Exam:  General appearance: alert, appears ill, dyspneic when speaking  Head: Normocephalic, without obvious abnormality, atraumatic  Neck: no adenopathy, +bilateral carotid bruits, +JVD  Lungs: bibasilar rales  Chest wall: CABG scar well-healed  Heart: irregularly irregular rate and rhythm, S1, S2 normal, +diastolic murmur URSB radiating to apex  Abdomen: soft, non-tender; bowel sounds normal; no masses,  no organomegaly  Extremities: extremities normal, atraumatic, trace BLE edema  Skin: Skin color, texture, turgor normal. No rashes or lesions  Neurologic: Grossly normal, AAO x 3    Laboratory:  Chemistry:   Lab Results   Component Value Date     03/14/2017    K 4.6 03/14/2017     03/14/2017    CO2 25 03/14/2017    BUN 25 (H) 03/14/2017    CREATININE 1.2 03/14/2017    CALCIUM 9.3 03/14/2017     Cardiac Markers:   Lab Results   Component Value Date    CKMB 3.6 01/25/2011    TROPONINI 0.016 03/14/2017    TROPONINI 1.09 (H) 01/25/2011     Cardiac Markers (Last 3):   Lab Results   Component Value Date    CKMB 3.6 01/25/2011    CKMB 4.2 (H) 01/24/2011     CKMB 3.1 01/24/2011    TROPONINI 0.016 03/14/2017    TROPONINI 0.036 (H) 09/07/2016    TROPONINI 0.028 (H) 09/06/2016    TROPONINI 1.09 (H) 01/25/2011    TROPONINI 2.64 (H) 01/24/2011    TROPONINI 0.58 (H) 01/24/2011     CBC:   Lab Results   Component Value Date    WBC 8.07 03/14/2017    HGB 11.3 (L) 03/14/2017    HCT 36.3 (L) 03/14/2017    HCT 29 (L) 09/26/2016    MCV 95 03/14/2017     03/14/2017     Lipids:   Lab Results   Component Value Date    CHOL 187 03/14/2016    TRIG 145 03/14/2016    HDL 55 03/14/2016     Coagulation:   Lab Results   Component Value Date    INR 1.5 (H) 03/14/2017    APTT 31.6 03/14/2017       Diagnostic Results:  ECG: Reviewed  X-Ray: Reviewed  Echo: Pending      ASSESSMENT/PLAN:     Patient Active Problem List   Diagnosis    Sjogren's syndrome    Multiple joint pain    Type 2 diabetes mellitus without complication    Hyperlipidemia    Coronary artery disease involving coronary bypass graft without angina pectoris    Carotid artery stenosis    Persistent atrial fibrillation    S/P CABG (coronary artery bypass graft)    S/P carotid endarterectomy    Hypertension    Long-term (current) use of anticoagulants    Encounter for long-term (current) use of other medications    S/P PTCA (percutaneous transluminal coronary angioplasty)    Aortic insufficiency    Leg pain    Lens replaced by other means    Rectal bleeding    Corneal abrasion    Foreign body in conjunctival sac    Osteoporosis, postmenopausal    Atrophic vaginitis    Osteoarthritis of hands, bilateral    Osteoarthritis of both knees    Bilateral dry eyes    Pseudophakia    Diabetes mellitus type 2 without retinopathy    Glaucoma suspect of both eyes    Allergic conjunctivitis of both eyes    Typical atrial flutter    Abdominal spasms    Hemorrhoids    SUSAN (generalized anxiety disorder)    Medication monitoring encounter    Immunocompromised    Asthma    A-fib    On anticoagulant therapy     Radiculopathy affecting upper extremity    Chronic neck pain    Acute systolic congestive heart failure      Patient who presents with acute decompensated CHF in setting of atrial fibrillation and respiratory illness. Needs IV diuresis as well as steroids. Recommend pulmonary consult. Continue BB. Stop Tikoysn and Eliquis. Start heparin gtt. Check 2D echo. Will likely need LHC once more euvolemic for further evaluation.    Plan:   -IV Lasix  -Steroids, pulmonary consult  -Swab for flu  -Continue BB, stop Tikosyn and Eliquis  -Start heparin gtt  -Will likely need LHC once more stable and euovlemic    Chart reviewed. Dr. Fletcher examined patient and agrees with plan as outlined above.

## 2017-03-14 NOTE — IP AVS SNAPSHOT
[facility]  [id number] [place] [address] [address] [place] [address]           Patient Discharge Instructions     Our goal is to set you up for success. This packet includes information on your condition, medications, and your home care. It will help you to care for yourself so you don't get sicker and need to go back to the hospital.     Please ask your nurse if you have any questions.        There are many details to remember when preparing to leave the hospital. Here is what you will need to do:    1. Take your medicine. If you are prescribed medications, review your Medication List in the following pages. You may have new medications to  at the pharmacy and others that you'll need to stop taking. Review the instructions for how and when to take your medications. Talk with your doctor or nurses if you are unsure of what to do.     2. Go to your follow-up appointments. Specific follow-up information is listed in the following pages. Your may be contacted by a transition nurse or clinical provider about future appointments. Be sure we have all of the phone numbers to reach you, if needed. Please contact your provider's office if you are unable to make an appointment.     3. Watch for warning signs. Your doctor or nurse will give you detailed warning signs to watch for and when to call for assistance. These instructions may also include educational information about your condition. If you experience any of warning signs to your health, call your doctor.               ** Verify the list of medication(s) below is accurate and up to date. Carry this with you in case of emergency. If your medications have changed, please notify your healthcare provider.             Medication List      START taking these medications        Additional Info                      amoxicillin-clavulanate 875-125mg 875-125 mg per tablet   Commonly known as:  AUGMENTIN   Quantity:  20 tablet   Refills:  1   Dose:  1 tablet     Instructions:  Take 1 tablet by mouth every 12 (twelve) hours.     Begin Date    AM    Noon    PM    Bedtime       dofetilide 125 MCG Cap   Commonly known as:  TIKOSYN   Quantity:  60 capsule   Refills:  1   Dose:  125 mcg    Last time this was given:  125 mcg on 3/18/2017  9:05 AM   Instructions:  Take 1 capsule (125 mcg total) by mouth every 12 (twelve) hours.     Begin Date    AM    Noon    PM    Bedtime       predniSONE 20 MG tablet   Commonly known as:  DELTASONE   Quantity:  30 tablet   Refills:  1   Dose:  10 mg    Last time this was given:  20 mg on 3/18/2017  9:05 AM   Instructions:  Take 0.5 tablets (10 mg total) by mouth 2 (two) times daily. Take 2 tabs twice daily with food for three days then 1 tab twice daily for three days then 1 daily for three days and then stop. Take your Protonix twice daily Before BF and Dinner for 5 days then once daily before BF as usual.     Begin Date    AM    Noon    PM    Bedtime         CHANGE how you take these medications        Additional Info                      BYSTOLIC 5 MG Tab   Quantity:  180 tablet   Refills:  6   What changed:  See the new instructions.   Generic drug:  nebivolol    Last time this was given:  5 mg on 3/18/2017  9:05 AM   Instructions:  TAKE ONE TABLET BY MOUTH TWICE DAILY     Begin Date    AM    Noon    PM    Bedtime       guaifenesin 600 mg 12 hr tablet   Commonly known as:  MUCINEX   Quantity:  60 tablet   Refills:  11   Dose:  1200 mg   What changed:  additional instructions    Instructions:  Take 2 tablets (1,200 mg total) by mouth 2 (two) times daily.     Begin Date    AM    Noon    PM    Bedtime         CONTINUE taking these medications        Additional Info                      albuterol 90 mcg/actuation inhaler   Commonly known as:  PROAIR HFA   Quantity:  1 Inhaler   Refills:  11   Dose:  2 puff    Instructions:  Inhale 2 puffs into the lungs every 6 (six) hours as needed.     Begin Date    AM    Noon    PM    Bedtime       ANASPAZ  0.125 mg Tbdl   Refills:  0   Dose:  0.125 mg   Generic drug:  hyoscyamine    Instructions:  Take 0.125 mg by mouth every 6 (six) hours as needed for Cramping.     Begin Date    AM    Noon    PM    Bedtime       aspirin 81 MG EC tablet   Commonly known as:  ECOTRIN   Quantity:  30 tablet   Refills:  6   Dose:  81 mg    Instructions:  Take 1 tablet (81 mg total) by mouth once daily.     Begin Date    AM    Noon    PM    Bedtime       atorvastatin 40 MG tablet   Commonly known as:  LIPITOR   Quantity:  90 tablet   Refills:  0    Last time this was given:  40 mg on 3/17/2017  9:40 PM   Instructions:  TAKE ONE TABLET BY MOUTH NIGHTLY     Begin Date    AM    Noon    PM    Bedtime       azelastine 137 mcg (0.1 %) nasal spray   Commonly known as:  ASTELIN   Quantity:  30 mL   Refills:  12   Dose:  2 spray    Instructions:  2 sprays (274 mcg total) by Nasal route 2 (two) times daily.     Begin Date    AM    Noon    PM    Bedtime       benzonatate 100 MG capsule   Commonly known as:  TESSALON   Quantity:  30 capsule   Refills:  1   Dose:  100 mg    Instructions:  Take 1 capsule (100 mg total) by mouth 3 (three) times daily as needed for Cough.     Begin Date    AM    Noon    PM    Bedtime       budesonide-formoterol 160-4.5 mcg 160-4.5 mcg/actuation Hfaa   Commonly known as:  SYMBICORT   Quantity:  1 Inhaler   Refills:  12   Dose:  2 puff    Instructions:  Inhale 2 puffs into the lungs every 12 (twelve) hours. Wash out mouth after using     Begin Date    AM    Noon    PM    Bedtime       carboxymethylcellulose 0.5 % Dpet   Commonly known as:  REFRESH PLUS   Refills:  0   Dose:  1 drop    Instructions:  Place 1 drop into both eyes 3 (three) times daily as needed.     Begin Date    AM    Noon    PM    Bedtime       diclofenac sodium 1 % Gel   Commonly known as:  VOLTAREN   Quantity:  3 Tube   Refills:  4   Dose:  2 g    Instructions:  Apply 2 g topically once daily.     Begin Date    AM    Noon    PM    Bedtime        escitalopram oxalate 10 MG tablet   Commonly known as:  LEXAPRO   Quantity:  90 tablet   Refills:  3    Instructions:  TAKE ONE-HALF TO ONE TABLET BY MOUTH EVERY DAY     Begin Date    AM    Noon    PM    Bedtime       fluticasone 50 mcg/actuation nasal spray   Commonly known as:  FLONASE   Quantity:  1 Bottle   Refills:  11   Dose:  2 spray    Instructions:  2 sprays by Each Nare route once daily.     Begin Date    AM    Noon    PM    Bedtime       furosemide 20 MG tablet   Commonly known as:  LASIX   Quantity:  90 tablet   Refills:  4   Dose:  20 mg    Instructions:  Take 1 tablet (20 mg total) by mouth as directed.     Begin Date    AM    Noon    PM    Bedtime       hydroxychloroquine 200 mg tablet   Commonly known as:  PLAQUENIL   Quantity:  90 tablet   Refills:  0    Instructions:  TAKE ONE TABLET BY MOUTH ONCE DAILY     Begin Date    AM    Noon    PM    Bedtime       nitroGLYCERIN 0.4 MG SL tablet   Commonly known as:  NITROSTAT   Quantity:  25 tablet   Refills:  6   Dose:  0.4 mg    Instructions:  Place 1 tablet (0.4 mg total) under the tongue every 5 (five) minutes as needed for Chest pain.     Begin Date    AM    Noon    PM    Bedtime       pantoprazole 40 MG tablet   Commonly known as:  PROTONIX   Quantity:  90 tablet   Refills:  3   Dose:  40 mg    Last time this was given:  40 mg on 3/18/2017  9:10 AM   Instructions:  Take 1 tablet (40 mg total) by mouth once daily.     Begin Date    AM    Noon    PM    Bedtime       phenobarb-hyoscy-atropine-scop 16.2-0.1037 -0.0194 mg/5 mL Elix   Commonly known as:  BELLADONNA-PHENOBARBITAL   Quantity:  180 mL   Refills:  1   Dose:  5 mL    Instructions:  Take 5 mLs by mouth daily as needed (abdominal pain).     Begin Date    AM    Noon    PM    Bedtime       potassium chloride 10 MEQ Tbsr   Commonly known as:  KLOR-CON   Quantity:  30 tablet   Refills:  6   Dose:  10 mEq    Last time this was given:  40 mEq on 3/16/2017  2:46 PM   Instructions:  Take 1 tablet (10 mEq  total) by mouth once daily.     Begin Date    AM    Noon    PM    Bedtime       pramoxine 1 % Foam   Quantity:  15 g   Refills:  1    Instructions:  Place rectally 3 (three) times daily as needed.     Begin Date    AM    Noon    PM    Bedtime       PREMARIN vaginal cream   Quantity:  30 g   Refills:  3   Generic drug:  conjugated estrogens    Instructions:  PLACE 1 GRAM VAGINALLY TWICE A WEEK     Begin Date    AM    Noon    PM    Bedtime       RESTASIS 0.05 % ophthalmic emulsion   Quantity:  60 each   Refills:  12   Generic drug:  cycloSPORINE    Instructions:  INSTILL ONE DROP INTO EACH EYE TWICE DAILY     Begin Date    AM    Noon    PM    Bedtime       rivaroxaban 20 mg Tab   Commonly known as:  XARELTO   Quantity:  90 tablet   Refills:  3   Dose:  20 mg    Last time this was given:  15 mg on 3/17/2017  4:17 PM   Instructions:  Take 1 tablet (20 mg total) by mouth daily with dinner or evening meal.     Begin Date    AM    Noon    PM    Bedtime       tramadol 50 mg tablet   Commonly known as:  ULTRAM   Quantity:  90 tablet   Refills:  3   Dose:  50 mg    Instructions:  Take 1 tablet (50 mg total) by mouth 3 (three) times daily as needed.     Begin Date    AM    Noon    PM    Bedtime       triamcinolone acetonide 0.1% 0.1 % ointment   Commonly known as:  KENALOG   Quantity:  60 g   Refills:  1    Instructions:  AAA bid prn     Begin Date    AM    Noon    PM    Bedtime       vitamin D 1000 units Tab   Refills:  0   Dose:  2000 Units    Instructions:  Take 2,000 Units by mouth once daily.     Begin Date    AM    Noon    PM    Bedtime            Where to Get Your Medications      These medications were sent to [facility] [place] - [address] [facility]  [address] [facility] [place] [address]     Phone:  [phone]     amoxicillin-clavulanate 875-125mg 875-125 mg per tablet    dofetilide 125 MCG Cap         You can get these medications from any pharmacy     Bring a paper prescription for each of these  medications     predniSONE 20 MG tablet                  Please bring to all follow up appointments:    1. A copy of your discharge instructions.  2. All medicines you are currently taking in their original bottles.  3. Identification and insurance card.    Please arrive 15 minutes ahead of scheduled appointment time.    Please call 24 hours in advance if you must reschedule your appointment and/or time.        Your Scheduled Appointments     Mar 20, 2017  8:00 AM CDT   [facility]ough with FIELDS, VISUAL-ONE   [facility]    [address] [place] [id number] [phone]            Mar 20, 2017  8:30 AM CDT   Established Patient Visit with Varghese Arambula MD   [facility] ([facility]    [address] [place] [id number] [phone]            Mar 30, 2017 10:00 AM CDT   Diagnostic Xray with [facility] XR2   [facility] ([facility])    [address]  [place] [id number] [phone]            Mar 30, 2017 10:20 AM CDT   Spirometry with PULMONARY LAB, [facility] ([facility])    [address]  [place] [id number] [phone]            Mar 30, 2017 10:40 AM CDT   Established Patient Visit with Artem Walter MD   [facility] ([facility])    [address] [place] [address] [phone]              Follow-up Information     Follow up with Alexandra Moreno MD In 1 week.    Specialty:  Family Medicine    Contact information:    [address]  [place] [address] [phone]          Follow up with Oseas Fletcher MD In 1 week.    Specialty:  Cardiology    Why:  and , If symptoms worsen    Contact information:    [address] [place] [id number] [phone]          Discharge Instructions     Future Orders    Activity as tolerated     Diet general     Questions:    Total calories:      Fat restriction, if any:      Protein restriction, if any:      Na restriction, if  "any:  2gNa    Fluid restriction:  Fluid - 1500mL    Additional restrictions:  Cardiac (Low Na/Chol)        Primary Diagnosis     Your primary diagnosis was:  Heart Failure      Admission Information     Date & Time Provider Department CSN    3/14/2017  6:20 AM Moy Sanderson MD [facility] [id number]      Care Providers     Provider Role Specialty Primary office phone    Moy Sanderson MD Attending Provider General Practice [phone]    Moy Sanderson MD Team Attending  General Practice [phone]    Kinjal Fletcher MD Consulting Physician  Cardiology  [phone]      Important Medicare Message          Most Recent Value    Important Message from Medicare Regarding Discharge Appeal Rights  Given to patient/caregiver, Explained to patient/caregiver, Signed/date by patient/caregiver yes 03/17/2017 1427      Your Vitals Were     BP Pulse Temp Resp Height Weight    134/96 (BP Location: Right arm, Patient Position: Lying, BP Method: Automatic) 95 98.1 °F (36.7 °C) (Oral) 18 5' 2" (1.575 m) 56.7 kg (125 lb)    SpO2 BMI             99% 22.86 kg/m2         Recent Lab Values        12/27/2012 4/30/2013 10/8/2013 3/13/2014 3/16/2015 1/5/2016 9/6/2016 3/14/2017     10:41 AM  8:20 AM 10:01 AM  8:55 AM  8:00 AM  9:39 AM  2:45 PM  2:01 PM    A1C 6.2 6.5 (H) 6.4 (H) 6.5 (H) 6.8 (H) 6.6 (H) 6.7 (H) 6.3 (H)    Comment for A1C at  2:45 PM on 9/6/2016:  According to ADA guidelines, hemoglobin A1C <7.0% represents  optimal control in non-pregnant diabetic patients.  Different  metrics may apply to specific populations.   Standards of Medical Care in Diabetes - 2016.  For the purpose of screening for the presence of diabetes:  <5.7%     Consistent with the absence of diabetes  5.7-6.4%  Consistent with increasing risk for diabetes   (prediabetes)  >or=6.5%  Consistent with diabetes  Currently no consensus exists for use of hemoglobin A1C  for diagnosis of diabetes for children.      Comment for A1C at  2:01 PM on " 3/14/2017:  According to ADA guidelines, hemoglobin A1C <7.0% represents  optimal control in non-pregnant diabetic patients.  Different  metrics may apply to specific populations.   Standards of Medical Care in Diabetes - 2016.  For the purpose of screening for the presence of diabetes:  <5.7%     Consistent with the absence of diabetes  5.7-6.4%  Consistent with increasing risk for diabetes   (prediabetes)  >or=6.5%  Consistent with diabetes  Currently no consensus exists for use of hemoglobin A1C  for diagnosis of diabetes for children.        Pending Labs     Order Current Status    Blood culture In process    Blood culture Preliminary result      Allergies as of 3/18/2017        Reactions    Codeine Nausea And Vomiting    Lisinopril     Other reaction(s): cough    Pacerone [Amiodarone] Nausea And Vomiting    Sulfa (Sulfonamide Antibiotics) Nausea And Vomiting    Celecoxib Palpitations    Ciprofloxacin Hives, Itching, Rash    Colesevelam Rash, Nausea And Vomiting    Doxycycline Rash    Statins-hmg-coa Reductase Inhibitors Rash    Myalgia(can take atorvastatin ok -no rhabdo)per nurse josé antonio and patient  / stomach upset      [facility]     [facility] Care Line - 24/7 Assistance  Unless otherwise directed by your provider, please contact [facility], our nurse care line that is available for 24/7 assistance.     Registered nurses in the [facility] provide clinical advisement, health education, appointment booking, and other advisory services.  Call for this free service at [phone].        Advance Directives     An advance directive is a document which, in the event you are no longer able to make decisions for yourself, tells your healthcare team what kind of treatment you do or do not want to receive, or who you would like to make those decisions for you.  If you do not currently have an advance directive, [facility] encourages you to create one.  For more information call:  [phone] ([phone]),  or log on to [email or web address].        Language Assistance Services     ATTENTION: Language assistance services are available, free of charge. Please call [phone].      ATENCIÓN: Si habla español, tiene a marte disposición servicios gratuitos de asistencia lingüística. Llame al [phone].     [facility] Ý: N?u b?n nói Ti?ng Vi?t, có các d?ch v? h? tr? ngôn ng? mi?n phí dành cho b?n. G?i s? [phone].        Stroke Education              Diabetes Discharge Instructions                                   Jan Peters            [facility] complies with applicable Federal civil rights laws and does not discriminate on the basis of race, color, national origin, age, disability, or sex.

## 2017-03-15 LAB
ALBUMIN SERPL BCP-MCNC: 4.4 G/DL
ALP SERPL-CCNC: 111 U/L
ALT SERPL W/O P-5'-P-CCNC: 48 U/L
ANION GAP SERPL CALC-SCNC: 16 MMOL/L
APTT BLDCRRT: 68.9 SEC
APTT BLDCRRT: 87.1 SEC
AST SERPL-CCNC: 69 U/L
BASOPHILS # BLD AUTO: 0.02 K/UL
BASOPHILS NFR BLD: 0.2 %
BILIRUB SERPL-MCNC: 1 MG/DL
BUN SERPL-MCNC: 31 MG/DL
CALCIUM SERPL-MCNC: 9.5 MG/DL
CHLORIDE SERPL-SCNC: 96 MMOL/L
CO2 SERPL-SCNC: 26 MMOL/L
CREAT SERPL-MCNC: 1.4 MG/DL
DIFFERENTIAL METHOD: ABNORMAL
EOSINOPHIL # BLD AUTO: 0 K/UL
EOSINOPHIL NFR BLD: 0 %
ERYTHROCYTE [DISTWIDTH] IN BLOOD BY AUTOMATED COUNT: 16.2 %
EST. GFR  (AFRICAN AMERICAN): 42 ML/MIN/1.73 M^2
EST. GFR  (NON AFRICAN AMERICAN): 36 ML/MIN/1.73 M^2
ESTIMATED AVG GLUCOSE: 134 MG/DL
ESTIMATED PA SYSTOLIC PRESSURE: 67.29
GLUCOSE SERPL-MCNC: 221 MG/DL
HBA1C MFR BLD HPLC: 6.3 %
HCT VFR BLD AUTO: 38.7 %
HGB BLD-MCNC: 12.4 G/DL
LYMPHOCYTES # BLD AUTO: 0.4 K/UL
LYMPHOCYTES NFR BLD: 3.2 %
MCH RBC QN AUTO: 30 PG
MCHC RBC AUTO-ENTMCNC: 32 %
MCV RBC AUTO: 94 FL
MITRAL VALVE REGURGITATION: ABNORMAL
MONOCYTES # BLD AUTO: 0.8 K/UL
MONOCYTES NFR BLD: 5.9 %
NEUTROPHILS # BLD AUTO: 11.7 K/UL
NEUTROPHILS NFR BLD: 90.7 %
PLATELET # BLD AUTO: 236 K/UL
PMV BLD AUTO: 10.6 FL
POCT GLUCOSE: 188 MG/DL (ref 70–110)
POCT GLUCOSE: 206 MG/DL (ref 70–110)
POCT GLUCOSE: 227 MG/DL (ref 70–110)
POCT GLUCOSE: 265 MG/DL (ref 70–110)
POTASSIUM SERPL-SCNC: 3.5 MMOL/L
PROT SERPL-MCNC: 8.1 G/DL
RBC # BLD AUTO: 4.14 M/UL
RETIRED EF AND QEF - SEE NOTES: 55 (ref 55–65)
SODIUM SERPL-SCNC: 138 MMOL/L
TRICUSPID VALVE REGURGITATION: ABNORMAL
WBC # BLD AUTO: 12.85 K/UL

## 2017-03-15 PROCEDURE — 25000003 PHARM REV CODE 250: Performed by: NURSE PRACTITIONER

## 2017-03-15 PROCEDURE — 63600175 PHARM REV CODE 636 W HCPCS: Performed by: NURSE PRACTITIONER

## 2017-03-15 PROCEDURE — 85730 THROMBOPLASTIN TIME PARTIAL: CPT

## 2017-03-15 PROCEDURE — 25000242 PHARM REV CODE 250 ALT 637 W/ HCPCS: Performed by: NURSE PRACTITIONER

## 2017-03-15 PROCEDURE — 63600175 PHARM REV CODE 636 W HCPCS: Performed by: EMERGENCY MEDICINE

## 2017-03-15 PROCEDURE — 21400001 HC TELEMETRY ROOM

## 2017-03-15 PROCEDURE — 99232 SBSQ HOSP IP/OBS MODERATE 35: CPT | Mod: ,,, | Performed by: INTERNAL MEDICINE

## 2017-03-15 PROCEDURE — 25000003 PHARM REV CODE 250: Performed by: SPECIALIST

## 2017-03-15 PROCEDURE — 80053 COMPREHEN METABOLIC PANEL: CPT

## 2017-03-15 PROCEDURE — 85025 COMPLETE CBC W/AUTO DIFF WBC: CPT

## 2017-03-15 PROCEDURE — 94640 AIRWAY INHALATION TREATMENT: CPT

## 2017-03-15 PROCEDURE — 25000242 PHARM REV CODE 250 ALT 637 W/ HCPCS: Performed by: EMERGENCY MEDICINE

## 2017-03-15 PROCEDURE — 93005 ELECTROCARDIOGRAM TRACING: CPT

## 2017-03-15 PROCEDURE — 93010 ELECTROCARDIOGRAM REPORT: CPT | Mod: ,,, | Performed by: INTERNAL MEDICINE

## 2017-03-15 PROCEDURE — 25000003 PHARM REV CODE 250: Performed by: EMERGENCY MEDICINE

## 2017-03-15 PROCEDURE — 27000221 HC OXYGEN, UP TO 24 HOURS

## 2017-03-15 PROCEDURE — 93306 TTE W/DOPPLER COMPLETE: CPT | Mod: 26,,, | Performed by: INTERNAL MEDICINE

## 2017-03-15 PROCEDURE — 25000003 PHARM REV CODE 250: Performed by: PHYSICIAN ASSISTANT

## 2017-03-15 PROCEDURE — 93306 TTE W/DOPPLER COMPLETE: CPT

## 2017-03-15 PROCEDURE — 85730 THROMBOPLASTIN TIME PARTIAL: CPT | Mod: 91

## 2017-03-15 PROCEDURE — 94760 N-INVAS EAR/PLS OXIMETRY 1: CPT

## 2017-03-15 PROCEDURE — 36415 COLL VENOUS BLD VENIPUNCTURE: CPT

## 2017-03-15 RX ORDER — FAMOTIDINE 20 MG/50ML
20 INJECTION, SOLUTION INTRAVENOUS DAILY
Status: DISCONTINUED | OUTPATIENT
Start: 2017-03-15 | End: 2017-03-15

## 2017-03-15 RX ORDER — BUDESONIDE 0.5 MG/2ML
0.5 INHALANT ORAL EVERY 12 HOURS
Status: DISCONTINUED | OUTPATIENT
Start: 2017-03-15 | End: 2017-03-18 | Stop reason: HOSPADM

## 2017-03-15 RX ORDER — DOFETILIDE 0.12 MG/1
125 CAPSULE ORAL EVERY 12 HOURS
Status: DISCONTINUED | OUTPATIENT
Start: 2017-03-15 | End: 2017-03-18 | Stop reason: HOSPADM

## 2017-03-15 RX ORDER — IPRATROPIUM BROMIDE AND ALBUTEROL SULFATE 2.5; .5 MG/3ML; MG/3ML
3 SOLUTION RESPIRATORY (INHALATION) EVERY 6 HOURS
Status: DISCONTINUED | OUTPATIENT
Start: 2017-03-15 | End: 2017-03-18 | Stop reason: HOSPADM

## 2017-03-15 RX ORDER — METHYLPREDNISOLONE SOD SUCC 125 MG
40 VIAL (EA) INJECTION EVERY 8 HOURS
Status: DISCONTINUED | OUTPATIENT
Start: 2017-03-15 | End: 2017-03-15

## 2017-03-15 RX ORDER — ARFORMOTEROL TARTRATE 15 UG/2ML
15 SOLUTION RESPIRATORY (INHALATION) 2 TIMES DAILY
Status: DISCONTINUED | OUTPATIENT
Start: 2017-03-15 | End: 2017-03-18 | Stop reason: HOSPADM

## 2017-03-15 RX ORDER — FAMOTIDINE 20 MG/1
20 TABLET, FILM COATED ORAL 2 TIMES DAILY
Status: DISCONTINUED | OUTPATIENT
Start: 2017-03-15 | End: 2017-03-15

## 2017-03-15 RX ORDER — PREDNISONE 10 MG/1
10 TABLET ORAL 2 TIMES DAILY
Status: DISCONTINUED | OUTPATIENT
Start: 2017-03-15 | End: 2017-03-17

## 2017-03-15 RX ORDER — PANTOPRAZOLE SODIUM 40 MG/1
40 TABLET, DELAYED RELEASE ORAL 2 TIMES DAILY
Status: DISCONTINUED | OUTPATIENT
Start: 2017-03-15 | End: 2017-03-18 | Stop reason: HOSPADM

## 2017-03-15 RX ADMIN — BUDESONIDE 0.5 MG: 0.5 SUSPENSION RESPIRATORY (INHALATION) at 07:03

## 2017-03-15 RX ADMIN — NITROGLYCERIN 0.5 INCH: 20 OINTMENT TOPICAL at 02:03

## 2017-03-15 RX ADMIN — ATORVASTATIN CALCIUM 40 MG: 40 TABLET, FILM COATED ORAL at 08:03

## 2017-03-15 RX ADMIN — PANTOPRAZOLE SODIUM 40 MG: 40 TABLET, DELAYED RELEASE ORAL at 04:03

## 2017-03-15 RX ADMIN — IPRATROPIUM BROMIDE AND ALBUTEROL SULFATE 3 ML: .5; 3 SOLUTION RESPIRATORY (INHALATION) at 10:03

## 2017-03-15 RX ADMIN — FAMOTIDINE 20 MG: 20 TABLET ORAL at 10:03

## 2017-03-15 RX ADMIN — ACETAMINOPHEN 650 MG: 325 TABLET ORAL at 08:03

## 2017-03-15 RX ADMIN — METHYLPREDNISOLONE SODIUM SUCCINATE 80 MG: 125 INJECTION, POWDER, FOR SOLUTION INTRAMUSCULAR; INTRAVENOUS at 06:03

## 2017-03-15 RX ADMIN — INSULIN ASPART 2 UNITS: 100 INJECTION, SOLUTION INTRAVENOUS; SUBCUTANEOUS at 06:03

## 2017-03-15 RX ADMIN — DOFETILIDE 125 MCG: 0.12 CAPSULE ORAL at 10:03

## 2017-03-15 RX ADMIN — INSULIN ASPART 1 UNITS: 100 INJECTION, SOLUTION INTRAVENOUS; SUBCUTANEOUS at 08:03

## 2017-03-15 RX ADMIN — IPRATROPIUM BROMIDE AND ALBUTEROL SULFATE 3 ML: .5; 3 SOLUTION RESPIRATORY (INHALATION) at 01:03

## 2017-03-15 RX ADMIN — NITROGLYCERIN 0.5 INCH: 20 OINTMENT TOPICAL at 06:03

## 2017-03-15 RX ADMIN — ASPIRIN 325 MG ORAL TABLET 325 MG: 325 PILL ORAL at 09:03

## 2017-03-15 RX ADMIN — HEPARIN SODIUM AND DEXTROSE 14 UNITS/KG/HR: 10000; 5 INJECTION INTRAVENOUS at 07:03

## 2017-03-15 RX ADMIN — NEBIVOLOL HYDROCHLORIDE 5 MG: 5 TABLET ORAL at 10:03

## 2017-03-15 RX ADMIN — PREDNISONE 10 MG: 10 TABLET ORAL at 08:03

## 2017-03-15 RX ADMIN — IPRATROPIUM BROMIDE AND ALBUTEROL SULFATE 3 ML: .5; 3 SOLUTION RESPIRATORY (INHALATION) at 04:03

## 2017-03-15 RX ADMIN — NITROGLYCERIN 0.5 INCH: 20 OINTMENT TOPICAL at 12:03

## 2017-03-15 RX ADMIN — INSULIN ASPART 3 UNITS: 100 INJECTION, SOLUTION INTRAVENOUS; SUBCUTANEOUS at 11:03

## 2017-03-15 RX ADMIN — ARFORMOTEROL TARTRATE 15 MCG: 15 SOLUTION RESPIRATORY (INHALATION) at 07:03

## 2017-03-15 RX ADMIN — RIVAROXABAN 15 MG: 15 TABLET, FILM COATED ORAL at 06:03

## 2017-03-15 NOTE — NURSING
Patient assessed for diabetes educational needs following chart review  She reports was diagnosed 20 years and has been diet control when not hospitalized  She has a home glucose meter but does not check regularly per her PCP recommendations  She has attended a diabetes education class in the past  Reviewed glucose info regarding steroid therapy  Instructed in A1C information  She verbalizes understanding   Does not need literature

## 2017-03-15 NOTE — PROGRESS NOTES
Pulmonary Disease Management Inpatient Evaluation    Admitting Diagnosis: Acute Systolic Congestive Heart Failure    Current Pulmonologist:  Dr. Walter    Pulse:  94 bpm    SpO2:  98 %    Breath Sounds:  Exp Wheezing, Coarse, Bilateral Breath Sounds, ULL, URL,LLL, LRL, LML    Edema:  No    Inpatient Respiratory Treatment: Medications:  Duoneb Q6 PRN ,   Nasal Cannula 3 L/M, Ventimask %, Non-Rebreather, CPT, Acapella,Ventilator Settings    Home CPAP/BIPAP: none    Home O2:  none    Smoking History: yes    Supplier of Home CPAP/BIPAP/Oxygen: n/a    Home Respiratory Meds: Albuterol inhaler BID, Symbicort BID      Current or Past PFTs: Yes   Date: 4/1/16  FVC: 1.76  FEV1: 1.22 FEV1/FVC: 69  TLC: 3.77  DLCO:  12.9      Education on Respiratory Issues:      Mrs. Montalvo is a 76 y.o. female patient who presents to the ER for SOB which onset gradually yesterday morning when she woke up. Symptoms are constant and moderate in severity. Sx are exacerbated by nothing and relieved by nothing. She has a history of Asthma, COPD, Sjogren's syndrome, carotid artery stenosis,CABG X3, HTN, and GERD. Patient dose not have home oxygen. Patient quit smoking 32 years ago. She is followed by Dr. Walter in the clinic for COPD and Asthma. Patient is compliant with using her respiratory medication. Patient is not receiving the nebs this hospital visit. Message sent to Dr. Sanderson about adding pulmicort and arformoterol to her treatments in patient. Patient is currently wheezing will contact therapist to give a treatment. The patient was visited and informed of Pulmonary Disease Management Program. Patient wants to discuss program with Dr. Walter at her next appointment. Will follow up with patient from clinic.

## 2017-03-15 NOTE — ASSESSMENT & PLAN NOTE
-Pt admitted to Inpatient status  -Cardiology following  -IV Lasix (BNP 1543)  -Strict I/Os  -daily weights  -Echo results pending  -supplemental oxygen  -will follow serial troponin    Continue Heparin gtt-- Continue Tikosyn  await [facility] tomorrow  Continue Lasix

## 2017-03-15 NOTE — PLAN OF CARE
Problem: Patient Care Overview  Goal: Plan of Care Review  Outcome: Ongoing (interventions implemented as appropriate)  Client remains hemodynamically stable this shift. Client has responded well to breathing treatments and diuretic therapy AEB decreased work of breathing. Client resting comfortably at the moment and free on any distress. Plan of care reviewed with client, all questions answered and needs addressed. Will monitor.

## 2017-03-15 NOTE — NURSING
Heparin therapeutic at the moment. Client remains alert and coherent. No distress. Non productive cough and afib per telemetry monitor. Client stable, will monitor.

## 2017-03-15 NOTE — PROGRESS NOTES
"[facility] Hospital Medicine  Progress Note    Patient Name: Marcel Montalvo  MRN: [id number]  Patient Class: IP- Inpatient   Admission Date: 3/14/2017  Length of Stay: 1 days  Attending Physician: Moy Sanderson MD  Primary Care Provider: Alexandra Moreno MD        Subjective:     Principal Problem:Acute systolic congestive heart failure    HPI:  Marcel Montalvo is a 76 y.o. female patient who presents to the Emergency Department for SOB which onset gradually this AM when she woke up. Symptoms are constant and moderate in severity. Sx are exacerbated by nothing and relieved by nothing. Pt denies being on any oxygen at home and states she received a treatment of Tikosyn yesterday. Pt also states "she she just wasn't feeling good" 2 days ago and reports a fever (TMAX 101) yesterday. Associated sxs include chest tightness, nausea, nonproductive cough, wheezing, and generalized body aches. Patient denies any vomiting, diarrhea, chills, abd pain, CP, chest tightness, palpitations, leg swelling, weakness/numbness, lightheadedness, dizziness, congestion, rhinorrhea, ear pain, sore throat, dysuria, difficulty urinating and all other sxs at this time. No further complaints or concerns at this time.          Hospital Course:  76 yr female with extensive PMH, CAd s/p CABG and PCI, Ch Afib on Xarelto s/p RFA x 3, s/p Tikosyn Tx in the past, s/p L CEA, NIDDM, HTN, HLP, Anxiety admitted Acute Systolic CHF. Treated with IV lasix and Nebs-- she feels better but still has orthopnea and some sore throat. Pt feels better with IV Lasix, supplemental oxygen, Duonebs prn, Strict I/Os, and Daily weights. Her tikosyn was just resumed 3 days ago. She is also on Heparin gtt for possible LHC tomorrow. No cough, fever or chills.       Interval History: feels lot better except some sore thraot and still has some orthopnea.    Review of Systems   Constitutional: Positive for activity change and fever. Negative for chills and fatigue. "   HENT: Positive for sore throat and trouble swallowing. Negative for congestion, rhinorrhea, sinus pressure and sneezing.    Eyes: Negative for redness, itching and visual disturbance.   Respiratory: Positive for chest tightness and shortness of breath. Negative for cough.    Cardiovascular: Negative for chest pain, palpitations and leg swelling.   Gastrointestinal: Positive for nausea. Negative for abdominal distention, abdominal pain, diarrhea and vomiting.   Endocrine: Negative for cold intolerance and heat intolerance.   Genitourinary: Negative for decreased urine volume, difficulty urinating, dysuria, flank pain, frequency and urgency.   Musculoskeletal: Positive for myalgias. Negative for arthralgias and back pain.   Skin: Negative for color change and wound.   Allergic/Immunologic: Negative for environmental allergies and food allergies.   Neurological: Negative for dizziness, syncope, weakness and headaches.   Hematological: Does not bruise/bleed easily.   Psychiatric/Behavioral: Negative for agitation, confusion and sleep disturbance. The patient is not nervous/anxious.      Objective:     Vital Signs (Most Recent):  Temp: 98 °F (36.7 °C) (03/15/17 1200)  Pulse: 86 (03/15/17 1200)  Resp: 18 (03/15/17 1200)  BP: 133/67 (03/15/17 1200)  SpO2: 97 % (03/15/17 1200) Vital Signs (24h Range):  Temp:  [97.9 °F (36.6 °C)-98.9 °F (37.2 °C)] 98 °F (36.7 °C)  Pulse:  [] 86  Resp:  [17-30] 18  SpO2:  [90 %-98 %] 97 %  BP: (133-173)/(61-85) 133/67     Weight: 56.4 kg (124 lb 5.4 oz)  Body mass index is 22.74 kg/(m^2).    Intake/Output Summary (Last 24 hours) at 03/15/17 1619  Last data filed at 03/15/17 0516   Gross per 24 hour   Intake            537.6 ml   Output              250 ml   Net            287.6 ml      Physical Exam   Constitutional: She is oriented to person, place, and time. She appears well-developed and well-nourished.   HENT:   Head: Normocephalic.   Nose: Nose normal.   Mouth/Throat: Oropharynx  is clear and moist.   Eyes: Conjunctivae and EOM are normal.   Neck: Normal range of motion. JVD present.   Cardiovascular: An irregularly irregular rhythm present.   Pulses:       Dorsalis pedis pulses are 2+ on the right side, and 2+ on the left side.        Posterior tibial pulses are 2+ on the right side, and 2+ on the left side.   Pulmonary/Chest: Effort normal. No respiratory distress. She has wheezes. She has no rales. She exhibits no tenderness.   Abdominal: Soft. Bowel sounds are normal. She exhibits no distension. There is no tenderness.   Musculoskeletal: Normal range of motion. She exhibits no edema.   Neurological: She is alert and oriented to person, place, and time.   Skin: Skin is warm and dry.   Psychiatric: She has a normal mood and affect. Her behavior is normal. Judgment and thought content normal.   Nursing note and vitals reviewed.      Significant Labs:   BMP:   Recent Labs  Lab 03/14/17  0628 03/15/17  0642   * 221*    138   K 4.6 3.5    96   CO2 25 26   BUN 25* 31*   CREATININE 1.2 1.4   CALCIUM 9.3 9.5   MG 1.8  --      CBC:   Recent Labs  Lab 03/14/17  0628 03/14/17  1401 03/15/17  0701   WBC 8.07 6.17 12.85*   HGB 11.3* 11.6* 12.4   HCT 36.3* 35.9* 38.7    166  166 236     CMP:   Recent Labs  Lab 03/14/17  0628 03/15/17  0642    138   K 4.6 3.5    96   CO2 25 26   * 221*   BUN 25* 31*   CREATININE 1.2 1.4   CALCIUM 9.3 9.5   PROT 7.5 8.1   ALBUMIN 4.2 4.4   BILITOT 0.8 1.0   ALKPHOS 104 111   AST 47* 69*   ALT 38 48*   ANIONGAP 10 16   EGFRNONAA 44* 36*     Troponin:   Recent Labs  Lab 03/14/17 0628 03/14/17  1401   TROPONINI 0.016 0.024     All pertinent labs within the past 24 hours have been reviewed.    Significant Imaging: I have reviewed all pertinent imaging results/findings within the past 24 hours.    Assessment/Plan:      * Acute systolic congestive heart failure  -Pt admitted to Inpatient status  -Cardiology following  -IV Lasix  (BNP 1543)  -Strict I/Os  -daily weights  -Echo results pending  -supplemental oxygen  -will follow serial troponin    Continue Heparin gtt-- Continue Tikosyn  await LHC tomorrow  Continue Lasix        Acute respiratory failure with hypoxia  - Sec to CHF from uncontrolled Afib w RVR  -supplemental oxygen  -Duonebs prn       Persistent atrial fibrillation  -Cardiology cosulted  -recently started on Tikosyn  -Tikosyn and Eliquis stopped  -Heparin infusion continued  -Nebivolol continued  -Echo results pending  -will need LHC when euvolemic per Cardiology    Tikosyn resumed, Lasix held.-- await LHC in am    Multiple joint pain  -secondary to OA  -analgesia prn      Type 2 diabetes mellitus without complication  -Accuchecks and SSI continued      Asthma  -hx of Asthma  -Duonebs prn  -IV steroids given in ED    States nebs make her feel better. Will start low dose steroids    VTE Risk Mitigation         Ordered     rivaroxaban tablet 15 mg  With dinner     Route:  Oral        03/15/17 1514     Medium Risk of VTE  Once      03/14/17 1527     Reason for No Pharmacological VTE Prophylaxis  Once      03/14/17 1527          Moy Sanderson MD  Department of Hospital Medicine   [facility]

## 2017-03-15 NOTE — SUBJECTIVE & OBJECTIVE
Interval History: feels lot better except some sore thraot and still has some orthopnea.    Review of Systems   Constitutional: Positive for activity change and fever. Negative for chills and fatigue.   HENT: Positive for sore throat and trouble swallowing. Negative for congestion, rhinorrhea, sinus pressure and sneezing.    Eyes: Negative for redness, itching and visual disturbance.   Respiratory: Positive for chest tightness and shortness of breath. Negative for cough.    Cardiovascular: Negative for chest pain, palpitations and leg swelling.   Gastrointestinal: Positive for nausea. Negative for abdominal distention, abdominal pain, diarrhea and vomiting.   Endocrine: Negative for cold intolerance and heat intolerance.   Genitourinary: Negative for decreased urine volume, difficulty urinating, dysuria, flank pain, frequency and urgency.   Musculoskeletal: Positive for myalgias. Negative for arthralgias and back pain.   Skin: Negative for color change and wound.   Allergic/Immunologic: Negative for environmental allergies and food allergies.   Neurological: Negative for dizziness, syncope, weakness and headaches.   Hematological: Does not bruise/bleed easily.   Psychiatric/Behavioral: Negative for agitation, confusion and sleep disturbance. The patient is not nervous/anxious.      Objective:     Vital Signs (Most Recent):  Temp: 98 °F (36.7 °C) (03/15/17 1200)  Pulse: 86 (03/15/17 1200)  Resp: 18 (03/15/17 1200)  BP: 133/67 (03/15/17 1200)  SpO2: 97 % (03/15/17 1200) Vital Signs (24h Range):  Temp:  [97.9 °F (36.6 °C)-98.9 °F (37.2 °C)] 98 °F (36.7 °C)  Pulse:  [] 86  Resp:  [17-30] 18  SpO2:  [90 %-98 %] 97 %  BP: (133-173)/(61-85) 133/67     Weight: 56.4 kg (124 lb 5.4 oz)  Body mass index is 22.74 kg/(m^2).    Intake/Output Summary (Last 24 hours) at 03/15/17 7778  Last data filed at 03/15/17 0516   Gross per 24 hour   Intake            537.6 ml   Output              250 ml   Net            287.6 ml       Physical Exam   Constitutional: She is oriented to person, place, and time. She appears well-developed and well-nourished.   HENT:   Head: Normocephalic.   Nose: Nose normal.   Mouth/Throat: Oropharynx is clear and moist.   Eyes: Conjunctivae and EOM are normal.   Neck: Normal range of motion. JVD present.   Cardiovascular: An irregularly irregular rhythm present.   Pulses:       Dorsalis pedis pulses are 2+ on the right side, and 2+ on the left side.        Posterior tibial pulses are 2+ on the right side, and 2+ on the left side.   Pulmonary/Chest: Effort normal. No respiratory distress. She has wheezes. She has no rales. She exhibits no tenderness.   Abdominal: Soft. Bowel sounds are normal. She exhibits no distension. There is no tenderness.   Musculoskeletal: Normal range of motion. She exhibits no edema.   Neurological: She is alert and oriented to person, place, and time.   Skin: Skin is warm and dry.   Psychiatric: She has a normal mood and affect. Her behavior is normal. Judgment and thought content normal.   Nursing note and vitals reviewed.      Significant Labs:   BMP:   Recent Labs  Lab 03/14/17 0628 03/15/17  0642   * 221*    138   K 4.6 3.5    96   CO2 25 26   BUN 25* 31*   CREATININE 1.2 1.4   CALCIUM 9.3 9.5   MG 1.8  --      CBC:   Recent Labs  Lab 03/14/17 0628 03/14/17  1401 03/15/17  0701   WBC 8.07 6.17 12.85*   HGB 11.3* 11.6* 12.4   HCT 36.3* 35.9* 38.7    166  166 236     CMP:   Recent Labs  Lab 03/14/17 0628 03/15/17  0642    138   K 4.6 3.5    96   CO2 25 26   * 221*   BUN 25* 31*   CREATININE 1.2 1.4   CALCIUM 9.3 9.5   PROT 7.5 8.1   ALBUMIN 4.2 4.4   BILITOT 0.8 1.0   ALKPHOS 104 111   AST 47* 69*   ALT 38 48*   ANIONGAP 10 16   EGFRNONAA 44* 36*     Troponin:   Recent Labs  Lab 03/14/17 0628 03/14/17  1401   TROPONINI 0.016 0.024     All pertinent labs within the past 24 hours have been reviewed.    Significant Imaging: I have  reviewed all pertinent imaging results/findings within the past 24 hours.

## 2017-03-15 NOTE — ASSESSMENT & PLAN NOTE
-hx of Asthma  -Rishi prn  -IV steroids given in ED    States nebs make her feel better. Will start low dose steroids

## 2017-03-15 NOTE — PROGRESS NOTES
Cardiology Progress Note        SUBJECTIVE:     History of Present Illness:  Patient is a 76 y.o. female who presents with decompensated CHF, atrial fibrillation, and URI. Patient seen and examined today, sitting on bed. Still complains of SOB, but reports it has greatly improved. Diuresed well overnight. Denies chest pain. Labs reviewed. Creatinine with slight bump. 2D echo pending and CXR pending.       OBJECTIVE:     Vital Signs (Most Recent)  Temp: 98.1 °F (36.7 °C) (03/15/17 0730)  Pulse: 85 (03/15/17 0730)  Resp: 20 (03/15/17 0730)  BP: (!) 143/74 (03/15/17 0730)  SpO2: 97 % (03/15/17 0730)    Vital Signs Range (Last 24H):  Temp:  [97.9 °F (36.6 °C)-98.9 °F (37.2 °C)]   Pulse:  []   Resp:  [15-30]   BP: (125-173)/(57-85)   SpO2:  [90 %-98 %]     Intake/Output last 3 shifts:  I/O last 3 completed shifts:  In: 538.9 [P.O.:420; I.V.:118.9]  Out: 250 [Urine:250]    Intake/Output this shift:       Review of patient's allergies indicates:   Allergen Reactions    Codeine Nausea And Vomiting    Lisinopril      Other reaction(s): cough    Pacerone [amiodarone] Nausea And Vomiting    Sulfa (sulfonamide antibiotics) Nausea And Vomiting    Celecoxib Palpitations    Ciprofloxacin Hives, Itching and Rash    Colesevelam Rash and Nausea And Vomiting    Doxycycline Rash    Statins-hmg-coa reductase inhibitors Rash     Myalgia(can take atorvastatin ok -no rhabdo)per nurse josé antonio and patient  / stomach upset       Current Facility-Administered Medications   Medication    acetaminophen tablet 650 mg    albuterol-ipratropium 2.5mg-0.5mg/3mL nebulizer solution 3 mL    aspirin tablet 325 mg    atorvastatin tablet 40 mg    dextrose 50% injection 12.5 g    dextrose 50% injection 25 g    famotidine tablet 20 mg    glucagon (human recombinant) injection 1 mg    glucose chewable tablet 16 g    glucose chewable tablet 24 g    heparin 25,000 units in dextrose 5% 250 mL (100 units/mL) bolus from bag; PRN BOLUS     heparin 25,000 units in dextrose 5% 250 mL (100 units/mL) bolus from bag; PRN BOLUS    heparin 25,000 units in dextrose 5% 250 mL (100 units/mL) infusion; FEMALE    insulin aspart pen 0-5 Units    methylPREDNISolone sodium succinate injection 80 mg    nebivolol tablet 5 mg    nitroGLYCERIN 2% TD oint ointment 0.5 inch    ondansetron injection 4 mg     No current facility-administered medications on file prior to encounter.      Current Outpatient Prescriptions on File Prior to Encounter   Medication Sig    aspirin (ECOTRIN) 81 MG EC tablet Take 1 tablet (81 mg total) by mouth once daily.    atorvastatin (LIPITOR) 40 MG tablet TAKE ONE TABLET BY MOUTH NIGHTLY    azelastine (ASTELIN) 137 mcg (0.1 %) nasal spray 2 sprays (274 mcg total) by Nasal route 2 (two) times daily.    benzonatate (TESSALON) 100 MG capsule Take 1 capsule (100 mg total) by mouth 3 (three) times daily as needed for Cough.    budesonide-formoterol 160-4.5 mcg (SYMBICORT) 160-4.5 mcg/actuation HFAA Inhale 2 puffs into the lungs every 12 (twelve) hours. Wash out mouth after using    BYSTOLIC 5 mg Tab TAKE ONE TABLET BY MOUTH TWICE DAILY (Patient taking differently: TAKE ONE TABLET BY MOUTH DAILY)    diclofenac sodium (VOLTAREN) 1 % Gel Apply 2 g topically once daily.    escitalopram oxalate (LEXAPRO) 10 MG tablet TAKE ONE-HALF TO ONE TABLET BY MOUTH EVERY DAY    fluticasone (FLONASE) 50 mcg/actuation nasal spray 2 sprays by Each Nare route once daily.    furosemide (LASIX) 20 MG tablet Take 1 tablet (20 mg total) by mouth as directed.    hydroxychloroquine (PLAQUENIL) 200 mg tablet TAKE ONE TABLET BY MOUTH ONCE DAILY    nitroGLYCERIN (NITROSTAT) 0.4 MG SL tablet Place 1 tablet (0.4 mg total) under the tongue every 5 (five) minutes as needed for Chest pain.    pantoprazole (PROTONIX) 40 MG tablet Take 1 tablet (40 mg total) by mouth once daily.    potassium chloride (KLOR-CON) 10 MEQ TbSR Take 1 tablet (10 mEq total) by mouth once  daily.    albuterol (PROAIR HFA) 90 mcg/actuation inhaler Inhale 2 puffs into the lungs every 6 (six) hours as needed.    carboxymethylcellulose (REFRESH PLUS) 0.5 % Dpet Place 1 drop into both eyes 3 (three) times daily as needed.    guaifenesin (MUCINEX) 600 mg 12 hr tablet Take 2 tablets (1,200 mg total) by mouth 2 (two) times daily. (Patient taking differently: Take 1,200 mg by mouth 2 (two) times daily. As needed for flares)    hyoscyamine (ANASPAZ) 0.125 mg TbDL Take 0.125 mg by mouth every 6 (six) hours as needed for Cramping.    phenobarb-hyoscy-atropine-scop (BELLADONNA-PHENOBARBITAL) 16.2-0.1037 -0.0194 mg/5 mL Elix Take 5 mLs by mouth daily as needed (abdominal pain).    pramoxine 1 % Foam Place rectally 3 (three) times daily as needed.    PREMARIN vaginal cream PLACE 1 GRAM VAGINALLY TWICE A WEEK    RESTASIS 0.05 % ophthalmic emulsion INSTILL ONE DROP INTO EACH EYE TWICE DAILY    rivaroxaban (XARELTO) 20 mg Tab Take 1 tablet (20 mg total) by mouth daily with dinner or evening meal.    tramadol (ULTRAM) 50 mg tablet Take 1 tablet (50 mg total) by mouth 3 (three) times daily as needed.    triamcinolone acetonide 0.1% (KENALOG) 0.1 % ointment AAA bid prn    vitamin D 1000 units Tab Take 2,000 Units by mouth once daily.       Physical Exam:  General appearance: alert, appears stated age and cooperative  Head: Normocephalic, without obvious abnormality, atraumatic  Neck: no adenopathy, no carotid bruit, no JVD  Lungs:  Coarse BS throughout with wheezing  Chest wall: no tenderness  Heart: irregularly irregular rate and rhythm S1, S2 normal, no murmur, click, rub or gallop  Abdomen: soft, non-tender  Extremities: extremities normal, atraumatic, no cyanosis or edema  Skin: Skin color, texture, turgor normal. No rashes or lesions  Neurologic: Grossly normal, AAO x 3    Laboratory:  Chemistry:   Lab Results   Component Value Date     03/15/2017    K 3.5 03/15/2017    CL 96 03/15/2017    CO2 26  03/15/2017    BUN 31 (H) 03/15/2017    CREATININE 1.4 03/15/2017    CALCIUM 9.5 03/15/2017     Cardiac Markers:   Lab Results   Component Value Date    CKMB 3.6 01/25/2011    TROPONINI 0.024 03/14/2017    TROPONINI 1.09 (H) 01/25/2011     Cardiac Markers (Last 3):   Lab Results   Component Value Date    CKMB 3.6 01/25/2011    CKMB 4.2 (H) 01/24/2011    CKMB 3.1 01/24/2011    TROPONINI 0.024 03/14/2017    TROPONINI 0.016 03/14/2017    TROPONINI 0.036 (H) 09/07/2016    TROPONINI 1.09 (H) 01/25/2011    TROPONINI 2.64 (H) 01/24/2011    TROPONINI 0.58 (H) 01/24/2011     CBC:   Lab Results   Component Value Date    WBC 12.85 (H) 03/15/2017    HGB 12.4 03/15/2017    HCT 38.7 03/15/2017    HCT 29 (L) 09/26/2016    MCV 94 03/15/2017     03/15/2017     Lipids:   Lab Results   Component Value Date    CHOL 187 03/14/2016    TRIG 145 03/14/2016    HDL 55 03/14/2016     Coagulation:   Lab Results   Component Value Date    INR 1.3 (H) 03/14/2017    APTT 87.1 (H) 03/15/2017       Diagnostic Results:  ECG: Reviewed  X-Ray: Reviewed  Echo: Pending      ASSESSMENT/PLAN:     Patient Active Problem List   Diagnosis    Sjogren's syndrome    Multiple joint pain    Type 2 diabetes mellitus without complication    Hyperlipidemia    Coronary artery disease involving coronary bypass graft without angina pectoris    Carotid artery stenosis    Persistent atrial fibrillation    S/P CABG (coronary artery bypass graft)    S/P carotid endarterectomy    Hypertension    Long-term (current) use of anticoagulants    Encounter for long-term (current) use of other medications    S/P PTCA (percutaneous transluminal coronary angioplasty)    Aortic insufficiency    Leg pain    Lens replaced by other means    Rectal bleeding    Corneal abrasion    Foreign body in conjunctival sac    Osteoporosis, postmenopausal    Atrophic vaginitis    Osteoarthritis of hands, bilateral    Osteoarthritis of both knees    Bilateral dry eyes     Pseudophakia    Diabetes mellitus type 2 without retinopathy    Glaucoma suspect of both eyes    Allergic conjunctivitis of both eyes    Typical atrial flutter    Abdominal spasms    Hemorrhoids    SUSAN (generalized anxiety disorder)    Medication monitoring encounter    Immunocompromised    Asthma    A-fib    On anticoagulant therapy    Radiculopathy affecting upper extremity    Chronic neck pain    Acute systolic congestive heart failure    Acute respiratory failure with hypoxia      Patient who presents with decompensated CHF in setting of atrial fibrillation and URI. Clinically improved since admission, has diuresed well. Hold IV Lasix given bumped creatinine. Continue steroids and breathing treatment. Continue other home cardiac meds. 2D echo pending. Dr. Fletcehr discussed case with EP specialist, Dr. Perez, will plan to re-start Tikoysn if EF is normal. If EF is depressed, may need further evaluation with C.  Plan:   -Hold IV Lasix  -Continue other cardiac meds  -Heparin gtt for now  -Check 2D ehco  -Further rec's pending results    Chart reviewed. Dr. Fletcher examined patient and agrees with plan as outlined above.

## 2017-03-16 LAB
ALBUMIN SERPL BCP-MCNC: 3.8 G/DL
ALP SERPL-CCNC: 88 U/L
ALT SERPL W/O P-5'-P-CCNC: 52 U/L
ANION GAP SERPL CALC-SCNC: 11 MMOL/L
APTT BLDCRRT: 32.7 SEC
AST SERPL-CCNC: 65 U/L
BASOPHILS # BLD AUTO: 0.01 K/UL
BASOPHILS NFR BLD: 0.1 %
BILIRUB SERPL-MCNC: 0.6 MG/DL
BUN SERPL-MCNC: 42 MG/DL
CALCIUM SERPL-MCNC: 8.9 MG/DL
CHLORIDE SERPL-SCNC: 99 MMOL/L
CO2 SERPL-SCNC: 30 MMOL/L
CREAT SERPL-MCNC: 1.2 MG/DL
DIFFERENTIAL METHOD: ABNORMAL
EOSINOPHIL # BLD AUTO: 0 K/UL
EOSINOPHIL NFR BLD: 0 %
ERYTHROCYTE [DISTWIDTH] IN BLOOD BY AUTOMATED COUNT: 16.3 %
EST. GFR  (AFRICAN AMERICAN): 50 ML/MIN/1.73 M^2
EST. GFR  (NON AFRICAN AMERICAN): 44 ML/MIN/1.73 M^2
GLUCOSE SERPL-MCNC: 199 MG/DL
HCT VFR BLD AUTO: 34.7 %
HGB BLD-MCNC: 11 G/DL
LYMPHOCYTES # BLD AUTO: 0.6 K/UL
LYMPHOCYTES NFR BLD: 6.1 %
MAGNESIUM SERPL-MCNC: 1.9 MG/DL
MCH RBC QN AUTO: 29.6 PG
MCHC RBC AUTO-ENTMCNC: 31.7 %
MCV RBC AUTO: 93 FL
MONOCYTES # BLD AUTO: 0.9 K/UL
MONOCYTES NFR BLD: 8.7 %
NEUTROPHILS # BLD AUTO: 8.6 K/UL
NEUTROPHILS NFR BLD: 85.1 %
PLATELET # BLD AUTO: 203 K/UL
PMV BLD AUTO: 10.4 FL
POCT GLUCOSE: 190 MG/DL (ref 70–110)
POCT GLUCOSE: 217 MG/DL (ref 70–110)
POCT GLUCOSE: 223 MG/DL (ref 70–110)
POCT GLUCOSE: 268 MG/DL (ref 70–110)
POTASSIUM SERPL-SCNC: 3.4 MMOL/L
PROT SERPL-MCNC: 7 G/DL
RBC # BLD AUTO: 3.72 M/UL
SODIUM SERPL-SCNC: 140 MMOL/L
WBC # BLD AUTO: 10.12 K/UL

## 2017-03-16 PROCEDURE — 80053 COMPREHEN METABOLIC PANEL: CPT

## 2017-03-16 PROCEDURE — 25000003 PHARM REV CODE 250: Performed by: SPECIALIST

## 2017-03-16 PROCEDURE — 93005 ELECTROCARDIOGRAM TRACING: CPT

## 2017-03-16 PROCEDURE — 25000003 PHARM REV CODE 250: Performed by: EMERGENCY MEDICINE

## 2017-03-16 PROCEDURE — 94640 AIRWAY INHALATION TREATMENT: CPT

## 2017-03-16 PROCEDURE — 85025 COMPLETE CBC W/AUTO DIFF WBC: CPT

## 2017-03-16 PROCEDURE — 99232 SBSQ HOSP IP/OBS MODERATE 35: CPT | Mod: ,,, | Performed by: INTERNAL MEDICINE

## 2017-03-16 PROCEDURE — 93010 ELECTROCARDIOGRAM REPORT: CPT | Mod: 76,,, | Performed by: INTERNAL MEDICINE

## 2017-03-16 PROCEDURE — 85730 THROMBOPLASTIN TIME PARTIAL: CPT

## 2017-03-16 PROCEDURE — 27000221 HC OXYGEN, UP TO 24 HOURS

## 2017-03-16 PROCEDURE — 25000003 PHARM REV CODE 250: Performed by: PHYSICIAN ASSISTANT

## 2017-03-16 PROCEDURE — 25000003 PHARM REV CODE 250: Performed by: NURSE PRACTITIONER

## 2017-03-16 PROCEDURE — 36415 COLL VENOUS BLD VENIPUNCTURE: CPT

## 2017-03-16 PROCEDURE — 21400001 HC TELEMETRY ROOM

## 2017-03-16 PROCEDURE — 63600175 PHARM REV CODE 636 W HCPCS: Performed by: EMERGENCY MEDICINE

## 2017-03-16 PROCEDURE — 25000242 PHARM REV CODE 250 ALT 637 W/ HCPCS: Performed by: EMERGENCY MEDICINE

## 2017-03-16 PROCEDURE — 63600175 PHARM REV CODE 636 W HCPCS: Performed by: PHYSICIAN ASSISTANT

## 2017-03-16 PROCEDURE — 83735 ASSAY OF MAGNESIUM: CPT

## 2017-03-16 PROCEDURE — 94761 N-INVAS EAR/PLS OXIMETRY MLT: CPT

## 2017-03-16 RX ORDER — MAGNESIUM SULFATE 1 G/100ML
1 INJECTION INTRAVENOUS ONCE
Status: COMPLETED | OUTPATIENT
Start: 2017-03-16 | End: 2017-03-16

## 2017-03-16 RX ORDER — DOCUSATE SODIUM 100 MG/1
100 CAPSULE, LIQUID FILLED ORAL DAILY
Status: DISCONTINUED | OUTPATIENT
Start: 2017-03-17 | End: 2017-03-18 | Stop reason: HOSPADM

## 2017-03-16 RX ORDER — POTASSIUM CHLORIDE 20 MEQ/1
40 TABLET, EXTENDED RELEASE ORAL ONCE
Status: COMPLETED | OUTPATIENT
Start: 2017-03-16 | End: 2017-03-16

## 2017-03-16 RX ORDER — GUAIFENESIN 600 MG/1
600 TABLET, EXTENDED RELEASE ORAL 2 TIMES DAILY
Status: DISCONTINUED | OUTPATIENT
Start: 2017-03-16 | End: 2017-03-18 | Stop reason: HOSPADM

## 2017-03-16 RX ADMIN — ARFORMOTEROL TARTRATE 15 MCG: 15 SOLUTION RESPIRATORY (INHALATION) at 07:03

## 2017-03-16 RX ADMIN — INSULIN ASPART 3 UNITS: 100 INJECTION, SOLUTION INTRAVENOUS; SUBCUTANEOUS at 02:03

## 2017-03-16 RX ADMIN — INSULIN ASPART 1 UNITS: 100 INJECTION, SOLUTION INTRAVENOUS; SUBCUTANEOUS at 09:03

## 2017-03-16 RX ADMIN — IPRATROPIUM BROMIDE AND ALBUTEROL SULFATE 3 ML: .5; 3 SOLUTION RESPIRATORY (INHALATION) at 12:03

## 2017-03-16 RX ADMIN — MAGNESIUM SULFATE IN DEXTROSE 1 G: 10 INJECTION, SOLUTION INTRAVENOUS at 02:03

## 2017-03-16 RX ADMIN — DOFETILIDE 125 MCG: 0.12 CAPSULE ORAL at 10:03

## 2017-03-16 RX ADMIN — IPRATROPIUM BROMIDE AND ALBUTEROL SULFATE 3 ML: .5; 3 SOLUTION RESPIRATORY (INHALATION) at 07:03

## 2017-03-16 RX ADMIN — BUDESONIDE 0.5 MG: 0.5 SUSPENSION RESPIRATORY (INHALATION) at 07:03

## 2017-03-16 RX ADMIN — INSULIN ASPART 2 UNITS: 100 INJECTION, SOLUTION INTRAVENOUS; SUBCUTANEOUS at 05:03

## 2017-03-16 RX ADMIN — PANTOPRAZOLE SODIUM 40 MG: 40 TABLET, DELAYED RELEASE ORAL at 08:03

## 2017-03-16 RX ADMIN — PANTOPRAZOLE SODIUM 600 MG: 40 TABLET, DELAYED RELEASE ORAL at 04:03

## 2017-03-16 RX ADMIN — ASPIRIN 325 MG ORAL TABLET 325 MG: 325 PILL ORAL at 09:03

## 2017-03-16 RX ADMIN — ATORVASTATIN CALCIUM 40 MG: 40 TABLET, FILM COATED ORAL at 08:03

## 2017-03-16 RX ADMIN — ACETAMINOPHEN 650 MG: 325 TABLET ORAL at 09:03

## 2017-03-16 RX ADMIN — PANTOPRAZOLE SODIUM 40 MG: 40 TABLET, DELAYED RELEASE ORAL at 09:03

## 2017-03-16 RX ADMIN — POTASSIUM CHLORIDE 40 MEQ: 1500 TABLET, EXTENDED RELEASE ORAL at 02:03

## 2017-03-16 RX ADMIN — NEBIVOLOL HYDROCHLORIDE 5 MG: 5 TABLET ORAL at 09:03

## 2017-03-16 RX ADMIN — PREDNISONE 10 MG: 10 TABLET ORAL at 09:03

## 2017-03-16 RX ADMIN — DOFETILIDE 125 MCG: 0.12 CAPSULE ORAL at 09:03

## 2017-03-16 RX ADMIN — PREDNISONE 10 MG: 10 TABLET ORAL at 08:03

## 2017-03-16 RX ADMIN — RIVAROXABAN 15 MG: 15 TABLET, FILM COATED ORAL at 05:03

## 2017-03-16 NOTE — PLAN OF CARE
Problem: Patient Care Overview  Goal: Plan of Care Review  Outcome: Ongoing (interventions implemented as appropriate)  Pt remained afebrile throughout this shift.   Meds administered per order.   Pt remained free of falls this shift.   Plan of care reviewed. Patient verbalizes understanding.   Pain meds administered as ordered.   Pt moving/turning independently.   Patient AFIB on monitor. 1st dose of Tikosyn given at 2300 last night, late d/t med not available. Qtc monitored, still less than 500. Next dose due at 1100 today.  Bed low, side rails up x 2, wheels locked, call light in reach.   Patient instructed to call for assistance.   Will continue to monitor.

## 2017-03-16 NOTE — PROGRESS NOTES
Pharmacy Brief Progress Note: Tikosyn (dofetilide) Education     Patient educated on Tikosyn indication, side effects, and drug interactions. Discussed importance of medication compliance and of keeping follow-up appointments to monitor kidney & heart function. Patient given Tikosyn educational handout. Patient expressed understanding and had no further questions.    Thank you for allowing us to participate in this patient's care.   Katherine McArdle, Pharm.D. 3/16/2017 3:56 PM

## 2017-03-16 NOTE — ASSESSMENT & PLAN NOTE
-Cardiology cosulted  -recently started on Tikosyn  -Tikosyn and Eliquis stopped  -Heparin infusion continued  -Nebivolol continued  -Echo results pending  -will need LHC when euvolemic per Cardiology    Tikosyn continued, Heparin d/lanre

## 2017-03-16 NOTE — NURSING
EGK performed 2hrs post Tikosyn admin and Igt=515. No intervention needed. Will cont to monitor pt.

## 2017-03-16 NOTE — PROGRESS NOTES
Cardiology Progress Note        SUBJECTIVE:     History of Present Illness:  Patient is a 76 y.o. female who presents with decompensated CHF, atrial fibrillation, and UTI. Patient seen and examined today, resting in bed. Reports SOB continues to slowly improve. No other complaints. 2D echo yesterday showed normal EF, pulmonary HTN, and right ventricular enlargement with low normal to mildly depressed systolic function. Tikoysn restarted yesterday evening.       OBJECTIVE:     Vital Signs (Most Recent)  Temp: 98.1 °F (36.7 °C) (03/16/17 1134)  Pulse: 94 (03/16/17 1134)  Resp: 18 (03/16/17 1134)  BP: 139/79 (03/16/17 1134)  SpO2: 95 % (03/16/17 1134)    Vital Signs Range (Last 24H):  Temp:  [97.7 °F (36.5 °C)-98.6 °F (37 °C)]   Pulse:  []   Resp:  [18-20]   BP: (139-167)/(66-92)   SpO2:  [95 %-100 %]     Intake/Output last 3 shifts:  I/O last 3 completed shifts:  In: 1518.4 [P.O.:1440; I.V.:78.4]  Out: 1350 [Urine:1350]    Intake/Output this shift:       Review of patient's allergies indicates:   Allergen Reactions    Codeine Nausea And Vomiting    Lisinopril      Other reaction(s): cough    Pacerone [amiodarone] Nausea And Vomiting    Sulfa (sulfonamide antibiotics) Nausea And Vomiting    Celecoxib Palpitations    Ciprofloxacin Hives, Itching and Rash    Colesevelam Rash and Nausea And Vomiting    Doxycycline Rash    Statins-hmg-coa reductase inhibitors Rash     Myalgia(can take atorvastatin ok -no rhabdo)per nurse josé antonio and patient  / stomach upset       Current Facility-Administered Medications   Medication    acetaminophen tablet 650 mg    albuterol-ipratropium 2.5mg-0.5mg/3mL nebulizer solution 3 mL    arformoterol nebulizer solution 15 mcg    aspirin tablet 325 mg    atorvastatin tablet 40 mg    budesonide nebulizer solution 0.5 mg    dextrose 50% injection 12.5 g    dextrose 50% injection 25 g    dofetilide capsule 125 mcg    glucagon (human recombinant) injection 1 mg    glucose  chewable tablet 16 g    glucose chewable tablet 24 g    insulin aspart pen 0-5 Units    nebivolol tablet 5 mg    nitroGLYCERIN 2% TD oint ointment 0.5 inch    ondansetron injection 4 mg    pantoprazole EC tablet 40 mg    predniSONE tablet 10 mg    rivaroxaban tablet 15 mg     No current facility-administered medications on file prior to encounter.      Current Outpatient Prescriptions on File Prior to Encounter   Medication Sig    aspirin (ECOTRIN) 81 MG EC tablet Take 1 tablet (81 mg total) by mouth once daily.    atorvastatin (LIPITOR) 40 MG tablet TAKE ONE TABLET BY MOUTH NIGHTLY    azelastine (ASTELIN) 137 mcg (0.1 %) nasal spray 2 sprays (274 mcg total) by Nasal route 2 (two) times daily.    benzonatate (TESSALON) 100 MG capsule Take 1 capsule (100 mg total) by mouth 3 (three) times daily as needed for Cough.    budesonide-formoterol 160-4.5 mcg (SYMBICORT) 160-4.5 mcg/actuation HFAA Inhale 2 puffs into the lungs every 12 (twelve) hours. Wash out mouth after using    BYSTOLIC 5 mg Tab TAKE ONE TABLET BY MOUTH TWICE DAILY (Patient taking differently: TAKE ONE TABLET BY MOUTH DAILY)    diclofenac sodium (VOLTAREN) 1 % Gel Apply 2 g topically once daily.    escitalopram oxalate (LEXAPRO) 10 MG tablet TAKE ONE-HALF TO ONE TABLET BY MOUTH EVERY DAY    fluticasone (FLONASE) 50 mcg/actuation nasal spray 2 sprays by Each Nare route once daily.    furosemide (LASIX) 20 MG tablet Take 1 tablet (20 mg total) by mouth as directed.    hydroxychloroquine (PLAQUENIL) 200 mg tablet TAKE ONE TABLET BY MOUTH ONCE DAILY    nitroGLYCERIN (NITROSTAT) 0.4 MG SL tablet Place 1 tablet (0.4 mg total) under the tongue every 5 (five) minutes as needed for Chest pain.    pantoprazole (PROTONIX) 40 MG tablet Take 1 tablet (40 mg total) by mouth once daily.    potassium chloride (KLOR-CON) 10 MEQ TbSR Take 1 tablet (10 mEq total) by mouth once daily.    albuterol (PROAIR HFA) 90 mcg/actuation inhaler Inhale 2 puffs  into the lungs every 6 (six) hours as needed.    carboxymethylcellulose (REFRESH PLUS) 0.5 % Dpet Place 1 drop into both eyes 3 (three) times daily as needed.    guaifenesin (MUCINEX) 600 mg 12 hr tablet Take 2 tablets (1,200 mg total) by mouth 2 (two) times daily. (Patient taking differently: Take 1,200 mg by mouth 2 (two) times daily. As needed for flares)    hyoscyamine (ANASPAZ) 0.125 mg TbDL Take 0.125 mg by mouth every 6 (six) hours as needed for Cramping.    phenobarb-hyoscy-atropine-scop (BELLADONNA-PHENOBARBITAL) 16.2-0.1037 -0.0194 mg/5 mL Elix Take 5 mLs by mouth daily as needed (abdominal pain).    pramoxine 1 % Foam Place rectally 3 (three) times daily as needed.    PREMARIN vaginal cream PLACE 1 GRAM VAGINALLY TWICE A WEEK    RESTASIS 0.05 % ophthalmic emulsion INSTILL ONE DROP INTO EACH EYE TWICE DAILY    rivaroxaban (XARELTO) 20 mg Tab Take 1 tablet (20 mg total) by mouth daily with dinner or evening meal.    tramadol (ULTRAM) 50 mg tablet Take 1 tablet (50 mg total) by mouth 3 (three) times daily as needed.    triamcinolone acetonide 0.1% (KENALOG) 0.1 % ointment AAA bid prn    vitamin D 1000 units Tab Take 2,000 Units by mouth once daily.       Physical Exam:  General appearance: alert, appears stated age and cooperative  Head: Normocephalic, without obvious abnormality, atraumatic  Neck: no adenopathy, no carotid bruit, no JVD  Lungs:  Coarse rhonchi with diffuse wheezes  Chest wall: no tenderness  Heart: irregularly irregular rate and rhythm, S1, S2 normal, no murmur, click, rub or gallop  Abdomen: soft, non-tender  Extremities: extremities normal, atraumatic, no cyanosis or edema  Skin: Skin color, texture, turgor normal. No rashes or lesions  Neurologic: Grossly normal, AAO x 3    Laboratory:  Chemistry:   Lab Results   Component Value Date     03/16/2017    K 3.4 (L) 03/16/2017    CL 99 03/16/2017    CO2 30 (H) 03/16/2017    BUN 42 (H) 03/16/2017    CREATININE 1.2 03/16/2017     CALCIUM 8.9 03/16/2017     Cardiac Markers:   Lab Results   Component Value Date    CKMB 3.6 01/25/2011    TROPONINI 0.024 03/14/2017    TROPONINI 1.09 (H) 01/25/2011     Cardiac Markers (Last 3):   Lab Results   Component Value Date    CKMB 3.6 01/25/2011    CKMB 4.2 (H) 01/24/2011    CKMB 3.1 01/24/2011    TROPONINI 0.024 03/14/2017    TROPONINI 0.016 03/14/2017    TROPONINI 0.036 (H) 09/07/2016    TROPONINI 1.09 (H) 01/25/2011    TROPONINI 2.64 (H) 01/24/2011    TROPONINI 0.58 (H) 01/24/2011     CBC:   Lab Results   Component Value Date    WBC 10.12 03/16/2017    HGB 11.0 (L) 03/16/2017    HCT 34.7 (L) 03/16/2017    HCT 29 (L) 09/26/2016    MCV 93 03/16/2017     03/16/2017     Lipids:   Lab Results   Component Value Date    CHOL 187 03/14/2016    TRIG 145 03/14/2016    HDL 55 03/14/2016     Coagulation:   Lab Results   Component Value Date    INR 1.3 (H) 03/14/2017    APTT 32.7 (H) 03/16/2017       Diagnostic Results:  ECG: Reviewed  X-Ray: Reviewed  Echo: Reviewed      ASSESSMENT/PLAN:     Patient Active Problem List   Diagnosis    Sjogren's syndrome    Multiple joint pain    Type 2 diabetes mellitus without complication    Hyperlipidemia    Coronary artery disease involving coronary bypass graft without angina pectoris    Carotid artery stenosis    Persistent atrial fibrillation    S/P CABG (coronary artery bypass graft)    S/P carotid endarterectomy    Hypertension    Long-term (current) use of anticoagulants    Encounter for long-term (current) use of other medications    S/P PTCA (percutaneous transluminal coronary angioplasty)    Aortic insufficiency    Leg pain    Lens replaced by other means    Rectal bleeding    Corneal abrasion    Foreign body in conjunctival sac    Osteoporosis, postmenopausal    Atrophic vaginitis    Osteoarthritis of hands, bilateral    Osteoarthritis of both knees    Bilateral dry eyes    Pseudophakia    Diabetes mellitus type 2 without  retinopathy    Glaucoma suspect of both eyes    Allergic conjunctivitis of both eyes    Typical atrial flutter    Abdominal spasms    Hemorrhoids    SUSAN (generalized anxiety disorder)    Medication monitoring encounter    Immunocompromised    Asthma    A-fib    On anticoagulant therapy    Radiculopathy affecting upper extremity    Chronic neck pain    Acute systolic congestive heart failure    Acute respiratory failure with hypoxia      Patient continues to slowly improve, still SOB and still with rhochi and wheezes. Continue breathing treatments and steroids. Cardiac wise, appears stable. Tikoysn restarted. No plans for LHC at this time as EF was normal on 2D echo.   Plan:   -Continue current cardiac meds  -Breathing treatments, steroids  -Continue to monitor    Chart reviewed. Dr. Fletcher examined patient and agrees with plan as outlined above.

## 2017-03-16 NOTE — PROGRESS NOTES
Problem: Patient Care Overview  Goal: Plan of Care Review  Outcome: Ongoing (interventions implemented as appropriate)  Pt was without falls or injury this shift. Last glucose was 217 with 2 units of coverage. Tikosyn dose continues, last Qtc was 480 with no changes. Spouse at bedside. Afib on the monitor, 's. Bed low and call light within reach.

## 2017-03-16 NOTE — PROGRESS NOTES
"[facility] Hospital Medicine  Progress Note    Patient Name: Marcel Montalvo  MRN: [id number]  Patient Class: IP- Inpatient   Admission Date: 3/14/2017  Length of Stay: 2 days  Attending Physician: Moy Sanderson MD  Primary Care Provider: Alexandra Moreno MD        Subjective:     Principal Problem:Acute systolic congestive heart failure    HPI:  Marcel Montalvo is a 76 y.o. female patient who presents to the Emergency Department for SOB which onset gradually this AM when she woke up. Symptoms are constant and moderate in severity. Sx are exacerbated by nothing and relieved by nothing. Pt denies being on any oxygen at home and states she received a treatment of Tikosyn yesterday. Pt also states "she she just wasn't feeling good" 2 days ago and reports a fever (TMAX 101) yesterday. Associated sxs include chest tightness, nausea, nonproductive cough, wheezing, and generalized body aches. Patient denies any vomiting, diarrhea, chills, abd pain, CP, chest tightness, palpitations, leg swelling, weakness/numbness, lightheadedness, dizziness, congestion, rhinorrhea, ear pain, sore throat, dysuria, difficulty urinating and all other sxs at this time. No further complaints or concerns at this time.          Hospital Course:  76 yr female with extensive PMH, CAd s/p CABG and PCI, Ch Afib on Xarelto s/p RFA x 3, s/p Tikosyn Tx in the past, s/p L CEA, NIDDM, HTN, HLP, Anxiety admitted Acute Systolic CHF. Treated with IV lasix and Nebs-- she feels better but still has orthopnea and some sore throat. Pt feels better with IV Lasix, supplemental oxygen, Duonebs prn, Strict I/Os, and Daily weights. Her tikosyn was just resumed 3 days ago. She is also on Heparin gtt for possible LHC next day however Cardiology decided against Cath as her echo shows normal LVF with PAH and DD. Her cough and sore throat have improved, no fever or chills. Walking around pretty well. Heparin d/lanre.        Interval History: feels much better, " cough, SOB, sore throat improved. Off Heparin as no LHC now, good LV Function.     Review of Systems   Constitutional: Negative for chills and fatigue.   HENT: Negative for congestion, rhinorrhea, sinus pressure and sneezing.    Eyes: Negative for redness, itching and visual disturbance.   Respiratory: Positive for chest tightness and shortness of breath. Negative for cough.    Cardiovascular: Negative for chest pain, palpitations and leg swelling.   Gastrointestinal: Negative for abdominal distention, abdominal pain, diarrhea and vomiting.   Endocrine: Negative for cold intolerance and heat intolerance.   Genitourinary: Negative for decreased urine volume, difficulty urinating, dysuria, flank pain, frequency and urgency.   Musculoskeletal: Positive for myalgias. Negative for arthralgias and back pain.   Skin: Negative for color change and wound.   Allergic/Immunologic: Negative for environmental allergies and food allergies.   Neurological: Negative for dizziness, syncope, weakness and headaches.   Hematological: Does not bruise/bleed easily.   Psychiatric/Behavioral: Negative for agitation, confusion and sleep disturbance. The patient is not nervous/anxious.      Objective:     Vital Signs (Most Recent):  Temp: 98.1 °F (36.7 °C) (03/16/17 1134)  Pulse: 95 (03/16/17 1236)  Resp: 18 (03/16/17 1236)  BP: 139/79 (03/16/17 1134)  SpO2: 96 % (03/16/17 1236) Vital Signs (24h Range):  Temp:  [97.7 °F (36.5 °C)-98.6 °F (37 °C)] 98.1 °F (36.7 °C)  Pulse:  [] 95  Resp:  [18-20] 18  SpO2:  [95 %-100 %] 96 %  BP: (139-167)/(66-79) 139/79     Weight: 55.2 kg (121 lb 11.2 oz)  Body mass index is 22.26 kg/(m^2).    Intake/Output Summary (Last 24 hours) at 03/16/17 1719  Last data filed at 03/16/17 1400   Gross per 24 hour   Intake             1320 ml   Output             1350 ml   Net              -30 ml      Physical Exam   Constitutional: She is oriented to person, place, and time. She appears well-developed and  well-nourished.   Looks much better, sitting up in chair   HENT:   Head: Normocephalic.   Nose: Nose normal.   Mouth/Throat: Oropharynx is clear and moist.   Eyes: Conjunctivae and EOM are normal.   Neck: Normal range of motion. No JVD present.   Cardiovascular: An irregularly irregular rhythm present.   Pulses:       Dorsalis pedis pulses are 2+ on the right side, and 2+ on the left side.        Posterior tibial pulses are 2+ on the right side, and 2+ on the left side.   Pulmonary/Chest: Effort normal. No respiratory distress. She has no wheezes. She has no rales. She exhibits no tenderness.   Much better air entry   Abdominal: Soft. Bowel sounds are normal. She exhibits no distension. There is no tenderness.   Musculoskeletal: Normal range of motion. She exhibits no edema.   Neurological: She is alert and oriented to person, place, and time.   Skin: Skin is warm and dry.   Psychiatric: She has a normal mood and affect. Her behavior is normal. Judgment and thought content normal.   Nursing note and vitals reviewed.      Significant Labs:   BMP:   Recent Labs  Lab 03/16/17  0550   *      K 3.4*   CL 99   CO2 30*   BUN 42*   CREATININE 1.2   CALCIUM 8.9   MG 1.9     CBC:   Recent Labs  Lab 03/15/17  0701 03/16/17  0550   WBC 12.85* 10.12   HGB 12.4 11.0*   HCT 38.7 34.7*    203     All pertinent labs within the past 24 hours have been reviewed.    Significant Imaging: I have reviewed all pertinent imaging results/findings within the past 24 hours.    Assessment/Plan:      * Acute systolic congestive heart failure  -Pt admitted to Inpatient status  -Cardiology following  -IV Lasix (BNP 1543)  -Strict I/Os  -daily weights  -Echo results pending  -supplemental oxygen  -will follow serial troponin    discontinue Heparin gtt and Continue Tikosyn-- good LV function  LHC not needed  Continue Lasix, Steroids and nebs        Acute respiratory failure with hypoxia  - Sec to CHF from uncontrolled Afib w  RVR  -supplemental oxygen  -Duonebs prn   - improving with current meds    Persistent atrial fibrillation  -Cardiology cosulted  -recently started on Tikosyn  -Tikosyn and Eliquis stopped  -Heparin infusion continued  -Nebivolol continued  -Echo results pending  -will need LHC when euvolemic per Cardiology    Tikosyn continued, Heparin d/lanre      Multiple joint pain  -secondary to OA  -analgesia prn  - improved    Type 2 diabetes mellitus without complication  -Accuchecks and SSI continued-- controlled      Asthma  -hx of Asthma  -Duonebs prn  -IV steroids given in ED    Improving with Nebs, steroids and protonix    VTE Risk Mitigation         Ordered     rivaroxaban tablet 15 mg  With dinner     Route:  Oral        03/15/17 1514     Medium Risk of VTE  Once      03/14/17 1527     Reason for No Pharmacological VTE Prophylaxis  Once      03/14/17 1527          Moy Sanderson MD  Department of Hospital Medicine   [facility]

## 2017-03-16 NOTE — ASSESSMENT & PLAN NOTE
-Pt admitted to Inpatient status  -Cardiology following  -IV Lasix (BNP 1543)  -Strict I/Os  -daily weights  -Echo results pending  -supplemental oxygen  -will follow serial troponin    discontinue Heparin gtt and Continue Tikosyn-- good LV function  [facility] not needed  Continue Lasix, Steroids and nebs

## 2017-03-16 NOTE — NURSING
Pt to receive first dose of Tikosyn tonight. Pharmacy states med in route now from [facility] pharmacy now. EKG performed and baseline Qtc is 476. Will admin med when rcvd and obtain EKG 2 hrs post med admin.

## 2017-03-16 NOTE — PLAN OF CARE
Problem: Patient Care Overview  Goal: Plan of Care Review  Outcome: Ongoing (interventions implemented as appropriate)  Patient 's spo2 and breathing are better.

## 2017-03-16 NOTE — ASSESSMENT & PLAN NOTE
- Sec to CHF from uncontrolled Afib w RVR  -supplemental oxygen  -Duonebs prn   - improving with current meds

## 2017-03-16 NOTE — SUBJECTIVE & OBJECTIVE
Interval History: feels much better, cough, SOB, sore throat improved. Off Heparin as no LHC now, good LV Function.     Review of Systems   Constitutional: Negative for chills and fatigue.   HENT: Negative for congestion, rhinorrhea, sinus pressure and sneezing.    Eyes: Negative for redness, itching and visual disturbance.   Respiratory: Positive for chest tightness and shortness of breath. Negative for cough.    Cardiovascular: Negative for chest pain, palpitations and leg swelling.   Gastrointestinal: Negative for abdominal distention, abdominal pain, diarrhea and vomiting.   Endocrine: Negative for cold intolerance and heat intolerance.   Genitourinary: Negative for decreased urine volume, difficulty urinating, dysuria, flank pain, frequency and urgency.   Musculoskeletal: Positive for myalgias. Negative for arthralgias and back pain.   Skin: Negative for color change and wound.   Allergic/Immunologic: Negative for environmental allergies and food allergies.   Neurological: Negative for dizziness, syncope, weakness and headaches.   Hematological: Does not bruise/bleed easily.   Psychiatric/Behavioral: Negative for agitation, confusion and sleep disturbance. The patient is not nervous/anxious.      Objective:     Vital Signs (Most Recent):  Temp: 98.1 °F (36.7 °C) (03/16/17 1134)  Pulse: 95 (03/16/17 1236)  Resp: 18 (03/16/17 1236)  BP: 139/79 (03/16/17 1134)  SpO2: 96 % (03/16/17 1236) Vital Signs (24h Range):  Temp:  [97.7 °F (36.5 °C)-98.6 °F (37 °C)] 98.1 °F (36.7 °C)  Pulse:  [] 95  Resp:  [18-20] 18  SpO2:  [95 %-100 %] 96 %  BP: (139-167)/(66-79) 139/79     Weight: 55.2 kg (121 lb 11.2 oz)  Body mass index is 22.26 kg/(m^2).    Intake/Output Summary (Last 24 hours) at 03/16/17 9769  Last data filed at 03/16/17 1400   Gross per 24 hour   Intake             1320 ml   Output             1350 ml   Net              -30 ml      Physical Exam   Constitutional: She is oriented to person, place, and time. She  appears well-developed and well-nourished.   Looks much better, sitting up in chair   HENT:   Head: Normocephalic.   Nose: Nose normal.   Mouth/Throat: Oropharynx is clear and moist.   Eyes: Conjunctivae and EOM are normal.   Neck: Normal range of motion. No JVD present.   Cardiovascular: An irregularly irregular rhythm present.   Pulses:       Dorsalis pedis pulses are 2+ on the right side, and 2+ on the left side.        Posterior tibial pulses are 2+ on the right side, and 2+ on the left side.   Pulmonary/Chest: Effort normal. No respiratory distress. She has no wheezes. She has no rales. She exhibits no tenderness.   Much better air entry   Abdominal: Soft. Bowel sounds are normal. She exhibits no distension. There is no tenderness.   Musculoskeletal: Normal range of motion. She exhibits no edema.   Neurological: She is alert and oriented to person, place, and time.   Skin: Skin is warm and dry.   Psychiatric: She has a normal mood and affect. Her behavior is normal. Judgment and thought content normal.   Nursing note and vitals reviewed.      Significant Labs:   BMP:   Recent Labs  Lab 03/16/17  0550   *      K 3.4*   CL 99   CO2 30*   BUN 42*   CREATININE 1.2   CALCIUM 8.9   MG 1.9     CBC:   Recent Labs  Lab 03/15/17  0701 03/16/17  0550   WBC 12.85* 10.12   HGB 12.4 11.0*   HCT 38.7 34.7*    203     All pertinent labs within the past 24 hours have been reviewed.    Significant Imaging: I have reviewed all pertinent imaging results/findings within the past 24 hours.

## 2017-03-17 DIAGNOSIS — E11.9 TYPE 2 DIABETES MELLITUS WITHOUT COMPLICATION: ICD-10-CM

## 2017-03-17 LAB
ALBUMIN SERPL BCP-MCNC: 4.2 G/DL
ALP SERPL-CCNC: 91 U/L
ALT SERPL W/O P-5'-P-CCNC: 68 U/L
ANION GAP SERPL CALC-SCNC: 13 MMOL/L
APTT BLDCRRT: 32.7 SEC
AST SERPL-CCNC: 61 U/L
BASOPHILS # BLD AUTO: 0.01 K/UL
BASOPHILS NFR BLD: 0.1 %
BILIRUB SERPL-MCNC: 0.6 MG/DL
BUN SERPL-MCNC: 33 MG/DL
CALCIUM SERPL-MCNC: 9.5 MG/DL
CHLORIDE SERPL-SCNC: 100 MMOL/L
CO2 SERPL-SCNC: 26 MMOL/L
CREAT SERPL-MCNC: 1.1 MG/DL
DIFFERENTIAL METHOD: ABNORMAL
EOSINOPHIL # BLD AUTO: 0 K/UL
EOSINOPHIL NFR BLD: 0 %
ERYTHROCYTE [DISTWIDTH] IN BLOOD BY AUTOMATED COUNT: 16.7 %
EST. GFR  (AFRICAN AMERICAN): 56 ML/MIN/1.73 M^2
EST. GFR  (NON AFRICAN AMERICAN): 49 ML/MIN/1.73 M^2
GLUCOSE SERPL-MCNC: 190 MG/DL
HCT VFR BLD AUTO: 36.8 %
HGB BLD-MCNC: 11.6 G/DL
LYMPHOCYTES # BLD AUTO: 0.7 K/UL
LYMPHOCYTES NFR BLD: 7.4 %
MCH RBC QN AUTO: 29.8 PG
MCHC RBC AUTO-ENTMCNC: 31.5 %
MCV RBC AUTO: 95 FL
MONOCYTES # BLD AUTO: 0.7 K/UL
MONOCYTES NFR BLD: 7.7 %
NEUTROPHILS # BLD AUTO: 8.1 K/UL
NEUTROPHILS NFR BLD: 84.8 %
PLATELET # BLD AUTO: 229 K/UL
PMV BLD AUTO: 10.4 FL
POCT GLUCOSE: 178 MG/DL (ref 70–110)
POCT GLUCOSE: 192 MG/DL (ref 70–110)
POCT GLUCOSE: 195 MG/DL (ref 70–110)
POCT GLUCOSE: 232 MG/DL (ref 70–110)
POTASSIUM SERPL-SCNC: 4.1 MMOL/L
PROT SERPL-MCNC: 7.7 G/DL
RBC # BLD AUTO: 3.89 M/UL
SODIUM SERPL-SCNC: 139 MMOL/L
WBC # BLD AUTO: 9.58 K/UL

## 2017-03-17 PROCEDURE — 25000242 PHARM REV CODE 250 ALT 637 W/ HCPCS: Performed by: EMERGENCY MEDICINE

## 2017-03-17 PROCEDURE — 25000003 PHARM REV CODE 250: Performed by: EMERGENCY MEDICINE

## 2017-03-17 PROCEDURE — 99232 SBSQ HOSP IP/OBS MODERATE 35: CPT | Mod: ,,, | Performed by: INTERNAL MEDICINE

## 2017-03-17 PROCEDURE — 94664 DEMO&/EVAL PT USE INHALER: CPT

## 2017-03-17 PROCEDURE — 94640 AIRWAY INHALATION TREATMENT: CPT

## 2017-03-17 PROCEDURE — 85730 THROMBOPLASTIN TIME PARTIAL: CPT

## 2017-03-17 PROCEDURE — 94799 UNLISTED PULMONARY SVC/PX: CPT

## 2017-03-17 PROCEDURE — 85025 COMPLETE CBC W/AUTO DIFF WBC: CPT

## 2017-03-17 PROCEDURE — 80053 COMPREHEN METABOLIC PANEL: CPT

## 2017-03-17 PROCEDURE — 25000003 PHARM REV CODE 250: Performed by: SPECIALIST

## 2017-03-17 PROCEDURE — 63600175 PHARM REV CODE 636 W HCPCS: Performed by: EMERGENCY MEDICINE

## 2017-03-17 PROCEDURE — 93005 ELECTROCARDIOGRAM TRACING: CPT

## 2017-03-17 PROCEDURE — 21400001 HC TELEMETRY ROOM

## 2017-03-17 PROCEDURE — 93010 ELECTROCARDIOGRAM REPORT: CPT | Mod: ,,, | Performed by: INTERNAL MEDICINE

## 2017-03-17 PROCEDURE — 36415 COLL VENOUS BLD VENIPUNCTURE: CPT

## 2017-03-17 PROCEDURE — 25000003 PHARM REV CODE 250: Performed by: PHYSICIAN ASSISTANT

## 2017-03-17 PROCEDURE — 25000003 PHARM REV CODE 250: Performed by: NURSE PRACTITIONER

## 2017-03-17 RX ORDER — FUROSEMIDE 10 MG/ML
20 INJECTION INTRAMUSCULAR; INTRAVENOUS ONCE
Status: COMPLETED | OUTPATIENT
Start: 2017-03-17 | End: 2017-03-17

## 2017-03-17 RX ORDER — PREDNISONE 20 MG/1
20 TABLET ORAL 2 TIMES DAILY
Status: DISCONTINUED | OUTPATIENT
Start: 2017-03-17 | End: 2017-03-18 | Stop reason: HOSPADM

## 2017-03-17 RX ADMIN — PREDNISONE 20 MG: 20 TABLET ORAL at 09:03

## 2017-03-17 RX ADMIN — ARFORMOTEROL TARTRATE 15 MCG: 15 SOLUTION RESPIRATORY (INHALATION) at 07:03

## 2017-03-17 RX ADMIN — INSULIN ASPART 2 UNITS: 100 INJECTION, SOLUTION INTRAVENOUS; SUBCUTANEOUS at 04:03

## 2017-03-17 RX ADMIN — PANTOPRAZOLE SODIUM 600 MG: 40 TABLET, DELAYED RELEASE ORAL at 09:03

## 2017-03-17 RX ADMIN — ATORVASTATIN CALCIUM 40 MG: 40 TABLET, FILM COATED ORAL at 09:03

## 2017-03-17 RX ADMIN — FUROSEMIDE 20 MG: 10 INJECTION, SOLUTION INTRAMUSCULAR; INTRAVENOUS at 04:03

## 2017-03-17 RX ADMIN — NEBIVOLOL HYDROCHLORIDE 5 MG: 5 TABLET ORAL at 09:03

## 2017-03-17 RX ADMIN — DOCUSATE SODIUM 100 MG: 100 CAPSULE, LIQUID FILLED ORAL at 09:03

## 2017-03-17 RX ADMIN — BUDESONIDE 0.5 MG: 0.5 SUSPENSION RESPIRATORY (INHALATION) at 07:03

## 2017-03-17 RX ADMIN — PREDNISONE 10 MG: 10 TABLET ORAL at 09:03

## 2017-03-17 RX ADMIN — PANTOPRAZOLE SODIUM 40 MG: 40 TABLET, DELAYED RELEASE ORAL at 09:03

## 2017-03-17 RX ADMIN — IPRATROPIUM BROMIDE AND ALBUTEROL SULFATE 3 ML: .5; 3 SOLUTION RESPIRATORY (INHALATION) at 12:03

## 2017-03-17 RX ADMIN — DOFETILIDE 125 MCG: 0.12 CAPSULE ORAL at 09:03

## 2017-03-17 RX ADMIN — RIVAROXABAN 15 MG: 15 TABLET, FILM COATED ORAL at 04:03

## 2017-03-17 RX ADMIN — ASPIRIN 325 MG ORAL TABLET 325 MG: 325 PILL ORAL at 09:03

## 2017-03-17 RX ADMIN — IPRATROPIUM BROMIDE AND ALBUTEROL SULFATE 3 ML: .5; 3 SOLUTION RESPIRATORY (INHALATION) at 01:03

## 2017-03-17 RX ADMIN — IPRATROPIUM BROMIDE AND ALBUTEROL SULFATE 3 ML: .5; 3 SOLUTION RESPIRATORY (INHALATION) at 07:03

## 2017-03-17 RX ADMIN — ACETAMINOPHEN 650 MG: 325 TABLET ORAL at 09:03

## 2017-03-17 NOTE — ASSESSMENT & PLAN NOTE
-Cardiology cosulted  -recently started on Tikosyn  -Tikosyn and Eliquis stopped  -Heparin infusion continued  -Nebivolol continued  -Echo results pending  -will need LHC when euvolemic per Cardiology    Tikosyn continued, Heparin d/lanre  Day 3 of tikosyn in hospital

## 2017-03-17 NOTE — SUBJECTIVE & OBJECTIVE
Interval History: looks better, SOB improved but still has some wheezing, remains in Afib on Tikosyn.      Review of Systems   Constitutional: Negative for chills and fatigue.   HENT: Negative for congestion, rhinorrhea, sinus pressure and sneezing.    Eyes: Negative for redness, itching and visual disturbance.   Respiratory: Positive for chest tightness and shortness of breath. Negative for cough.    Cardiovascular: Negative for chest pain, palpitations and leg swelling.   Gastrointestinal: Negative for abdominal distention, abdominal pain, diarrhea and vomiting.   Endocrine: Negative for cold intolerance and heat intolerance.   Genitourinary: Negative for decreased urine volume, difficulty urinating, dysuria, flank pain, frequency and urgency.   Musculoskeletal: Positive for myalgias. Negative for arthralgias and back pain.   Skin: Negative for color change and wound.   Allergic/Immunologic: Negative for environmental allergies and food allergies.   Neurological: Negative for dizziness, syncope, weakness and headaches.   Hematological: Does not bruise/bleed easily.   Psychiatric/Behavioral: Negative for agitation, confusion and sleep disturbance. The patient is not nervous/anxious.      Objective:     Vital Signs (Most Recent):  Temp: 97.9 °F (36.6 °C) (03/17/17 0800)  Pulse: 103 (03/17/17 0900)  Resp: 20 (03/17/17 0800)  BP: (!) 157/80 (03/17/17 0800)  SpO2: 99 % (03/17/17 0800) Vital Signs (24h Range):  Temp:  [97.9 °F (36.6 °C)-98.3 °F (36.8 °C)] 97.9 °F (36.6 °C)  Pulse:  [] 103  Resp:  [18-20] 20  SpO2:  [96 %-100 %] 99 %  BP: (147-165)/(74-82) 157/80     Weight: 56.7 kg (125 lb)  Body mass index is 22.86 kg/(m^2).    Intake/Output Summary (Last 24 hours) at 03/17/17 1233  Last data filed at 03/17/17 0000   Gross per 24 hour   Intake              340 ml   Output              900 ml   Net             -560 ml      Physical Exam   Constitutional: She is oriented to person, place, and time. She appears  well-developed and well-nourished.   Looks much better, sitting up in chair   HENT:   Head: Normocephalic.   Nose: Nose normal.   Mouth/Throat: Oropharynx is clear and moist.   Eyes: Conjunctivae and EOM are normal.   Neck: Normal range of motion. No JVD present.   Cardiovascular: An irregularly irregular rhythm present.   Pulses:       Dorsalis pedis pulses are 2+ on the right side, and 2+ on the left side.        Posterior tibial pulses are 2+ on the right side, and 2+ on the left side.   Pulmonary/Chest: Effort normal. No respiratory distress. She has no wheezes. She has no rales. She exhibits no tenderness.   Much better air entry   Abdominal: Soft. Bowel sounds are normal. She exhibits no distension. There is no tenderness.   Musculoskeletal: Normal range of motion. She exhibits no edema.   Neurological: She is alert and oriented to person, place, and time.   Skin: Skin is warm and dry.   Psychiatric: She has a normal mood and affect. Her behavior is normal. Judgment and thought content normal.   Nursing note and vitals reviewed.      Significant Labs:     BMP:   Recent Labs  Lab 03/16/17  0550 03/17/17  0606   * 190*    139   K 3.4* 4.1   CL 99 100   CO2 30* 26   BUN 42* 33*   CREATININE 1.2 1.1   CALCIUM 8.9 9.5   MG 1.9  --      CBC:   Recent Labs  Lab 03/16/17  0550 03/17/17  0606   WBC 10.12 9.58   HGB 11.0* 11.6*   HCT 34.7* 36.8*    229        All pertinent labs within the past 24 hours have been reviewed.    Significant Imaging: I have reviewed all pertinent imaging results/findings within the past 24 hours.

## 2017-03-17 NOTE — PLAN OF CARE
Problem: Patient Care Overview  Goal: Plan of Care Review  Outcome: Ongoing (interventions implemented as appropriate)  Pt remained afebrile throughout this shift.   Tikosyn and oral steroids administered per order.   Pt remained free of falls this shift.   Plan of care reviewed. Patient verbalizes understanding.   PRN tylenol administered x1.   Pt moving/turning independently.   Patient AFIB on monitor. Qtc 439 with no changes.   Bed low, side rails up x 2, wheels locked, call light in reach.   Patient instructed to call for assistance.   Will continue to monitor.

## 2017-03-17 NOTE — PROGRESS NOTES
"[facility] Hospital Medicine  Progress Note    Patient Name: Marcel Montalvo  MRN: [id number]  Patient Class: IP- Inpatient   Admission Date: 3/14/2017  Length of Stay: 3 days  Attending Physician: Moy Sanderson MD  Primary Care Provider: Alexandra Moreno MD        Subjective:     Principal Problem:Acute systolic congestive heart failure    HPI:  Marcel Montalvo is a 76 y.o. female patient who presents to the Emergency Department for SOB which onset gradually this AM when she woke up. Symptoms are constant and moderate in severity. Sx are exacerbated by nothing and relieved by nothing. Pt denies being on any oxygen at home and states she received a treatment of Tikosyn yesterday. Pt also states "she she just wasn't feeling good" 2 days ago and reports a fever (TMAX 101) yesterday. Associated sxs include chest tightness, nausea, nonproductive cough, wheezing, and generalized body aches. Patient denies any vomiting, diarrhea, chills, abd pain, CP, chest tightness, palpitations, leg swelling, weakness/numbness, lightheadedness, dizziness, congestion, rhinorrhea, ear pain, sore throat, dysuria, difficulty urinating and all other sxs at this time. No further complaints or concerns at this time.          Hospital Course:  76 yr female with extensive PMH, CAd s/p CABG and PCI, Ch Afib on Xarelto s/p RFA x 3, s/p Tikosyn Tx in the past, s/p L CEA, NIDDM, HTN, HLP, Anxiety admitted Acute Systolic CHF. Treated with IV lasix and Nebs-- she feels better but still has orthopnea and some sore throat. Pt feels better with IV Lasix, supplemental oxygen, Duonebs prn, Strict I/Os, and Daily weights. Her tikosyn was just resumed 3 days ago. She is also on Heparin gtt for possible LHC next day however Cardiology decided against Cath as her echo shows normal LVF with PAH and DD. Her cough and sore throat have improved, no fever or chills. Walking around pretty well. Heparin d/lanre.        Interval History: looks better, SOB " improved but still has some wheezing, remains in Afib on Tikosyn.      Review of Systems   Constitutional: Negative for chills and fatigue.   HENT: Negative for congestion, rhinorrhea, sinus pressure and sneezing.    Eyes: Negative for redness, itching and visual disturbance.   Respiratory: Positive for chest tightness and shortness of breath. Negative for cough.    Cardiovascular: Negative for chest pain, palpitations and leg swelling.   Gastrointestinal: Negative for abdominal distention, abdominal pain, diarrhea and vomiting.   Endocrine: Negative for cold intolerance and heat intolerance.   Genitourinary: Negative for decreased urine volume, difficulty urinating, dysuria, flank pain, frequency and urgency.   Musculoskeletal: Positive for myalgias. Negative for arthralgias and back pain.   Skin: Negative for color change and wound.   Allergic/Immunologic: Negative for environmental allergies and food allergies.   Neurological: Negative for dizziness, syncope, weakness and headaches.   Hematological: Does not bruise/bleed easily.   Psychiatric/Behavioral: Negative for agitation, confusion and sleep disturbance. The patient is not nervous/anxious.      Objective:     Vital Signs (Most Recent):  Temp: 97.9 °F (36.6 °C) (03/17/17 0800)  Pulse: 103 (03/17/17 0900)  Resp: 20 (03/17/17 0800)  BP: (!) 157/80 (03/17/17 0800)  SpO2: 99 % (03/17/17 0800) Vital Signs (24h Range):  Temp:  [97.9 °F (36.6 °C)-98.3 °F (36.8 °C)] 97.9 °F (36.6 °C)  Pulse:  [] 103  Resp:  [18-20] 20  SpO2:  [96 %-100 %] 99 %  BP: (147-165)/(74-82) 157/80     Weight: 56.7 kg (125 lb)  Body mass index is 22.86 kg/(m^2).    Intake/Output Summary (Last 24 hours) at 03/17/17 1233  Last data filed at 03/17/17 0000   Gross per 24 hour   Intake              340 ml   Output              900 ml   Net             -560 ml      Physical Exam   Constitutional: She is oriented to person, place, and time. She appears well-developed and well-nourished.    Looks much better, sitting up in chair   HENT:   Head: Normocephalic.   Nose: Nose normal.   Mouth/Throat: Oropharynx is clear and moist.   Eyes: Conjunctivae and EOM are normal.   Neck: Normal range of motion. No JVD present.   Cardiovascular: An irregularly irregular rhythm present.   Pulses:       Dorsalis pedis pulses are 2+ on the right side, and 2+ on the left side.        Posterior tibial pulses are 2+ on the right side, and 2+ on the left side.   Pulmonary/Chest: Effort normal. No respiratory distress. She has no wheezes. She has no rales. She exhibits no tenderness.   Much better air entry   Abdominal: Soft. Bowel sounds are normal. She exhibits no distension. There is no tenderness.   Musculoskeletal: Normal range of motion. She exhibits no edema.   Neurological: She is alert and oriented to person, place, and time.   Skin: Skin is warm and dry.   Psychiatric: She has a normal mood and affect. Her behavior is normal. Judgment and thought content normal.   Nursing note and vitals reviewed.      Significant Labs:     BMP:   Recent Labs  Lab 03/16/17  0550 03/17/17  0606   * 190*    139   K 3.4* 4.1   CL 99 100   CO2 30* 26   BUN 42* 33*   CREATININE 1.2 1.1   CALCIUM 8.9 9.5   MG 1.9  --      CBC:   Recent Labs  Lab 03/16/17  0550 03/17/17  0606   WBC 10.12 9.58   HGB 11.0* 11.6*   HCT 34.7* 36.8*    229        All pertinent labs within the past 24 hours have been reviewed.    Significant Imaging: I have reviewed all pertinent imaging results/findings within the past 24 hours.    Assessment/Plan:      * Acute systolic congestive heart failure  -Pt admitted to Inpatient status  -Cardiology following  -IV Lasix (BNP 1543)  -Strict I/Os  -daily weights  -Echo results pending  -supplemental oxygen  -will follow serial troponin    discontinue Heparin gtt and Continue Tikosyn-- good LV function  LHC not needed  Continue Lasix, Steroids and nebs  improving      Acute respiratory failure with  hypoxia  - Sec to CHF from uncontrolled Afib w RVR  -supplemental oxygen  -Duonebs prn   - improving with current meds  - check for home O2.    Persistent atrial fibrillation  -Cardiology cosulted  -recently started on Tikosyn  -Tikosyn and Eliquis stopped  -Heparin infusion continued  -Nebivolol continued  -Echo results pending  -will need LHC when euvolemic per Cardiology    Tikosyn continued, Heparin d/lanre  Day 3 of tikosyn in hospital      Type 2 diabetes mellitus without complication  -Accuchecks and SSI continued-- controlled      Asthma  -hx of Asthma  -Duonebs prn  -IV steroids given in ED    Improving with Nebs, steroids and protonix    VTE Risk Mitigation         Ordered     rivaroxaban tablet 15 mg  With dinner     Route:  Oral        03/15/17 1514     Medium Risk of VTE  Once      03/14/17 1527     Reason for No Pharmacological VTE Prophylaxis  Once      03/14/17 1527          Moy Sanderson MD  Department of Hospital Medicine   [facility]

## 2017-03-17 NOTE — ASSESSMENT & PLAN NOTE
-Pt admitted to Inpatient status  -Cardiology following  -IV Lasix (BNP 1543)  -Strict I/Os  -daily weights  -Echo results pending  -supplemental oxygen  -will follow serial troponin    discontinue Heparin gtt and Continue Tikosyn-- good LV function  C not needed  Continue Lasix, Steroids and nebs  improving

## 2017-03-17 NOTE — ASSESSMENT & PLAN NOTE
- Sec to CHF from uncontrolled Afib w RVR  -supplemental oxygen  -Duonebs prn   - improving with current meds  - check for home O2.

## 2017-03-17 NOTE — PLAN OF CARE
Problem: Patient Care Overview  Goal: Plan of Care Review  Outcome: Ongoing (interventions implemented as appropriate)  Pt on room air; tolerates txs well.

## 2017-03-17 NOTE — PROGRESS NOTES
Cardiology Progress Note        SUBJECTIVE:     History of Present Illness:  Patient is a 76 y.o. female who presents with decompensated CHF, atrial fibrillation, and URI. Patient seen and examined today, sitting up in bed. SOB continues to improve although still has some wheezing. Denies chest pain. HR in low 100's this AM before meds. Tolerating current dose of Tikosyn.     OBJECTIVE:     Vital Signs (Most Recent)  Temp: 97.9 °F (36.6 °C) (03/17/17 0800)  Pulse: 103 (03/17/17 0900)  Resp: 20 (03/17/17 0800)  BP: (!) 157/80 (03/17/17 0800)  SpO2: 99 % (03/17/17 0800)    Vital Signs Range (Last 24H):  Temp:  [97.9 °F (36.6 °C)-98.3 °F (36.8 °C)]   Pulse:  []   Resp:  [18-20]   BP: (147-165)/(74-82)   SpO2:  [96 %-100 %]     Intake/Output last 3 shifts:  I/O last 3 completed shifts:  In: 700 [P.O.:600; I.V.:100]  Out: 1450 [Urine:1450]    Intake/Output this shift:       Review of patient's allergies indicates:   Allergen Reactions    Codeine Nausea And Vomiting    Lisinopril      Other reaction(s): cough    Pacerone [amiodarone] Nausea And Vomiting    Sulfa (sulfonamide antibiotics) Nausea And Vomiting    Celecoxib Palpitations    Ciprofloxacin Hives, Itching and Rash    Colesevelam Rash and Nausea And Vomiting    Doxycycline Rash    Statins-hmg-coa reductase inhibitors Rash     Myalgia(can take atorvastatin ok -no rhabdo)per nurse josé antonio and patient  / stomach upset       Current Facility-Administered Medications   Medication    acetaminophen tablet 650 mg    albuterol-ipratropium 2.5mg-0.5mg/3mL nebulizer solution 3 mL    arformoterol nebulizer solution 15 mcg    aspirin tablet 325 mg    atorvastatin tablet 40 mg    budesonide nebulizer solution 0.5 mg    dextrose 50% injection 12.5 g    dextrose 50% injection 25 g    docusate sodium capsule 100 mg    dofetilide capsule 125 mcg    glucagon (human recombinant) injection 1 mg    glucose chewable tablet 16 g    glucose chewable tablet 24 g     guaifenesin 12 hr tablet 600 mg    insulin aspart pen 0-5 Units    nebivolol tablet 5 mg    nitroGLYCERIN 2% TD oint ointment 0.5 inch    ondansetron injection 4 mg    pantoprazole EC tablet 40 mg    predniSONE tablet 10 mg    rivaroxaban tablet 15 mg     No current facility-administered medications on file prior to encounter.      Current Outpatient Prescriptions on File Prior to Encounter   Medication Sig    aspirin (ECOTRIN) 81 MG EC tablet Take 1 tablet (81 mg total) by mouth once daily.    atorvastatin (LIPITOR) 40 MG tablet TAKE ONE TABLET BY MOUTH NIGHTLY    azelastine (ASTELIN) 137 mcg (0.1 %) nasal spray 2 sprays (274 mcg total) by Nasal route 2 (two) times daily.    benzonatate (TESSALON) 100 MG capsule Take 1 capsule (100 mg total) by mouth 3 (three) times daily as needed for Cough.    budesonide-formoterol 160-4.5 mcg (SYMBICORT) 160-4.5 mcg/actuation HFAA Inhale 2 puffs into the lungs every 12 (twelve) hours. Wash out mouth after using    BYSTOLIC 5 mg Tab TAKE ONE TABLET BY MOUTH TWICE DAILY (Patient taking differently: TAKE ONE TABLET BY MOUTH DAILY)    diclofenac sodium (VOLTAREN) 1 % Gel Apply 2 g topically once daily.    escitalopram oxalate (LEXAPRO) 10 MG tablet TAKE ONE-HALF TO ONE TABLET BY MOUTH EVERY DAY    fluticasone (FLONASE) 50 mcg/actuation nasal spray 2 sprays by Each Nare route once daily.    furosemide (LASIX) 20 MG tablet Take 1 tablet (20 mg total) by mouth as directed.    hydroxychloroquine (PLAQUENIL) 200 mg tablet TAKE ONE TABLET BY MOUTH ONCE DAILY    nitroGLYCERIN (NITROSTAT) 0.4 MG SL tablet Place 1 tablet (0.4 mg total) under the tongue every 5 (five) minutes as needed for Chest pain.    pantoprazole (PROTONIX) 40 MG tablet Take 1 tablet (40 mg total) by mouth once daily.    potassium chloride (KLOR-CON) 10 MEQ TbSR Take 1 tablet (10 mEq total) by mouth once daily.    albuterol (PROAIR HFA) 90 mcg/actuation inhaler Inhale 2 puffs into the lungs every  6 (six) hours as needed.    carboxymethylcellulose (REFRESH PLUS) 0.5 % Dpet Place 1 drop into both eyes 3 (three) times daily as needed.    guaifenesin (MUCINEX) 600 mg 12 hr tablet Take 2 tablets (1,200 mg total) by mouth 2 (two) times daily. (Patient taking differently: Take 1,200 mg by mouth 2 (two) times daily. As needed for flares)    hyoscyamine (ANASPAZ) 0.125 mg TbDL Take 0.125 mg by mouth every 6 (six) hours as needed for Cramping.    phenobarb-hyoscy-atropine-scop (BELLADONNA-PHENOBARBITAL) 16.2-0.1037 -0.0194 mg/5 mL Elix Take 5 mLs by mouth daily as needed (abdominal pain).    pramoxine 1 % Foam Place rectally 3 (three) times daily as needed.    PREMARIN vaginal cream PLACE 1 GRAM VAGINALLY TWICE A WEEK    RESTASIS 0.05 % ophthalmic emulsion INSTILL ONE DROP INTO EACH EYE TWICE DAILY    rivaroxaban (XARELTO) 20 mg Tab Take 1 tablet (20 mg total) by mouth daily with dinner or evening meal.    tramadol (ULTRAM) 50 mg tablet Take 1 tablet (50 mg total) by mouth 3 (three) times daily as needed.    triamcinolone acetonide 0.1% (KENALOG) 0.1 % ointment AAA bid prn    vitamin D 1000 units Tab Take 2,000 Units by mouth once daily.       Physical Exam:  General appearance: alert, appears stated age and cooperative  Head: Normocephalic, without obvious abnormality, atraumatic  Neck: no adenopathy, no carotid bruit, no JVD  Lungs: +diffuse rhonchi with occasional wheezes  Chest wall: no tenderness  Heart: irregularly irregular rate and rhythm, S1, S2 normal, no murmur, click, rub or gallop  Abdomen: soft, non-tender; bowel sounds normal; no masses,  no organomegaly  Extremities: extremities normal, atraumatic, no cyanosis or edema  Skin: Skin color, texture, turgor normal. No rashes or lesions  Neurologic: Grossly normal, AAO x 3    Laboratory:  Chemistry:   Lab Results   Component Value Date     03/17/2017    K 4.1 03/17/2017     03/17/2017    CO2 26 03/17/2017    BUN 33 (H) 03/17/2017     CREATININE 1.1 03/17/2017    CALCIUM 9.5 03/17/2017     Cardiac Markers:   Lab Results   Component Value Date    CKMB 3.6 01/25/2011    TROPONINI 0.024 03/14/2017    TROPONINI 1.09 (H) 01/25/2011     Cardiac Markers (Last 3):   Lab Results   Component Value Date    CKMB 3.6 01/25/2011    CKMB 4.2 (H) 01/24/2011    CKMB 3.1 01/24/2011    TROPONINI 0.024 03/14/2017    TROPONINI 0.016 03/14/2017    TROPONINI 0.036 (H) 09/07/2016    TROPONINI 1.09 (H) 01/25/2011    TROPONINI 2.64 (H) 01/24/2011    TROPONINI 0.58 (H) 01/24/2011     CBC:   Lab Results   Component Value Date    WBC 9.58 03/17/2017    HGB 11.6 (L) 03/17/2017    HCT 36.8 (L) 03/17/2017    HCT 29 (L) 09/26/2016    MCV 95 03/17/2017     03/17/2017     Lipids:   Lab Results   Component Value Date    CHOL 187 03/14/2016    TRIG 145 03/14/2016    HDL 55 03/14/2016     Coagulation:   Lab Results   Component Value Date    INR 1.3 (H) 03/14/2017    APTT 32.7 (H) 03/17/2017       Diagnostic Results:  ECG: Reviewed  X-Ray: Reviewed  Echo: Reviewed      ASSESSMENT/PLAN:     Patient Active Problem List   Diagnosis    Sjogren's syndrome    Multiple joint pain    Type 2 diabetes mellitus without complication    Hyperlipidemia    Coronary artery disease involving coronary bypass graft without angina pectoris    Carotid artery stenosis    Persistent atrial fibrillation    S/P CABG (coronary artery bypass graft)    S/P carotid endarterectomy    Hypertension    Long-term (current) use of anticoagulants    Encounter for long-term (current) use of other medications    S/P PTCA (percutaneous transluminal coronary angioplasty)    Aortic insufficiency    Leg pain    Lens replaced by other means    Rectal bleeding    Corneal abrasion    Foreign body in conjunctival sac    Osteoporosis, postmenopausal    Atrophic vaginitis    Osteoarthritis of hands, bilateral    Osteoarthritis of both knees    Bilateral dry eyes    Pseudophakia    Diabetes mellitus  type 2 without retinopathy    Glaucoma suspect of both eyes    Allergic conjunctivitis of both eyes    Typical atrial flutter    Abdominal spasms    Hemorrhoids    SUSAN (generalized anxiety disorder)    Medication monitoring encounter    Immunocompromised    Asthma    A-fib    On anticoagulant therapy    Radiculopathy affecting upper extremity    Chronic neck pain    Acute systolic congestive heart failure    Acute respiratory failure with hypoxia      Patient continues to improve, still has some wheezes and rhonchi, continue current pulmonary management. CV wise, stable at present time. Continue current meds.   Plan:   -Continue current cardiac meds  -Continue pulmonary treatment  -Likely d/c tmw    Chart reviewed. Dr. Fletcher examined patient and agrees with plan as outlined above.

## 2017-03-18 VITALS
BODY MASS INDEX: 23 KG/M2 | HEIGHT: 62 IN | HEART RATE: 95 BPM | WEIGHT: 125 LBS | TEMPERATURE: 98 F | RESPIRATION RATE: 18 BRPM | SYSTOLIC BLOOD PRESSURE: 134 MMHG | OXYGEN SATURATION: 99 % | DIASTOLIC BLOOD PRESSURE: 96 MMHG

## 2017-03-18 PROBLEM — I50.21 ACUTE SYSTOLIC CONGESTIVE HEART FAILURE: Status: RESOLVED | Noted: 2017-03-14 | Resolved: 2017-03-18

## 2017-03-18 PROBLEM — J96.01 ACUTE RESPIRATORY FAILURE WITH HYPOXIA: Status: RESOLVED | Noted: 2017-03-14 | Resolved: 2017-03-18

## 2017-03-18 LAB
ALBUMIN SERPL BCP-MCNC: 4.2 G/DL
ALP SERPL-CCNC: 94 U/L
ALT SERPL W/O P-5'-P-CCNC: 71 U/L
ANION GAP SERPL CALC-SCNC: 12 MMOL/L
APTT BLDCRRT: 35.9 SEC
AST SERPL-CCNC: 53 U/L
BASOPHILS # BLD AUTO: 0.01 K/UL
BASOPHILS NFR BLD: 0.1 %
BILIRUB SERPL-MCNC: 0.6 MG/DL
BUN SERPL-MCNC: 36 MG/DL
CALCIUM SERPL-MCNC: 9.9 MG/DL
CHLORIDE SERPL-SCNC: 100 MMOL/L
CO2 SERPL-SCNC: 27 MMOL/L
CREAT SERPL-MCNC: 1.2 MG/DL
DIFFERENTIAL METHOD: ABNORMAL
EOSINOPHIL # BLD AUTO: 0 K/UL
EOSINOPHIL NFR BLD: 0 %
ERYTHROCYTE [DISTWIDTH] IN BLOOD BY AUTOMATED COUNT: 16.4 %
EST. GFR  (AFRICAN AMERICAN): 50 ML/MIN/1.73 M^2
EST. GFR  (NON AFRICAN AMERICAN): 44 ML/MIN/1.73 M^2
GLUCOSE SERPL-MCNC: 219 MG/DL
HCT VFR BLD AUTO: 37.1 %
HGB BLD-MCNC: 11.7 G/DL
LYMPHOCYTES # BLD AUTO: 0.8 K/UL
LYMPHOCYTES NFR BLD: 9 %
MCH RBC QN AUTO: 29.7 PG
MCHC RBC AUTO-ENTMCNC: 31.5 %
MCV RBC AUTO: 94 FL
MONOCYTES # BLD AUTO: 0.5 K/UL
MONOCYTES NFR BLD: 5.4 %
NEUTROPHILS # BLD AUTO: 7.3 K/UL
NEUTROPHILS NFR BLD: 85.5 %
PLATELET # BLD AUTO: 234 K/UL
PMV BLD AUTO: 11 FL
POCT GLUCOSE: 219 MG/DL (ref 70–110)
POTASSIUM SERPL-SCNC: 4.2 MMOL/L
PROT SERPL-MCNC: 7.8 G/DL
RBC # BLD AUTO: 3.94 M/UL
SODIUM SERPL-SCNC: 139 MMOL/L
WBC # BLD AUTO: 8.52 K/UL

## 2017-03-18 PROCEDURE — 25000003 PHARM REV CODE 250: Performed by: EMERGENCY MEDICINE

## 2017-03-18 PROCEDURE — 99232 SBSQ HOSP IP/OBS MODERATE 35: CPT | Mod: ,,, | Performed by: INTERNAL MEDICINE

## 2017-03-18 PROCEDURE — 85730 THROMBOPLASTIN TIME PARTIAL: CPT

## 2017-03-18 PROCEDURE — 93010 ELECTROCARDIOGRAM REPORT: CPT | Mod: ,,, | Performed by: INTERNAL MEDICINE

## 2017-03-18 PROCEDURE — 93005 ELECTROCARDIOGRAM TRACING: CPT

## 2017-03-18 PROCEDURE — 94761 N-INVAS EAR/PLS OXIMETRY MLT: CPT

## 2017-03-18 PROCEDURE — 94640 AIRWAY INHALATION TREATMENT: CPT

## 2017-03-18 PROCEDURE — 80053 COMPREHEN METABOLIC PANEL: CPT

## 2017-03-18 PROCEDURE — 94799 UNLISTED PULMONARY SVC/PX: CPT

## 2017-03-18 PROCEDURE — 25000003 PHARM REV CODE 250: Performed by: PHYSICIAN ASSISTANT

## 2017-03-18 PROCEDURE — 25000242 PHARM REV CODE 250 ALT 637 W/ HCPCS: Performed by: EMERGENCY MEDICINE

## 2017-03-18 PROCEDURE — 99900035 HC TECH TIME PER 15 MIN (STAT)

## 2017-03-18 PROCEDURE — 36415 COLL VENOUS BLD VENIPUNCTURE: CPT

## 2017-03-18 PROCEDURE — 85025 COMPLETE CBC W/AUTO DIFF WBC: CPT

## 2017-03-18 PROCEDURE — 94664 DEMO&/EVAL PT USE INHALER: CPT

## 2017-03-18 PROCEDURE — 63600175 PHARM REV CODE 636 W HCPCS: Performed by: EMERGENCY MEDICINE

## 2017-03-18 PROCEDURE — 25000003 PHARM REV CODE 250: Performed by: SPECIALIST

## 2017-03-18 RX ORDER — AMOXICILLIN AND CLAVULANATE POTASSIUM 500; 125 MG/1; MG/1
1 TABLET, FILM COATED ORAL 2 TIMES DAILY
Status: DISCONTINUED | OUTPATIENT
Start: 2017-03-18 | End: 2017-03-18 | Stop reason: HOSPADM

## 2017-03-18 RX ORDER — PREDNISONE 20 MG/1
10 TABLET ORAL 2 TIMES DAILY
Qty: 30 TABLET | Refills: 1 | Status: ON HOLD | OUTPATIENT
Start: 2017-03-18 | End: 2017-03-29 | Stop reason: HOSPADM

## 2017-03-18 RX ORDER — DOFETILIDE 0.12 MG/1
125 CAPSULE ORAL EVERY 12 HOURS
Qty: 60 CAPSULE | Refills: 1 | Status: SHIPPED | OUTPATIENT
Start: 2017-03-18 | End: 2017-04-21

## 2017-03-18 RX ORDER — AZITHROMYCIN 250 MG/1
TABLET, FILM COATED ORAL
Qty: 6 TABLET | Refills: 1 | Status: SHIPPED | OUTPATIENT
Start: 2017-03-18 | End: 2017-03-18 | Stop reason: HOSPADM

## 2017-03-18 RX ORDER — PREDNISONE 20 MG/1
10 TABLET ORAL 2 TIMES DAILY
Qty: 30 TABLET | Refills: 1 | Status: SHIPPED | OUTPATIENT
Start: 2017-03-18 | End: 2017-03-18

## 2017-03-18 RX ORDER — AMOXICILLIN AND CLAVULANATE POTASSIUM 875; 125 MG/1; MG/1
1 TABLET, FILM COATED ORAL EVERY 12 HOURS
Qty: 20 TABLET | Refills: 1 | Status: ON HOLD | OUTPATIENT
Start: 2017-03-18 | End: 2017-03-29 | Stop reason: HOSPADM

## 2017-03-18 RX ADMIN — IPRATROPIUM BROMIDE AND ALBUTEROL SULFATE 3 ML: .5; 3 SOLUTION RESPIRATORY (INHALATION) at 08:03

## 2017-03-18 RX ADMIN — AMOXICILLIN AND CLAVULANATE POTASSIUM 500 MG: 500; 125 TABLET, FILM COATED ORAL at 11:03

## 2017-03-18 RX ADMIN — PANTOPRAZOLE SODIUM 40 MG: 40 TABLET, DELAYED RELEASE ORAL at 09:03

## 2017-03-18 RX ADMIN — INSULIN ASPART 2 UNITS: 100 INJECTION, SOLUTION INTRAVENOUS; SUBCUTANEOUS at 06:03

## 2017-03-18 RX ADMIN — PANTOPRAZOLE SODIUM 600 MG: 40 TABLET, DELAYED RELEASE ORAL at 09:03

## 2017-03-18 RX ADMIN — DOFETILIDE 125 MCG: 0.12 CAPSULE ORAL at 09:03

## 2017-03-18 RX ADMIN — DOCUSATE SODIUM 100 MG: 100 CAPSULE, LIQUID FILLED ORAL at 09:03

## 2017-03-18 RX ADMIN — ARFORMOTEROL TARTRATE 15 MCG: 15 SOLUTION RESPIRATORY (INHALATION) at 08:03

## 2017-03-18 RX ADMIN — BUDESONIDE 0.5 MG: 0.5 SUSPENSION RESPIRATORY (INHALATION) at 08:03

## 2017-03-18 RX ADMIN — NEBIVOLOL HYDROCHLORIDE 5 MG: 5 TABLET ORAL at 09:03

## 2017-03-18 RX ADMIN — PREDNISONE 20 MG: 20 TABLET ORAL at 09:03

## 2017-03-18 RX ADMIN — ASPIRIN 325 MG ORAL TABLET 325 MG: 325 PILL ORAL at 09:03

## 2017-03-18 RX ADMIN — IPRATROPIUM BROMIDE AND ALBUTEROL SULFATE 3 ML: .5; 3 SOLUTION RESPIRATORY (INHALATION) at 12:03

## 2017-03-18 NOTE — PLAN OF CARE
Problem: Patient Care Overview  Goal: Plan of Care Review  Outcome: Ongoing (interventions implemented as appropriate)  Alert and oriented x4, able to make needs known.  Slept well throughout shift.  EKG obtained 2 hours post administration of Dofetilide revealed QTC of <500. Continues in A-fibrillation on scope. BBS coarse and with scattered expiratory wheezes, no dyspnea noted.  Oxygen saturations in low 90's.  Patient reporting pending discharge today.

## 2017-03-18 NOTE — PROGRESS NOTES
Home Oxygen Evaluation    Date Performed: 3/18/2017    1) Patient's Home O2 Sat on room air, while at rest: 100        If O2 sats on room air at rest are 88% or below, patient qualifies. No additional testing needed. Document N/A in steps 2 and 3. If 89% or above, complete steps 2.      2) Patient's O2 Sat on room air while exercisin        If O2 sats on room air while exercising remain 89% or above patient does not qualify, no further testing needed Document N/A in step 3. If O2 sats on room air while exercising are 88% or below, continue to step 3.      3) Patient's O2 Sat while exercising on O2:  at  LPM         (Must show improvement from #2 for patients to qualify)    If O2 sats improve on oxygen, patient qualifies for portable oxygen. If not, the patient does not qualify.

## 2017-03-18 NOTE — PROGRESS NOTES
Pt informed of discharge teaching and medication changes. Pt passed Home O2 eval. IV removed and tele monitor removed. Pt wheeled to car and discharged to husbands care.

## 2017-03-18 NOTE — PLAN OF CARE
Problem: Patient Care Overview  Goal: Plan of Care Review  Outcome: Ongoing (interventions implemented as appropriate)  Pt stable. Pt is Afib with HR range , HR increases with exertion but remains <120s on the heart monitor.  PT recieved tikosyn this shift, QTC 2hrs after was 485.  Pt had no complaints of pain.  Pt used IS and Accapella independently.  Plan of care reviewed.  Pt verbalizes understanding.  Pt was free from falls or injuries during duration of shift.  Pt turned and repositioned self.  PIV intact with no redness, swelling or drainage.  Bed low, wheels locked, bed alarm on, call light in reach.  Pt instructed to call for assistance.  Will continue to monitor.

## 2017-03-18 NOTE — PROGRESS NOTES
Cardiology Progress Note        SUBJECTIVE:     History of Present Illness:  Patient is a 76 y.o. female presents with ACUTE RESPIRATORY DISTRESS EXACERBATION OS HER ASTHAM AFTER A FEBRILE ILLNESS. SHE IS FEELING BETETR TODAY LESS SHORT OF BREATH HER AIR EXCHANGE IS BETTER NO CHEST PAIN FEVER OR CHILLS. IS TAKING HER TIKOSYN W/O SIDE EFFECTS,. SHE IS GETTING READY FOR DISCHARGE.      OBJECTIVE:     Vital Signs (Most Recent)  Temp: 98 °F (36.7 °C) (03/18/17 0705)  Pulse: 100 (03/18/17 0838)  Resp: 20 (03/18/17 0838)  BP: (!) 156/90 (03/18/17 0705)  SpO2: 99 % (03/18/17 0838)    Vital Signs Range (Last 24H):  Temp:  [97.8 °F (36.6 °C)-98.2 °F (36.8 °C)]   Pulse:  []   Resp:  [18-20]   BP: (129-165)/(64-90)   SpO2:  [93 %-99 %]     Intake/Output last 3 shifts:  I/O last 3 completed shifts:  In: 720 [P.O.:720]  Out: 1350 [Urine:1350]    Intake/Output this shift:       Review of patient's allergies indicates:   Allergen Reactions    Codeine Nausea And Vomiting    Lisinopril      Other reaction(s): cough    Pacerone [amiodarone] Nausea And Vomiting    Sulfa (sulfonamide antibiotics) Nausea And Vomiting    Celecoxib Palpitations    Ciprofloxacin Hives, Itching and Rash    Colesevelam Rash and Nausea And Vomiting    Doxycycline Rash    Statins-hmg-coa reductase inhibitors Rash     Myalgia(can take atorvastatin ok -no rhabdo)per nurse josé antonio and patient  / stomach upset       Current Facility-Administered Medications   Medication    acetaminophen tablet 650 mg    albuterol-ipratropium 2.5mg-0.5mg/3mL nebulizer solution 3 mL    arformoterol nebulizer solution 15 mcg    aspirin tablet 325 mg    atorvastatin tablet 40 mg    budesonide nebulizer solution 0.5 mg    dextrose 50% injection 12.5 g    dextrose 50% injection 25 g    docusate sodium capsule 100 mg    dofetilide capsule 125 mcg    glucagon (human recombinant) injection 1 mg    glucose chewable tablet 16 g    glucose chewable tablet 24 g     guaifenesin 12 hr tablet 600 mg    insulin aspart pen 0-5 Units    nebivolol tablet 5 mg    ondansetron injection 4 mg    pantoprazole EC tablet 40 mg    predniSONE tablet 20 mg    rivaroxaban tablet 15 mg     No current facility-administered medications on file prior to encounter.      Current Outpatient Prescriptions on File Prior to Encounter   Medication Sig    aspirin (ECOTRIN) 81 MG EC tablet Take 1 tablet (81 mg total) by mouth once daily.    atorvastatin (LIPITOR) 40 MG tablet TAKE ONE TABLET BY MOUTH NIGHTLY    azelastine (ASTELIN) 137 mcg (0.1 %) nasal spray 2 sprays (274 mcg total) by Nasal route 2 (two) times daily.    benzonatate (TESSALON) 100 MG capsule Take 1 capsule (100 mg total) by mouth 3 (three) times daily as needed for Cough.    budesonide-formoterol 160-4.5 mcg (SYMBICORT) 160-4.5 mcg/actuation HFAA Inhale 2 puffs into the lungs every 12 (twelve) hours. Wash out mouth after using    BYSTOLIC 5 mg Tab TAKE ONE TABLET BY MOUTH TWICE DAILY (Patient taking differently: TAKE ONE TABLET BY MOUTH DAILY)    diclofenac sodium (VOLTAREN) 1 % Gel Apply 2 g topically once daily.    escitalopram oxalate (LEXAPRO) 10 MG tablet TAKE ONE-HALF TO ONE TABLET BY MOUTH EVERY DAY    fluticasone (FLONASE) 50 mcg/actuation nasal spray 2 sprays by Each Nare route once daily.    furosemide (LASIX) 20 MG tablet Take 1 tablet (20 mg total) by mouth as directed.    hydroxychloroquine (PLAQUENIL) 200 mg tablet TAKE ONE TABLET BY MOUTH ONCE DAILY    nitroGLYCERIN (NITROSTAT) 0.4 MG SL tablet Place 1 tablet (0.4 mg total) under the tongue every 5 (five) minutes as needed for Chest pain.    pantoprazole (PROTONIX) 40 MG tablet Take 1 tablet (40 mg total) by mouth once daily.    potassium chloride (KLOR-CON) 10 MEQ TbSR Take 1 tablet (10 mEq total) by mouth once daily.    albuterol (PROAIR HFA) 90 mcg/actuation inhaler Inhale 2 puffs into the lungs every 6 (six) hours as needed.     carboxymethylcellulose (REFRESH PLUS) 0.5 % Dpet Place 1 drop into both eyes 3 (three) times daily as needed.    guaifenesin (MUCINEX) 600 mg 12 hr tablet Take 2 tablets (1,200 mg total) by mouth 2 (two) times daily. (Patient taking differently: Take 1,200 mg by mouth 2 (two) times daily. As needed for flares)    hyoscyamine (ANASPAZ) 0.125 mg TbDL Take 0.125 mg by mouth every 6 (six) hours as needed for Cramping.    phenobarb-hyoscy-atropine-scop (BELLADONNA-PHENOBARBITAL) 16.2-0.1037 -0.0194 mg/5 mL Elix Take 5 mLs by mouth daily as needed (abdominal pain).    pramoxine 1 % Foam Place rectally 3 (three) times daily as needed.    PREMARIN vaginal cream PLACE 1 GRAM VAGINALLY TWICE A WEEK    RESTASIS 0.05 % ophthalmic emulsion INSTILL ONE DROP INTO EACH EYE TWICE DAILY    rivaroxaban (XARELTO) 20 mg Tab Take 1 tablet (20 mg total) by mouth daily with dinner or evening meal.    tramadol (ULTRAM) 50 mg tablet Take 1 tablet (50 mg total) by mouth 3 (three) times daily as needed.    triamcinolone acetonide 0.1% (KENALOG) 0.1 % ointment AAA bid prn    vitamin D 1000 units Tab Take 2,000 Units by mouth once daily.       Physical Exam:  General appearance: alert, appears stated age and cooperative  Head: Normocephalic, without obvious abnormality, atraumatic  Eyes:  conjunctivae/corneas clear. PERRL, EOM's intact.   Neck: no adenopathy, no carotid bruit, no JVD, supple, symmetrical, trachea midline and thyroid not enlarged, symmetric, no tenderness/mass/nodules  Back:  no skin lesions, erythema, or scars  Lungs:  clear to auscultation bilaterally STIULL AHS PROLONGED EXPIRATORY PHASE AND DIFFUSE WHEEZING BUT AIR EXCHANGE IS BETTER  Chest wall: no tenderness SCAR CABG WELL HEALED.  Heart:irregularily iorregular  rate and rhythm, S1, S2 normal, no murmur, click, rub or gallop  Abdomen: soft, non-tender; bowel sounds normal; no masses,  no organomegaly  Extremities: extremities normal, atraumatic, no cyanosis or  edema  Pulses: 2+ and symmetric  Skin: Skin color, texture, turgor normal. No rashes or lesions  Neurologic: Grossly normal    Laboratory:  Chemistry:   Lab Results   Component Value Date     03/18/2017    K 4.2 03/18/2017     03/18/2017    CO2 27 03/18/2017    BUN 36 (H) 03/18/2017    CREATININE 1.2 03/18/2017    CALCIUM 9.9 03/18/2017     Cardiac Markers:   Lab Results   Component Value Date    CKMB 3.6 01/25/2011    TROPONINI 0.024 03/14/2017    TROPONINI 1.09 (H) 01/25/2011     Cardiac Markers (Last 3):   Lab Results   Component Value Date    CKMB 3.6 01/25/2011    CKMB 4.2 (H) 01/24/2011    CKMB 3.1 01/24/2011    TROPONINI 0.024 03/14/2017    TROPONINI 0.016 03/14/2017    TROPONINI 0.036 (H) 09/07/2016    TROPONINI 1.09 (H) 01/25/2011    TROPONINI 2.64 (H) 01/24/2011    TROPONINI 0.58 (H) 01/24/2011     CBC:   Lab Results   Component Value Date    WBC 8.52 03/18/2017    HGB 11.7 (L) 03/18/2017    HCT 37.1 03/18/2017    HCT 29 (L) 09/26/2016    MCV 94 03/18/2017     03/18/2017     Lipids:   Lab Results   Component Value Date    CHOL 187 03/14/2016    TRIG 145 03/14/2016    HDL 55 03/14/2016     Coagulation:   Lab Results   Component Value Date    INR 1.3 (H) 03/14/2017    APTT 35.9 (H) 03/18/2017       Diagnostic Results:  ECG: Reviewed  X-Ray: Reviewed  US: Reviewed  CT: Reviewed  Echo: Reviewed      ASSESSMENT/PLAN:     Patient Active Problem List   Diagnosis    Sjogren's syndrome    Type 2 diabetes mellitus without complication    Hyperlipidemia    Coronary artery disease involving coronary bypass graft without angina pectoris    Carotid artery stenosis    Persistent atrial fibrillation    S/P CABG (coronary artery bypass graft)    S/P carotid endarterectomy    Hypertension    Long-term (current) use of anticoagulants    Encounter for long-term (current) use of other medications    S/P PTCA (percutaneous transluminal coronary angioplasty)    Aortic insufficiency    Leg pain     Lens replaced by other means    Rectal bleeding    Corneal abrasion    Foreign body in conjunctival sac    Osteoporosis, postmenopausal    Atrophic vaginitis    Osteoarthritis of hands, bilateral    Osteoarthritis of both knees    Bilateral dry eyes    Pseudophakia    Diabetes mellitus type 2 without retinopathy    Glaucoma suspect of both eyes    Allergic conjunctivitis of both eyes    Typical atrial flutter    Abdominal spasms    Hemorrhoids    SUSAN (generalized anxiety disorder)    Medication monitoring encounter    Immunocompromised    Asthma    A-fib    On anticoagulant therapy    Radiculopathy affecting upper extremity    Chronic neck pain    Acute systolic congestive heart failure    Acute respiratory failure with hypoxia      Improving over all she is getting ready for discharge. She is on appropriate therapy for afib. She will need to be reevaluate in 2weeks after her respiratory illness subsides and if still in afib plan on cardioversion. At some time electively needs a r/lhc.  Plan:   Continue current therapy no further recommendation. F/u as outpatient.

## 2017-03-19 LAB — BACTERIA BLD CULT: NORMAL

## 2017-03-20 ENCOUNTER — OFFICE VISIT (OUTPATIENT)
Dept: OPHTHALMOLOGY | Facility: CLINIC | Age: 77
End: 2017-03-20
Payer: MEDICARE

## 2017-03-20 ENCOUNTER — APPOINTMENT (OUTPATIENT)
Dept: OPHTHALMOLOGY | Facility: CLINIC | Age: 77
End: 2017-03-20
Payer: MEDICARE

## 2017-03-20 ENCOUNTER — TELEPHONE (OUTPATIENT)
Dept: CARDIOLOGY | Facility: CLINIC | Age: 77
End: 2017-03-20

## 2017-03-20 DIAGNOSIS — Z79.899 ENCOUNTER FOR EYE EXAM DUE TO HIGH RISK MEDICATION: ICD-10-CM

## 2017-03-20 DIAGNOSIS — H02.826 EYELID CYST, LEFT: ICD-10-CM

## 2017-03-20 DIAGNOSIS — M35.00 SJOGREN'S SYNDROME: ICD-10-CM

## 2017-03-20 DIAGNOSIS — H35.3130 AGE-RELATED MACULAR DEGENERATION, DRY, BOTH EYES: ICD-10-CM

## 2017-03-20 DIAGNOSIS — E11.9 DIABETES MELLITUS TYPE 2 WITHOUT RETINOPATHY: Primary | ICD-10-CM

## 2017-03-20 DIAGNOSIS — Z96.1 PSEUDOPHAKIA: ICD-10-CM

## 2017-03-20 PROCEDURE — 67700 BLEPHAROTOMY DRG ABSC EYELID: CPT | Mod: S$PBB,LT,, | Performed by: OPHTHALMOLOGY

## 2017-03-20 PROCEDURE — 99999 PR PBB SHADOW E&M-EST. PATIENT-LVL II: CPT | Mod: PBBFAC,,, | Performed by: OPHTHALMOLOGY

## 2017-03-20 PROCEDURE — 92134 CPTRZ OPH DX IMG PST SGM RTA: CPT | Mod: PBBFAC,PO | Performed by: OPHTHALMOLOGY

## 2017-03-20 PROCEDURE — 92014 COMPRE OPH EXAM EST PT 1/>: CPT | Mod: 25,S$PBB,, | Performed by: OPHTHALMOLOGY

## 2017-03-20 PROCEDURE — 92083 EXTENDED VISUAL FIELD XM: CPT | Mod: PBBFAC,PO | Performed by: OPHTHALMOLOGY

## 2017-03-20 PROCEDURE — 67700 BLEPHAROTOMY DRG ABSC EYELID: CPT | Mod: PBBFAC,PO,LT | Performed by: OPHTHALMOLOGY

## 2017-03-20 PROCEDURE — 99212 OFFICE O/P EST SF 10 MIN: CPT | Mod: PBBFAC,PO | Performed by: OPHTHALMOLOGY

## 2017-03-20 RX ORDER — ERYTHROMYCIN 5 MG/G
OINTMENT OPHTHALMIC
Qty: 1 TUBE | Refills: 3 | Status: SHIPPED | OUTPATIENT
Start: 2017-03-20 | End: 2017-03-20 | Stop reason: SDUPTHER

## 2017-03-20 RX ORDER — ERYTHROMYCIN 5 MG/G
OINTMENT OPHTHALMIC
Qty: 1 TUBE | Refills: 3 | Status: SHIPPED | OUTPATIENT
Start: 2017-03-20 | End: 2017-03-23

## 2017-03-20 NOTE — TELEPHONE ENCOUNTER
Advised complete Augmentin and Prednisone, stop taking Zithromax (Z-pack)  Patient repeated back instructions

## 2017-03-20 NOTE — TELEPHONE ENCOUNTER
Dr. Fletcher please advise if patient needs both Z-pack along with Augmentin 875-125 and Prednisone 20 titrated   Pharmacist concerned about drug interaction Zithromax and Tikosyn

## 2017-03-21 ENCOUNTER — PATIENT OUTREACH (OUTPATIENT)
Dept: ADMINISTRATIVE | Facility: CLINIC | Age: 77
End: 2017-03-21
Payer: MEDICARE

## 2017-03-21 NOTE — PATIENT INSTRUCTIONS

## 2017-03-21 NOTE — DISCHARGE SUMMARY
"[facility] Hospital Medicine  Discharge Summary      Patient Name: Marcel Montalvo  MRN: [id number]  Admission Date: 3/14/2017  Hospital Length of Stay: 4 days  Discharge Date and Time: 3/18/2017  3:33 PM  Attending Physician: No att. providers found   Discharging Provider: Moy Sanderson MD  Primary Care Provider: Alexandra Moreno MD      HPI:   Marcel Montalvo is a 76 y.o. female patient who presents to the Emergency Department for SOB which onset gradually this AM when she woke up. Symptoms are constant and moderate in severity. Sx are exacerbated by nothing and relieved by nothing. Pt denies being on any oxygen at home and states she received a treatment of Tikosyn yesterday. Pt also states "she she just wasn't feeling good" 2 days ago and reports a fever (TMAX 101) yesterday. Associated sxs include chest tightness, nausea, nonproductive cough, wheezing, and generalized body aches. Patient denies any vomiting, diarrhea, chills, abd pain, CP, chest tightness, palpitations, leg swelling, weakness/numbness, lightheadedness, dizziness, congestion, rhinorrhea, ear pain, sore throat, dysuria, difficulty urinating and all other sxs at this time. No further complaints or concerns at this time.          * No surgery found *      Indwelling Lines/Drains at time of discharge:   Lines/Drains/Airways          No matching active lines, drains, or airways        Hospital Course:   76 yr female with extensive PMH, CAd s/p CABG and PCI, Ch Afib on Xarelto s/p RFA x 3, s/p Tikosyn Tx in the past, s/p L CEA, NIDDM, HTN, HLP, Anxiety admitted Acute Systolic CHF. Treated with IV lasix and Nebs-- she felt better but still had orthopnea and some sore throat. She also had acute Asthma exacerbation for which steroids added and she gradually started to improve. Cardiology continued her Tikosyn and an echo was done which showed preserved LVF after resuming her Tikosyn. She also had PAH and DD on her echo.     Her cough, sore " throat and SOB improved with Steroids and Avelox and lasix, she had no fever or chills. Walking around pretty well. Heparin d/lanre by cardiology and cleared to be discharged. She was seen and examined and deemed stable to be discharged today.           Consults:   Consults         Status Ordering Provider     Inpatient consult to Cardiology  Once     Provider:  (Not yet assigned)    Completed ALMA HAGEN          Significant Diagnostic Studies: Labs:   Lab Results   Component Value Date    WBC 8.52 03/18/2017    HGB 11.7 (L) 03/18/2017    HCT 37.1 03/18/2017    MCV 94 03/18/2017     03/18/2017     BMP  Lab Results   Component Value Date     03/18/2017    K 4.2 03/18/2017     03/18/2017    CO2 27 03/18/2017    BUN 36 (H) 03/18/2017    CREATININE 1.2 03/18/2017    CALCIUM 9.9 03/18/2017    ANIONGAP 12 03/18/2017    ESTGFRAFRICA 50 (A) 03/18/2017    EGFRNONAA 44 (A) 03/18/2017      Recent Labs  Lab 03/14/17  1401   TROPONINI 0.024    and All labs within the past 24 hours have been reviewed  Microbiology:   Blood Culture   Lab Results   Component Value Date    LABBLOO No growth after 5 days. 03/14/2017    and Sputum Culture   Lab Results   Component Value Date    RESPIRATORYC NO SIGNIFICANT ISOLATE. 01/25/2007                 2D echo with color flow doppler   Status:  Final result   Visible to patient:  Yes (Patient Portal) Next appt:  03/23/2017 at 03:00 PM in Podiatry (Yahaira Fish DPM) Dx:  Heart failure Order: [id number]           Ref Range & Units 5d ago   5mo ago   8mo ago   1yr ago      EF 55 - 65 55  55 60    Mitral Valve Regurgitation  TRIVIAL MILD TO MODERATE MILD MILD    Est. PA Systolic Pressure  67.29 (A)  34.36 29.59    Tricuspid Valve Regurgitation  TRIVIAL TO MILD MILD  MILD   Resulting Agency  [facility] CV[facility] CV[facility]   Narrative      Date of Procedure: 03/15/2017        TEST DESCRIPTION   Technical Quality: This is a portable study performed at the patient's bedside. This is a  technically challenging study.     Aorta: The aortic root is normal in size, measuring 2.6 cm at sinotubular junction and 2.4 cm at Sinuses of Valsalva. The proximal ascending aorta is normal in size, measuring 2.6 cm across.     Left Atrium: The left atrial volume index is mildly enlarged, measuring 40.04 cc/m2.     Left Ventricle: The left ventricle is normal in size, with an end-diastolic diameter of 3.3 cm, and an end-systolic diameter of 2.2 cm. LV wall thickness is normal, with the septum measuring 1.1 cm and the posterior wall measuring 1.6 cm across. Relative   wall thickness was increased at 0.97, and the LV mass index was increased at 111.3 g/m2 consistent with concentric left ventricular hypertrophy. Global left ventricular systolic function appears normal. Visually estimated ejection fraction is 55-60%.   The LV Doppler derived stroke volume equals 58.0 ccs.       Right Atrium: The right atrium is mildly enlarged, measuring 5.2 cm in length and 3.7 cm in width in the apical view.     Right Ventricle: The right ventricle is mildly enlarged measuring 4.4 cm at the base in the apical right ventricle-focused view. Global right ventricular systolic function appears low normal to mildly depressed. There is abnormal septal motion consistent   with RV pressure/volume overload. Tricuspid annular plane systolic excursion (TAPSE) is 1.2 cm. The estimated PA systolic pressure is 67 mmHg.     Aortic Valve:  The aortic valve is moderately sclerotic. The peak gradient obtained across the aortic valve is 14.0 mmHg, with a mean gradient of 7.58 mmHg. Using a left ventricular outflow tract diameter of 1.9 cm, a left ventricular outflow tract   velocity time integral of 21 cm, and a peak instantaneous transvalvular velocity time integral of 32 cm, the calculated aortic valve area is 1.82 cm2.     Mitral Valve:  The pressure half time is 42 msec. The calculated mitral valve area is 5.24 cm2. There is trivial mitral  regurgitation. Mitral valve is normal in structure with normal leaflet mobility.     Tricuspid Valve:  There is trivial to mild tricuspid regurgitation. Tricuspid valve is normal in structure with normal leaflet mobility.     Pulmonary Valve:  Pulmonary valve is normal in structure with normal leaflet mobility.     IVC: IVC is enlarged but collapses > 50% with a sniff, suggesting intermediate right atrial pressure of 8 mmHg.     Atrial Septum: The atrial septum is intact.     Intracavitary: There is no evidence of pericardial effusion, intracavity mass, thrombi, or vegetation.     Other: There is a pleural effusion present.         CONCLUSIONS     1 - Biatrial enlargement.     2 - Concentric hypertrophy.     3 - Normal left ventricular systolic function (EF 55-60%).     4 - Right ventricular enlargement with low normal to mildly depressed systolic function.     5 - Pulmonary hypertension. The estimated PA systolic pressure is 67 mmHg.     6 - Trivial mitral regurgitation.     7 - Trivial to mild tricuspid regurgitation.     8 - Intermediate central venous pressure.       This document has been electronically    SIGNED BY: Kinjal Fletcher MD On: 03/15/2017 15:12              Imaging Results         X-Ray Chest PA And Lateral (Final result) Result time:  03/17/17 18:14:34    Final result by Dimitri Jalloh MD (03/17/17 18:14:34)    Impression:      Stable postoperative chest x-ray with left pleural and parenchymal scarring.  No acute findings.      Electronically signed by: DIMITRI JALLOH MD  Date:     03/17/17  Time:    18:14     Narrative:    Exam: XR CHEST PA AND LATERAL,    Date:  03/17/17 16:57:24    History: CHF.    Comparison:  03/15/2017.    Findings: Sternal wires are present.  Mild cardiomegaly is noted.  There is chronic pleural thickening and blunting of the left costophrenic angle with some pleural fibrosis.    Overall appearance is stable.            X-Ray Chest 1 View (Final result) Result time:   03/15/17 12:24:22    Final result by Gt Montesinos MD (03/15/17 12:24:22)    Impression:     No change from prior day. Interstitial coarsening as above, at least in part chronic, but with mild superimposed interstitial edema not excluded..      Electronically signed by: GT MONTESINOS MD  Date:     03/15/17  Time:    12:24     Narrative:    History: Congestive heart failure    Upper normal heart size. Median sternotomy. There is unchanged interstitial scarring in the left lower lung and pleural scarring blunting the left costophrenic angle. Mild bilateral interstitial coarsening is unchanged from prior day. When comparing to prior old studies the presence of mild active interstitial infiltrates, i.e. interstitial edema, superimposed on chronic interstitial coarsening is not excluded.            X-Ray Chest 1 View (Final result) Result time:  03/14/17 08:01:10    Final result by CHRISTIANO Tejada Sr., MD (03/14/17 08:01:10)    Impression:        1.  There has been interval development of a mild amount of interstitial opacities seen in both lungs with Kerley B lines visualized bilaterally.  This is characteristic of pulmonary edema.  2.  There is mild cardiomegaly.    3.  There is opacification of the left costophrenic angle.  There is blunting of the right costophrenic angle.  These are characteristic of tiny pleural effusions.  4.  Surgical changes      Electronically signed by: CHRISTIANO TEJADA MD  Date:     03/14/17  Time:    08:01     Narrative:    One-view chest x-ray    Clinical History: Shortness of breath    Finding: Comparison was made to a prior examination performed on 10/2/2015.  There are multiple sternotomy wires in place.  There are surgical clips projected over the mediastinum and the left side of the neck.  There is mild cardiomegaly.  There has been interval development of a mild amount of interstitial opacities seen in both lungs with Kerley B lines visualized bilaterally.  There is no pneumothorax.   There is opacification of the left costophrenic angle.  There is blunting of the right costophrenic angle.              Pending Diagnostic Studies:     None        Final Active Diagnoses:    Diagnosis Date Noted POA    Persistent atrial fibrillation [I48.1]  Yes    Asthma [J45.909] 07/18/2016 Yes    Hypertension [I10] 08/07/2013 Yes    Type 2 diabetes mellitus without complication [E11.9]  Yes    Hyperlipidemia [E78.5]  Yes      Problems Resolved During this Admission:    Diagnosis Date Noted Date Resolved POA    PRINCIPAL PROBLEM:  Acute systolic congestive heart failure [I50.21] 03/14/2017 03/18/2017 Yes    Acute respiratory failure with hypoxia [J96.01] 03/14/2017 03/18/2017 Yes    Multiple joint pain [M25.50] 06/24/2013 03/17/2017 Yes      No new Assessment & Plan notes have been filed under this hospital service since the last note was generated.  Service: Hospital Medicine      Discharged Condition: stable    Disposition: Home or Self Care    Follow Up:  Follow-up Information     Follow up with Alexandra Moreno MD In 1 week.    Specialty:  Family Medicine    Contact information:    [address]  [address]  [place] [address]  [phone]          Follow up with Oseas Fletcher MD In 1 week.    Specialty:  Cardiology    Why:  and , If symptoms worsen    Contact information:    [address]  [place] [address] [phone]          Patient Instructions:     Diet general   Order Specific Question Answer Comments   Na restriction, if any: 2gNa    Fluid restriction: Fluid - 1500mL    Additional restrictions: Cardiac (Low Na/Chol)      Activity as tolerated       Medications:  Reconciled Home Medications:   Discharge Medication List as of 3/18/2017 12:04 PM      START taking these medications    Details   amoxicillin-clavulanate 875-125mg (AUGMENTIN) 875-125 mg per tablet Take 1 tablet by mouth every 12 (twelve) hours., Starting 3/18/2017, Until Discontinued, Normal      dofetilide (TIKOSYN)  125 MCG Cap Take 1 capsule (125 mcg total) by mouth every 12 (twelve) hours., Starting 3/18/2017, Until Sun 3/18/18, Normal      predniSONE (DELTASONE) 20 MG tablet Take 0.5 tablets (10 mg total) by mouth 2 (two) times daily. Take 2 tabs twice daily with food for three days then 1 tab twice daily for three days then 1 daily for three days and then stop. Take your Protonix twice daily Before BF and Dinner for 5 days  then once daily before BF as usual., Starting 3/18/2017, Until Tue 3/28/17, Print         CONTINUE these medications which have NOT CHANGED    Details   aspirin (ECOTRIN) 81 MG EC tablet Take 1 tablet (81 mg total) by mouth once daily., Starting 3/19/2014, Until Discontinued, Normal      atorvastatin (LIPITOR) 40 MG tablet TAKE ONE TABLET BY MOUTH NIGHTLY, Normal      azelastine (ASTELIN) 137 mcg (0.1 %) nasal spray 2 sprays (274 mcg total) by Nasal route 2 (two) times daily., Starting 11/18/2016, Until Sat 11/18/17, Normal      benzonatate (TESSALON) 100 MG capsule Take 1 capsule (100 mg total) by mouth 3 (three) times daily as needed for Cough., Starting 2/29/2016, Until Discontinued, Normal      budesonide-formoterol 160-4.5 mcg (SYMBICORT) 160-4.5 mcg/actuation HFAA Inhale 2 puffs into the lungs every 12 (twelve) hours. Wash out mouth after using, Starting 12/8/2016, Until Fri 12/8/17, Normal      BYSTOLIC 5 mg Tab TAKE ONE TABLET BY MOUTH TWICE DAILY, Normal      diclofenac sodium (VOLTAREN) 1 % Gel Apply 2 g topically once daily., Starting 2/24/2015, Until Discontinued, Normal      escitalopram oxalate (LEXAPRO) 10 MG tablet TAKE ONE-HALF TO ONE TABLET BY MOUTH EVERY DAY, Normal      fluticasone (FLONASE) 50 mcg/actuation nasal spray 2 sprays by Each Nare route once daily., Starting 4/1/2016, Until Discontinued, Normal      furosemide (LASIX) 20 MG tablet Take 1 tablet (20 mg total) by mouth as directed., Starting 8/8/2016, Until Discontinued, Normal      hydroxychloroquine (PLAQUENIL) 200 mg  tablet TAKE ONE TABLET BY MOUTH ONCE DAILY, Normal      nitroGLYCERIN (NITROSTAT) 0.4 MG SL tablet Place 1 tablet (0.4 mg total) under the tongue every 5 (five) minutes as needed for Chest pain., Starting 9/19/2014, Until Discontinued, Normal      pantoprazole (PROTONIX) 40 MG tablet Take 1 tablet (40 mg total) by mouth once daily., Starting 3/30/2016, Until Discontinued, Normal      potassium chloride (KLOR-CON) 10 MEQ TbSR Take 1 tablet (10 mEq total) by mouth once daily., Starting 9/12/2016, Until Discontinued, Normal      albuterol (PROAIR HFA) 90 mcg/actuation inhaler Inhale 2 puffs into the lungs every 6 (six) hours as needed., Starting 4/1/2016, Until Discontinued, Normal      carboxymethylcellulose (REFRESH PLUS) 0.5 % Dpet Place 1 drop into both eyes 3 (three) times daily as needed., Until Discontinued, Historical Med      guaifenesin (MUCINEX) 600 mg 12 hr tablet Take 2 tablets (1,200 mg total) by mouth 2 (two) times daily., Starting 10/30/2013, Until Discontinued, Normal      hyoscyamine (ANASPAZ) 0.125 mg TbDL Take 0.125 mg by mouth every 6 (six) hours as needed for Cramping., Until Discontinued, Historical Med      phenobarb-hyoscy-atropine-scop (BELLADONNA-PHENOBARBITAL) 16.2-0.1037 -0.0194 mg/5 mL Elix Take 5 mLs by mouth daily as needed (abdominal pain)., Starting 5/9/2016, Until Discontinued, Normal      pramoxine 1 % Foam Place rectally 3 (three) times daily as needed., Starting 7/21/2016, Until Discontinued, Normal      PREMARIN vaginal cream PLACE 1 GRAM VAGINALLY TWICE A WEEK, Normal      RESTASIS 0.05 % ophthalmic emulsion INSTILL ONE DROP INTO EACH EYE TWICE DAILY, Normal      rivaroxaban (XARELTO) 20 mg Tab Take 1 tablet (20 mg total) by mouth daily with dinner or evening meal., Starting 9/27/2016, Until Discontinued, Normal      tramadol (ULTRAM) 50 mg tablet Take 1 tablet (50 mg total) by mouth 3 (three) times daily as needed., Starting 4/28/2015, Until Discontinued, Print       triamcinolone acetonide 0.1% (KENALOG) 0.1 % ointment AAA bid prn, Normal      vitamin D 1000 units Tab Take 2,000 Units by mouth once daily., Until Discontinued, Historical Med           Time spent on the discharge of patient: 55 minutes    Moy Sanderson MD  Department of Hospital Medicine  [facility]

## 2017-03-23 ENCOUNTER — HOSPITAL ENCOUNTER (INPATIENT)
Facility: HOSPITAL | Age: 77
LOS: 5 days | Discharge: HOME HEALTH | DRG: 556 | End: 2017-03-29
Attending: EMERGENCY MEDICINE | Admitting: HOSPITALIST
Payer: MEDICARE

## 2017-03-23 ENCOUNTER — OFFICE VISIT (OUTPATIENT)
Dept: PODIATRY | Facility: CLINIC | Age: 77
DRG: 556 | End: 2017-03-23
Payer: MEDICARE

## 2017-03-23 ENCOUNTER — OFFICE VISIT (OUTPATIENT)
Dept: URGENT CARE | Facility: CLINIC | Age: 77
DRG: 556 | End: 2017-03-23
Payer: MEDICARE

## 2017-03-23 VITALS
WEIGHT: 128.31 LBS | OXYGEN SATURATION: 99 % | DIASTOLIC BLOOD PRESSURE: 60 MMHG | SYSTOLIC BLOOD PRESSURE: 126 MMHG | BODY MASS INDEX: 23.47 KG/M2 | HEART RATE: 92 BPM | TEMPERATURE: 96 F

## 2017-03-23 VITALS
SYSTOLIC BLOOD PRESSURE: 122 MMHG | HEART RATE: 98 BPM | BODY MASS INDEX: 23.65 KG/M2 | WEIGHT: 128.5 LBS | DIASTOLIC BLOOD PRESSURE: 60 MMHG | HEIGHT: 62 IN

## 2017-03-23 DIAGNOSIS — R74.8 ABNORMAL LIVER ENZYMES: ICD-10-CM

## 2017-03-23 DIAGNOSIS — D62 ACUTE BLOOD LOSS ANEMIA: Primary | ICD-10-CM

## 2017-03-23 DIAGNOSIS — S30.1XXA HEMATOMA OF ABDOMINAL WALL, INITIAL ENCOUNTER: ICD-10-CM

## 2017-03-23 DIAGNOSIS — N17.9 AKI (ACUTE KIDNEY INJURY): ICD-10-CM

## 2017-03-23 DIAGNOSIS — S30.1XXA ABDOMINAL WALL HEMATOMA, INITIAL ENCOUNTER: ICD-10-CM

## 2017-03-23 DIAGNOSIS — S39.011A STRAIN OF MUSCLE, FASCIA AND TENDON OF ABDOMEN, INITIAL ENCOUNTER: Primary | ICD-10-CM

## 2017-03-23 DIAGNOSIS — R53.1 WEAKNESS: ICD-10-CM

## 2017-03-23 DIAGNOSIS — I48.20 CHRONIC ATRIAL FIBRILLATION: Chronic | ICD-10-CM

## 2017-03-23 LAB
ALBUMIN SERPL BCP-MCNC: 3.3 G/DL
ALP SERPL-CCNC: 68 U/L
ALT SERPL W/O P-5'-P-CCNC: 80 U/L
ANION GAP SERPL CALC-SCNC: 13 MMOL/L
APTT BLDCRRT: 22.9 SEC
AST SERPL-CCNC: 39 U/L
BASOPHILS # BLD AUTO: 0.01 K/UL
BASOPHILS NFR BLD: 0.1 %
BILIRUB SERPL-MCNC: 0.5 MG/DL
BUN SERPL-MCNC: 51 MG/DL
CALCIUM SERPL-MCNC: 9.1 MG/DL
CHLORIDE SERPL-SCNC: 99 MMOL/L
CO2 SERPL-SCNC: 20 MMOL/L
CREAT SERPL-MCNC: 2.1 MG/DL
DIFFERENTIAL METHOD: ABNORMAL
EOSINOPHIL # BLD AUTO: 0 K/UL
EOSINOPHIL NFR BLD: 0 %
ERYTHROCYTE [DISTWIDTH] IN BLOOD BY AUTOMATED COUNT: 15.7 %
EST. GFR  (AFRICAN AMERICAN): 26 ML/MIN/1.73 M^2
EST. GFR  (NON AFRICAN AMERICAN): 22 ML/MIN/1.73 M^2
GLUCOSE SERPL-MCNC: 581 MG/DL
HCT VFR BLD AUTO: 23.9 %
HGB BLD-MCNC: 7.8 G/DL
INR PPP: 2
LACTATE SERPL-SCNC: 3.4 MMOL/L
LYMPHOCYTES # BLD AUTO: 1.2 K/UL
LYMPHOCYTES NFR BLD: 7.4 %
MCH RBC QN AUTO: 29.7 PG
MCHC RBC AUTO-ENTMCNC: 32.6 %
MCV RBC AUTO: 91 FL
MONOCYTES # BLD AUTO: 1.6 K/UL
MONOCYTES NFR BLD: 9.4 %
NEUTROPHILS # BLD AUTO: 14 K/UL
NEUTROPHILS NFR BLD: 83.1 %
PLATELET # BLD AUTO: 276 K/UL
PMV BLD AUTO: 10.1 FL
POTASSIUM SERPL-SCNC: 5.3 MMOL/L
PROT SERPL-MCNC: 6 G/DL
PROTHROMBIN TIME: 19.8 SEC
RBC # BLD AUTO: 2.63 M/UL
SODIUM SERPL-SCNC: 132 MMOL/L
TROPONIN I SERPL DL<=0.01 NG/ML-MCNC: 0.03 NG/ML
WBC # BLD AUTO: 16.87 K/UL

## 2017-03-23 PROCEDURE — 99999 PR PBB SHADOW E&M-EST. PATIENT-LVL IV: CPT | Mod: PBBFAC,,, | Performed by: PODIATRIST

## 2017-03-23 PROCEDURE — 81000 URINALYSIS NONAUTO W/SCOPE: CPT

## 2017-03-23 PROCEDURE — 99203 OFFICE O/P NEW LOW 30 MIN: CPT | Mod: S$PBB,,, | Performed by: PODIATRIST

## 2017-03-23 PROCEDURE — P9612 CATHETERIZE FOR URINE SPEC: HCPCS

## 2017-03-23 PROCEDURE — 85730 THROMBOPLASTIN TIME PARTIAL: CPT

## 2017-03-23 PROCEDURE — 99999 PR PBB SHADOW E&M-EST. PATIENT-LVL V: CPT | Mod: PBBFAC,,, | Performed by: PHYSICIAN ASSISTANT

## 2017-03-23 PROCEDURE — 96361 HYDRATE IV INFUSION ADD-ON: CPT

## 2017-03-23 PROCEDURE — 80053 COMPREHEN METABOLIC PANEL: CPT

## 2017-03-23 PROCEDURE — 86920 COMPATIBILITY TEST SPIN: CPT

## 2017-03-23 PROCEDURE — 96374 THER/PROPH/DIAG INJ IV PUSH: CPT

## 2017-03-23 PROCEDURE — 36430 TRANSFUSION BLD/BLD COMPNT: CPT

## 2017-03-23 PROCEDURE — 82272 OCCULT BLD FECES 1-3 TESTS: CPT

## 2017-03-23 PROCEDURE — 96375 TX/PRO/DX INJ NEW DRUG ADDON: CPT

## 2017-03-23 PROCEDURE — 25000003 PHARM REV CODE 250: Performed by: EMERGENCY MEDICINE

## 2017-03-23 PROCEDURE — 87040 BLOOD CULTURE FOR BACTERIA: CPT

## 2017-03-23 PROCEDURE — 86900 BLOOD TYPING SEROLOGIC ABO: CPT

## 2017-03-23 PROCEDURE — 99213 OFFICE O/P EST LOW 20 MIN: CPT | Mod: S$PBB,,, | Performed by: PHYSICIAN ASSISTANT

## 2017-03-23 PROCEDURE — 84484 ASSAY OF TROPONIN QUANT: CPT

## 2017-03-23 PROCEDURE — 83605 ASSAY OF LACTIC ACID: CPT

## 2017-03-23 PROCEDURE — 86901 BLOOD TYPING SEROLOGIC RH(D): CPT

## 2017-03-23 PROCEDURE — 85025 COMPLETE CBC W/AUTO DIFF WBC: CPT

## 2017-03-23 PROCEDURE — 87086 URINE CULTURE/COLONY COUNT: CPT

## 2017-03-23 PROCEDURE — 99285 EMERGENCY DEPT VISIT HI MDM: CPT | Mod: 25,27

## 2017-03-23 PROCEDURE — 85610 PROTHROMBIN TIME: CPT

## 2017-03-23 RX ORDER — SODIUM CHLORIDE 9 MG/ML
500 INJECTION, SOLUTION INTRAVENOUS
Status: COMPLETED | OUTPATIENT
Start: 2017-03-23 | End: 2017-03-24

## 2017-03-23 RX ORDER — SODIUM CHLORIDE 9 MG/ML
1000 INJECTION, SOLUTION INTRAVENOUS
Status: COMPLETED | OUTPATIENT
Start: 2017-03-24 | End: 2017-03-24

## 2017-03-23 RX ORDER — ACETAMINOPHEN 325 MG/1
650 TABLET ORAL
Status: DISCONTINUED | OUTPATIENT
Start: 2017-03-23 | End: 2017-03-23

## 2017-03-23 RX ORDER — INSULIN ASPART 100 [IU]/ML
10 INJECTION, SOLUTION INTRAVENOUS; SUBCUTANEOUS
Status: DISCONTINUED | OUTPATIENT
Start: 2017-03-24 | End: 2017-03-24

## 2017-03-23 RX ADMIN — SODIUM CHLORIDE 500 ML: 0.9 INJECTION, SOLUTION INTRAVENOUS at 10:03

## 2017-03-23 NOTE — PROGRESS NOTES
PODIATRY INITIAL CONSULTATION  Requesting Consult Provider:      Alexandra Moreno MD    CHIEF COMPLAINT  Chief Complaint   Patient presents with    Nail Problem     Left hallux nail loose         HPI    SUBJECTIVE: Marcel Montalvo is a 76 y.o. female who  has a past medical history of *Atrial fibrillation; AF (atrial fibrillation); Anticoagulant long-term use; Anxiety; Aortic insufficiency (3/19/2014); Aortic regurgitation; Asthma; Carotid artery stenosis; Coronary artery disease; Diabetes mellitus; GERD (gastroesophageal reflux disease); H. pylori infection; Hyperlipidemia; Hypertension (8/7/2013); IBS (irritable bowel syndrome); Leg pain (3/19/2014); Long-term (current) use of anticoagulants (8/7/2013); Osteoarthritis; Osteoarthritis; Osteopenia; Pancreatitis; S/P CABG (coronary artery bypass graft) (8/7/2013); S/P carotid endarterectomy (8/7/2013); S/P PTCA (percutaneous transluminal coronary angioplasty) (3/19/2014); Sjogren's syndrome; Stroke; and Vitamin D deficiency disease. Marcelpresents to clinic for high risk diabetic foot exam and care.  Marcel denies numbness, burning, and/or tingling sensations in their feet. Patient admits to painful left hallux toenail. She states nail injury occurred several months ago with someone stepping on her left great toe. She has had subsequent loosening noted to the nail plate.      HgA1c:   Hemoglobin A1C   Date Value Ref Range Status   03/14/2017 6.3 (H) 4.5 - 6.2 % Final     Comment:     According to ADA guidelines, hemoglobin A1C <7.0% represents  optimal control in non-pregnant diabetic patients.  Different  metrics may apply to specific populations.   Standards of Medical Care in Diabetes - 2016.  For the purpose of screening for the presence of diabetes:  <5.7%     Consistent with the absence of diabetes  5.7-6.4%  Consistent with increasing risk for diabetes   (prediabetes)  >or=6.5%  Consistent with diabetes  Currently no consensus exists for use of hemoglobin  A1C  for diagnosis of diabetes for children.     09/06/2016 6.7 (H) 4.5 - 6.2 % Final     Comment:     According to ADA guidelines, hemoglobin A1C <7.0% represents  optimal control in non-pregnant diabetic patients.  Different  metrics may apply to specific populations.   Standards of Medical Care in Diabetes - 2016.  For the purpose of screening for the presence of diabetes:  <5.7%     Consistent with the absence of diabetes  5.7-6.4%  Consistent with increasing risk for diabetes   (prediabetes)  >or=6.5%  Consistent with diabetes  Currently no consensus exists for use of hemoglobin A1C  for diagnosis of diabetes for children.     01/05/2016 6.6 (H) 4.5 - 6.2 % Final         PMH  Past Medical History:   Diagnosis Date    *Atrial fibrillation     AF (atrial fibrillation)     Palpation: atrial fibrillation, on Coumadin and followed by cardiologist.    Anticoagulant long-term use     Anxiety     Aortic insufficiency 3/19/2014    Aortic regurgitation     Aortic regurgitation/tricuspid regurgitation per MARIO in April 2007.     Asthma     Carotid artery stenosis     CEA on the left by Dr. Maciel    Coronary artery disease     status post CABG X 3.     Diabetes mellitus     GERD (gastroesophageal reflux disease)     H. pylori infection     treated    Hyperlipidemia     intolerant to statins.    Hypertension 8/7/2013    IBS (irritable bowel syndrome)     Leg pain 3/19/2014    Long-term (current) use of anticoagulants 8/7/2013    Osteoarthritis     Osteoarthritis     Osteopenia     DEXA scan done on 06/20/12     Pancreatitis     resolved    S/P CABG (coronary artery bypass graft) 8/7/2013    S/P carotid endarterectomy 8/7/2013    S/P PTCA (percutaneous transluminal coronary angioplasty) 3/19/2014    Sjogren's syndrome     Stroke     Vitamin D deficiency disease        MEDS  Current Outpatient Prescriptions on File Prior to Visit   Medication Sig Dispense Refill    albuterol (PROAIR HFA) 90  mcg/actuation inhaler Inhale 2 puffs into the lungs every 6 (six) hours as needed. 1 Inhaler 11    amoxicillin-clavulanate 875-125mg (AUGMENTIN) 875-125 mg per tablet Take 1 tablet by mouth every 12 (twelve) hours. 20 tablet 1    aspirin (ECOTRIN) 81 MG EC tablet Take 1 tablet (81 mg total) by mouth once daily. 30 tablet 6    atorvastatin (LIPITOR) 40 MG tablet TAKE ONE TABLET BY MOUTH NIGHTLY 90 tablet 0    azelastine (ASTELIN) 137 mcg (0.1 %) nasal spray 2 sprays (274 mcg total) by Nasal route 2 (two) times daily. 30 mL 12    benzonatate (TESSALON) 100 MG capsule Take 1 capsule (100 mg total) by mouth 3 (three) times daily as needed for Cough. 30 capsule 1    budesonide-formoterol 160-4.5 mcg (SYMBICORT) 160-4.5 mcg/actuation HFAA Inhale 2 puffs into the lungs every 12 (twelve) hours. Wash out mouth after using 1 Inhaler 12    BYSTOLIC 5 mg Tab TAKE ONE TABLET BY MOUTH TWICE DAILY (Patient taking differently: TAKE ONE TABLET BY MOUTH DAILY) 180 tablet 6    carboxymethylcellulose (REFRESH PLUS) 0.5 % Dpet Place 1 drop into both eyes 3 (three) times daily as needed.      diclofenac sodium (VOLTAREN) 1 % Gel Apply 2 g topically once daily. 3 Tube 4    dofetilide (TIKOSYN) 125 MCG Cap Take 1 capsule (125 mcg total) by mouth every 12 (twelve) hours. 60 capsule 1    escitalopram oxalate (LEXAPRO) 10 MG tablet TAKE ONE-HALF TO ONE TABLET BY MOUTH EVERY DAY 90 tablet 3    fluticasone (FLONASE) 50 mcg/actuation nasal spray 2 sprays by Each Nare route once daily. 1 Bottle 11    furosemide (LASIX) 20 MG tablet Take 1 tablet (20 mg total) by mouth as directed. 90 tablet 4    guaifenesin (MUCINEX) 600 mg 12 hr tablet Take 2 tablets (1,200 mg total) by mouth 2 (two) times daily. (Patient taking differently: Take 1,200 mg by mouth 2 (two) times daily. As needed for flares) 60 tablet 11    hydroxychloroquine (PLAQUENIL) 200 mg tablet TAKE ONE TABLET BY MOUTH ONCE DAILY 90 tablet 0    pantoprazole (PROTONIX) 40 MG  tablet Take 1 tablet (40 mg total) by mouth once daily. 90 tablet 3    potassium chloride (KLOR-CON) 10 MEQ TbSR Take 1 tablet (10 mEq total) by mouth once daily. 30 tablet 6    predniSONE (DELTASONE) 20 MG tablet Take 0.5 tablets (10 mg total) by mouth 2 (two) times daily. Take 2 tabs twice daily with food for three days then 1 tab twice daily for three days then 1 daily for three days and then stop. Take your Protonix twice daily Before BF and Dinner for 5 days then once daily before BF as usual. 30 tablet 1    PREMARIN vaginal cream PLACE 1 GRAM VAGINALLY TWICE A WEEK 30 g 3    RESTASIS 0.05 % ophthalmic emulsion INSTILL ONE DROP INTO EACH EYE TWICE DAILY 60 each 12    rivaroxaban (XARELTO) 20 mg Tab Take 1 tablet (20 mg total) by mouth daily with dinner or evening meal. 90 tablet 3    tramadol (ULTRAM) 50 mg tablet Take 1 tablet (50 mg total) by mouth 3 (three) times daily as needed. 90 tablet 3    vitamin D 1000 units Tab Take 2,000 Units by mouth once daily.      hyoscyamine (ANASPAZ) 0.125 mg TbDL Take 0.125 mg by mouth every 6 (six) hours as needed for Cramping.      nitroGLYCERIN (NITROSTAT) 0.4 MG SL tablet Place 1 tablet (0.4 mg total) under the tongue every 5 (five) minutes as needed for Chest pain. 25 tablet 6    phenobarb-hyoscy-atropine-scop (BELLADONNA-PHENOBARBITAL) 16.2-0.1037 -0.0194 mg/5 mL Elix Take 5 mLs by mouth daily as needed (abdominal pain). 180 mL 1    pramoxine 1 % Foam Place rectally 3 (three) times daily as needed. 15 g 1    triamcinolone acetonide 0.1% (KENALOG) 0.1 % ointment AAA bid prn 60 g 1    [DISCONTINUED] erythromycin (ROMYCIN) ophthalmic ointment Apply to incision(s) area of the left upper eyelid 3 to 4 times a day for 3 days 1 Tube 3     Current Facility-Administered Medications on File Prior to Visit   Medication Dose Route Frequency Provider Last Rate Last Dose    denosumab (PROLIA) injection 60 mg  60 mg Subcutaneous Q6 Months Jaci Day NP   60 mg at  "01/10/17 1047       [facility]     Past Surgical History:   Procedure Laterality Date    ABDOMINAL SURGERY      APPENDECTOMY      bilateral cataract surgery      CARPAL TUNNEL RELEASE      RT    CATARACT EXTRACTION      CEA      left    CHOLECYSTECTOMY      CORONARY ARTERY BYPASS GRAFT      2 stents    EYE SURGERY      HYSTERECTOMY      TONSILLECTOMY          ALL  Review of patient's allergies indicates:   Allergen Reactions    Codeine Nausea And Vomiting    Lisinopril      Other reaction(s): cough    Pacerone [amiodarone] Nausea And Vomiting    Sulfa (sulfonamide antibiotics) Nausea And Vomiting    Celecoxib Palpitations    Ciprofloxacin Hives, Itching and Rash    Colesevelam Rash and Nausea And Vomiting    Doxycycline Rash    Statins-hmg-coa reductase inhibitors Rash     Myalgia(can take atorvastatin ok -no rhabdo)per nurse josé antonio and patient  / stomach upset       SOC     Social History   Substance Use Topics    Smoking status: Former Smoker     Packs/day: 0.50     Years: 25.00     Quit date: 12/17/1988    Smokeless tobacco: Never Used    Alcohol use 0.0 oz/week     0 Standard drinks or equivalent per week      Comment: wine occasionally         Family HX    Family History   Problem Relation Age of Onset    Alzheimer's disease Mother     Hyperlipidemia Mother     Cancer Father      lung    Heart disease Maternal Uncle     Cancer Brother      stomach    Cancer Paternal Grandmother     Cancer Paternal Grandfather      stomach            REVIEW OF SYSTEMS  General: Denies any fever or chills  Chest: Denies shortness of breath, wheezing, coughing, or sputum production  Heart: Denies chest pain.  As noted above and per history of current illness above, otherwise negative in the remainder of the 14 systems.     PHYSICAL EXAM  Vitals:    03/23/17 1132   BP: 122/60   Pulse: 98   Weight: 58.3 kg (128 lb 8.5 oz)   Height: 5' 2" (1.575 m)   PainSc: 0-No pain       GEN:  This patient is well-developed, " well-nourished and appears stated age, well-oriented to person, place and time, and cooperative and pleasant on today's visit.      LOWER EXTREMITY  Vascular:   · DP pedal pulse 2/4 b/l, PT pedal pulse 2/4 b/l  · Skin temperature warm to warm from prox to distally  · CFT <5 secs b/l  · There is no edema noted b/l.     Dermatologic:   · Left hallux nail plate with partial avulsion noted , no acute signs of infection  · No open skin lesions noted  · No erythema or drainage noted b/l.  · Webspaces are C/D/I B/L.  · There is no hyperkeratotic tissue noted.  · Skin texture and turgor WNL  · There is no pedal hair growth noted    Neurologic:  · Protective sensation absent at 0/10 sites upon examination with Corona Weinsten 5.07 g monofilament.   · Propioception intact at 1st MTPJ b/l.   · Babinski reflex absent b/l. Light touch and sharp/dull sensation intact b/l.    Musculoskeletal/Orthopedic:  · No symptomatic structural abnormalities noted.   · Muscle strength is 5/5 for foot inverters, everters, plantarflexors, and dorsiflexors. Muscle tone is normal.  · Pain free range of motion in all four quadrants with stiffness and limitation b/l  ·        ASSESSMENT  Avulsion of nail plate, initial encounter      PLAN  -patient was examined and evaulated  -Discuss presenting problems, etiology, pathologic processes and management options with patient today.   -I counseled the patient on their conditions, their implications and medical management. An in depth discussion on diabetic management, risk prevention, amputation prevention verbally and provided educational literature in written format.  -With patient's permission, nail was removed with use of . No blood loss. Patient tolerated well  -discussed nail growth  -RTC if any problems      Future Appointments  Date Time Provider Department Center   3/23/2017 3:00 PM Yahaira Fish DPM [facility] POD [facility]   3/29/2017 4:15 PM Kinjal Fletcher MD [facility] CARDIO [facility]   3/30/2017  10:00 AM [facility] XR2 [facility] XRAY [facility]   3/30/2017 10:20 AM PULMONARY LAB, [facility] PULMLAB [facility]   3/30/2017 10:40 AM Artem Walter MD [facility] PULMSVC [facility]   4/21/2017 3:20 PM Virgilio Perez MD [facility] ARRHYTH [facility]   5/11/2017 9:40 AM Alexandra Moreno MD [facility] IM [place]   7/12/2017 9:00 AM LABORATORY, [facility] LAB [facility]   7/12/2017 9:30 AM [facility] BMD1 [facility] DEXABMD [facility]   7/12/2017 10:00 AM Luana Knight PA-C [facility] RHEUM [facility]   9/25/2017 9:00 AM Varghese Arambula MD [facility]         Report Electronically Signed By:  Yahaira Fish DPM   Podiatric Medicine & Surgery  [facility] Aysha Neil  3/23/2017

## 2017-03-23 NOTE — PHYSICIAN QUERY
PT Name: Marcel Montalvo  MR #: [id number]     Physician Query Form - Diagnosis Clarification      CDS/: Nehemiah Blanco               Contact information: [email or web address]    This form is a permanent document in the medical record.     Query Date: March 23, 2017    By submitting this query, we are merely seeking further clarification of documentation.  Please utilize your independent clinical judgment when addressing the question(s) below.     The medical record contains the following:      Findings Supporting Clinical Information Location in Medical Record   UTI     Female who presents with decompensated CHF, atrial fibrillation, and UTI    Amoxicillin-Clavalanate, 500 mg    Urinalysis (Abnormal)  Color, UA Yellow, Straw, Saira Progress Note 3/16/17      MAR 3/18/18    Labs 3/14/17     Please clarify if the _______UTI____________________ diagnosis has been:    [  ] Ruled In, POA (Y)  [  ] Ruled In, POA (N)  [xx  ] Ruled Out  [  ] Clinically insignificant  [  ] Clinically undetermined  [  ] Other/Clarification of findings (please specify)_______________________________    Please document in your progress notes daily for the duration of treatment, until resolved, and include in your discharge summary.

## 2017-03-23 NOTE — MR AVS SNAPSHOT
[facility]  [address] [facility] [place] [address]  Phone: [phone]  Fax: [phone]                  Marcel Montalvo   3/23/2017 12:00 PM   Office Visit    Description:  Female : 1940   Provider:  Jacki Bowen PA-C   Department:  [facility]           Reason for Visit     Cough                To Do List           Future Appointments        Provider Department Dept Phone    3/23/2017 3:00 PM Yahaira Fish DPM [facility] - Podiatry [phone]    3/29/2017 4:15 PM Kinjal Fletcher MD [facility] Cardiology [phone]    3/30/2017 10:00 AM [facility] XR2 [facility] [phone]    3/30/2017 10:20 AM PULMONARY LAB, [facility] Pulmonary Function Svcs [phone]    3/30/2017 10:40 AM Artem Walter MD [facility] Pulmonary Services [phone]      Goals (5 Years of Data)     None      [facility] On Call     [facility] On Call Nurse Care Line -  Assistance  Registered nurses in the [facility] Center provide clinical advisement, health education, appointment booking, and other advisory services.  Call for this free service at [phone].             Medications           Message regarding Medications     Verify the changes and/or additions to your medication regime listed below are the same as discussed with your clinician today.  If any of these changes or additions are incorrect, please notify your healthcare provider.             Verify that the below list of medications is an accurate representation of the medications you are currently taking.  If none reported, the list may be blank. If incorrect, please contact your healthcare provider. Carry this list with you in case of emergency.           Current Medications     albuterol (PROAIR HFA) 90 mcg/actuation inhaler Inhale 2 puffs into the lungs every 6 (six) hours as needed.    amoxicillin-clavulanate 875-125mg (AUGMENTIN) 875-125 mg per tablet Take 1 tablet by mouth every 12 (twelve) hours.    aspirin (ECOTRIN)  81 MG EC tablet Take 1 tablet (81 mg total) by mouth once daily.    atorvastatin (LIPITOR) 40 MG tablet TAKE ONE TABLET BY MOUTH NIGHTLY    azelastine (ASTELIN) 137 mcg (0.1 %) nasal spray 2 sprays (274 mcg total) by Nasal route 2 (two) times daily.    benzonatate (TESSALON) 100 MG capsule Take 1 capsule (100 mg total) by mouth 3 (three) times daily as needed for Cough.    budesonide-formoterol 160-4.5 mcg (SYMBICORT) 160-4.5 mcg/actuation HFAA Inhale 2 puffs into the lungs every 12 (twelve) hours. Wash out mouth after using    BYSTOLIC 5 mg Tab TAKE ONE TABLET BY MOUTH TWICE DAILY    carboxymethylcellulose (REFRESH PLUS) 0.5 % Dpet Place 1 drop into both eyes 3 (three) times daily as needed.    diclofenac sodium (VOLTAREN) 1 % Gel Apply 2 g topically once daily.    dofetilide (TIKOSYN) 125 MCG Cap Take 1 capsule (125 mcg total) by mouth every 12 (twelve) hours.    escitalopram oxalate (LEXAPRO) 10 MG tablet TAKE ONE-HALF TO ONE TABLET BY MOUTH EVERY DAY    fluticasone (FLONASE) 50 mcg/actuation nasal spray 2 sprays by Each Nare route once daily.    furosemide (LASIX) 20 MG tablet Take 1 tablet (20 mg total) by mouth as directed.    guaifenesin (MUCINEX) 600 mg 12 hr tablet Take 2 tablets (1,200 mg total) by mouth 2 (two) times daily.    hydroxychloroquine (PLAQUENIL) 200 mg tablet TAKE ONE TABLET BY MOUTH ONCE DAILY    pantoprazole (PROTONIX) 40 MG tablet Take 1 tablet (40 mg total) by mouth once daily.    potassium chloride (KLOR-CON) 10 MEQ TbSR Take 1 tablet (10 mEq total) by mouth once daily.    predniSONE (DELTASONE) 20 MG tablet Take 0.5 tablets (10 mg total) by mouth 2 (two) times daily. Take 2 tabs twice daily with food for three days then 1 tab twice daily for three days then 1 daily for three days and then stop. Take your Protonix twice daily Before BF and Dinner for 5 days then once daily before BF as usual.    PREMARIN vaginal cream PLACE 1 GRAM VAGINALLY TWICE A WEEK    RESTASIS 0.05 % ophthalmic  emulsion INSTILL ONE DROP INTO EACH EYE TWICE DAILY    rivaroxaban (XARELTO) 20 mg Tab Take 1 tablet (20 mg total) by mouth daily with dinner or evening meal.    tramadol (ULTRAM) 50 mg tablet Take 1 tablet (50 mg total) by mouth 3 (three) times daily as needed.    vitamin D 1000 units Tab Take 2,000 Units by mouth once daily.    hyoscyamine (ANASPAZ) 0.125 mg TbDL Take 0.125 mg by mouth every 6 (six) hours as needed for Cramping.    nitroGLYCERIN (NITROSTAT) 0.4 MG SL tablet Place 1 tablet (0.4 mg total) under the tongue every 5 (five) minutes as needed for Chest pain.    phenobarb-hyoscy-atropine-scop (BELLADONNA-PHENOBARBITAL) 16.2-0.1037 -0.0194 mg/5 mL Elix Take 5 mLs by mouth daily as needed (abdominal pain).    pramoxine 1 % Foam Place rectally 3 (three) times daily as needed.    triamcinolone acetonide 0.1% (KENALOG) 0.1 % ointment AAA bid prn           Clinical Reference Information           Your Vitals Were     BP Pulse Temp    126/60 (BP Location: Right arm, Patient Position: Sitting, BP Method: Manual) 92 96.4 °F (35.8 °C) (Tympanic)    Weight SpO2 BMI    58.2 kg (128 lb 4.9 oz) 99% 23.47 kg/m2      Blood Pressure          Most Recent Value    BP  126/60      Allergies as of 3/23/2017     Codeine    Lisinopril    Pacerone [Amiodarone]    Sulfa (Sulfonamide Antibiotics)    Celecoxib    Ciprofloxacin    Colesevelam    Doxycycline    Statins-hmg-coa Reductase Inhibitors      Immunizations Administered on Date of Encounter - 3/23/2017     None      Instructions    Voltaren gel   Tylenol or Tramadol for pain as needed  Ice pack or heating pad  Hold pillow by side when coughing-compression      Follow up with Ortho or Primary Care Physician if any worsening in symptoms or no improvement after 2 weeks.        Muscle Strain in the Abdomen  A muscle strain is a stretching or tearing of the muscle fibers. It is also called a pulled muscle. The abdomen is protected by a thick wall of muscle in the front and sides.  These muscles help with twisting and bending forward. Too much coughing, lifting heavy objects, or sudden jerking movements can sometimes cause a muscle strain in the abdomen. This causes pain that is worse when you move. The area may also feel tender or look swollen and bruised.  Home care  · Apply an ice pack over the injured area for 15 to 20 minutes every 3 to 6 hours. You should do this for the first 24 to 48 hours. You can make an ice pack by filling a plastic bag that seals at the top with ice cubes and then wrapping it with a thin towel. Be careful not to injure your skin with the ice treatments. Ice should never be applied directly to skin. Continue the use of ice packs for relief of pain and swelling as needed. After 48 hours, apply heat (warm shower or warm bath) for 15 to 20 minutes several times a day, or alternate ice and heat.  · You may use over-the-counter pain medicine to control pain, unless another pain medicine was prescribed. If you have liver or kidney disease, a stomach ulcer or GI bleeding, talk with your healthcare provider before using these medicines.  Follow-up care  Follow up with your healthcare provider, or as advised.  Call 911  Call 911 if you have:  · Weakness, lightheaded, or faint  · Chest pain  When to seek medical advice  Call your healthcare provider right away if any of these occur:  · Pain gets worse or moves to the right lower abdomen, just below the waistline  · Fever of 100.4°F (38°C) or above lasting for 24 to 48 hours  · Vomiting  · Severe abdominal pain that spreads to the back or toward the groin  · Blood in the urine  · Unexpected vaginal bleeding in women  Date Last Reviewed: 11/19/2015 © 2000-2016 [employer]. [address] [place] [address], [place] [address]. All rights reserved. This information is not intended as a substitute for professional medical care. Always follow your healthcare professional's instructions.             Language Assistance Services      ATTENTION: Language assistance services are available, free of charge. Please call [phone].      ATENCIÓN: Si habla español, tiene a marte disposición servicios gratuitos de asistencia lingüística. Llame al [phone].     CHÚ Ý: N?u b?n nói Ti?ng Vi?t, có các d?ch v? h? tr? ngôn ng? mi?n phí dành cho b?n. G?i s? [phone].         [facility] complies with applicable Federal civil rights laws and does not discriminate on the basis of race, color, national origin, age, disability, or sex.

## 2017-03-23 NOTE — LETTER
March 23, 2017      Kinjal Fletcher MD  [address] [facility] [place] [place] [address]           [facility]iat[facility]  [address] [place] [address]  Phone: [phone]  Fax: [phone]          Patient: Marcel Montalvo   MR Number: [id number]   YOB: 1940   Date of Visit: 3/23/2017       Dear Dr. Kinjal Fletcher:    Thank you for referring Marcel Montalvo to me for evaluation. Attached you will find relevant portions of my assessment and plan of care.    If you have questions, please do not hesitate to call me. I look forward to following Marcel Montalvo along with you.    Sincerely,    Yahaira Fish, MARIAELENA    Enclosure  CC:  No Recipients    If you would like to receive this communication electronically, please contact [email or web address] or [phone] to request more information on [employer] access.    For providers and/or their staff who would like to refer a patient to [facility], please contact us through our one-stop-shop provider referral line, [facility], at [phone].    If you feel you have received this communication in error or would no longer like to receive these types of communications, please e-mail [email or web address]

## 2017-03-23 NOTE — PATIENT INSTRUCTIONS
Voltaren gel   Tylenol or Tramadol for pain as needed  Ice pack or heating pad  Hold pillow by side when coughing-compression      Follow up with Ortho or Primary Care Physician if any worsening in symptoms or no improvement after 2 weeks.        Muscle Strain in the Abdomen  A muscle strain is a stretching or tearing of the muscle fibers. It is also called a pulled muscle. The abdomen is protected by a thick wall of muscle in the front and sides. These muscles help with twisting and bending forward. Too much coughing, lifting heavy objects, or sudden jerking movements can sometimes cause a muscle strain in the abdomen. This causes pain that is worse when you move. The area may also feel tender or look swollen and bruised.  Home care  · Apply an ice pack over the injured area for 15 to 20 minutes every 3 to 6 hours. You should do this for the first 24 to 48 hours. You can make an ice pack by filling a plastic bag that seals at the top with ice cubes and then wrapping it with a thin towel. Be careful not to injure your skin with the ice treatments. Ice should never be applied directly to skin. Continue the use of ice packs for relief of pain and swelling as needed. After 48 hours, apply heat (warm shower or warm bath) for 15 to 20 minutes several times a day, or alternate ice and heat.  · You may use over-the-counter pain medicine to control pain, unless another pain medicine was prescribed. If you have liver or kidney disease, a stomach ulcer or GI bleeding, talk with your healthcare provider before using these medicines.  Follow-up care  Follow up with your healthcare provider, or as advised.  Call 911  Call 911 if you have:  · Weakness, lightheaded, or faint  · Chest pain  When to seek medical advice  Call your healthcare provider right away if any of these occur:  · Pain gets worse or moves to the right lower abdomen, just below the waistline  · Fever of 100.4°F (38°C) or above lasting for 24 to 48  hours  · Vomiting  · Severe abdominal pain that spreads to the back or toward the groin  · Blood in the urine  · Unexpected vaginal bleeding in women  Date Last Reviewed: 11/19/2015  © 6161-5600 [employer]. [address] [place] [address], [place] [address]. All rights reserved. This information is not intended as a substitute for professional medical care. Always follow your healthcare professional's instructions.

## 2017-03-23 NOTE — PROGRESS NOTES
Subjective:    Patient ID: Marcel Montalvo is a 76 y.o. female.    Chief Complaint: Cough    HPI Comments: Patient released from hospital 5 days ago.  She continues to take Augmentin and Prednisone.  She complains of left side pain along abdomen; she thinks she has pulled muscle from coughing.      Review of Systems   Constitutional: Negative for chills and fever.   HENT: Negative for ear pain and sore throat.    Respiratory: Positive for cough and shortness of breath. Negative for wheezing.    Gastrointestinal: Negative for diarrhea and vomiting.     Objective:   /60 (BP Location: Right arm, Patient Position: Sitting, BP Method: Manual)  Pulse 92  Temp 96.4 °F (35.8 °C) (Tympanic)   Wt 58.2 kg (128 lb 4.9 oz)  SpO2 99%  BMI 23.47 kg/m2    Physical Exam   Constitutional: She is oriented to person, place, and time. She appears well-developed and well-nourished.   HENT:   Head: Normocephalic and atraumatic.   Right Ear: External ear normal.   Left Ear: External ear normal.   Nose: Nose normal.   Eyes: Conjunctivae and EOM are normal.   Neck: Normal range of motion. Neck supple.   Pulmonary/Chest: Effort normal. No respiratory distress.   Abdominal:       Tender to palpation in highlighted area.  Pain with flexion and extension of lumbar spine.  Pain with twisting of torso.    Neurological: She is alert and oriented to person, place, and time.   Skin: Skin is warm and dry.   Nursing note and vitals reviewed.    Assessment:     1. Strain of muscle, fascia and tendon of abdomen, initial encounter      Plan:   Strain of muscle, fascia and tendon of abdomen, initial encounter    Voltaren gel   Tylenol or Tramadol for pain as needed  Ice pack or heating pad  Hold pillow by side when coughing-compression      Follow up with Ortho or Primary Care Physician if any worsening in symptoms or no improvement after 2 weeks.  After visit summary given and discussed.  Patient verbalized understanding and agrees with  treatment plan.  Patient remained stable and was discharged in no acute distress.

## 2017-03-24 PROBLEM — N17.9 AKI (ACUTE KIDNEY INJURY): Status: ACTIVE | Noted: 2017-03-24

## 2017-03-24 PROBLEM — I50.32 CHRONIC DIASTOLIC HEART FAILURE: Chronic | Status: ACTIVE | Noted: 2017-03-24

## 2017-03-24 PROBLEM — R74.8 ABNORMAL LIVER ENZYMES: Status: ACTIVE | Noted: 2017-03-24

## 2017-03-24 PROBLEM — S30.1XXA ABDOMINAL WALL HEMATOMA: Status: ACTIVE | Noted: 2017-03-24

## 2017-03-24 PROBLEM — K92.2 UPPER GI BLEED: Status: ACTIVE | Noted: 2017-03-24

## 2017-03-24 PROBLEM — J41.0 SIMPLE CHRONIC BRONCHITIS: Chronic | Status: ACTIVE | Noted: 2017-03-24

## 2017-03-24 PROBLEM — D62 ACUTE BLOOD LOSS ANEMIA: Status: ACTIVE | Noted: 2017-03-24

## 2017-03-24 LAB
ABO + RH BLD: NORMAL
ALBUMIN SERPL BCP-MCNC: 3.2 G/DL
ALP SERPL-CCNC: 57 U/L
ALT SERPL W/O P-5'-P-CCNC: 71 U/L
ANION GAP SERPL CALC-SCNC: 11 MMOL/L
ANION GAP SERPL CALC-SCNC: 12 MMOL/L
AST SERPL-CCNC: 23 U/L
BACTERIA #/AREA URNS HPF: NORMAL /HPF
BASOPHILS # BLD AUTO: 0.02 K/UL
BASOPHILS NFR BLD: 0.1 %
BILIRUB SERPL-MCNC: 1.2 MG/DL
BILIRUB UR QL STRIP: NEGATIVE
BLD GP AB SCN CELLS X3 SERPL QL: NORMAL
BLD PROD TYP BPU: NORMAL
BLD PROD TYP BPU: NORMAL
BLOOD UNIT EXPIRATION DATE: NORMAL
BLOOD UNIT EXPIRATION DATE: NORMAL
BLOOD UNIT TYPE CODE: 5100
BLOOD UNIT TYPE CODE: 6200
BLOOD UNIT TYPE: NORMAL
BLOOD UNIT TYPE: NORMAL
BUN SERPL-MCNC: 55 MG/DL
BUN SERPL-MCNC: 55 MG/DL
CALCIUM SERPL-MCNC: 8.7 MG/DL
CALCIUM SERPL-MCNC: 8.7 MG/DL
CHLORIDE SERPL-SCNC: 100 MMOL/L
CHLORIDE SERPL-SCNC: 100 MMOL/L
CLARITY UR: CLEAR
CO2 SERPL-SCNC: 22 MMOL/L
CO2 SERPL-SCNC: 22 MMOL/L
CODING SYSTEM: NORMAL
CODING SYSTEM: NORMAL
COLOR UR: YELLOW
CREAT SERPL-MCNC: 2.1 MG/DL
CREAT SERPL-MCNC: 2.1 MG/DL
DIFFERENTIAL METHOD: ABNORMAL
DISPENSE STATUS: NORMAL
DISPENSE STATUS: NORMAL
EOSINOPHIL # BLD AUTO: 0 K/UL
EOSINOPHIL NFR BLD: 0 %
ERYTHROCYTE [DISTWIDTH] IN BLOOD BY AUTOMATED COUNT: 16.8 %
EST. GFR  (AFRICAN AMERICAN): 26 ML/MIN/1.73 M^2
EST. GFR  (AFRICAN AMERICAN): 26 ML/MIN/1.73 M^2
EST. GFR  (NON AFRICAN AMERICAN): 22 ML/MIN/1.73 M^2
EST. GFR  (NON AFRICAN AMERICAN): 22 ML/MIN/1.73 M^2
GLUCOSE SERPL-MCNC: 343 MG/DL
GLUCOSE SERPL-MCNC: 350 MG/DL
GLUCOSE UR QL STRIP: ABNORMAL
HCT VFR BLD AUTO: 28.3 %
HGB BLD-MCNC: 9.5 G/DL
HGB UR QL STRIP: NEGATIVE
KETONES UR QL STRIP: NEGATIVE
LEUKOCYTE ESTERASE UR QL STRIP: NEGATIVE
LYMPHOCYTES # BLD AUTO: 1.8 K/UL
LYMPHOCYTES NFR BLD: 8.3 %
MCH RBC QN AUTO: 28.6 PG
MCHC RBC AUTO-ENTMCNC: 33.6 %
MCV RBC AUTO: 85 FL
MICROSCOPIC COMMENT: NORMAL
MONOCYTES # BLD AUTO: 1.7 K/UL
MONOCYTES NFR BLD: 7.9 %
NEUTROPHILS # BLD AUTO: 17.7 K/UL
NEUTROPHILS NFR BLD: 83.7 %
NITRITE UR QL STRIP: NEGATIVE
NUM UNITS TRANS PACKED RBC: NORMAL
NUM UNITS TRANS PACKED RBC: NORMAL
OB PNL STL: POSITIVE
PH UR STRIP: 6 [PH] (ref 5–8)
PLATELET # BLD AUTO: 228 K/UL
PMV BLD AUTO: 10.7 FL
POCT GLUCOSE: 237 MG/DL (ref 70–110)
POCT GLUCOSE: 313 MG/DL (ref 70–110)
POCT GLUCOSE: 326 MG/DL (ref 70–110)
POCT GLUCOSE: 380 MG/DL (ref 70–110)
POCT GLUCOSE: 422 MG/DL (ref 70–110)
POTASSIUM SERPL-SCNC: 4.7 MMOL/L
POTASSIUM SERPL-SCNC: 4.7 MMOL/L
PROT SERPL-MCNC: 5.4 G/DL
PROT UR QL STRIP: NEGATIVE
RBC # BLD AUTO: 3.32 M/UL
SODIUM SERPL-SCNC: 133 MMOL/L
SODIUM SERPL-SCNC: 134 MMOL/L
SP GR UR STRIP: 1.02 (ref 1–1.03)
URN SPEC COLLECT METH UR: ABNORMAL
UROBILINOGEN UR STRIP-ACNC: NEGATIVE EU/DL
WBC # BLD AUTO: 21.16 K/UL
YEAST URNS QL MICRO: NORMAL

## 2017-03-24 PROCEDURE — 63600175 PHARM REV CODE 636 W HCPCS: Performed by: EMERGENCY MEDICINE

## 2017-03-24 PROCEDURE — C9113 INJ PANTOPRAZOLE SODIUM, VIA: HCPCS | Performed by: EMERGENCY MEDICINE

## 2017-03-24 PROCEDURE — 27000221 HC OXYGEN, UP TO 24 HOURS

## 2017-03-24 PROCEDURE — 82962 GLUCOSE BLOOD TEST: CPT

## 2017-03-24 PROCEDURE — 36430 TRANSFUSION BLD/BLD COMPNT: CPT

## 2017-03-24 PROCEDURE — 99222 1ST HOSP IP/OBS MODERATE 55: CPT | Mod: ,,, | Performed by: INTERNAL MEDICINE

## 2017-03-24 PROCEDURE — 99232 SBSQ HOSP IP/OBS MODERATE 35: CPT | Mod: ,,, | Performed by: PHYSICIAN ASSISTANT

## 2017-03-24 PROCEDURE — 80053 COMPREHEN METABOLIC PANEL: CPT

## 2017-03-24 PROCEDURE — 25000003 PHARM REV CODE 250: Performed by: HOSPITALIST

## 2017-03-24 PROCEDURE — P9016 RBC LEUKOCYTES REDUCED: HCPCS

## 2017-03-24 PROCEDURE — 25000003 PHARM REV CODE 250: Performed by: EMERGENCY MEDICINE

## 2017-03-24 PROCEDURE — 21400001 HC TELEMETRY ROOM

## 2017-03-24 PROCEDURE — 96372 THER/PROPH/DIAG INJ SC/IM: CPT

## 2017-03-24 PROCEDURE — 85025 COMPLETE CBC W/AUTO DIFF WBC: CPT

## 2017-03-24 PROCEDURE — 63600175 PHARM REV CODE 636 W HCPCS: Performed by: HOSPITALIST

## 2017-03-24 PROCEDURE — 80048 BASIC METABOLIC PNL TOTAL CA: CPT

## 2017-03-24 PROCEDURE — 25000242 PHARM REV CODE 250 ALT 637 W/ HCPCS: Performed by: HOSPITALIST

## 2017-03-24 PROCEDURE — 94640 AIRWAY INHALATION TREATMENT: CPT

## 2017-03-24 PROCEDURE — 36415 COLL VENOUS BLD VENIPUNCTURE: CPT

## 2017-03-24 RX ORDER — FUROSEMIDE 10 MG/ML
80 INJECTION INTRAMUSCULAR; INTRAVENOUS ONCE
Status: DISCONTINUED | OUTPATIENT
Start: 2017-03-24 | End: 2017-03-24

## 2017-03-24 RX ORDER — NEBIVOLOL 5 MG/1
5 TABLET ORAL DAILY
Status: DISCONTINUED | OUTPATIENT
Start: 2017-03-24 | End: 2017-03-29 | Stop reason: HOSPADM

## 2017-03-24 RX ORDER — FUROSEMIDE 10 MG/ML
80 INJECTION INTRAMUSCULAR; INTRAVENOUS ONCE
Status: COMPLETED | OUTPATIENT
Start: 2017-03-24 | End: 2017-03-24

## 2017-03-24 RX ORDER — FLUTICASONE FUROATE AND VILANTEROL 100; 25 UG/1; UG/1
1 POWDER RESPIRATORY (INHALATION) DAILY
Status: DISCONTINUED | OUTPATIENT
Start: 2017-03-24 | End: 2017-03-24

## 2017-03-24 RX ORDER — ACETAMINOPHEN 325 MG/1
650 TABLET ORAL EVERY 6 HOURS PRN
Status: DISCONTINUED | OUTPATIENT
Start: 2017-03-24 | End: 2017-03-29 | Stop reason: HOSPADM

## 2017-03-24 RX ORDER — PANTOPRAZOLE SODIUM 40 MG/10ML
40 INJECTION, POWDER, LYOPHILIZED, FOR SOLUTION INTRAVENOUS DAILY
Status: DISCONTINUED | OUTPATIENT
Start: 2017-03-24 | End: 2017-03-25

## 2017-03-24 RX ORDER — INSULIN ASPART 100 [IU]/ML
0-5 INJECTION, SOLUTION INTRAVENOUS; SUBCUTANEOUS EVERY 6 HOURS PRN
Status: DISCONTINUED | OUTPATIENT
Start: 2017-03-24 | End: 2017-03-29 | Stop reason: HOSPADM

## 2017-03-24 RX ORDER — ARFORMOTEROL TARTRATE 15 UG/2ML
15 SOLUTION RESPIRATORY (INHALATION) 2 TIMES DAILY
Status: DISCONTINUED | OUTPATIENT
Start: 2017-03-24 | End: 2017-03-29 | Stop reason: HOSPADM

## 2017-03-24 RX ORDER — NITROGLYCERIN 0.4 MG/1
0.4 TABLET SUBLINGUAL EVERY 5 MIN PRN
Status: DISCONTINUED | OUTPATIENT
Start: 2017-03-24 | End: 2017-03-29 | Stop reason: HOSPADM

## 2017-03-24 RX ORDER — IPRATROPIUM BROMIDE AND ALBUTEROL SULFATE 2.5; .5 MG/3ML; MG/3ML
3 SOLUTION RESPIRATORY (INHALATION) EVERY 4 HOURS PRN
Status: DISCONTINUED | OUTPATIENT
Start: 2017-03-24 | End: 2017-03-29 | Stop reason: HOSPADM

## 2017-03-24 RX ORDER — BUDESONIDE 0.5 MG/2ML
0.5 INHALANT ORAL 2 TIMES DAILY
Status: DISCONTINUED | OUTPATIENT
Start: 2017-03-24 | End: 2017-03-29 | Stop reason: HOSPADM

## 2017-03-24 RX ORDER — ONDANSETRON 2 MG/ML
4 INJECTION INTRAMUSCULAR; INTRAVENOUS EVERY 8 HOURS PRN
Status: DISCONTINUED | OUTPATIENT
Start: 2017-03-24 | End: 2017-03-29 | Stop reason: HOSPADM

## 2017-03-24 RX ORDER — FUROSEMIDE 10 MG/ML
40 INJECTION INTRAMUSCULAR; INTRAVENOUS ONCE
Status: DISCONTINUED | OUTPATIENT
Start: 2017-03-24 | End: 2017-03-24

## 2017-03-24 RX ORDER — DOFETILIDE 0.12 MG/1
125 CAPSULE ORAL EVERY 12 HOURS
Status: DISCONTINUED | OUTPATIENT
Start: 2017-03-24 | End: 2017-03-29 | Stop reason: HOSPADM

## 2017-03-24 RX ORDER — HYDROCODONE BITARTRATE AND ACETAMINOPHEN 500; 5 MG/1; MG/1
TABLET ORAL
Status: DISCONTINUED | OUTPATIENT
Start: 2017-03-24 | End: 2017-03-26 | Stop reason: SDUPTHER

## 2017-03-24 RX ORDER — MORPHINE SULFATE 2 MG/ML
2 INJECTION, SOLUTION INTRAMUSCULAR; INTRAVENOUS EVERY 4 HOURS PRN
Status: DISCONTINUED | OUTPATIENT
Start: 2017-03-24 | End: 2017-03-29

## 2017-03-24 RX ORDER — GLUCAGON 1 MG
1 KIT INJECTION
Status: DISCONTINUED | OUTPATIENT
Start: 2017-03-24 | End: 2017-03-29 | Stop reason: HOSPADM

## 2017-03-24 RX ORDER — ESCITALOPRAM OXALATE 10 MG/1
10 TABLET ORAL DAILY
Status: DISCONTINUED | OUTPATIENT
Start: 2017-03-24 | End: 2017-03-29 | Stop reason: HOSPADM

## 2017-03-24 RX ORDER — HYDROXYCHLOROQUINE SULFATE 200 MG/1
200 TABLET, FILM COATED ORAL DAILY
Status: DISCONTINUED | OUTPATIENT
Start: 2017-03-24 | End: 2017-03-29 | Stop reason: HOSPADM

## 2017-03-24 RX ADMIN — DOFETILIDE 125 MCG: 0.12 CAPSULE ORAL at 09:03

## 2017-03-24 RX ADMIN — ESCITALOPRAM OXALATE 10 MG: 10 TABLET, FILM COATED ORAL at 09:03

## 2017-03-24 RX ADMIN — FUROSEMIDE 80 MG: 10 INJECTION, SOLUTION INTRAMUSCULAR; INTRAVENOUS at 01:03

## 2017-03-24 RX ADMIN — FUROSEMIDE 80 MG: 10 INJECTION, SOLUTION INTRAVENOUS at 06:03

## 2017-03-24 RX ADMIN — ONDANSETRON 4 MG: 2 INJECTION INTRAMUSCULAR; INTRAVENOUS at 02:03

## 2017-03-24 RX ADMIN — BUDESONIDE 0.5 MG: 0.5 SUSPENSION RESPIRATORY (INHALATION) at 07:03

## 2017-03-24 RX ADMIN — MORPHINE SULFATE 2 MG: 2 INJECTION, SOLUTION INTRAMUSCULAR; INTRAVENOUS at 03:03

## 2017-03-24 RX ADMIN — INSULIN ASPART 1 UNITS: 100 INJECTION, SOLUTION INTRAVENOUS; SUBCUTANEOUS at 10:03

## 2017-03-24 RX ADMIN — INSULIN ASPART 2 UNITS: 100 INJECTION, SOLUTION INTRAVENOUS; SUBCUTANEOUS at 01:03

## 2017-03-24 RX ADMIN — INSULIN ASPART 5 UNITS: 100 INJECTION, SOLUTION INTRAVENOUS; SUBCUTANEOUS at 06:03

## 2017-03-24 RX ADMIN — ONDANSETRON 4 MG: 2 INJECTION INTRAMUSCULAR; INTRAVENOUS at 01:03

## 2017-03-24 RX ADMIN — PANTOPRAZOLE SODIUM 40 MG: 40 INJECTION, POWDER, FOR SOLUTION INTRAVENOUS at 01:03

## 2017-03-24 RX ADMIN — ARFORMOTEROL TARTRATE 15 MCG: 15 SOLUTION RESPIRATORY (INHALATION) at 07:03

## 2017-03-24 RX ADMIN — INSULIN ASPART 4 UNITS: 100 INJECTION, SOLUTION INTRAVENOUS; SUBCUTANEOUS at 05:03

## 2017-03-24 RX ADMIN — SODIUM CHLORIDE 1000 ML: 0.9 INJECTION, SOLUTION INTRAVENOUS at 12:03

## 2017-03-24 RX ADMIN — MORPHINE SULFATE 2 MG: 2 INJECTION, SOLUTION INTRAMUSCULAR; INTRAVENOUS at 07:03

## 2017-03-24 RX ADMIN — MORPHINE SULFATE 2 MG: 2 INJECTION, SOLUTION INTRAMUSCULAR; INTRAVENOUS at 11:03

## 2017-03-24 RX ADMIN — IPRATROPIUM BROMIDE AND ALBUTEROL SULFATE 3 ML: .5; 3 SOLUTION RESPIRATORY (INHALATION) at 02:03

## 2017-03-24 RX ADMIN — ONDANSETRON 4 MG: 2 INJECTION INTRAMUSCULAR; INTRAVENOUS at 10:03

## 2017-03-24 RX ADMIN — FUROSEMIDE 80 MG: 10 INJECTION, SOLUTION INTRAMUSCULAR; INTRAVENOUS at 10:03

## 2017-03-24 RX ADMIN — INSULIN ASPART 5 UNITS: 100 INJECTION, SOLUTION INTRAVENOUS; SUBCUTANEOUS at 11:03

## 2017-03-24 RX ADMIN — ACETAMINOPHEN 650 MG: 325 TABLET ORAL at 11:03

## 2017-03-24 RX ADMIN — NEBIVOLOL HYDROCHLORIDE 5 MG: 5 TABLET ORAL at 09:03

## 2017-03-24 RX ADMIN — HYDROXYCHLOROQUINE SULFATE 200 MG: 200 TABLET, FILM COATED ORAL at 09:03

## 2017-03-24 NOTE — SUBJECTIVE & OBJECTIVE
Interval History: pt seen and examined today with plan of care discussed.  PRBC transfusion in progress with Endoscopy procedure planned.  Will continue to monitor repeat lab results.     Review of Systems   Constitutional: Positive for activity change and fatigue. Negative for appetite change, chills and fever.   HENT: Negative for congestion, sinus pressure, sore throat and trouble swallowing.    Eyes: Negative for redness, itching and visual disturbance.   Respiratory: Positive for cough. Negative for chest tightness, shortness of breath and wheezing.    Cardiovascular: Negative for chest pain, palpitations and leg swelling.   Gastrointestinal: Positive for abdominal pain. Negative for abdominal distention, diarrhea, nausea and vomiting.   Endocrine: Negative for cold intolerance and heat intolerance.   Genitourinary: Negative for difficulty urinating, dysuria, flank pain, frequency and urgency.   Musculoskeletal: Negative for arthralgias, back pain and myalgias.   Skin: Negative for color change and wound.   Allergic/Immunologic: Negative for environmental allergies and food allergies.   Neurological: Positive for weakness. Negative for dizziness, syncope and headaches.   Hematological: Does not bruise/bleed easily.   Psychiatric/Behavioral: Negative for confusion and sleep disturbance. The patient is not nervous/anxious.      Objective:     Vital Signs (Most Recent):  Temp: 98 °F (36.7 °C) (03/24/17 1141)  Pulse: 77 (03/24/17 1149)  Resp: 18 (03/24/17 1149)  BP: 126/65 (after 80mg of lasix) (03/24/17 1149)  SpO2: (!) 0 % (03/24/17 1149) Vital Signs (24h Range):  Temp:  [96.7 °F (35.9 °C)-98.4 °F (36.9 °C)] 98 °F (36.7 °C)  Pulse:  [] 77  Resp:  [13-22] 18  SpO2:  [0 %-100 %] 0 %  BP: (106-157)/(48-96) 126/65     Weight: 58.1 kg (128 lb)  Body mass index is 23.41 kg/(m^2).    Intake/Output Summary (Last 24 hours) at 03/24/17 1225  Last data filed at 03/24/17 1045   Gross per 24 hour   Intake               630 ml   Output                0 ml   Net              630 ml      Physical Exam   Constitutional: She is oriented to person, place, and time. She appears well-developed and well-nourished.   HENT:   Head: Normocephalic.   Nose: Nose normal.   Mouth/Throat: Oropharynx is clear and moist.   Eyes: Conjunctivae and EOM are normal.   Neck: Normal range of motion. No JVD present.   Cardiovascular: Normal rate, regular rhythm, normal heart sounds and intact distal pulses.  Exam reveals no friction rub.    No murmur heard.  Pulmonary/Chest: Effort normal and breath sounds normal. No respiratory distress. She has no wheezes.   Abdominal: Soft. Bowel sounds are normal. She exhibits no distension. There is tenderness.   Musculoskeletal: Normal range of motion.   Generalized weakness noted with ROM    Neurological: She is alert and oriented to person, place, and time.   Skin: Skin is warm and dry.   Ecchymosis to RLQ of abdomen   Psychiatric: She has a normal mood and affect. Her behavior is normal. Thought content normal.       Significant Labs:   CBC:   Recent Labs  Lab 03/23/17  2250   WBC 16.87*   HGB 7.8*   HCT 23.9*        CMP:   Recent Labs  Lab 03/23/17  2250   *   K 5.3*   CL 99   CO2 20*   *   BUN 51*   CREATININE 2.1*   CALCIUM 9.1   PROT 6.0   ALBUMIN 3.3*   BILITOT 0.5   ALKPHOS 68   AST 39   ALT 80*   ANIONGAP 13   EGFRNONAA 22*       Significant Imaging:   Imaging Results         CT Renal Stone Study ABD Pelvis WO (Final result) Result time:  03/24/17 08:15:57    Final result by Jayjay Roberts MD (03/24/17 08:15:57)    Impression:             Large hematoma in the right lateral abdominal wall measuring 23.0 x 14.0 x 7.8 cm.        All CT scans at this facility use dose modulation, iterative reconstruction and/or weight based dosing when appropriate to reduce radiation dose to as low as reasonably achievable.      Electronically signed by: JAYJAY ROBERTS MD  Date:     03/24/17  Time:    08:15      Narrative:    Exam: CT abdomen and pelvis without intravenous contrast.    Technique: Axial CT images performed through the abdomen and pelvis without intravenous contrast. Multiplanar reformats were performed and interpreted.    Comparison: None    Clinical History: right flank pain.   Abdominal pain    Findings:       There is a large hematoma in the right lateral abdominal wall measuring 23.0 x 14.0 x 7.8 cm.    There is some scarring in the left lung base.    No urolithiasis, hydronephrosis, or perinephric stranding.  The liver, spleen, kidneys, adrenal glands, and pancreas have a normal noncontrasted appearance.   The gallbladder is unremarkable.  No peritoneal free fluid, free air, or inflammatory change.     The bowel is nondistended and within normal limits.    There is calcific atherosclerotic disease of the normal caliber abdominal aorta.    The urinary bladder is unremarkable. The patient has had a hysterectomy.    No significant osseous abnormality is identified.            X-Ray Chest 1 View (Final result) Result time:  03/24/17 08:08:56    Final result by Jayjay Roberts MD (03/24/17 08:08:56)    Impression:     Stable cardiomegaly.  No acute lung infiltrate.            Electronically signed by: JAYJAY ROBERTS MD  Date:     03/24/17  Time:    08:08     Narrative:    Exam: Portable chest radiograph    Clinical History:   .  Weakness.    Comparison: Chest x-ray, 03/17/2017.    Findings:.     Sternotomy wires are noted.  There is cardiomegaly. There is calcific atherosclerotic disease of the thoracic aorta.  There is platelike atelectasis and/or bandlike scarring in the left midlung field.  No consolidation. No pleural effusion or pneumothorax or pulmonary edema.            RADIOLOGY REPORT (Final result) Result time:  03/24/17 09:50:41

## 2017-03-24 NOTE — NURSING
[facility], Pb [place] notified and stated to follow sliding scale at this time. Will continue to monitor.

## 2017-03-24 NOTE — PLAN OF CARE
Problem: Anemia (Adult)  Goal: Identify Related Risk Factors and Signs and Symptoms  Related risk factors and signs and symptoms are identified upon initiation of [employer] Clinical Practice Guideline (CPG)   Outcome: Ongoing (interventions implemented as appropriate)  Pt tolerated 2nd unit of PRBC. HH improved to 9.5/28.3. Pt generally weak and requires assist when OOB and toileting. No stools noted today. RT flank to hip hematoma firm and tender with small area of bruising. Pt remained safe without injury/ today.

## 2017-03-24 NOTE — PLAN OF CARE
Problem: Patient Care Overview  Goal: Plan of Care Review  Outcome: Ongoing (interventions implemented as appropriate)  Patient stable. Plan of care reviewed. Patient verbalizes understanding. Patient received prbc. Bed low, wheels locked, bed alarm on, call light in reach. Patient instructed to call for assistance. Will continue to monitor.

## 2017-03-24 NOTE — NURSING
Chart reviewed for diabetes educational needs  She was seen by this nurse on a recent admit--please see note from 3-15-17   She reports no changes since that time regarding diabetes

## 2017-03-24 NOTE — PROGRESS NOTES
[facility] Hospital Medicine  Progress Note    Patient Name: Marcel Montalvo  MRN: [id number]  Patient Class: IP- Inpatient   Admission Date: 3/23/2017  Length of Stay: 0 days  Attending Physician: Randy Valdez MD  Primary Care Provider: Alexandra Moreno MD        Subjective:     Principal Problem:Acute blood loss anemia    HPI:  76 year old female with h/o CHF, COPD, Afib, NIDDM presents complaining of generalized weakness/fatigue for the past day.  Has been getting lightheaded when she stands up.  Has some shortness of breath and cough but has been improving.  Reports black/tarry stools and is having right sided abdominal pain that is dull/achy in nature with sharp/stabbing exacerbations.  Has some nausea, no vomiting.  Was admitted last week with CHF/COPD.    Hospital Course:  03/24/17-Pt admitted to Telemetry Unit for acute blood loss anemia with GI consulted.  Pt seen and examined today with plan of care discussed with patient and family at bedside.  Pt reports weakness and pain at site of hematoma.  H/H to be evaluated post transfusion of 2 units PRBCs.  Endoscopy procedure planned once medically stable.  Will continue to follow repeat lab and procedure results.      Interval History: pt seen and examined today with plan of care discussed.  PRBC transfusion in progress with Endoscopy procedure planned.  Will continue to monitor repeat lab results.     Review of Systems   Constitutional: Positive for activity change and fatigue. Negative for appetite change, chills and fever.   HENT: Negative for congestion, sinus pressure, sore throat and trouble swallowing.    Eyes: Negative for redness, itching and visual disturbance.   Respiratory: Positive for cough. Negative for chest tightness, shortness of breath and wheezing.    Cardiovascular: Negative for chest pain, palpitations and leg swelling.   Gastrointestinal: Positive for abdominal pain. Negative for abdominal distention, diarrhea, nausea and  vomiting.   Endocrine: Negative for cold intolerance and heat intolerance.   Genitourinary: Negative for difficulty urinating, dysuria, flank pain, frequency and urgency.   Musculoskeletal: Negative for arthralgias, back pain and myalgias.   Skin: Negative for color change and wound.   Allergic/Immunologic: Negative for environmental allergies and food allergies.   Neurological: Positive for weakness. Negative for dizziness, syncope and headaches.   Hematological: Does not bruise/bleed easily.   Psychiatric/Behavioral: Negative for confusion and sleep disturbance. The patient is not nervous/anxious.      Objective:     Vital Signs (Most Recent):  Temp: 98 °F (36.7 °C) (03/24/17 1141)  Pulse: 77 (03/24/17 1149)  Resp: 18 (03/24/17 1149)  BP: 126/65 (after 80mg of lasix) (03/24/17 1149)  SpO2: (!) 0 % (03/24/17 1149) Vital Signs (24h Range):  Temp:  [96.7 °F (35.9 °C)-98.4 °F (36.9 °C)] 98 °F (36.7 °C)  Pulse:  [] 77  Resp:  [13-22] 18  SpO2:  [0 %-100 %] 0 %  BP: (106-157)/(48-96) 126/65     Weight: 58.1 kg (128 lb)  Body mass index is 23.41 kg/(m^2).    Intake/Output Summary (Last 24 hours) at 03/24/17 1225  Last data filed at 03/24/17 1045   Gross per 24 hour   Intake              630 ml   Output                0 ml   Net              630 ml      Physical Exam   Constitutional: She is oriented to person, place, and time. She appears well-developed and well-nourished.   HENT:   Head: Normocephalic.   Nose: Nose normal.   Mouth/Throat: Oropharynx is clear and moist.   Eyes: Conjunctivae and EOM are normal.   Neck: Normal range of motion. No JVD present.   Cardiovascular: Normal rate, regular rhythm, normal heart sounds and intact distal pulses.  Exam reveals no friction rub.    No murmur heard.  Pulmonary/Chest: Effort normal and breath sounds normal. No respiratory distress. She has no wheezes.   Abdominal: Soft. Bowel sounds are normal. She exhibits no distension. There is tenderness.   Musculoskeletal:  Normal range of motion.   Generalized weakness noted with ROM    Neurological: She is alert and oriented to person, place, and time.   Skin: Skin is warm and dry.   Ecchymosis to RLQ of abdomen   Psychiatric: She has a normal mood and affect. Her behavior is normal. Thought content normal.       Significant Labs:   CBC:   Recent Labs  Lab 03/23/17  2250   WBC 16.87*   HGB 7.8*   HCT 23.9*        CMP:   Recent Labs  Lab 03/23/17  2250   *   K 5.3*   CL 99   CO2 20*   *   BUN 51*   CREATININE 2.1*   CALCIUM 9.1   PROT 6.0   ALBUMIN 3.3*   BILITOT 0.5   ALKPHOS 68   AST 39   ALT 80*   ANIONGAP 13   EGFRNONAA 22*       Significant Imaging:   Imaging Results         CT Renal Stone Study ABD Pelvis WO (Final result) Result time:  03/24/17 08:15:57    Final result by Jayjay Roberts MD (03/24/17 08:15:57)    Impression:             Large hematoma in the right lateral abdominal wall measuring 23.0 x 14.0 x 7.8 cm.        All CT scans at this facility use dose modulation, iterative reconstruction and/or weight based dosing when appropriate to reduce radiation dose to as low as reasonably achievable.      Electronically signed by: JAYJAY ROBERTS MD  Date:     03/24/17  Time:    08:15     Narrative:    Exam: CT abdomen and pelvis without intravenous contrast.    Technique: Axial CT images performed through the abdomen and pelvis without intravenous contrast. Multiplanar reformats were performed and interpreted.    Comparison: None    Clinical History: right flank pain.   Abdominal pain    Findings:       There is a large hematoma in the right lateral abdominal wall measuring 23.0 x 14.0 x 7.8 cm.    There is some scarring in the left lung base.    No urolithiasis, hydronephrosis, or perinephric stranding.  The liver, spleen, kidneys, adrenal glands, and pancreas have a normal noncontrasted appearance.   The gallbladder is unremarkable.  No peritoneal free fluid, free air, or inflammatory change.     The bowel  is nondistended and within normal limits.    There is calcific atherosclerotic disease of the normal caliber abdominal aorta.    The urinary bladder is unremarkable. The patient has had a hysterectomy.    No significant osseous abnormality is identified.            X-Ray Chest 1 View (Final result) Result time:  03/24/17 08:08:56    Final result by Jayjay Roberts MD (03/24/17 08:08:56)    Impression:     Stable cardiomegaly.  No acute lung infiltrate.            Electronically signed by: JAYJAY ROBERTS MD  Date:     03/24/17  Time:    08:08     Narrative:    Exam: Portable chest radiograph    Clinical History:   .  Weakness.    Comparison: Chest x-ray, 03/17/2017.    Findings:.     Sternotomy wires are noted.  There is cardiomegaly. There is calcific atherosclerotic disease of the thoracic aorta.  There is platelike atelectasis and/or bandlike scarring in the left midlung field.  No consolidation. No pleural effusion or pneumothorax or pulmonary edema.            RADIOLOGY REPORT (Final result) Result time:  03/24/17 09:50:41        Assessment/Plan:      * Acute blood loss anemia  Secondary to GI bleed   -Hgb drop from 11.8 last week to 7 tonight and patient symptomatic  - transfuse 2 units of PRBC and f/u H/H  -will repeat CBC in am      Coronary artery disease involving coronary bypass graft without angina pectoris  Hold Aspirin; continue Bystolic       Essential hypertension  Resume Bystolic; hydralazine prn      Type 2 diabetes mellitus with hyperglycemia, without long-term current use of insulin  accuchecks and sliding scale insulin      Chronic atrial fibrillation  Rate currently controlled; continue Bystolic and Tikosyn; hold Xarelto       Chronic diastolic heart failure  Compensated; continue Bystolic       Simple chronic bronchitis  Continue symbicort; duonebs prn      Upper GI bleed  Protonix 40 mg IV BID  GI consulted   clear liquid diet   hold Aspirin/Xarlelto    -may need colonoscopy/ EGD once medically  stable        DOMINGO (acute kidney injury)  Transfuse - hold IVFs; avoid nephrotoxins; monitor       Hematoma of abdominal wall  -General Surgery consulted  -no surgical intervention required at this time  -may need IR embolization   -will continue monitor      VTE Risk Mitigation         Ordered     Place sequential compression device  Until discontinued      03/24/17 0111          Kathi Mcdaniels NP  Department of Hospital Medicine   [facility]

## 2017-03-24 NOTE — PLAN OF CARE
"       03/24/17 1451   Readmission Questionnaire   At the time of your discharge, did someone talk to you about what your health problems were? Yes   At the time of discharge, did someone talk to you about what to watch out for regarding worsening of your health problem? Yes   At the time of discharge, did someone talk to you about what to do if you experienced worsening of your health problem? Yes   At the time of discharge, did someone talk to you about when and where to follow up with a doctor after you left the hospital? Yes   What do you believe caused you to be sick enough to be re-admitted? "Hematoma and my heart"   How often do you need to have someone help you when you read instructions, pamphlets, or other written material from your doctor or pharmacy? Sometimes   Do you have problems taking your medications as prescribed? No   Do you have any problems affording any of  your prescribed medications? No   Do you have problems obtaining/receiving your medications? No   Does the patient have transportation to healthcare appointments? Yes   Lives With spouse   Living Arrangements house   Does the patient have family/friends to help with healtcare needs after discharge? yes   Who are your caregiver(s) and their phone number(s)? Anibal Montalvo, :  [phone]   Does your caregiver provide all the help you need? Yes   Are you currently feeling confused? No   Are you currently having problems thinking? No   Are you currently having memory problems? No   Have you felt down, depressed, or hopeless? 0   Have you felt little interest or pleasure in doing things? 0   In the last 7 days, my sleep quality was: fair     "

## 2017-03-24 NOTE — CONSULTS
[facility] -   General Surgery  Consult Note    Patient Name: Marcel Montalvo  MRN: [id number]  Code Status: Prior  Admission Date: 3/23/2017  Hospital Length of Stay: 0 days  Attending Physician: Randy Valdez MD  Primary Care Provider: Alexandra Moreno MD    Patient information was obtained from patient, past medical records and ER records.     Inpatient consult to General Surgery  Consult performed by: IDALIA MCMAHAN  Consult ordered by: MANJULA AGEE  Reason for consult: hematoma        Subjective:     Principal Problem: Acute blood loss anemia    History of Present Illness: Marcel Montalvo presented to the ED with right sided abdominal pain, weakness, and melena that began about 3 days ago. She was recently d/c from the hospital after tx for acute CHF and bronchitis. She reports bouts of coughing. She is on Xarelto for afib. She began to noticed right side abdominal pain and swelling that is worse with movement and coughing. She has not noticed increase in swelling since admission to the hospital. Non contrast CT shows a large abdominal wall hematoma. H/H decreased and pt is receiving blood. Stool was positive for occult blood and GI has been consulted.     No current facility-administered medications on file prior to encounter.      Current Outpatient Prescriptions on File Prior to Encounter   Medication Sig    albuterol (PROAIR HFA) 90 mcg/actuation inhaler Inhale 2 puffs into the lungs every 6 (six) hours as needed.    amoxicillin-clavulanate 875-125mg (AUGMENTIN) 875-125 mg per tablet Take 1 tablet by mouth every 12 (twelve) hours.    aspirin (ECOTRIN) 81 MG EC tablet Take 1 tablet (81 mg total) by mouth once daily.    atorvastatin (LIPITOR) 40 MG tablet TAKE ONE TABLET BY MOUTH NIGHTLY    azelastine (ASTELIN) 137 mcg (0.1 %) nasal spray 2 sprays (274 mcg total) by Nasal route 2 (two) times daily.    budesonide-formoterol 160-4.5 mcg (SYMBICORT) 160-4.5 mcg/actuation HFAA Inhale 2 puffs  into the lungs every 12 (twelve) hours. Wash out mouth after using    BYSTOLIC 5 mg Tab TAKE ONE TABLET BY MOUTH TWICE DAILY (Patient taking differently: TAKE ONE TABLET BY MOUTH DAILY)    carboxymethylcellulose (REFRESH PLUS) 0.5 % Dpet Place 1 drop into both eyes 3 (three) times daily as needed.    diclofenac sodium (VOLTAREN) 1 % Gel Apply 2 g topically once daily.    dofetilide (TIKOSYN) 125 MCG Cap Take 1 capsule (125 mcg total) by mouth every 12 (twelve) hours.    escitalopram oxalate (LEXAPRO) 10 MG tablet TAKE ONE-HALF TO ONE TABLET BY MOUTH EVERY DAY    fluticasone (FLONASE) 50 mcg/actuation nasal spray 2 sprays by Each Nare route once daily.    furosemide (LASIX) 20 MG tablet Take 1 tablet (20 mg total) by mouth as directed.    guaifenesin (MUCINEX) 600 mg 12 hr tablet Take 2 tablets (1,200 mg total) by mouth 2 (two) times daily. (Patient taking differently: Take 1,200 mg by mouth 2 (two) times daily. As needed for flares)    hydroxychloroquine (PLAQUENIL) 200 mg tablet TAKE ONE TABLET BY MOUTH ONCE DAILY    nitroGLYCERIN (NITROSTAT) 0.4 MG SL tablet Place 1 tablet (0.4 mg total) under the tongue every 5 (five) minutes as needed for Chest pain.    pantoprazole (PROTONIX) 40 MG tablet Take 1 tablet (40 mg total) by mouth once daily.    phenobarb-hyoscy-atropine-scop (BELLADONNA-PHENOBARBITAL) 16.2-0.1037 -0.0194 mg/5 mL Elix Take 5 mLs by mouth daily as needed (abdominal pain).    potassium chloride (KLOR-CON) 10 MEQ TbSR Take 1 tablet (10 mEq total) by mouth once daily.    pramoxine 1 % Foam Place rectally 3 (three) times daily as needed.    predniSONE (DELTASONE) 20 MG tablet Take 0.5 tablets (10 mg total) by mouth 2 (two) times daily. Take 2 tabs twice daily with food for three days then 1 tab twice daily for three days then 1 daily for three days and then stop. Take your Protonix twice daily Before BF and Dinner for 5 days then once daily before BF as usual.    PREMARIN vaginal cream  PLACE 1 GRAM VAGINALLY TWICE A WEEK    RESTASIS 0.05 % ophthalmic emulsion INSTILL ONE DROP INTO EACH EYE TWICE DAILY    rivaroxaban (XARELTO) 20 mg Tab Take 1 tablet (20 mg total) by mouth daily with dinner or evening meal.    vitamin D 1000 units Tab Take 2,000 Units by mouth once daily.    benzonatate (TESSALON) 100 MG capsule Take 1 capsule (100 mg total) by mouth 3 (three) times daily as needed for Cough.    hyoscyamine (ANASPAZ) 0.125 mg TbDL Take 0.125 mg by mouth every 6 (six) hours as needed for Cramping.    tramadol (ULTRAM) 50 mg tablet Take 1 tablet (50 mg total) by mouth 3 (three) times daily as needed.    triamcinolone acetonide 0.1% (KENALOG) 0.1 % ointment AAA bid prn       Review of patient's allergies indicates:   Allergen Reactions    Codeine Nausea And Vomiting    Lisinopril      Other reaction(s): cough    Pacerone [amiodarone] Nausea And Vomiting    Sulfa (sulfonamide antibiotics) Nausea And Vomiting    Celecoxib Palpitations    Ciprofloxacin Hives, Itching and Rash    Colesevelam Rash and Nausea And Vomiting    Doxycycline Rash    Statins-hmg-coa reductase inhibitors Rash     Myalgia(can take atorvastatin ok -no rhabdo)per nurse josé antonio and patient  / stomach upset       Past Medical History:   Diagnosis Date    *Atrial fibrillation     AF (atrial fibrillation)     Palpation: atrial fibrillation, on Coumadin and followed by cardiologist.    Anticoagulant long-term use     Anxiety     Aortic insufficiency 3/19/2014    Aortic regurgitation     Aortic regurgitation/tricuspid regurgitation per MARIO in April 2007.     Asthma     Carotid artery stenosis     CEA on the left by Dr. Maciel    Coronary artery disease     status post CABG X 3.     Diabetes mellitus     GERD (gastroesophageal reflux disease)     H. pylori infection     treated    Hyperlipidemia     intolerant to statins.    Hypertension 8/7/2013    IBS (irritable bowel syndrome)     Leg pain 3/19/2014     Long-term (current) use of anticoagulants 8/7/2013    Osteoarthritis     Osteoarthritis     Osteopenia     DEXA scan done on 06/20/12     Pancreatitis     resolved    S/P CABG (coronary artery bypass graft) 8/7/2013    S/P carotid endarterectomy 8/7/2013    S/P PTCA (percutaneous transluminal coronary angioplasty) 3/19/2014    Sjogren's syndrome     Stroke     Vitamin D deficiency disease      Past Surgical History:   Procedure Laterality Date    ABDOMINAL SURGERY      APPENDECTOMY      bilateral cataract surgery      CARPAL TUNNEL RELEASE      RT    CATARACT EXTRACTION      CEA      left    CHOLECYSTECTOMY      CORONARY ARTERY BYPASS GRAFT      2 stents    EYE SURGERY      HYSTERECTOMY      TONSILLECTOMY       Family History     Problem Relation (Age of Onset)    Alzheimer's disease Mother    Cancer Father, Brother, Paternal Grandmother, Paternal Grandfather    Heart disease Maternal Uncle    Hyperlipidemia Mother        Social History Main Topics    Smoking status: Former Smoker     Packs/day: 0.50     Years: 25.00     Quit date: 12/17/1988    Smokeless tobacco: Never Used    Alcohol use 0.0 oz/week     0 Standard drinks or equivalent per week      Comment: wine occasionally    Drug use: No    Sexual activity: Yes     Partners: Male     Review of Systems   Constitutional: Positive for activity change and fatigue. Negative for appetite change, chills and fever.   Respiratory: Positive for cough. Negative for shortness of breath.    Cardiovascular: Negative for chest pain.   Gastrointestinal: Positive for abdominal pain. Negative for nausea and vomiting.   Genitourinary: Negative for dysuria.   Musculoskeletal: Negative for myalgias.   Skin: Negative for wound.   Neurological: Negative for weakness.   Hematological: Does not bruise/bleed easily.   Psychiatric/Behavioral: The patient is not nervous/anxious.      Objective:     Vital Signs (Most Recent):  Temp: 98.4 °F (36.9 °C) (03/24/17  1045)  Pulse: 100 (03/24/17 1045)  Resp: 18 (03/24/17 1045)  BP: (!) 157/71 (03/24/17 1045)  SpO2: 100 % (03/24/17 1045) Vital Signs (24h Range):  Temp:  [96.4 °F (35.8 °C)-98.4 °F (36.9 °C)] 98.4 °F (36.9 °C)  Pulse:  [] 100  Resp:  [13-22] 18  SpO2:  [96 %-100 %] 100 %  BP: (106-157)/(48-96) 157/71     Weight: 58.1 kg (128 lb)  Body mass index is 23.41 kg/(m^2).    Physical Exam   Constitutional: She appears well-developed and well-nourished.   HENT:   Head: Normocephalic and atraumatic.   Eyes: EOM are normal.   Cardiovascular: Normal rate and regular rhythm.    Pulmonary/Chest: Effort normal and breath sounds normal. No respiratory distress.   Abdominal: Soft. She exhibits mass (large tender right lower abdominal wall mass a/w hematoma with bruising on right flank ). There is tenderness (rlq).   Musculoskeletal: Normal range of motion.   Skin: Skin is warm and dry.   Psychiatric: She has a normal mood and affect. Thought content normal.   Vitals reviewed.      Significant Labs:  CBC:   Recent Labs  Lab 03/23/17  2250   WBC 16.87*   RBC 2.63*   HGB 7.8*   HCT 23.9*      MCV 91   MCH 29.7   MCHC 32.6     CMP:   Recent Labs  Lab 03/23/17  2250   *   CALCIUM 9.1   ALBUMIN 3.3*   PROT 6.0   *   K 5.3*   CO2 20*   CL 99   BUN 51*   CREATININE 2.1*   ALKPHOS 68   ALT 80*   AST 39   BILITOT 0.5         Significant Diagnostics:  CT: I have reviewed all pertinent results/findings within the past 24 hours and my personal findings are:  large right sided abdominal wall hematoma    Assessment/Plan:     Hematoma of abdominal wall  Recommend d/c Xarelto, serial H/H, and transfuse as needed. If hematoma continues to expand she may need IR embolization. No surgical intervention needed for hematoma at this time.     VTE Risk Mitigation         Ordered     Place sequential compression device  Until discontinued      03/24/17 0111          Thank you for your consult. I will follow-up with patient. Please  contact us if you have any additional questions.    Chayo Obregon PA-C  General Surgery  [facility] [place]

## 2017-03-24 NOTE — SUBJECTIVE & OBJECTIVE
No current facility-administered medications on file prior to encounter.      Current Outpatient Prescriptions on File Prior to Encounter   Medication Sig    albuterol (PROAIR HFA) 90 mcg/actuation inhaler Inhale 2 puffs into the lungs every 6 (six) hours as needed.    amoxicillin-clavulanate 875-125mg (AUGMENTIN) 875-125 mg per tablet Take 1 tablet by mouth every 12 (twelve) hours.    aspirin (ECOTRIN) 81 MG EC tablet Take 1 tablet (81 mg total) by mouth once daily.    atorvastatin (LIPITOR) 40 MG tablet TAKE ONE TABLET BY MOUTH NIGHTLY    azelastine (ASTELIN) 137 mcg (0.1 %) nasal spray 2 sprays (274 mcg total) by Nasal route 2 (two) times daily.    budesonide-formoterol 160-4.5 mcg (SYMBICORT) 160-4.5 mcg/actuation HFAA Inhale 2 puffs into the lungs every 12 (twelve) hours. Wash out mouth after using    BYSTOLIC 5 mg Tab TAKE ONE TABLET BY MOUTH TWICE DAILY (Patient taking differently: TAKE ONE TABLET BY MOUTH DAILY)    carboxymethylcellulose (REFRESH PLUS) 0.5 % Dpet Place 1 drop into both eyes 3 (three) times daily as needed.    diclofenac sodium (VOLTAREN) 1 % Gel Apply 2 g topically once daily.    dofetilide (TIKOSYN) 125 MCG Cap Take 1 capsule (125 mcg total) by mouth every 12 (twelve) hours.    escitalopram oxalate (LEXAPRO) 10 MG tablet TAKE ONE-HALF TO ONE TABLET BY MOUTH EVERY DAY    fluticasone (FLONASE) 50 mcg/actuation nasal spray 2 sprays by Each Nare route once daily.    furosemide (LASIX) 20 MG tablet Take 1 tablet (20 mg total) by mouth as directed.    guaifenesin (MUCINEX) 600 mg 12 hr tablet Take 2 tablets (1,200 mg total) by mouth 2 (two) times daily. (Patient taking differently: Take 1,200 mg by mouth 2 (two) times daily. As needed for flares)    hydroxychloroquine (PLAQUENIL) 200 mg tablet TAKE ONE TABLET BY MOUTH ONCE DAILY    nitroGLYCERIN (NITROSTAT) 0.4 MG SL tablet Place 1 tablet (0.4 mg total) under the tongue every 5 (five) minutes as needed for Chest pain.     pantoprazole (PROTONIX) 40 MG tablet Take 1 tablet (40 mg total) by mouth once daily.    phenobarb-hyoscy-atropine-scop (BELLADONNA-PHENOBARBITAL) 16.2-0.1037 -0.0194 mg/5 mL Elix Take 5 mLs by mouth daily as needed (abdominal pain).    potassium chloride (KLOR-CON) 10 MEQ TbSR Take 1 tablet (10 mEq total) by mouth once daily.    pramoxine 1 % Foam Place rectally 3 (three) times daily as needed.    predniSONE (DELTASONE) 20 MG tablet Take 0.5 tablets (10 mg total) by mouth 2 (two) times daily. Take 2 tabs twice daily with food for three days then 1 tab twice daily for three days then 1 daily for three days and then stop. Take your Protonix twice daily Before BF and Dinner for 5 days then once daily before BF as usual.    PREMARIN vaginal cream PLACE 1 GRAM VAGINALLY TWICE A WEEK    RESTASIS 0.05 % ophthalmic emulsion INSTILL ONE DROP INTO EACH EYE TWICE DAILY    rivaroxaban (XARELTO) 20 mg Tab Take 1 tablet (20 mg total) by mouth daily with dinner or evening meal.    vitamin D 1000 units Tab Take 2,000 Units by mouth once daily.    benzonatate (TESSALON) 100 MG capsule Take 1 capsule (100 mg total) by mouth 3 (three) times daily as needed for Cough.    hyoscyamine (ANASPAZ) 0.125 mg TbDL Take 0.125 mg by mouth every 6 (six) hours as needed for Cramping.    tramadol (ULTRAM) 50 mg tablet Take 1 tablet (50 mg total) by mouth 3 (three) times daily as needed.    triamcinolone acetonide 0.1% (KENALOG) 0.1 % ointment AAA bid prn       Review of patient's allergies indicates:   Allergen Reactions    Codeine Nausea And Vomiting    Lisinopril      Other reaction(s): cough    Pacerone [amiodarone] Nausea And Vomiting    Sulfa (sulfonamide antibiotics) Nausea And Vomiting    Celecoxib Palpitations    Ciprofloxacin Hives, Itching and Rash    Colesevelam Rash and Nausea And Vomiting    Doxycycline Rash    Statins-hmg-coa reductase inhibitors Rash     Myalgia(can take atorvastatin ok -no rhabdo)per nurse  josé antonio and patient  / stomach upset       Past Medical History:   Diagnosis Date    *Atrial fibrillation     AF (atrial fibrillation)     Palpation: atrial fibrillation, on Coumadin and followed by cardiologist.    Anticoagulant long-term use     Anxiety     Aortic insufficiency 3/19/2014    Aortic regurgitation     Aortic regurgitation/tricuspid regurgitation per MARIO in April 2007.     Asthma     Carotid artery stenosis     CEA on the left by Dr. Maciel    Coronary artery disease     status post CABG X 3.     Diabetes mellitus     GERD (gastroesophageal reflux disease)     H. pylori infection     treated    Hyperlipidemia     intolerant to statins.    Hypertension 8/7/2013    IBS (irritable bowel syndrome)     Leg pain 3/19/2014    Long-term (current) use of anticoagulants 8/7/2013    Osteoarthritis     Osteoarthritis     Osteopenia     DEXA scan done on 06/20/12     Pancreatitis     resolved    S/P CABG (coronary artery bypass graft) 8/7/2013    S/P carotid endarterectomy 8/7/2013    S/P PTCA (percutaneous transluminal coronary angioplasty) 3/19/2014    Sjogren's syndrome     Stroke     Vitamin D deficiency disease      Past Surgical History:   Procedure Laterality Date    ABDOMINAL SURGERY      APPENDECTOMY      bilateral cataract surgery      CARPAL TUNNEL RELEASE      RT    CATARACT EXTRACTION      CEA      left    CHOLECYSTECTOMY      CORONARY ARTERY BYPASS GRAFT      2 stents    EYE SURGERY      HYSTERECTOMY      TONSILLECTOMY       Family History     Problem Relation (Age of Onset)    Alzheimer's disease Mother    Cancer Father, Brother, Paternal Grandmother, Paternal Grandfather    Heart disease Maternal Uncle    Hyperlipidemia Mother        Social History Main Topics    Smoking status: Former Smoker     Packs/day: 0.50     Years: 25.00     Quit date: 12/17/1988    Smokeless tobacco: Never Used    Alcohol use 0.0 oz/week     0 Standard drinks or equivalent per week       Comment: wine occasionally    Drug use: No    Sexual activity: Yes     Partners: Male     Review of Systems   Constitutional: Positive for activity change and fatigue. Negative for appetite change, chills and fever.   Respiratory: Positive for cough. Negative for shortness of breath.    Cardiovascular: Negative for chest pain.   Gastrointestinal: Positive for abdominal pain. Negative for nausea and vomiting.   Genitourinary: Negative for dysuria.   Musculoskeletal: Negative for myalgias.   Skin: Negative for wound.   Neurological: Negative for weakness.   Hematological: Does not bruise/bleed easily.   Psychiatric/Behavioral: The patient is not nervous/anxious.      Objective:     Vital Signs (Most Recent):  Temp: 98.4 °F (36.9 °C) (03/24/17 1045)  Pulse: 100 (03/24/17 1045)  Resp: 18 (03/24/17 1045)  BP: (!) 157/71 (03/24/17 1045)  SpO2: 100 % (03/24/17 1045) Vital Signs (24h Range):  Temp:  [96.4 °F (35.8 °C)-98.4 °F (36.9 °C)] 98.4 °F (36.9 °C)  Pulse:  [] 100  Resp:  [13-22] 18  SpO2:  [96 %-100 %] 100 %  BP: (106-157)/(48-96) 157/71     Weight: 58.1 kg (128 lb)  Body mass index is 23.41 kg/(m^2).    Physical Exam   Constitutional: She appears well-developed and well-nourished.   HENT:   Head: Normocephalic and atraumatic.   Eyes: EOM are normal.   Cardiovascular: Normal rate and regular rhythm.    Pulmonary/Chest: Effort normal and breath sounds normal. No respiratory distress.   Abdominal: Soft. She exhibits mass (large tender right lower abdominal wall mass a/w hematoma with bruising on right flank ). There is tenderness (rlq).   Musculoskeletal: Normal range of motion.   Skin: Skin is warm and dry.   Psychiatric: She has a normal mood and affect. Thought content normal.   Vitals reviewed.      Significant Labs:  CBC:   Recent Labs  Lab 03/23/17  2250   WBC 16.87*   RBC 2.63*   HGB 7.8*   HCT 23.9*      MCV 91   MCH 29.7   MCHC 32.6     CMP:   Recent Labs  Lab 03/23/17  7020   *    CALCIUM 9.1   ALBUMIN 3.3*   PROT 6.0   *   K 5.3*   CO2 20*   CL 99   BUN 51*   CREATININE 2.1*   ALKPHOS 68   ALT 80*   AST 39   BILITOT 0.5         Significant Diagnostics:  CT: I have reviewed all pertinent results/findings within the past 24 hours and my personal findings are:  large right sided abdominal wall hematoma

## 2017-03-24 NOTE — ASSESSMENT & PLAN NOTE
Secondary to GI bleed   -Hgb drop from 11.8 last week to 7 tonight and patient symptomatic  - transfuse 2 units of PRBC and f/u H/H  -will repeat CBC in am

## 2017-03-24 NOTE — ASSESSMENT & PLAN NOTE
Secondary to GI bleed - Hgb drop from 11.8 last week to 7 tonight and patient symptomatic; transfuse 2 units of PRBC and f/u H/H

## 2017-03-24 NOTE — ED PROVIDER NOTES
SCRIBE #1 NOTE: I, Magaly Thomas, am scribing for, and in the presence of, Nataliya Feldman MD. I have scribed the entire note.      History      Chief Complaint   Patient presents with    Shortness of Breath     Pt reports pain with deep inspiration, CP/SOB since recent hosp d/c. Pt also reports taking nitro SL with minor improvement.       Review of patient's allergies indicates:   Allergen Reactions    Codeine Nausea And Vomiting    Lisinopril      Other reaction(s): cough    Pacerone [amiodarone] Nausea And Vomiting    Sulfa (sulfonamide antibiotics) Nausea And Vomiting    Celecoxib Palpitations    Ciprofloxacin Hives, Itching and Rash    Colesevelam Rash and Nausea And Vomiting    Doxycycline Rash    Statins-hmg-coa reductase inhibitors Rash     Myalgia(can take atorvastatin ok -no rhabdo)per nurse josé antonio and patient  / stomach upset        HPI   HPI    3/23/2017, 10:34 PM   History obtained from the patient      History of Present Illness: Marcel Montalvo is a 76 y.o. female patient who presents to the Emergency Department for R sided abd pain which onset gradually for a few days. Pt was admited to hospital from 3/14/17-3/18/17 for acute Systolic CHF and was given IV lasix. Pt reports going for an office visit 3/23/17 complaining of R sided pain along abdomen. Pt reports coming to ED for SOB and cough. No mitigating or exacerbating factors reported. Associated sxs include weakness, SOB, 3x episodes of diarrhea, dizziness, decreased appetite, cough, and wheezing. Patient denies any slurred speech, fever, nausea, emesis, dysuria, rash, and all other sxs at this time. Prior Tx includes Tikosyn. No further complaints or concerns at this time.         Arrival mode: Personal vehicle    PCP: Alexandra Moreno MD       Past Medical History:  Past Medical History:   Diagnosis Date    *Atrial fibrillation     AF (atrial fibrillation)     Palpation: atrial fibrillation, on Coumadin and followed by cardiologist.     Anticoagulant long-term use     Anxiety     Aortic insufficiency 3/19/2014    Aortic regurgitation     Aortic regurgitation/tricuspid regurgitation per MARIO in April 2007.     Asthma     Carotid artery stenosis     CEA on the left by Dr. Maciel    Coronary artery disease     status post CABG X 3.     Diabetes mellitus     GERD (gastroesophageal reflux disease)     H. pylori infection     treated    Hyperlipidemia     intolerant to statins.    Hypertension 8/7/2013    IBS (irritable bowel syndrome)     Leg pain 3/19/2014    Long-term (current) use of anticoagulants 8/7/2013    Osteoarthritis     Osteoarthritis     Osteopenia     DEXA scan done on 06/20/12     Pancreatitis     resolved    S/P CABG (coronary artery bypass graft) 8/7/2013    S/P carotid endarterectomy 8/7/2013    S/P PTCA (percutaneous transluminal coronary angioplasty) 3/19/2014    Sjogren's syndrome     Stroke     Vitamin D deficiency disease        Past Surgical History:  Past Surgical History:   Procedure Laterality Date    ABDOMINAL SURGERY      APPENDECTOMY      bilateral cataract surgery      CARPAL TUNNEL RELEASE      RT    CATARACT EXTRACTION      CEA      left    CHOLECYSTECTOMY      CORONARY ARTERY BYPASS GRAFT      2 stents    EYE SURGERY      HYSTERECTOMY      TONSILLECTOMY           Family History:  Family History   Problem Relation Age of Onset    Alzheimer's disease Mother     Hyperlipidemia Mother     Cancer Father      lung    Heart disease Maternal Uncle     Cancer Brother      stomach    Cancer Paternal Grandmother     Cancer Paternal Grandfather      stomach       Social History:  Social History     Social History Main Topics    Smoking status: Former Smoker     Packs/day: 0.50     Years: 25.00     Quit date: 12/17/1988    Smokeless tobacco: Never Used    Alcohol use 0.0 oz/week     0 Standard drinks or equivalent per week      Comment: wine occasionally    Drug use: No    Sexual  activity: Yes     Partners: Male       ROS   Review of Systems   Constitutional: Positive for fatigue. Negative for fever. Appetite change: decreased.   HENT: Negative for sore throat.    Respiratory: Positive for cough, shortness of breath and wheezing.    Cardiovascular: Positive for chest pain.   Gastrointestinal: Positive for diarrhea. Negative for nausea.   Genitourinary: Negative for dysuria.   Musculoskeletal: Negative for back pain.   Skin: Negative for rash.   Neurological: Negative for speech difficulty.   Hematological: Does not bruise/bleed easily.   All other systems reviewed and are negative.      Physical Exam    Initial Vitals   BP Pulse Resp Temp SpO2   03/23/17 2214 03/23/17 2214 03/23/17 2214 03/23/17 2214 03/23/17 2214   140/67 99 22 96.7 °F (35.9 °C) 99 %      Physical Exam  Nursing Notes and Vital Signs Reviewed.  Constitutional: Patient is in no acute distress. Awake and alert. Well-developed and well-nourished.  Head: Atraumatic. Normocephalic.  Eyes: PERRL. EOM intact. Conjunctivae are not pale. No scleral icterus.  ENT: Mucous membranes are moist. Oropharynx is clear and symmetric.    Neck: Supple. Full ROM. No lymphadenopathy.  Cardiovascular: Regular rate. Irregularly regular rhythm. No murmurs, rubs, or gallops. Distal pulses are 2+ and symmetric.  Pulmonary/Chest: No respiratory distress. Clear to auscultation bilaterally. No wheezing, rales, or rhonchi.  Abdominal: Soft and non-distended.  There is no tenderness.  No rebound, guarding, or rigidity. Good bowel sounds.  Genitourinary: CVA tenderness  Musculoskeletal: Moves all extremities. No obvious deformities. No edema. No calf tenderness.  Skin: Warm and dry.  Neurological:  Alert, awake, and appropriate.  Normal speech.  No acute focal neurological deficits are appreciated.  Psychiatric: Normal affect. Good eye contact. Appropriate in content.    ED Course    Procedures  ED Vital Signs:  Vitals:    03/28/17 0300 03/28/17 0400  03/28/17 0500 03/28/17 0730   BP:  120/64  114/62   Pulse: 97 95 103 84   Resp:  18  18   Temp:  98.1 °F (36.7 °C)  97.9 °F (36.6 °C)   TempSrc:  Oral  Oral   SpO2:  100%  100%   Weight:       Height:        03/28/17 0813 03/28/17 0817 03/28/17 1222 03/28/17 1608   BP:   137/72 131/70   Pulse: 92 89 98 91   Resp: 20 20 20 18   Temp:   98.1 °F (36.7 °C) 97.8 °F (36.6 °C)   TempSrc:   Oral Oral   SpO2: 99% 95% 99% 98%   Weight:       Height:        03/28/17 1921 03/28/17 2031 03/28/17 2034 03/28/17 2100   BP: 106/68      Pulse: 99 100  91   Resp: 18 18     Temp: 98.4 °F (36.9 °C)      TempSrc: Oral      SpO2: 100% (!) 93% (!) 93%    Weight:       Height:        03/28/17 2300 03/28/17 2331 03/29/17 0100   BP:  (!) 95/53    Pulse: 97 (!) 118 99   Resp:  18    Temp:  98.9 °F (37.2 °C)    TempSrc:  Oral    SpO2:  97%    Weight:      Height:          Abnormal Lab Results:  Labs Reviewed   CBC W/ AUTO DIFFERENTIAL - Abnormal; Notable for the following:        Result Value    WBC 16.87 (*)     RBC 2.63 (*)     Hemoglobin 7.8 (*)     Hematocrit 23.9 (*)     RDW 15.7 (*)     Gran # 14.0 (*)     Mono # 1.6 (*)     Gran% 83.1 (*)     Lymph% 7.4 (*)     All other components within normal limits   COMPREHENSIVE METABOLIC PANEL - Abnormal; Notable for the following:     Sodium 132 (*)     Potassium 5.3 (*)     CO2 20 (*)     Glucose 581 (*)     BUN, Bld 51 (*)     Creatinine 2.1 (*)     Albumin 3.3 (*)     ALT 80 (*)     eGFR if  26 (*)     eGFR if non  22 (*)     All other components within normal limits    Narrative:       Glucose critical result(s) called and verbal readback obtained from   Sanjay Arreaga, 03/23/2017 23:54   PROTIME-INR - Abnormal; Notable for the following:     Prothrombin Time 19.8 (*)     INR 2.0 (*)     All other components within normal limits   URINALYSIS - Abnormal; Notable for the following:     Glucose, UA 3+ (*)     All other components within normal limits   TROPONIN  I - Abnormal; Notable for the following:     Troponin I 0.028 (*)     All other components within normal limits   LACTIC ACID, PLASMA - Abnormal; Notable for the following:     Lactate (Lactic Acid) 3.4 (*)     All other components within normal limits   OCCULT BLOOD X 1, STOOL - Abnormal; Notable for the following:     Occult Blood Positive (*)     All other components within normal limits   POCT GLUCOSE - Abnormal; Notable for the following:     POCT Glucose 326 (*)     All other components within normal limits   APTT   URINALYSIS MICROSCOPIC   TYPE & SCREEN   POCT GLUCOSE MONITORING CONTINUOUS   PREPARE RBC SOFT        All Lab Results:  Results for orders placed or performed during the hospital encounter of 03/23/17   Blood Culture #1 **CANNOT BE ORDERED STAT**   Result Value Ref Range    Blood Culture, Routine No Growth to date     Blood Culture, Routine No Growth to date     Blood Culture, Routine No Growth to date     Blood Culture, Routine No Growth to date     Blood Culture, Routine No Growth to date    Blood Culture #2 **CANNOT BE ORDERED STAT**   Result Value Ref Range    Blood Culture, Routine No Growth to date     Blood Culture, Routine No Growth to date     Blood Culture, Routine No Growth to date     Blood Culture, Routine No Growth to date     Blood Culture, Routine No Growth to date    Urine culture **CANNOT BE ORDERED STAT**   Result Value Ref Range    Urine Culture, Routine No growth    Culture, Respiratory   Result Value Ref Range    Respiratory Culture       STENOTROPHOMONAS (X.) MALTOPHILIA  Moderate  Normal respiratory tanisha also present      Gram Stain (Respiratory) <10 epithelial cells per low power field.     Gram Stain (Respiratory) Few WBC's     Gram Stain (Respiratory) Few Gram positive cocci     Gram Stain (Respiratory) Few Gram negative rods        Susceptibility    Stenotrophomonas (X.) Maltophilia - CULTURE, RESPIRATORY     Ceftazidime 4 Sensitive mcg/mL     Trimeth/Sulfa <=2/38  Sensitive mcg/mL   CBC auto differential   Result Value Ref Range    WBC 16.87 (H) 3.90 - 12.70 K/uL    RBC 2.63 (L) 4.00 - 5.40 M/uL    Hemoglobin 7.8 (L) 12.0 - 16.0 g/dL    Hematocrit 23.9 (L) 37.0 - 48.5 %    MCV 91 82 - 98 fL    MCH 29.7 27.0 - 31.0 pg    MCHC 32.6 32.0 - 36.0 %    RDW 15.7 (H) 11.5 - 14.5 %    Platelets 276 150 - 350 K/uL    MPV 10.1 9.2 - 12.9 fL    Gran # 14.0 (H) 1.8 - 7.7 K/uL    Lymph # 1.2 1.0 - 4.8 K/uL    Mono # 1.6 (H) 0.3 - 1.0 K/uL    Eos # 0.0 0.0 - 0.5 K/uL    Baso # 0.01 0.00 - 0.20 K/uL    Gran% 83.1 (H) 38.0 - 73.0 %    Lymph% 7.4 (L) 18.0 - 48.0 %    Mono% 9.4 4.0 - 15.0 %    Eosinophil% 0.0 0.0 - 8.0 %    Basophil% 0.1 0.0 - 1.9 %    Differential Method Automated    Comprehensive metabolic panel   Result Value Ref Range    Sodium 132 (L) 136 - 145 mmol/L    Potassium 5.3 (H) 3.5 - 5.1 mmol/L    Chloride 99 95 - 110 mmol/L    CO2 20 (L) 23 - 29 mmol/L    Glucose 581 (HH) 70 - 110 mg/dL    BUN, Bld 51 (H) 8 - 23 mg/dL    Creatinine 2.1 (H) 0.5 - 1.4 mg/dL    Calcium 9.1 8.7 - 10.5 mg/dL    Total Protein 6.0 6.0 - 8.4 g/dL    Albumin 3.3 (L) 3.5 - 5.2 g/dL    Total Bilirubin 0.5 0.1 - 1.0 mg/dL    Alkaline Phosphatase 68 55 - 135 U/L    AST 39 10 - 40 U/L    ALT 80 (H) 10 - 44 U/L    Anion Gap 13 8 - 16 mmol/L    eGFR if African American 26 (A) >60 mL/min/1.73 m^2    eGFR if non African American 22 (A) >60 mL/min/1.73 m^2   Protime-INR   Result Value Ref Range    Prothrombin Time 19.8 (H) 9.0 - 12.5 sec    INR 2.0 (H) 0.8 - 1.2   APTT   Result Value Ref Range    aPTT 22.9 21.0 - 32.0 sec   Urinalysis Catheterized   Result Value Ref Range    Specimen UA Urine, Catheterized     Color, UA Yellow Yellow, Straw, Saira    Appearance, UA Clear Clear    pH, UA 6.0 5.0 - 8.0    Specific Gravity, UA 1.020 1.005 - 1.030    Protein, UA Negative Negative    Glucose, UA 3+ (A) Negative    Ketones, UA Negative Negative    Bilirubin (UA) Negative Negative    Occult Blood UA Negative Negative     Nitrite, UA Negative Negative    Urobilinogen, UA Negative <2.0 EU/dL    Leukocytes, UA Negative Negative   Troponin I   Result Value Ref Range    Troponin I 0.028 (H) 0.000 - 0.026 ng/mL   Lactic acid, plasma   Result Value Ref Range    Lactate (Lactic Acid) 3.4 (H) 0.5 - 2.2 mmol/L   Occult blood x 1, stool   Result Value Ref Range    Occult Blood Positive (A) Negative   Urinalysis Microscopic   Result Value Ref Range    Bacteria, UA None None-Occ /hpf    Yeast, UA None None    Microscopic Comment SEE COMMENT    Basic metabolic panel   Result Value Ref Range    Sodium 133 (L) 136 - 145 mmol/L    Potassium 4.7 3.5 - 5.1 mmol/L    Chloride 100 95 - 110 mmol/L    CO2 22 (L) 23 - 29 mmol/L    Glucose 343 (H) 70 - 110 mg/dL    BUN, Bld 55 (H) 8 - 23 mg/dL    Creatinine 2.1 (H) 0.5 - 1.4 mg/dL    Calcium 8.7 8.7 - 10.5 mg/dL    Anion Gap 11 8 - 16 mmol/L    eGFR if African American 26 (A) >60 mL/min/1.73 m^2    eGFR if non African American 22 (A) >60 mL/min/1.73 m^2   CBC auto differential   Result Value Ref Range    WBC 21.16 (H) 3.90 - 12.70 K/uL    RBC 3.32 (L) 4.00 - 5.40 M/uL    Hemoglobin 9.5 (L) 12.0 - 16.0 g/dL    Hematocrit 28.3 (L) 37.0 - 48.5 %    MCV 85 82 - 98 fL    MCH 28.6 27.0 - 31.0 pg    MCHC 33.6 32.0 - 36.0 %    RDW 16.8 (H) 11.5 - 14.5 %    Platelets 228 150 - 350 K/uL    MPV 10.7 9.2 - 12.9 fL    Gran # 17.7 (H) 1.8 - 7.7 K/uL    Lymph # 1.8 1.0 - 4.8 K/uL    Mono # 1.7 (H) 0.3 - 1.0 K/uL    Eos # 0.0 0.0 - 0.5 K/uL    Baso # 0.02 0.00 - 0.20 K/uL    Gran% 83.7 (H) 38.0 - 73.0 %    Lymph% 8.3 (L) 18.0 - 48.0 %    Mono% 7.9 4.0 - 15.0 %    Eosinophil% 0.0 0.0 - 8.0 %    Basophil% 0.1 0.0 - 1.9 %    Differential Method Automated    Comprehensive metabolic panel   Result Value Ref Range    Sodium 134 (L) 136 - 145 mmol/L    Potassium 4.7 3.5 - 5.1 mmol/L    Chloride 100 95 - 110 mmol/L    CO2 22 (L) 23 - 29 mmol/L    Glucose 350 (H) 70 - 110 mg/dL    BUN, Bld 55 (H) 8 - 23 mg/dL    Creatinine 2.1 (H)  0.5 - 1.4 mg/dL    Calcium 8.7 8.7 - 10.5 mg/dL    Total Protein 5.4 (L) 6.0 - 8.4 g/dL    Albumin 3.2 (L) 3.5 - 5.2 g/dL    Total Bilirubin 1.2 (H) 0.1 - 1.0 mg/dL    Alkaline Phosphatase 57 55 - 135 U/L    AST 23 10 - 40 U/L    ALT 71 (H) 10 - 44 U/L    Anion Gap 12 8 - 16 mmol/L    eGFR if African American 26 (A) >60 mL/min/1.73 m^2    eGFR if non African American 22 (A) >60 mL/min/1.73 m^2   CBC auto differential   Result Value Ref Range    WBC 28.02 (H) 3.90 - 12.70 K/uL    RBC 2.91 (L) 4.00 - 5.40 M/uL    Hemoglobin 8.4 (L) 12.0 - 16.0 g/dL    Hematocrit 24.9 (L) 37.0 - 48.5 %    MCV 86 82 - 98 fL    MCH 28.9 27.0 - 31.0 pg    MCHC 33.7 32.0 - 36.0 %    RDW 16.9 (H) 11.5 - 14.5 %    Platelets 214 150 - 350 K/uL    MPV 10.9 9.2 - 12.9 fL    Gran # 23.3 (H) 1.8 - 7.7 K/uL    Lymph # 2.5 1.0 - 4.8 K/uL    Mono # 2.3 (H) 0.3 - 1.0 K/uL    Eos # 0.0 0.0 - 0.5 K/uL    Baso # 0.01 0.00 - 0.20 K/uL    Gran% 83.9 (H) 38.0 - 73.0 %    Lymph% 8.8 (L) 18.0 - 48.0 %    Mono% 8.1 4.0 - 15.0 %    Eosinophil% 0.0 0.0 - 8.0 %    Basophil% 0.0 0.0 - 1.9 %    Platelet Estimate Appears normal     Aniso Slight     Poik Slight     Poly Occasional     Ovalocytes Occasional     Tear Drop Cells Occasional     Spherocytes Occasional     Differential Method Automated    Basic metabolic panel   Result Value Ref Range    Sodium 133 (L) 136 - 145 mmol/L    Potassium 4.6 3.5 - 5.1 mmol/L    Chloride 97 95 - 110 mmol/L    CO2 23 23 - 29 mmol/L    Glucose 284 (H) 70 - 110 mg/dL    BUN, Bld 65 (H) 8 - 23 mg/dL    Creatinine 2.9 (H) 0.5 - 1.4 mg/dL    Calcium 8.2 (L) 8.7 - 10.5 mg/dL    Anion Gap 13 8 - 16 mmol/L    eGFR if African American 17 (A) >60 mL/min/1.73 m^2    eGFR if non African American 15 (A) >60 mL/min/1.73 m^2   Protime-INR   Result Value Ref Range    Prothrombin Time 13.8 (H) 9.0 - 12.5 sec    INR 1.3 (H) 0.8 - 1.2   Lactic acid, plasma   Result Value Ref Range    Lactate (Lactic Acid) 2.7 (H) 0.5 - 2.2 mmol/L   CBC auto  differential   Result Value Ref Range    WBC 27.24 (H) 3.90 - 12.70 K/uL    RBC 2.29 (L) 4.00 - 5.40 M/uL    Hemoglobin 6.8 (L) 12.0 - 16.0 g/dL    Hematocrit 19.4 (LL) 37.0 - 48.5 %    MCV 85 82 - 98 fL    MCH 29.7 27.0 - 31.0 pg    MCHC 35.1 32.0 - 36.0 %    RDW 16.5 (H) 11.5 - 14.5 %    Platelets 220 150 - 350 K/uL    MPV 10.0 9.2 - 12.9 fL    Gran # 23.4 (H) 1.8 - 7.7 K/uL    Lymph # 2.0 1.0 - 4.8 K/uL    Mono # 1.8 (H) 0.3 - 1.0 K/uL    Eos # 0.0 0.0 - 0.5 K/uL    Baso # 0.01 0.00 - 0.20 K/uL    Gran% 86.0 (H) 38.0 - 73.0 %    Lymph% 7.5 (L) 18.0 - 48.0 %    Mono% 6.5 4.0 - 15.0 %    Eosinophil% 0.0 0.0 - 8.0 %    Basophil% 0.0 0.0 - 1.9 %    Differential Method Automated    Comprehensive metabolic panel   Result Value Ref Range    Sodium 133 (L) 136 - 145 mmol/L    Potassium 4.3 3.5 - 5.1 mmol/L    Chloride 98 95 - 110 mmol/L    CO2 22 (L) 23 - 29 mmol/L    Glucose 200 (H) 70 - 110 mg/dL    BUN, Bld 74 (H) 8 - 23 mg/dL    Creatinine 3.1 (H) 0.5 - 1.4 mg/dL    Calcium 7.9 (L) 8.7 - 10.5 mg/dL    Total Protein 5.1 (L) 6.0 - 8.4 g/dL    Albumin 2.9 (L) 3.5 - 5.2 g/dL    Total Bilirubin 0.7 0.1 - 1.0 mg/dL    Alkaline Phosphatase 52 (L) 55 - 135 U/L    AST 39 10 - 40 U/L    ALT 76 (H) 10 - 44 U/L    Anion Gap 13 8 - 16 mmol/L    eGFR if African American 16 (A) >60 mL/min/1.73 m^2    eGFR if non African American 14 (A) >60 mL/min/1.73 m^2   Lactic acid, plasma   Result Value Ref Range    Lactate (Lactic Acid) 2.1 0.5 - 2.2 mmol/L   Protime-INR   Result Value Ref Range    Prothrombin Time 12.1 9.0 - 12.5 sec    INR 1.2 0.8 - 1.2   APTT   Result Value Ref Range    aPTT 27.9 21.0 - 32.0 sec   Hemoglobin   Result Value Ref Range    Hemoglobin 9.6 (L) 12.0 - 16.0 g/dL   Hematocrit   Result Value Ref Range    Hematocrit 27.2 (L) 37.0 - 48.5 %   CBC auto differential   Result Value Ref Range    WBC 24.72 (H) 3.90 - 12.70 K/uL    RBC 3.36 (L) 4.00 - 5.40 M/uL    Hemoglobin 9.9 (L) 12.0 - 16.0 g/dL    Hematocrit 28.1 (L)  37.0 - 48.5 %    MCV 84 82 - 98 fL    MCH 29.5 27.0 - 31.0 pg    MCHC 35.2 32.0 - 36.0 %    RDW 15.8 (H) 11.5 - 14.5 %    Platelets 173 150 - 350 K/uL    MPV 10.1 9.2 - 12.9 fL    Gran # 21.0 (H) 1.8 - 7.7 K/uL    Lymph # 2.0 1.0 - 4.8 K/uL    Mono # 1.7 (H) 0.3 - 1.0 K/uL    Eos # 0.0 0.0 - 0.5 K/uL    Baso # 0.01 0.00 - 0.20 K/uL    Gran% 85.7 (H) 38.0 - 73.0 %    Lymph% 8.3 (L) 18.0 - 48.0 %    Mono% 6.8 4.0 - 15.0 %    Eosinophil% 0.0 0.0 - 8.0 %    Basophil% 0.0 0.0 - 1.9 %    Differential Method Automated    Comprehensive metabolic panel   Result Value Ref Range    Sodium 135 (L) 136 - 145 mmol/L    Potassium 4.1 3.5 - 5.1 mmol/L    Chloride 100 95 - 110 mmol/L    CO2 26 23 - 29 mmol/L    Glucose 213 (H) 70 - 110 mg/dL    BUN, Bld 53 (H) 8 - 23 mg/dL    Creatinine 2.0 (H) 0.5 - 1.4 mg/dL    Calcium 8.2 (L) 8.7 - 10.5 mg/dL    Total Protein 5.5 (L) 6.0 - 8.4 g/dL    Albumin 3.1 (L) 3.5 - 5.2 g/dL    Total Bilirubin 1.3 (H) 0.1 - 1.0 mg/dL    Alkaline Phosphatase 63 55 - 135 U/L    AST 32 10 - 40 U/L    ALT 75 (H) 10 - 44 U/L    Anion Gap 9 8 - 16 mmol/L    eGFR if African American 27 (A) >60 mL/min/1.73 m^2    eGFR if non African American 24 (A) >60 mL/min/1.73 m^2   CBC auto differential   Result Value Ref Range    WBC 16.41 (H) 3.90 - 12.70 K/uL    RBC 2.99 (L) 4.00 - 5.40 M/uL    Hemoglobin 8.6 (L) 12.0 - 16.0 g/dL    Hematocrit 25.9 (L) 37.0 - 48.5 %    MCV 87 82 - 98 fL    MCH 28.8 27.0 - 31.0 pg    MCHC 33.2 32.0 - 36.0 %    RDW 16.6 (H) 11.5 - 14.5 %    Platelets 172 150 - 350 K/uL    MPV 9.8 9.2 - 12.9 fL    Gran # 13.3 (H) 1.8 - 7.7 K/uL    Lymph # 1.6 1.0 - 4.8 K/uL    Mono # 1.5 (H) 0.3 - 1.0 K/uL    Eos # 0.1 0.0 - 0.5 K/uL    Baso # 0.00 0.00 - 0.20 K/uL    Gran% 81.1 (H) 38.0 - 73.0 %    Lymph% 9.4 (L) 18.0 - 48.0 %    Mono% 9.0 4.0 - 15.0 %    Eosinophil% 0.5 0.0 - 8.0 %    Basophil% 0.0 0.0 - 1.9 %    Differential Method Automated    Comprehensive metabolic panel   Result Value Ref Range     Sodium 140 136 - 145 mmol/L    Potassium 4.3 3.5 - 5.1 mmol/L    Chloride 106 95 - 110 mmol/L    CO2 27 23 - 29 mmol/L    Glucose 151 (H) 70 - 110 mg/dL    BUN, Bld 33 (H) 8 - 23 mg/dL    Creatinine 1.3 0.5 - 1.4 mg/dL    Calcium 7.9 (L) 8.7 - 10.5 mg/dL    Total Protein 4.9 (L) 6.0 - 8.4 g/dL    Albumin 2.7 (L) 3.5 - 5.2 g/dL    Total Bilirubin 0.7 0.1 - 1.0 mg/dL    Alkaline Phosphatase 53 (L) 55 - 135 U/L    AST 26 10 - 40 U/L    ALT 58 (H) 10 - 44 U/L    Anion Gap 7 (L) 8 - 16 mmol/L    eGFR if African American 46 (A) >60 mL/min/1.73 m^2    eGFR if non African American 40 (A) >60 mL/min/1.73 m^2   Hemoglobin   Result Value Ref Range    Hemoglobin 9.2 (L) 12.0 - 16.0 g/dL   Hematocrit   Result Value Ref Range    Hematocrit 27.5 (L) 37.0 - 48.5 %   CBC auto differential   Result Value Ref Range    WBC 13.96 (H) 3.90 - 12.70 K/uL    RBC 3.06 (L) 4.00 - 5.40 M/uL    Hemoglobin 9.1 (L) 12.0 - 16.0 g/dL    Hematocrit 27.6 (L) 37.0 - 48.5 %    MCV 90 82 - 98 fL    MCH 29.7 27.0 - 31.0 pg    MCHC 33.0 32.0 - 36.0 %    RDW 16.7 (H) 11.5 - 14.5 %    Platelets 201 150 - 350 K/uL    MPV 9.8 9.2 - 12.9 fL    Gran # 10.8 (H) 1.8 - 7.7 K/uL    Lymph # 1.6 1.0 - 4.8 K/uL    Mono # 1.4 (H) 0.3 - 1.0 K/uL    Eos # 0.1 0.0 - 0.5 K/uL    Baso # 0.01 0.00 - 0.20 K/uL    Gran% 77.5 (H) 38.0 - 73.0 %    Lymph% 11.4 (L) 18.0 - 48.0 %    Mono% 10.1 4.0 - 15.0 %    Eosinophil% 0.9 0.0 - 8.0 %    Basophil% 0.1 0.0 - 1.9 %    Differential Method Automated    Comprehensive metabolic panel   Result Value Ref Range    Sodium 138 136 - 145 mmol/L    Potassium 4.2 3.5 - 5.1 mmol/L    Chloride 105 95 - 110 mmol/L    CO2 26 23 - 29 mmol/L    Glucose 135 (H) 70 - 110 mg/dL    BUN, Bld 20 8 - 23 mg/dL    Creatinine 1.0 0.5 - 1.4 mg/dL    Calcium 8.0 (L) 8.7 - 10.5 mg/dL    Total Protein 5.5 (L) 6.0 - 8.4 g/dL    Albumin 3.0 (L) 3.5 - 5.2 g/dL    Total Bilirubin 1.0 0.1 - 1.0 mg/dL    Alkaline Phosphatase 69 55 - 135 U/L    AST 31 10 - 40 U/L    ALT  61 (H) 10 - 44 U/L    Anion Gap 7 (L) 8 - 16 mmol/L    eGFR if African American >60 >60 mL/min/1.73 m^2    eGFR if non African American 54 (A) >60 mL/min/1.73 m^2   Magnesium   Result Value Ref Range    Magnesium 1.7 1.6 - 2.6 mg/dL   Type & Screen   Result Value Ref Range    Group & Rh A POS     Indirect Luis NEG    POCT glucose   Result Value Ref Range    POCT Glucose 326 (H) 70 - 110 mg/dL   POCT glucose   Result Value Ref Range    POCT Glucose 422 (H) 70 - 110 mg/dL   POCT glucose   Result Value Ref Range    POCT Glucose 380 (H) 70 - 110 mg/dL   POCT glucose   Result Value Ref Range    POCT Glucose 313 (H) 70 - 110 mg/dL   POCT glucose   Result Value Ref Range    POCT Glucose 237 (H) 70 - 110 mg/dL   POCT glucose   Result Value Ref Range    POCT Glucose 267 (H) 70 - 110 mg/dL   POCT glucose   Result Value Ref Range    POCT Glucose 259 (H) 70 - 110 mg/dL   POCT glucose   Result Value Ref Range    POCT Glucose 225 (H) 70 - 110 mg/dL   POCT glucose   Result Value Ref Range    POCT Glucose 289 (H) 70 - 110 mg/dL   POCT glucose   Result Value Ref Range    POCT Glucose 157 (H) 70 - 110 mg/dL   POCT glucose   Result Value Ref Range    POCT Glucose 63 (L) 70 - 110 mg/dL   POCT glucose   Result Value Ref Range    POCT Glucose 189 (H) 70 - 110 mg/dL   POCT glucose   Result Value Ref Range    POCT Glucose 162 (H) 70 - 110 mg/dL   POCT glucose   Result Value Ref Range    POCT Glucose 179 (H) 70 - 110 mg/dL   POCT glucose   Result Value Ref Range    POCT Glucose 134 (H) 70 - 110 mg/dL   POCT glucose   Result Value Ref Range    POCT Glucose 179 (H) 70 - 110 mg/dL   POCT glucose   Result Value Ref Range    POCT Glucose 172 (H) 70 - 110 mg/dL   POCT glucose   Result Value Ref Range    POCT Glucose 170 (H) 70 - 110 mg/dL   POCT glucose   Result Value Ref Range    POCT Glucose 130 (H) 70 - 110 mg/dL   POCT glucose   Result Value Ref Range    POCT Glucose 145 (H) 70 - 110 mg/dL   Prepare RBC 1 Unit   Result Value Ref Range     UNIT NUMBER [id number]     PRODUCT CODE [id number]     DISPENSE STATUS TRANSFUSED     CODING SYSTEM [id number]     Unit Blood Type Code 6200     Unit Blood Type A POS     Unit Expiration [id number]    Prepare RBC 1 Unit   Result Value Ref Range    UNIT NUMBER [id number]     PRODUCT CODE [id number]     DISPENSE STATUS TRANSFUSED     CODING SYSTEM [id number]     Unit Blood Type Code 5100     Unit Blood Type O POS     Unit Expiration [id number]    Prepare RBC 2 Units; H/H < 7   Result Value Ref Range    UNIT NUMBER [id number]     PRODUCT CODE [id number]     DISPENSE STATUS TRANSFUSED     CODING SYSTEM [id number]     Unit Blood Type Code 6200     Unit Blood Type A POS     Unit Expiration [id number]     UNIT NUMBER [id number]     PRODUCT CODE [id number]     DISPENSE STATUS TRANSFUSED     CODING SYSTEM [id number]     Unit Blood Type Code 6200     Unit Blood Type A POS     Unit Expiration [id number]          Imaging Results:  Imaging Results         CT Renal Stone Study ABD Pelvis WO (In process) Per Virtual radiology, pt's CT results 22 x 8.4 x 10 cm right anterior lateral/left abdominal wall hematoma. Evaluation for active extravasation is limited without intravenous contrast. Ill-defined 5 mm right lower lobe pulmonary nodule.         The EKG was ordered, reviewed, and independently interpreted by the ED provider.  Interpretation time: 22:20  Rate: 102 BPM  Rhythm: atrial fibrillation with rapid ventricular response  Interpretation: Right axis deviation. Septal infarct. Abnormal ECG. No STEMI.           The Emergency Provider reviewed the vital signs and test results, which are outlined above.    ED Discussion       11:53 PM: Re-evaluated pt. Dr. Feldman performed rectal exam on pt. Results are normal. D/w pt all pertinent results. D/w pt any concerns expressed at this time. Answered all questions. Pt expresses understanding at this time.    1:37 AM: Discussed case with Dr. Ambriz ([facility]). Dr. Ambriz agrees  with current care and management of pt and accepts admission.   Admitting Service: Hospital medicine   Admitting Physician: Dr. Ambriz  Admit to: Med-surg    1:37 AM: Re-evaluated pt. I have discussed test results, shared treatment plan, and the need for admission with patient and family at bedside. Pt and family express understanding at this time and agree with all information. All questions answered. Pt and family have no further questions or concerns at this time. Pt is ready for admit.      ED Medication(s):  Medications   dextrose 50% injection 12.5 g (12.5 g Intravenous Given 3/25/17 2139)   glucagon (human recombinant) injection 1 mg (not administered)   insulin aspart pen 0-5 Units (2 Units Subcutaneous Given 3/25/17 1811)   ondansetron injection 4 mg (4 mg Intravenous Given 3/24/17 2230)   nebivolol tablet 5 mg (5 mg Oral Given 3/28/17 0835)   dofetilide capsule 125 mcg (125 mcg Oral Given 3/28/17 2101)   escitalopram oxalate tablet 10 mg (10 mg Oral Given 3/28/17 0835)   hydroxychloroquine tablet 200 mg (200 mg Oral Given 3/28/17 0835)   nitroGLYCERIN SL tablet 0.4 mg (not administered)   morphine injection 2 mg (2 mg Intravenous Given 3/24/17 1908)   albuterol-ipratropium 2.5mg-0.5mg/3mL nebulizer solution 3 mL (3 mLs Nebulization Given 3/28/17 2031)   arformoterol nebulizer solution 15 mcg (15 mcg Nebulization Given 3/28/17 2031)     And   budesonide nebulizer solution 0.5 mg (0.5 mg Nebulization Given 3/28/17 2031)   acetaminophen tablet 650 mg (650 mg Oral Given 3/28/17 2101)   0.9%  NaCl infusion ( Intravenous New Bag 3/28/17 0603)   guaifenesin 12 hr tablet 600 mg (600 mg Oral Given 3/28/17 2100)   famotidine IVPB 20 mg (20 mg Intravenous New Bag 3/28/17 0836)   metronidazole IVPB 500 mg (500 mg Intravenous New Bag 3/28/17 2331)   cefTRIAXone (ROCEPHIN) 1 g in dextrose 5 % 50 mL IVPB (1 g Intravenous New Bag 3/28/17 5721)   0.9%  NaCl infusion (for blood administration) (not administered)   docusate  sodium capsule 100 mg (100 mg Oral Given 3/28/17 2100)   polyethylene glycol packet 17 g (17 g Oral Given 3/28/17 0845)   cyclobenzaprine tablet 5 mg (5 mg Oral Given 3/28/17 2100)   0.9%  NaCl infusion (0 mLs Intravenous Stopped 3/24/17 0004)   0.9%  NaCl infusion (0 mLs Intravenous Stopped 3/24/17 0204)   furosemide injection 80 mg (80 mg Intravenous Given 3/24/17 0119)   furosemide injection 80 mg (80 mg Intravenous Given 3/24/17 1052)   furosemide injection 80 mg (80 mg Intravenous Given 3/24/17 0619)   iron sucrose (VENOFER) 200 mg in sodium chloride 0.9% 100 mL IVPB (200 mg Intravenous New Bag 3/26/17 1227)       Current Discharge Medication List                Medical Decision Making    Medical Decision Making:   Clinical Tests:   Lab Tests: Ordered and Reviewed  Radiological Study: Ordered and Reviewed           Scribe Attestation:   Scribe #1: I performed the above scribed service and the documentation accurately describes the services I performed. I attest to the accuracy of the note.    Attending:   Physician Attestation Statement for Scribe #1: I, Nataliya Feldman MD, personally performed the services described in this documentation, as scribed by Magaly Thomas, in my presence, and it is both accurate and complete.         Attending Attestation:         Attending Critical Care:   Critical Care Times:   Direct Patient Care (initial evaluation, reassessments, and time considering the case)................................................................10 minutes.   Additional History from reviewing old medical records or taking additional history from the family, EMS, PCP, etc.......................5 minutes.   Ordering, Reviewing, and Interpreting Diagnostic Studies...............................................................................................................10 minutes.    Documentation..................................................................................................................................................................................5 minutes.   Consultation with other Physicians. .................................................................................................................................................10 minutes.   Consultation with the patient's family directly relating to the patient's condition, care, and DNR status (when patient unable)......5 minutes.   ==============================================================  · Total Critical Care Time - exclusive of procedural time: 45 minutes.  ==============================================================  Critical care was necessary to treat or prevent imminent or life-threatening deterioration of the following conditions: congestive heart failure.   The following critical care procedures were done by me (see procedure notes): blood draw for specimens and pulse oximetry.   Critical care was time spent personally by me on the following activities: obtaining history from patient or relative, examination of patient, review of x-rays / CT sent with the patient, review of old charts, ordering lab, x-rays, and/or EKG, development of treatment plan with patient or relative, ordering and performing treatments and interventions, evaluation of patient's response to treatment, discussion with consultants, re-evaluation of patient's conition and interpretation of cardiac measurements.   Critical Care Condition: critical           Clinical Impression       ICD-10-CM ICD-9-CM   1. Acute blood loss anemia D62 285.1   2. Weakness R53.1 780.79   3. Abdominal wall hematoma, initial encounter S30.1XXA 922.2   4. DOMINGO (acute kidney injury) N17.9 584.9   5. Hematoma of abdominal wall, initial encounter S30.1XXA 922.2   6. Abnormal liver enzymes R74.8 790.5       Disposition:   Disposition:  Admitted  Condition: Serious         Si RICARDO Feldman MD  03/29/17 3392

## 2017-03-24 NOTE — ASSESSMENT & PLAN NOTE
Recommend d/c Xarelto, serial H/H, and transfuse as needed. If hematoma continues to expand she may need IR embolization. No surgical intervention needed for hematoma at this time.

## 2017-03-24 NOTE — CONSULTS
[facility] -   Gastroenterology  Consult Note    Patient Name: Marcel Montalvo  MRN: [id number]  Admission Date: 3/23/2017  Hospital Length of Stay: 0 days  Code Status: Prior   Attending Provider: Randy Valdez MD   Consulting Provider: Natanael Beasley MD  Primary Care Physician: Alexandra Moreno MD  Principal Problem:Acute blood loss anemia    Last Recorded LACE+ Scores     None          Inpatient consult to Gastroenterology  Consult performed by: NATANAEL BEASLEY  Consult ordered by: MANJULA AGEE  Reason for consult: Anemia        Subjective:     See previous consult note.

## 2017-03-24 NOTE — NURSING
Patient transferred to floor. Report received from Marlyn WATKINS. PRBC transfusing when pt arrived to floor.  at bedside. Cardiac monitor placed, pt resting in bed, oriented to room, instructed to call for assistance. Bed low, wheels locked, call light in reach. Will continue to monitor.

## 2017-03-24 NOTE — SUBJECTIVE & OBJECTIVE
Past Medical History:   Diagnosis Date    *Atrial fibrillation     AF (atrial fibrillation)     Palpation: atrial fibrillation, on Coumadin and followed by cardiologist.    Anticoagulant long-term use     Anxiety     Aortic insufficiency 3/19/2014    Aortic regurgitation     Aortic regurgitation/tricuspid regurgitation per MARIO in April 2007.     Asthma     Carotid artery stenosis     CEA on the left by Dr. Maciel    Coronary artery disease     status post CABG X 3.     Diabetes mellitus     GERD (gastroesophageal reflux disease)     H. pylori infection     treated    Hyperlipidemia     intolerant to statins.    Hypertension 8/7/2013    IBS (irritable bowel syndrome)     Leg pain 3/19/2014    Long-term (current) use of anticoagulants 8/7/2013    Osteoarthritis     Osteoarthritis     Osteopenia     DEXA scan done on 06/20/12     Pancreatitis     resolved    S/P CABG (coronary artery bypass graft) 8/7/2013    S/P carotid endarterectomy 8/7/2013    S/P PTCA (percutaneous transluminal coronary angioplasty) 3/19/2014    Sjogren's syndrome     Stroke     Vitamin D deficiency disease        Past Surgical History:   Procedure Laterality Date    ABDOMINAL SURGERY      APPENDECTOMY      bilateral cataract surgery      CARPAL TUNNEL RELEASE      RT    CATARACT EXTRACTION      CEA      left    CHOLECYSTECTOMY      CORONARY ARTERY BYPASS GRAFT      2 stents    EYE SURGERY      HYSTERECTOMY      TONSILLECTOMY         Review of patient's allergies indicates:   Allergen Reactions    Codeine Nausea And Vomiting    Lisinopril      Other reaction(s): cough    Pacerone [amiodarone] Nausea And Vomiting    Sulfa (sulfonamide antibiotics) Nausea And Vomiting    Celecoxib Palpitations    Ciprofloxacin Hives, Itching and Rash    Colesevelam Rash and Nausea And Vomiting    Doxycycline Rash    Statins-hmg-coa reductase inhibitors Rash     Myalgia(can take atorvastatin ok -no rhabdo)per nurse josé antonio  and patient  / stomach upset       Current Facility-Administered Medications on File Prior to Encounter   Medication    denosumab (PROLIA) injection 60 mg     Current Outpatient Prescriptions on File Prior to Encounter   Medication Sig    albuterol (PROAIR HFA) 90 mcg/actuation inhaler Inhale 2 puffs into the lungs every 6 (six) hours as needed.    amoxicillin-clavulanate 875-125mg (AUGMENTIN) 875-125 mg per tablet Take 1 tablet by mouth every 12 (twelve) hours.    aspirin (ECOTRIN) 81 MG EC tablet Take 1 tablet (81 mg total) by mouth once daily.    atorvastatin (LIPITOR) 40 MG tablet TAKE ONE TABLET BY MOUTH NIGHTLY    azelastine (ASTELIN) 137 mcg (0.1 %) nasal spray 2 sprays (274 mcg total) by Nasal route 2 (two) times daily.    budesonide-formoterol 160-4.5 mcg (SYMBICORT) 160-4.5 mcg/actuation HFAA Inhale 2 puffs into the lungs every 12 (twelve) hours. Wash out mouth after using    BYSTOLIC 5 mg Tab TAKE ONE TABLET BY MOUTH TWICE DAILY (Patient taking differently: TAKE ONE TABLET BY MOUTH DAILY)    carboxymethylcellulose (REFRESH PLUS) 0.5 % Dpet Place 1 drop into both eyes 3 (three) times daily as needed.    diclofenac sodium (VOLTAREN) 1 % Gel Apply 2 g topically once daily.    dofetilide (TIKOSYN) 125 MCG Cap Take 1 capsule (125 mcg total) by mouth every 12 (twelve) hours.    escitalopram oxalate (LEXAPRO) 10 MG tablet TAKE ONE-HALF TO ONE TABLET BY MOUTH EVERY DAY    fluticasone (FLONASE) 50 mcg/actuation nasal spray 2 sprays by Each Nare route once daily.    furosemide (LASIX) 20 MG tablet Take 1 tablet (20 mg total) by mouth as directed.    guaifenesin (MUCINEX) 600 mg 12 hr tablet Take 2 tablets (1,200 mg total) by mouth 2 (two) times daily. (Patient taking differently: Take 1,200 mg by mouth 2 (two) times daily. As needed for flares)    hydroxychloroquine (PLAQUENIL) 200 mg tablet TAKE ONE TABLET BY MOUTH ONCE DAILY    nitroGLYCERIN (NITROSTAT) 0.4 MG SL tablet Place 1 tablet (0.4 mg  total) under the tongue every 5 (five) minutes as needed for Chest pain.    pantoprazole (PROTONIX) 40 MG tablet Take 1 tablet (40 mg total) by mouth once daily.    phenobarb-hyoscy-atropine-scop (BELLADONNA-PHENOBARBITAL) 16.2-0.1037 -0.0194 mg/5 mL Elix Take 5 mLs by mouth daily as needed (abdominal pain).    potassium chloride (KLOR-CON) 10 MEQ TbSR Take 1 tablet (10 mEq total) by mouth once daily.    pramoxine 1 % Foam Place rectally 3 (three) times daily as needed.    predniSONE (DELTASONE) 20 MG tablet Take 0.5 tablets (10 mg total) by mouth 2 (two) times daily. Take 2 tabs twice daily with food for three days then 1 tab twice daily for three days then 1 daily for three days and then stop. Take your Protonix twice daily Before BF and Dinner for 5 days then once daily before BF as usual.    PREMARIN vaginal cream PLACE 1 GRAM VAGINALLY TWICE A WEEK    RESTASIS 0.05 % ophthalmic emulsion INSTILL ONE DROP INTO EACH EYE TWICE DAILY    rivaroxaban (XARELTO) 20 mg Tab Take 1 tablet (20 mg total) by mouth daily with dinner or evening meal.    vitamin D 1000 units Tab Take 2,000 Units by mouth once daily.    benzonatate (TESSALON) 100 MG capsule Take 1 capsule (100 mg total) by mouth 3 (three) times daily as needed for Cough.    hyoscyamine (ANASPAZ) 0.125 mg TbDL Take 0.125 mg by mouth every 6 (six) hours as needed for Cramping.    tramadol (ULTRAM) 50 mg tablet Take 1 tablet (50 mg total) by mouth 3 (three) times daily as needed.    triamcinolone acetonide 0.1% (KENALOG) 0.1 % ointment AAA bid prn     Family History     Problem Relation (Age of Onset)    Alzheimer's disease Mother    Cancer Father, Brother, Paternal Grandmother, Paternal Grandfather    Heart disease Maternal Uncle    Hyperlipidemia Mother        Social History Main Topics    Smoking status: Former Smoker     Packs/day: 0.50     Years: 25.00     Quit date: 12/17/1988    Smokeless tobacco: Never Used    Alcohol use 0.0 oz/week     0  Standard drinks or equivalent per week      Comment: wine occasionally    Drug use: No    Sexual activity: Yes     Partners: Male     Review of Systems   Complete 14 point review of systems done and negative except per HPI  Objective:     Vital Signs (Most Recent):  Temp: 97.3 °F (36.3 °C) (03/24/17 0046)  Pulse: 104 (03/24/17 0046)  Resp: 15 (03/24/17 0046)  BP: (!) 137/48 (03/24/17 0046)  SpO2: 99 % (03/24/17 0046) Vital Signs (24h Range):  Temp:  [96.4 °F (35.8 °C)-97.3 °F (36.3 °C)] 97.3 °F (36.3 °C)  Pulse:  [] 104  Resp:  [15-22] 15  SpO2:  [99 %] 99 %  BP: (122-140)/(48-67) 137/48     Weight: 58.1 kg (128 lb)  Body mass index is 23.41 kg/(m^2).    Physical Exam   Constitutional: She is oriented to person, place, and time. She appears well-developed and well-nourished.   HENT:   Head: Normocephalic and atraumatic.   Eyes: Conjunctivae are normal. Pupils are equal, round, and reactive to light. No scleral icterus.   Neck: Normal range of motion. Neck supple. No JVD present.   Cardiovascular: Normal rate and regular rhythm.    Pulmonary/Chest: Effort normal.   Diffuse expiratory wheezes, no crackles    Abdominal: Soft. Bowel sounds are normal. She exhibits no distension. There is tenderness. There is no rebound and no guarding.   Genitourinary:   Genitourinary Comments: Deferred    Musculoskeletal: Normal range of motion. She exhibits no edema.   Neurological: She is alert and oriented to person, place, and time.   No gross focal motor deficits    Skin: Skin is warm and dry.   Psychiatric: She has a normal mood and affect. Her behavior is normal.        Significant Labs:   Recent Lab Results       03/24/17  0038 03/23/17  2354 03/23/17  2349 03/23/17  2250      Albumin    3.3(L)     Alkaline Phosphatase    68     ALT    80(H)     Anion Gap    13     Appearance, UA  Clear       aPTT    22.9  Comment:  aPTT therapeutic range = 39-69 seconds     AST    39     Bacteria, UA  None       Baso #    0.01      Basophil%    0.1     Bilirubin (UA)  Negative       Total Bilirubin    0.5  Comment:  For infants and newborns, interpretation of results should be based  on gestational age, weight and in agreement with clinical  observations.  Premature Infant recommended reference ranges:  Up to 24 hours.............<8.0 mg/dL  Up to 48 hours............<12.0 mg/dL  3-5 days..................<15.0 mg/dL  6-29 days.................<15.0 mg/dL       BUN, Bld    51(H)     Calcium    9.1     Chloride    99     CO2    20(L)     Color, UA  Yellow       Creatinine    2.1(H)     Differential Method    Automated     eGFR if     26(A)     eGFR if non     22  Comment:  Calculation used to obtain the estimated glomerular filtration  rate (eGFR) is the CKD-EPI equation. Since race is unknown   in our information system, the eGFR values for   -American and Non--American patients are given   for each creatinine result.  (A)     Eos #    0.0     Eosinophil%    0.0     Glucose    581  Comment:  Glucose critical result(s) called and verbal readback obtained from   Sanjay Arreaga, 03/23/2017 23:54  (HH)     Glucose, UA  3+(A)       Gran #    14.0(H)     Gran%    83.1(H)     Hematocrit    23.9(L)     Hemoglobin    7.8(L)     Coumadin Monitoring INR    2.0  Comment:  Coumadin Therapy:  2.0 - 3.0 for INR for all indicators except mechanical heart valves  and antiphospholipid syndromes which should use 2.5 - 3.5.  (H)     Ketones, UA  Negative       Lactate, Jason    3.4  Comment:  Falsely low lactic acid results can be found in samples   containing >=13.0 mg/dL total bilirubin and/or >=3.5 mg/dL   direct bilirubin.  (H)     Leukocytes, UA  Negative       Lymph #    1.2     Lymph%    7.4(L)     MCH    29.7     MCHC    32.6     MCV    91     Microscopic Comment  SEE COMMENT  Comment:  Other formed elements not mentioned in the report are not   present in the microscopic examination.          Mono #     1.6(H)     Mono%    9.4     MPV    10.1     Nitrite, UA  Negative       Occult Blood   Positive(A)      Occult Blood UA  Negative       pH, UA  6.0       Platelets    276     POCT Glucose 326(H)        Potassium    5.3(H)     Total Protein    6.0     Protein, UA  Negative  Comment:  Recommend a 24 hour urine protein or a urine   protein/creatinine ratio if globulin induced proteinuria is  clinically suspected.         Protime    19.8(H)     RBC    2.63(L)     RDW    15.7(H)     Sodium    132(L)     Specific Gravity, UA  1.020       Specimen UA  Urine, Catheterized       Troponin I    0.028  Comment:  The reference interval for Troponin I represents the 99th percentile   cutoff   for our facility and is consistent with 3rd generation assay   performance.  (H)     Urobilinogen, UA  Negative       WBC    16.87(H)     Yeast, UA  None           CXR - no acute abnormalities

## 2017-03-24 NOTE — ASSESSMENT & PLAN NOTE
Protonix 40 mg IV BID  GI consulted   clear liquid diet   hold Aspirin/Xarlelto    -may need colonoscopy/ EGD once medically stable

## 2017-03-24 NOTE — PLAN OF CARE
Met with patient and patient's  in hospital room for Discharge Planning Assessment.  Patient and  report that patient is independent with ADLs and IADLs, was receiving no HH services prior to admission and indicate that patient will not need any HH services upon discharge.  Patient stating that she would like for a nebulizer to be arranged for her prior to discharge, as her current nebulizer is over 5 years old. Patient and  indicate having no other needs or concerns at this time.    Obtained order for nebulizer; spoke with Lefty at [facility] requesting that due to [facility] being unable to provide nebulizer, that [facility] coordinate arranging for patient.  Provided Lefty with patient's MRN.     PLAN:  Continue to follow       03/24/17 1433   Discharge Assessment   Assessment Type Discharge Planning Assessment   Confirmed/corrected address and phone number on facesheet? Yes   Assessment information obtained from? Patient;Caregiver;Medical Record   Expected Length of Stay (days) (TBD)   Communicated expected length of stay with patient/caregiver no   Prior to hospitilization cognitive status: Alert/Oriented   Prior to hospitalization functional status: Assistive Equipment   Current cognitive status: Alert/Oriented   Current Functional Status: Assistive Equipment   Arrived From home or self-care;admitted as an inpatient   Lives With spouse   Able to Return to Prior Arrangements yes   Is patient able to care for self after discharge? Yes   Does the patient have family/friends to help with healtcare needs after discharge? yes   How many people do you have in your home that can help with your care after discharge? 1   Who are your caregiver(s) and their phone number(s)? Anibal Montalvo, :  [phone]   Patient's perception of discharge disposition home or selfcare   Readmission Within The Last 30 Days current reason for admission unrelated to previous admission   Patient currently being  followed by outpatient case management? No   Patient currently receives home health services? No   Does the patient currently use HME? Yes   Name and contact number for HME provider: Cannot recall   Patient currently receives private duty nursing? No   Patient currently receives any other outside agency services? No   Equipment Currently Used at Home other (see comments)  (Nebulizer)   Do you have any problems affording any of your prescribed medications? No   Is the patient taking medications as prescribed? yes   Do you have any financial concerns preventing you from receiving the healthcare you need? No   Does the patient have transportation to healthcare appointments? Yes   Transportation Available family or friend will provide;car   On Dialysis? No   Does the patient receive services at the Coumadin Clinic? No   Are there any open cases? No   Discharge Plan A Home with family   Discharge Plan B Home with family;Home Health   Patient/Family In Agreement With Plan yes

## 2017-03-24 NOTE — MEDICAL/APP STUDENT
[facility] Hospital Medicine  Consult Note    Patient Name: Marcel Montalvo  MRN: [id number]  Admission Date: 3/23/2017  Hospital Length of Stay: 0 days   Attending Physician: Randy Valdez MD   Primary Care Provider: Alexandra Moreno MD           Patient information was obtained from patient and ER records.     Consults  Subjective:     Principal Problem:Acute blood loss anemia    Chief Complaint:   Chief Complaint   Patient presents with    Shortness of Breath     Pt reports pain with deep inspiration, CP/SOB since recent hosp d/c. Pt also reports taking nitro SL with minor improvement.        HPI: Pt is a 75 y/o w/f with a history of AF, long term anticoagulant use, aortic regurgitation, asthma, DM, GERD, and IBS.  Pt recently discharged from hospital on 3/18/17 after admission for acute respiratory distress secondary to asthma.  After discharge pt reported 3-4 soft dark BMs on 3/19 - 3/20.  On the following and subsequent days pt reports BM returned to normal, soft brown BM and denies further dark stools. During this time pt reports increasingly worsening right sided abdominal pain that is beginning to bruise to a greater degree over the past week.  Pt reports pain is a 10 at worst and is currently a 4. Pain radiates to her left abdomen when palpated is worsened with movement and palpation and improved by IV pain medication.  Pt denies direct trauma and believes she may have pulled a muscle when coughing recently.  Pt denies fever, nausea or vomiting    Past Medical History:   Diagnosis Date    *Atrial fibrillation     AF (atrial fibrillation)     Palpation: atrial fibrillation, on Coumadin and followed by cardiologist.    Anticoagulant long-term use     Anxiety     Aortic insufficiency 3/19/2014    Aortic regurgitation     Aortic regurgitation/tricuspid regurgitation per MARIO in April 2007.     Asthma     Carotid artery stenosis     CEA on the left by Dr. Maciel    Coronary artery  disease     status post CABG X 3.     Diabetes mellitus     GERD (gastroesophageal reflux disease)     H. pylori infection     treated    Hyperlipidemia     intolerant to statins.    Hypertension 8/7/2013    IBS (irritable bowel syndrome)     Leg pain 3/19/2014    Long-term (current) use of anticoagulants 8/7/2013    Osteoarthritis     Osteoarthritis     Osteopenia     DEXA scan done on 06/20/12     Pancreatitis     resolved    S/P CABG (coronary artery bypass graft) 8/7/2013    S/P carotid endarterectomy 8/7/2013    S/P PTCA (percutaneous transluminal coronary angioplasty) 3/19/2014    Sjogren's syndrome     Stroke     Vitamin D deficiency disease        Past Surgical History:   Procedure Laterality Date    ABDOMINAL SURGERY      APPENDECTOMY      bilateral cataract surgery      CARPAL TUNNEL RELEASE      RT    CATARACT EXTRACTION      CEA      left    CHOLECYSTECTOMY      CORONARY ARTERY BYPASS GRAFT      2 stents    EYE SURGERY      HYSTERECTOMY      TONSILLECTOMY         Review of patient's allergies indicates:   Allergen Reactions    Codeine Nausea And Vomiting    Lisinopril      Other reaction(s): cough    Pacerone [amiodarone] Nausea And Vomiting    Sulfa (sulfonamide antibiotics) Nausea And Vomiting    Celecoxib Palpitations    Ciprofloxacin Hives, Itching and Rash    Colesevelam Rash and Nausea And Vomiting    Doxycycline Rash    Statins-hmg-coa reductase inhibitors Rash     Myalgia(can take atorvastatin ok -no rhabdo)per nurse josé antonio and patient  / stomach upset       No current facility-administered medications on file prior to encounter.      Current Outpatient Prescriptions on File Prior to Encounter   Medication Sig    albuterol (PROAIR HFA) 90 mcg/actuation inhaler Inhale 2 puffs into the lungs every 6 (six) hours as needed.    amoxicillin-clavulanate 875-125mg (AUGMENTIN) 875-125 mg per tablet Take 1 tablet by mouth every 12 (twelve) hours.    aspirin (ECOTRIN)  81 MG EC tablet Take 1 tablet (81 mg total) by mouth once daily.    atorvastatin (LIPITOR) 40 MG tablet TAKE ONE TABLET BY MOUTH NIGHTLY    azelastine (ASTELIN) 137 mcg (0.1 %) nasal spray 2 sprays (274 mcg total) by Nasal route 2 (two) times daily.    budesonide-formoterol 160-4.5 mcg (SYMBICORT) 160-4.5 mcg/actuation HFAA Inhale 2 puffs into the lungs every 12 (twelve) hours. Wash out mouth after using    BYSTOLIC 5 mg Tab TAKE ONE TABLET BY MOUTH TWICE DAILY (Patient taking differently: TAKE ONE TABLET BY MOUTH DAILY)    carboxymethylcellulose (REFRESH PLUS) 0.5 % Dpet Place 1 drop into both eyes 3 (three) times daily as needed.    diclofenac sodium (VOLTAREN) 1 % Gel Apply 2 g topically once daily.    dofetilide (TIKOSYN) 125 MCG Cap Take 1 capsule (125 mcg total) by mouth every 12 (twelve) hours.    escitalopram oxalate (LEXAPRO) 10 MG tablet TAKE ONE-HALF TO ONE TABLET BY MOUTH EVERY DAY    fluticasone (FLONASE) 50 mcg/actuation nasal spray 2 sprays by Each Nare route once daily.    furosemide (LASIX) 20 MG tablet Take 1 tablet (20 mg total) by mouth as directed.    guaifenesin (MUCINEX) 600 mg 12 hr tablet Take 2 tablets (1,200 mg total) by mouth 2 (two) times daily. (Patient taking differently: Take 1,200 mg by mouth 2 (two) times daily. As needed for flares)    hydroxychloroquine (PLAQUENIL) 200 mg tablet TAKE ONE TABLET BY MOUTH ONCE DAILY    nitroGLYCERIN (NITROSTAT) 0.4 MG SL tablet Place 1 tablet (0.4 mg total) under the tongue every 5 (five) minutes as needed for Chest pain.    pantoprazole (PROTONIX) 40 MG tablet Take 1 tablet (40 mg total) by mouth once daily.    phenobarb-hyoscy-atropine-scop (BELLADONNA-PHENOBARBITAL) 16.2-0.1037 -0.0194 mg/5 mL Elix Take 5 mLs by mouth daily as needed (abdominal pain).    potassium chloride (KLOR-CON) 10 MEQ TbSR Take 1 tablet (10 mEq total) by mouth once daily.    pramoxine 1 % Foam Place rectally 3 (three) times daily as needed.    predniSONE  (DELTASONE) 20 MG tablet Take 0.5 tablets (10 mg total) by mouth 2 (two) times daily. Take 2 tabs twice daily with food for three days then 1 tab twice daily for three days then 1 daily for three days and then stop. Take your Protonix twice daily Before BF and Dinner for 5 days then once daily before BF as usual.    PREMARIN vaginal cream PLACE 1 GRAM VAGINALLY TWICE A WEEK    RESTASIS 0.05 % ophthalmic emulsion INSTILL ONE DROP INTO EACH EYE TWICE DAILY    rivaroxaban (XARELTO) 20 mg Tab Take 1 tablet (20 mg total) by mouth daily with dinner or evening meal.    vitamin D 1000 units Tab Take 2,000 Units by mouth once daily.    benzonatate (TESSALON) 100 MG capsule Take 1 capsule (100 mg total) by mouth 3 (three) times daily as needed for Cough.    hyoscyamine (ANASPAZ) 0.125 mg TbDL Take 0.125 mg by mouth every 6 (six) hours as needed for Cramping.    tramadol (ULTRAM) 50 mg tablet Take 1 tablet (50 mg total) by mouth 3 (three) times daily as needed.    triamcinolone acetonide 0.1% (KENALOG) 0.1 % ointment AAA bid prn     Family History     Problem Relation (Age of Onset)    Alzheimer's disease Mother    Cancer Father, Brother, Paternal Grandmother, Paternal Grandfather    Heart disease Maternal Uncle    Hyperlipidemia Mother        Social History Main Topics    Smoking status: Former Smoker     Packs/day: 0.50     Years: 25.00     Quit date: 12/17/1988    Smokeless tobacco: Never Used    Alcohol use 0.0 oz/week     0 Standard drinks or equivalent per week      Comment: wine occasionally    Drug use: No    Sexual activity: Yes     Partners: Male     Review of Systems   Constitutional: Negative for appetite change, fatigue and fever.   HENT: Negative for ear pain, nosebleeds, sore throat and trouble swallowing.    Eyes: Negative for pain, discharge and visual disturbance.   Respiratory: Positive for cough, shortness of breath and wheezing. Negative for choking, chest tightness and stridor.     Cardiovascular: Negative for chest pain, palpitations and leg swelling.   Gastrointestinal: Positive for abdominal distention, abdominal pain and blood in stool. Negative for constipation, diarrhea, nausea and vomiting.   Genitourinary: Positive for flank pain. Negative for difficulty urinating, frequency and hematuria.   Musculoskeletal: Negative for arthralgias, back pain and joint swelling.   Skin: Negative for pallor and rash.        Ecchymosis to right flank   Neurological: Positive for weakness. Negative for dizziness, syncope, speech difficulty and headaches.   Hematological: Bruises/bleeds easily.     Objective:     Vital Signs (Most Recent):  Temp: 98.4 °F (36.9 °C) (03/24/17 0840)  Pulse: (!) 122 (03/24/17 0840)  Resp: 18 (03/24/17 0840)  BP: (!) 148/96 (03/24/17 0840)  SpO2: 99 % (03/24/17 0840) Vital Signs (24h Range):  Temp:  [96.4 °F (35.8 °C)-98.4 °F (36.9 °C)] 98.4 °F (36.9 °C)  Pulse:  [] 122  Resp:  [13-22] 18  SpO2:  [96 %-100 %] 99 %  BP: (106-148)/(48-96) 148/96     Weight: 58.1 kg (128 lb)  Body mass index is 23.41 kg/(m^2).    Physical Exam   Constitutional: She is oriented to person, place, and time. She appears well-developed and well-nourished.   HENT:   Head: Normocephalic and atraumatic.   Right Ear: External ear normal.   Left Ear: External ear normal.   Nose: Nose normal.   Mouth/Throat: Oropharynx is clear and moist.   Eyes: Conjunctivae are normal. Pupils are equal, round, and reactive to light. Right eye exhibits no discharge. Left eye exhibits no discharge.   Neck: Normal range of motion. Neck supple. No JVD present.   Cardiovascular: S1 normal, S2 normal and normal heart sounds.  An irregularly irregular rhythm present. Exam reveals no gallop and no friction rub.    No murmur heard.  Pulmonary/Chest: Effort normal. No respiratory distress. She has no wheezes. She has rhonchi in the right upper field, the right middle field, the left upper field and the left middle field.  She has no rales.   Abdominal: Bowel sounds are normal. There is tenderness in the right upper quadrant and right lower quadrant. There is rigidity and guarding.       Musculoskeletal: Normal range of motion. She exhibits no edema or deformity.   Neurological: She is alert and oriented to person, place, and time.   Skin: Skin is warm and intact. Bruising noted. No laceration and no rash noted. She is not diaphoretic.       Significant Labs:   BMP:   Recent Labs  Lab 03/23/17  2250   *   *   K 5.3*   CL 99   CO2 20*   BUN 51*   CREATININE 2.1*   CALCIUM 9.1     CBC:   Recent Labs  Lab 03/23/17 2250   WBC 16.87*   HGB 7.8*   HCT 23.9*        CMP:   Recent Labs  Lab 03/23/17 2250   *   K 5.3*   CL 99   CO2 20*   *   BUN 51*   CREATININE 2.1*   CALCIUM 9.1   PROT 6.0   ALBUMIN 3.3*   BILITOT 0.5   ALKPHOS 68   AST 39   ALT 80*   ANIONGAP 13   EGFRNONAA 22*     Coagulation:   Recent Labs  Lab 03/23/17  2250   INR 2.0*   APTT 22.9     Lipase: No results for input(s): LIPASE in the last 48 hours.  All pertinent labs within the past 24 hours have been reviewed.    Significant Imaging: I have reviewed all pertinent imaging results/findings within the past 24 hours.    Assessment/Plan:     Active Diagnoses:    Diagnosis Date Noted POA    PRINCIPAL PROBLEM:  Acute blood loss anemia [D62] 03/24/2017 Yes    Chronic diastolic heart failure [I50.32] 03/24/2017 Yes     Chronic    Simple chronic bronchitis [J41.0] 03/24/2017 Yes     Chronic    Upper GI bleed [K92.2] 03/24/2017 Yes    DOMINGO (acute kidney injury) [N17.9] 03/24/2017 Yes    Chronic atrial fibrillation [I48.2] 09/02/2016 Yes     Chronic    Type 2 diabetes mellitus with hyperglycemia, without long-term current use of insulin [E11.65] 01/11/2016 Yes     Chronic    Essential hypertension [I10] 08/07/2013 Yes     Chronic    Coronary artery disease involving coronary bypass graft without angina pectoris [I25.810]  Yes     Chronic       Problems Resolved During this Admission:    Diagnosis Date Noted Date Resolved POA     VTE Risk Mitigation         Ordered     Place sequential compression device  Until discontinued      03/24/17 0111        1) Acute blood loss anemia   - Complete transfusion   - Monitor H&H    - Transfuse if Hgb between 7-8    2) GI bleed   - Consider as source of bleeding   - Once stable complete both an EGD and Colonoscopy   - Continue to hold Xarelto and ASA   - Keep pt NPO    3) DOMINGO   - Continue to monitor creatinine      Thank you for your consult. I will follow-up with patient. Please contact us if you have any additional questions.    Anupam Jean  Department of Hospital Medicine   [facility]

## 2017-03-24 NOTE — ASSESSMENT & PLAN NOTE
-General Surgery consulted  -no surgical intervention required at this time  -may need IR embolization   -will continue monitor

## 2017-03-24 NOTE — H&P
[facility] Hospital Medicine  History & Physical    Patient Name: Marcel Montalvo  MRN: [id number]  Admission Date: 3/23/2017  Attending Physician: Rigo Ambriz MD   Primary Care Provider: Alexandra Moreno MD         Patient information was obtained from patient, past medical records and ER records.     Subjective:     Principal Problem:Acute blood loss anemia    Chief Complaint:   Chief Complaint   Patient presents with    Shortness of Breath     Pt reports pain with deep inspiration, CP/SOB since recent hosp d/c. Pt also reports taking nitro SL with minor improvement.        HPI: 76 year old female with h/o CHF, COPD, Afib, NIDDM presents complaining of generalized weakness/fatigue for the past day.  Has been getting lightheaded when she stands up.  Has some shortness of breath and cough but has been improving.  Reports black/tarry stools and is having right sided abdominal pain that is dull/achy in nature with sharp/stabbing exacerbations.  Has some nausea, no vomiting.  Was admitted last week with CHF/COPD.    Past Medical History:   Diagnosis Date    *Atrial fibrillation     AF (atrial fibrillation)     Palpation: atrial fibrillation, on Coumadin and followed by cardiologist.    Anticoagulant long-term use     Anxiety     Aortic insufficiency 3/19/2014    Aortic regurgitation     Aortic regurgitation/tricuspid regurgitation per MARIO in April 2007.     Asthma     Carotid artery stenosis     CEA on the left by Dr. Maciel    Coronary artery disease     status post CABG X 3.     Diabetes mellitus     GERD (gastroesophageal reflux disease)     H. pylori infection     treated    Hyperlipidemia     intolerant to statins.    Hypertension 8/7/2013    IBS (irritable bowel syndrome)     Leg pain 3/19/2014    Long-term (current) use of anticoagulants 8/7/2013    Osteoarthritis     Osteoarthritis     Osteopenia     DEXA scan done on 06/20/12     Pancreatitis     resolved    S/P CABG  (coronary artery bypass graft) 8/7/2013    S/P carotid endarterectomy 8/7/2013    S/P PTCA (percutaneous transluminal coronary angioplasty) 3/19/2014    Sjogren's syndrome     Stroke     Vitamin D deficiency disease        Past Surgical History:   Procedure Laterality Date    ABDOMINAL SURGERY      APPENDECTOMY      bilateral cataract surgery      CARPAL TUNNEL RELEASE      RT    CATARACT EXTRACTION      CEA      left    CHOLECYSTECTOMY      CORONARY ARTERY BYPASS GRAFT      2 stents    EYE SURGERY      HYSTERECTOMY      TONSILLECTOMY         Review of patient's allergies indicates:   Allergen Reactions    Codeine Nausea And Vomiting    Lisinopril      Other reaction(s): cough    Pacerone [amiodarone] Nausea And Vomiting    Sulfa (sulfonamide antibiotics) Nausea And Vomiting    Celecoxib Palpitations    Ciprofloxacin Hives, Itching and Rash    Colesevelam Rash and Nausea And Vomiting    Doxycycline Rash    Statins-hmg-coa reductase inhibitors Rash     Myalgia(can take atorvastatin ok -no rhabdo)per nurse josé antonio and patient  / stomach upset       Current Facility-Administered Medications on File Prior to Encounter   Medication    denosumab (PROLIA) injection 60 mg     Current Outpatient Prescriptions on File Prior to Encounter   Medication Sig    albuterol (PROAIR HFA) 90 mcg/actuation inhaler Inhale 2 puffs into the lungs every 6 (six) hours as needed.    amoxicillin-clavulanate 875-125mg (AUGMENTIN) 875-125 mg per tablet Take 1 tablet by mouth every 12 (twelve) hours.    aspirin (ECOTRIN) 81 MG EC tablet Take 1 tablet (81 mg total) by mouth once daily.    atorvastatin (LIPITOR) 40 MG tablet TAKE ONE TABLET BY MOUTH NIGHTLY    azelastine (ASTELIN) 137 mcg (0.1 %) nasal spray 2 sprays (274 mcg total) by Nasal route 2 (two) times daily.    budesonide-formoterol 160-4.5 mcg (SYMBICORT) 160-4.5 mcg/actuation HFAA Inhale 2 puffs into the lungs every 12 (twelve) hours. Wash out mouth after  using    BYSTOLIC 5 mg Tab TAKE ONE TABLET BY MOUTH TWICE DAILY (Patient taking differently: TAKE ONE TABLET BY MOUTH DAILY)    carboxymethylcellulose (REFRESH PLUS) 0.5 % Dpet Place 1 drop into both eyes 3 (three) times daily as needed.    diclofenac sodium (VOLTAREN) 1 % Gel Apply 2 g topically once daily.    dofetilide (TIKOSYN) 125 MCG Cap Take 1 capsule (125 mcg total) by mouth every 12 (twelve) hours.    escitalopram oxalate (LEXAPRO) 10 MG tablet TAKE ONE-HALF TO ONE TABLET BY MOUTH EVERY DAY    fluticasone (FLONASE) 50 mcg/actuation nasal spray 2 sprays by Each Nare route once daily.    furosemide (LASIX) 20 MG tablet Take 1 tablet (20 mg total) by mouth as directed.    guaifenesin (MUCINEX) 600 mg 12 hr tablet Take 2 tablets (1,200 mg total) by mouth 2 (two) times daily. (Patient taking differently: Take 1,200 mg by mouth 2 (two) times daily. As needed for flares)    hydroxychloroquine (PLAQUENIL) 200 mg tablet TAKE ONE TABLET BY MOUTH ONCE DAILY    nitroGLYCERIN (NITROSTAT) 0.4 MG SL tablet Place 1 tablet (0.4 mg total) under the tongue every 5 (five) minutes as needed for Chest pain.    pantoprazole (PROTONIX) 40 MG tablet Take 1 tablet (40 mg total) by mouth once daily.    phenobarb-hyoscy-atropine-scop (BELLADONNA-PHENOBARBITAL) 16.2-0.1037 -0.0194 mg/5 mL Elix Take 5 mLs by mouth daily as needed (abdominal pain).    potassium chloride (KLOR-CON) 10 MEQ TbSR Take 1 tablet (10 mEq total) by mouth once daily.    pramoxine 1 % Foam Place rectally 3 (three) times daily as needed.    predniSONE (DELTASONE) 20 MG tablet Take 0.5 tablets (10 mg total) by mouth 2 (two) times daily. Take 2 tabs twice daily with food for three days then 1 tab twice daily for three days then 1 daily for three days and then stop. Take your Protonix twice daily Before BF and Dinner for 5 days then once daily before BF as usual.    PREMARIN vaginal cream PLACE 1 GRAM VAGINALLY TWICE A WEEK    RESTASIS 0.05 %  ophthalmic emulsion INSTILL ONE DROP INTO EACH EYE TWICE DAILY    rivaroxaban (XARELTO) 20 mg Tab Take 1 tablet (20 mg total) by mouth daily with dinner or evening meal.    vitamin D 1000 units Tab Take 2,000 Units by mouth once daily.    benzonatate (TESSALON) 100 MG capsule Take 1 capsule (100 mg total) by mouth 3 (three) times daily as needed for Cough.    hyoscyamine (ANASPAZ) 0.125 mg TbDL Take 0.125 mg by mouth every 6 (six) hours as needed for Cramping.    tramadol (ULTRAM) 50 mg tablet Take 1 tablet (50 mg total) by mouth 3 (three) times daily as needed.    triamcinolone acetonide 0.1% (KENALOG) 0.1 % ointment AAA bid prn     Family History     Problem Relation (Age of Onset)    Alzheimer's disease Mother    Cancer Father, Brother, Paternal Grandmother, Paternal Grandfather    Heart disease Maternal Uncle    Hyperlipidemia Mother        Social History Main Topics    Smoking status: Former Smoker     Packs/day: 0.50     Years: 25.00     Quit date: 12/17/1988    Smokeless tobacco: Never Used    Alcohol use 0.0 oz/week     0 Standard drinks or equivalent per week      Comment: wine occasionally    Drug use: No    Sexual activity: Yes     Partners: Male     Review of Systems   Complete 14 point review of systems done and negative except per HPI  Objective:     Vital Signs (Most Recent):  Temp: 97.3 °F (36.3 °C) (03/24/17 0046)  Pulse: 104 (03/24/17 0046)  Resp: 15 (03/24/17 0046)  BP: (!) 137/48 (03/24/17 0046)  SpO2: 99 % (03/24/17 0046) Vital Signs (24h Range):  Temp:  [96.4 °F (35.8 °C)-97.3 °F (36.3 °C)] 97.3 °F (36.3 °C)  Pulse:  [] 104  Resp:  [15-22] 15  SpO2:  [99 %] 99 %  BP: (122-140)/(48-67) 137/48     Weight: 58.1 kg (128 lb)  Body mass index is 23.41 kg/(m^2).    Physical Exam   Constitutional: She is oriented to person, place, and time. She appears well-developed and well-nourished.   HENT:   Head: Normocephalic and atraumatic.   Eyes: Conjunctivae are normal. Pupils are equal,  round, and reactive to light. No scleral icterus.   Neck: Normal range of motion. Neck supple. No JVD present.   Cardiovascular: Normal rate and regular rhythm.    Pulmonary/Chest: Effort normal.   Diffuse expiratory wheezes, no crackles    Abdominal: Soft. Bowel sounds are normal. She exhibits no distension. There is tenderness. There is no rebound and no guarding.   Genitourinary:   Genitourinary Comments: Deferred    Musculoskeletal: Normal range of motion. She exhibits no edema.   Neurological: She is alert and oriented to person, place, and time.   No gross focal motor deficits    Skin: Skin is warm and dry.   Psychiatric: She has a normal mood and affect. Her behavior is normal.        Significant Labs:   Recent Lab Results       03/24/17  0038 03/23/17  2354 03/23/17  2349 03/23/17  2250      Albumin    3.3(L)     Alkaline Phosphatase    68     ALT    80(H)     Anion Gap    13     Appearance, UA  Clear       aPTT    22.9  Comment:  aPTT therapeutic range = 39-69 seconds     AST    39     Bacteria, UA  None       Baso #    0.01     Basophil%    0.1     Bilirubin (UA)  Negative       Total Bilirubin    0.5  Comment:  For infants and newborns, interpretation of results should be based  on gestational age, weight and in agreement with clinical  observations.  Premature Infant recommended reference ranges:  Up to 24 hours.............<8.0 mg/dL  Up to 48 hours............<12.0 mg/dL  3-5 days..................<15.0 mg/dL  6-29 days.................<15.0 mg/dL       BUN, Bld    51(H)     Calcium    9.1     Chloride    99     CO2    20(L)     Color, UA  Yellow       Creatinine    2.1(H)     Differential Method    Automated     eGFR if     26(A)     eGFR if non     22  Comment:  Calculation used to obtain the estimated glomerular filtration  rate (eGFR) is the CKD-EPI equation. Since race is unknown   in our information system, the eGFR values for   -American and  Non--American patients are given   for each creatinine result.  (A)     Eos #    0.0     Eosinophil%    0.0     Glucose    581  Comment:  Glucose critical result(s) called and verbal readback obtained from   Sanjay Arreaga, 03/23/2017 23:54  (HH)     Glucose, UA  3+(A)       Gran #    14.0(H)     Gran%    83.1(H)     Hematocrit    23.9(L)     Hemoglobin    7.8(L)     Coumadin Monitoring INR    2.0  Comment:  Coumadin Therapy:  2.0 - 3.0 for INR for all indicators except mechanical heart valves  and antiphospholipid syndromes which should use 2.5 - 3.5.  (H)     Ketones, UA  Negative       Lactate, Jason    3.4  Comment:  Falsely low lactic acid results can be found in samples   containing >=13.0 mg/dL total bilirubin and/or >=3.5 mg/dL   direct bilirubin.  (H)     Leukocytes, UA  Negative       Lymph #    1.2     Lymph%    7.4(L)     MCH    29.7     MCHC    32.6     MCV    91     Microscopic Comment  SEE COMMENT  Comment:  Other formed elements not mentioned in the report are not   present in the microscopic examination.          Mono #    1.6(H)     Mono%    9.4     MPV    10.1     Nitrite, UA  Negative       Occult Blood   Positive(A)      Occult Blood UA  Negative       pH, UA  6.0       Platelets    276     POCT Glucose 326(H)        Potassium    5.3(H)     Total Protein    6.0     Protein, UA  Negative  Comment:  Recommend a 24 hour urine protein or a urine   protein/creatinine ratio if globulin induced proteinuria is  clinically suspected.         Protime    19.8(H)     RBC    2.63(L)     RDW    15.7(H)     Sodium    132(L)     Specific Gravity, UA  1.020       Specimen UA  Urine, Catheterized       Troponin I    0.028  Comment:  The reference interval for Troponin I represents the 99th percentile   cutoff   for our facility and is consistent with 3rd generation assay   performance.  (H)     Urobilinogen, UA  Negative       WBC    16.87(H)     Yeast, UA  None           CXR - no acute abnormalities        Assessment/Plan:     * Acute blood loss anemia  Secondary to GI bleed - Hgb drop from 11.8 last week to 7 tonight and patient symptomatic; transfuse 2 units of PRBC and f/u H/H      Upper GI bleed  Protonix 40 mg IV BID; consult GI in AM; clear liquid diet; hold Aspirin/Xarlelto        Type 2 diabetes mellitus with hyperglycemia, without long-term current use of insulin  accuchecks and sliding scale insulin      Chronic atrial fibrillation  Rate currently controlled; continue Bystolic and Tikosyn; hold Xarelto       DOMINGO (acute kidney injury)  Transfuse - hold IVFs; avoid nephrotoxins; monitor       Chronic diastolic heart failure  Compensated; continue Bystolic       Simple chronic bronchitis  Continue symbicort; duonebs prn      Coronary artery disease involving coronary bypass graft without angina pectoris  Hold Aspirin; continue Bystolic       Essential hypertension  Resume Bystolic; hydralazine prn      VTE Risk Mitigation         Ordered     Place sequential compression device  Until discontinued      03/24/17 0111        No Lovenox due to gi bleed    Rigo Ambriz MD  Department of Hospital Medicine   [facility]

## 2017-03-25 PROBLEM — D72.829 LEUKOCYTOSIS: Status: ACTIVE | Noted: 2017-03-25

## 2017-03-25 LAB
ALBUMIN SERPL BCP-MCNC: 2.9 G/DL
ALP SERPL-CCNC: 52 U/L
ALT SERPL W/O P-5'-P-CCNC: 76 U/L
ANION GAP SERPL CALC-SCNC: 13 MMOL/L
ANION GAP SERPL CALC-SCNC: 13 MMOL/L
ANISOCYTOSIS BLD QL SMEAR: SLIGHT
AST SERPL-CCNC: 39 U/L
BACTERIA UR CULT: NO GROWTH
BASOPHILS # BLD AUTO: 0.01 K/UL
BASOPHILS # BLD AUTO: 0.01 K/UL
BASOPHILS NFR BLD: 0 %
BASOPHILS NFR BLD: 0 %
BILIRUB SERPL-MCNC: 0.7 MG/DL
BLD PROD TYP BPU: NORMAL
BLD PROD TYP BPU: NORMAL
BLOOD UNIT EXPIRATION DATE: NORMAL
BLOOD UNIT EXPIRATION DATE: NORMAL
BLOOD UNIT TYPE CODE: 6200
BLOOD UNIT TYPE CODE: 6200
BLOOD UNIT TYPE: NORMAL
BLOOD UNIT TYPE: NORMAL
BUN SERPL-MCNC: 65 MG/DL
BUN SERPL-MCNC: 74 MG/DL
CALCIUM SERPL-MCNC: 7.9 MG/DL
CALCIUM SERPL-MCNC: 8.2 MG/DL
CHLORIDE SERPL-SCNC: 97 MMOL/L
CHLORIDE SERPL-SCNC: 98 MMOL/L
CO2 SERPL-SCNC: 22 MMOL/L
CO2 SERPL-SCNC: 23 MMOL/L
CODING SYSTEM: NORMAL
CODING SYSTEM: NORMAL
CREAT SERPL-MCNC: 2.9 MG/DL
CREAT SERPL-MCNC: 3.1 MG/DL
DACRYOCYTES BLD QL SMEAR: ABNORMAL
DIFFERENTIAL METHOD: ABNORMAL
DIFFERENTIAL METHOD: ABNORMAL
DISPENSE STATUS: NORMAL
DISPENSE STATUS: NORMAL
EOSINOPHIL # BLD AUTO: 0 K/UL
EOSINOPHIL # BLD AUTO: 0 K/UL
EOSINOPHIL NFR BLD: 0 %
EOSINOPHIL NFR BLD: 0 %
ERYTHROCYTE [DISTWIDTH] IN BLOOD BY AUTOMATED COUNT: 16.5 %
ERYTHROCYTE [DISTWIDTH] IN BLOOD BY AUTOMATED COUNT: 16.9 %
EST. GFR  (AFRICAN AMERICAN): 16 ML/MIN/1.73 M^2
EST. GFR  (AFRICAN AMERICAN): 17 ML/MIN/1.73 M^2
EST. GFR  (NON AFRICAN AMERICAN): 14 ML/MIN/1.73 M^2
EST. GFR  (NON AFRICAN AMERICAN): 15 ML/MIN/1.73 M^2
GLUCOSE SERPL-MCNC: 200 MG/DL
GLUCOSE SERPL-MCNC: 284 MG/DL
HCT VFR BLD AUTO: 19.4 %
HCT VFR BLD AUTO: 24.9 %
HGB BLD-MCNC: 6.8 G/DL
HGB BLD-MCNC: 8.4 G/DL
INR PPP: 1.3
LACTATE SERPL-SCNC: 2.1 MMOL/L
LACTATE SERPL-SCNC: 2.7 MMOL/L
LYMPHOCYTES # BLD AUTO: 2 K/UL
LYMPHOCYTES # BLD AUTO: 2.5 K/UL
LYMPHOCYTES NFR BLD: 7.5 %
LYMPHOCYTES NFR BLD: 8.8 %
MCH RBC QN AUTO: 28.9 PG
MCH RBC QN AUTO: 29.7 PG
MCHC RBC AUTO-ENTMCNC: 33.7 %
MCHC RBC AUTO-ENTMCNC: 35.1 %
MCV RBC AUTO: 85 FL
MCV RBC AUTO: 86 FL
MONOCYTES # BLD AUTO: 1.8 K/UL
MONOCYTES # BLD AUTO: 2.3 K/UL
MONOCYTES NFR BLD: 6.5 %
MONOCYTES NFR BLD: 8.1 %
NEUTROPHILS # BLD AUTO: 23.3 K/UL
NEUTROPHILS # BLD AUTO: 23.4 K/UL
NEUTROPHILS NFR BLD: 83.9 %
NEUTROPHILS NFR BLD: 86 %
NUM UNITS TRANS PACKED RBC: NORMAL
NUM UNITS TRANS PACKED RBC: NORMAL
OVALOCYTES BLD QL SMEAR: ABNORMAL
PLATELET # BLD AUTO: 214 K/UL
PLATELET # BLD AUTO: 220 K/UL
PLATELET BLD QL SMEAR: ABNORMAL
PMV BLD AUTO: 10 FL
PMV BLD AUTO: 10.9 FL
POCT GLUCOSE: 225 MG/DL (ref 70–110)
POCT GLUCOSE: 259 MG/DL (ref 70–110)
POCT GLUCOSE: 267 MG/DL (ref 70–110)
POIKILOCYTOSIS BLD QL SMEAR: SLIGHT
POLYCHROMASIA BLD QL SMEAR: ABNORMAL
POTASSIUM SERPL-SCNC: 4.3 MMOL/L
POTASSIUM SERPL-SCNC: 4.6 MMOL/L
PROT SERPL-MCNC: 5.1 G/DL
PROTHROMBIN TIME: 13.8 SEC
RBC # BLD AUTO: 2.29 M/UL
RBC # BLD AUTO: 2.91 M/UL
SODIUM SERPL-SCNC: 133 MMOL/L
SODIUM SERPL-SCNC: 133 MMOL/L
SPHEROCYTES BLD QL SMEAR: ABNORMAL
WBC # BLD AUTO: 27.24 K/UL
WBC # BLD AUTO: 28.02 K/UL

## 2017-03-25 PROCEDURE — 36415 COLL VENOUS BLD VENIPUNCTURE: CPT

## 2017-03-25 PROCEDURE — 94640 AIRWAY INHALATION TREATMENT: CPT

## 2017-03-25 PROCEDURE — 94761 N-INVAS EAR/PLS OXIMETRY MLT: CPT

## 2017-03-25 PROCEDURE — 83605 ASSAY OF LACTIC ACID: CPT | Mod: 91

## 2017-03-25 PROCEDURE — 85025 COMPLETE CBC W/AUTO DIFF WBC: CPT

## 2017-03-25 PROCEDURE — 80048 BASIC METABOLIC PNL TOTAL CA: CPT

## 2017-03-25 PROCEDURE — 87070 CULTURE OTHR SPECIMN AEROBIC: CPT

## 2017-03-25 PROCEDURE — 87186 SC STD MICRODIL/AGAR DIL: CPT

## 2017-03-25 PROCEDURE — 25000003 PHARM REV CODE 250: Performed by: HOSPITALIST

## 2017-03-25 PROCEDURE — C9113 INJ PANTOPRAZOLE SODIUM, VIA: HCPCS | Performed by: EMERGENCY MEDICINE

## 2017-03-25 PROCEDURE — 63600175 PHARM REV CODE 636 W HCPCS: Performed by: NURSE PRACTITIONER

## 2017-03-25 PROCEDURE — 63600175 PHARM REV CODE 636 W HCPCS: Performed by: EMERGENCY MEDICINE

## 2017-03-25 PROCEDURE — P9016 RBC LEUKOCYTES REDUCED: HCPCS

## 2017-03-25 PROCEDURE — 80053 COMPREHEN METABOLIC PANEL: CPT

## 2017-03-25 PROCEDURE — 87205 SMEAR GRAM STAIN: CPT

## 2017-03-25 PROCEDURE — 99231 SBSQ HOSP IP/OBS SF/LOW 25: CPT | Mod: ,,, | Performed by: PHYSICIAN ASSISTANT

## 2017-03-25 PROCEDURE — 94799 UNLISTED PULMONARY SVC/PX: CPT

## 2017-03-25 PROCEDURE — 85610 PROTHROMBIN TIME: CPT

## 2017-03-25 PROCEDURE — 83605 ASSAY OF LACTIC ACID: CPT

## 2017-03-25 PROCEDURE — 21400001 HC TELEMETRY ROOM

## 2017-03-25 PROCEDURE — 63600175 PHARM REV CODE 636 W HCPCS: Performed by: HOSPITALIST

## 2017-03-25 PROCEDURE — 87077 CULTURE AEROBIC IDENTIFY: CPT

## 2017-03-25 PROCEDURE — 27000221 HC OXYGEN, UP TO 24 HOURS

## 2017-03-25 PROCEDURE — 25000003 PHARM REV CODE 250: Performed by: NURSE PRACTITIONER

## 2017-03-25 PROCEDURE — 99232 SBSQ HOSP IP/OBS MODERATE 35: CPT | Mod: ,,, | Performed by: INTERNAL MEDICINE

## 2017-03-25 RX ORDER — CYCLOBENZAPRINE HCL 5 MG
5 TABLET ORAL 3 TIMES DAILY PRN
Status: DISCONTINUED | OUTPATIENT
Start: 2017-03-25 | End: 2017-03-26

## 2017-03-25 RX ORDER — GUAIFENESIN 600 MG/1
600 TABLET, EXTENDED RELEASE ORAL 2 TIMES DAILY
Status: DISCONTINUED | OUTPATIENT
Start: 2017-03-25 | End: 2017-03-29 | Stop reason: HOSPADM

## 2017-03-25 RX ORDER — FAMOTIDINE 20 MG/50ML
20 INJECTION, SOLUTION INTRAVENOUS DAILY
Status: DISCONTINUED | OUTPATIENT
Start: 2017-03-26 | End: 2017-03-29

## 2017-03-25 RX ORDER — METRONIDAZOLE 500 MG/100ML
500 INJECTION, SOLUTION INTRAVENOUS
Status: DISCONTINUED | OUTPATIENT
Start: 2017-03-25 | End: 2017-03-29

## 2017-03-25 RX ORDER — SODIUM CHLORIDE 9 MG/ML
INJECTION, SOLUTION INTRAVENOUS CONTINUOUS
Status: DISCONTINUED | OUTPATIENT
Start: 2017-03-25 | End: 2017-03-29 | Stop reason: HOSPADM

## 2017-03-25 RX ORDER — HYDROCODONE BITARTRATE AND ACETAMINOPHEN 500; 5 MG/1; MG/1
TABLET ORAL
Status: DISCONTINUED | OUTPATIENT
Start: 2017-03-25 | End: 2017-03-29 | Stop reason: HOSPADM

## 2017-03-25 RX ADMIN — PANTOPRAZOLE SODIUM 600 MG: 40 TABLET, DELAYED RELEASE ORAL at 12:03

## 2017-03-25 RX ADMIN — HYDROXYCHLOROQUINE SULFATE 200 MG: 200 TABLET, FILM COATED ORAL at 09:03

## 2017-03-25 RX ADMIN — NEBIVOLOL HYDROCHLORIDE 5 MG: 5 TABLET ORAL at 09:03

## 2017-03-25 RX ADMIN — PANTOPRAZOLE SODIUM 40 MG: 40 INJECTION, POWDER, FOR SOLUTION INTRAVENOUS at 09:03

## 2017-03-25 RX ADMIN — CEFTRIAXONE 1 G: 1 INJECTION, SOLUTION INTRAVENOUS at 03:03

## 2017-03-25 RX ADMIN — BUDESONIDE 0.5 MG: 0.5 SUSPENSION RESPIRATORY (INHALATION) at 07:03

## 2017-03-25 RX ADMIN — ACETAMINOPHEN 650 MG: 325 TABLET ORAL at 07:03

## 2017-03-25 RX ADMIN — DEXTROSE MONOHYDRATE 12.5 G: 25 INJECTION, SOLUTION INTRAVENOUS at 09:03

## 2017-03-25 RX ADMIN — SODIUM CHLORIDE: 0.9 INJECTION, SOLUTION INTRAVENOUS at 11:03

## 2017-03-25 RX ADMIN — CYCLOBENZAPRINE HYDROCHLORIDE 5 MG: 5 TABLET, FILM COATED ORAL at 11:03

## 2017-03-25 RX ADMIN — PANTOPRAZOLE SODIUM 600 MG: 40 TABLET, DELAYED RELEASE ORAL at 09:03

## 2017-03-25 RX ADMIN — INSULIN ASPART 3 UNITS: 100 INJECTION, SOLUTION INTRAVENOUS; SUBCUTANEOUS at 11:03

## 2017-03-25 RX ADMIN — ESCITALOPRAM OXALATE 10 MG: 10 TABLET, FILM COATED ORAL at 09:03

## 2017-03-25 RX ADMIN — METRONIDAZOLE 500 MG: 500 INJECTION, SOLUTION INTRAVENOUS at 03:03

## 2017-03-25 RX ADMIN — ARFORMOTEROL TARTRATE 15 MCG: 15 SOLUTION RESPIRATORY (INHALATION) at 07:03

## 2017-03-25 RX ADMIN — DOFETILIDE 125 MCG: 0.12 CAPSULE ORAL at 09:03

## 2017-03-25 RX ADMIN — INSULIN ASPART 3 UNITS: 100 INJECTION, SOLUTION INTRAVENOUS; SUBCUTANEOUS at 05:03

## 2017-03-25 RX ADMIN — INSULIN ASPART 2 UNITS: 100 INJECTION, SOLUTION INTRAVENOUS; SUBCUTANEOUS at 06:03

## 2017-03-25 NOTE — PROGRESS NOTES
[facility]  General Surgery  Progress Note    Subjective:     History of Present Illness:  Marcel Montalvo presented to the ED with right sided abdominal pain, weakness, and melena that began about 3 days ago. She was recently d/c from the hospital after tx for acute CHF and bronchitis. She reports bouts of coughing. She is on Xarelto for afib. She began to noticed right side abdominal pain and swelling that is worse with movement and coughing. She has not noticed increase in swelling since admission to the hospital. Non contrast CT shows a large abdominal wall hematoma. H/H decreased and pt is receiving blood. Stool was positive for occult blood and GI has been consulted.     Post-Op Info:  * No surgery found *         Interval History: c/o increased mucus production and cough. rlq pain is similar to yesterday.     Medications:  Continuous Infusions:   Scheduled Meds:   arformoterol  15 mcg Nebulization BID    And    budesonide  0.5 mg Nebulization BID    dofetilide  125 mcg Oral Q12H    escitalopram oxalate  10 mg Oral Daily    hydroxychloroquine  200 mg Oral Daily    nebivolol  5 mg Oral Daily    pantoprazole  40 mg Intravenous Daily     PRN Meds:sodium chloride, acetaminophen, albuterol-ipratropium 2.5mg-0.5mg/3mL, dextrose 50%, glucagon (human recombinant), insulin aspart, morphine, nitroGLYCERIN, ondansetron     Review of patient's allergies indicates:   Allergen Reactions    Codeine Nausea And Vomiting    Lisinopril      Other reaction(s): cough    Pacerone [amiodarone] Nausea And Vomiting    Sulfa (sulfonamide antibiotics) Nausea And Vomiting    Celecoxib Palpitations    Ciprofloxacin Hives, Itching and Rash    Colesevelam Rash and Nausea And Vomiting    Doxycycline Rash    Statins-hmg-coa reductase inhibitors Rash     Myalgia(can take atorvastatin ok -no rhabdo)per nurse josé antonio and patient  / stomach upset     Objective:     Vital Signs (Most Recent):  Temp: 97.7 °F (36.5 °C)  (03/25/17 0358)  Pulse: 75 (03/25/17 0705)  Resp: 18 (03/25/17 0705)  BP: (!) 123/55 (03/25/17 0358)  SpO2: 97 % (03/25/17 0705) Vital Signs (24h Range):  Temp:  [97.5 °F (36.4 °C)-98.4 °F (36.9 °C)] 97.7 °F (36.5 °C)  Pulse:  [] 75  Resp:  [18-20] 18  SpO2:  [0 %-100 %] 97 %  BP: (108-157)/(55-79) 123/55     Weight: 58.1 kg (128 lb)  Body mass index is 23.41 kg/(m^2).    Intake/Output - Last 3 Shifts       03/23 0700 - 03/24 0659 03/24 0700 - 03/25 0659 03/25 0700 - 03/26 0659    P.O.  1520     Blood 306.3 323.8     Total Intake(mL/kg) 306.3 (5.3) 1843.8 (31.8)     Urine (mL/kg/hr)  700 (0.5)     Total Output   700      Net +306.3 +1143.8             Urine Occurrence  3 x           Physical Exam   Constitutional: She appears well-developed and well-nourished.   HENT:   Head: Normocephalic and atraumatic.   Eyes: EOM are normal.   Cardiovascular: Normal rate and regular rhythm.    Pulmonary/Chest: Effort normal. No respiratory distress. She has rales.   Abdominal: Soft. Mass: hematoma to rlq/flank is about same size. Tenderness over hematoma. There is tenderness (rlq and flank).   Musculoskeletal: Normal range of motion.   Skin: Skin is warm and dry.   Psychiatric: She has a normal mood and affect. Thought content normal.   Vitals reviewed.      Significant Labs:  CBC:   Recent Labs  Lab 03/25/17  0447   WBC 28.02*   RBC 2.91*   HGB 8.4*   HCT 24.9*      MCV 86   MCH 28.9   MCHC 33.7     CMP:   Recent Labs  Lab 03/24/17  1334 03/25/17  0447   *  350* 284*   CALCIUM 8.7  8.7 8.2*   ALBUMIN 3.2*  --    PROT 5.4*  --    *  134* 133*   K 4.7  4.7 4.6   CO2 22*  22* 23     100 97   BUN 55*  55* 65*   CREATININE 2.1*  2.1* 2.9*   ALKPHOS 57  --    ALT 71*  --    AST 23  --    BILITOT 1.2*  --          Significant Diagnostics:  no new studies    Assessment/Plan:     Abdominal wall hematoma  Continue to hold anticoagulation, monitor H/H.  If hematoma continues to expand she may need  IR embolization vs surgical ligation. Will recheck tomorrow.        Chayo Obregon PA-C  General Surgery  [facility]

## 2017-03-25 NOTE — ASSESSMENT & PLAN NOTE
Continue to hold anticoagulation, monitor H/H.  If hematoma continues to expand she may need IR embolization vs surgical ligation. Will recheck tomorrow.

## 2017-03-25 NOTE — PROGRESS NOTES
[facility] Hospital Medicine  Progress Note    Patient Name: Marcel Montalvo  MRN: [id number]  Patient Class: IP- Inpatient   Admission Date: 3/23/2017  Length of Stay: 1 days  Attending Physician: Moy Sanderson MD  Primary Care Provider: Alexandra Moreno MD        Subjective:     Principal Problem:Acute blood loss anemia    HPI:  76 year old female with h/o CHF, COPD, Afib, NIDDM presents complaining of generalized weakness/fatigue for the past day.  Has been getting lightheaded when she stands up.  Has some shortness of breath and cough but has been improving.  Reports black/tarry stools and is having right sided abdominal pain that is dull/achy in nature with sharp/stabbing exacerbations.  Has some nausea, no vomiting.  Was admitted last week with CHF/COPD.    Hospital Course:  03/24/17-Pt admitted to Telemetry Unit for acute blood loss anemia with GI consulted.  Pt seen and examined today with plan of care discussed with patient and family at bedside.  Pt reports weakness and pain at site of hematoma.  H/H to be evaluated post transfusion of 2 units PRBCs.  Endoscopy procedure planned once medically stable.  Will continue to follow repeat lab and procedure results.      03/25/17- pt seen and examined today with plan of care discussed with patient and family at bedside.  Pt reports productive cough, abdominal pain, weakness, and abdominal distention.  H/H stable.  BUN/Creatinine and lactate levels remain elevated with IVF initiated.  Pt afebrile and WBC elevated with IV antibiotics initiated.  Incentive spirometry encouraged with Mucinex continued.  GI and General Surgery following with ongoing evaluation for need of surgical procedure.  Will follow repeat labs, blood culture, and sputum culture results.        Interval History:     Review of Systems   Constitutional: Positive for activity change and fatigue. Negative for appetite change, chills and fever.   HENT: Negative for congestion, sinus  pressure, sore throat and trouble swallowing.    Eyes: Negative for redness, itching and visual disturbance.   Respiratory: Positive for cough. Negative for chest tightness, shortness of breath and wheezing.    Cardiovascular: Negative for chest pain, palpitations and leg swelling.   Gastrointestinal: Positive for abdominal pain. Negative for abdominal distention, diarrhea, nausea and vomiting.   Endocrine: Negative for cold intolerance and heat intolerance.   Genitourinary: Negative for difficulty urinating, dysuria, flank pain, frequency and urgency.   Musculoskeletal: Negative for arthralgias, back pain and myalgias.   Skin: Negative for color change and wound.   Allergic/Immunologic: Negative for environmental allergies and food allergies.   Neurological: Positive for weakness. Negative for dizziness, syncope and headaches.   Hematological: Does not bruise/bleed easily.   Psychiatric/Behavioral: Negative for confusion and sleep disturbance. The patient is not nervous/anxious.      Objective:     Vital Signs (Most Recent):  Temp: 98 °F (36.7 °C) (03/25/17 1241)  Pulse: 81 (03/25/17 1241)  Resp: 18 (03/25/17 1241)  BP: 122/88 (03/25/17 1241)  SpO2: 100 % (03/25/17 1241) Vital Signs (24h Range):  Temp:  [97.7 °F (36.5 °C)-98 °F (36.7 °C)] 98 °F (36.7 °C)  Pulse:  [] 81  Resp:  [18-20] 18  SpO2:  [97 %-100 %] 100 %  BP: (108-135)/(55-88) 122/88     Weight: 58.1 kg (128 lb)  Body mass index is 23.41 kg/(m^2).    Intake/Output Summary (Last 24 hours) at 03/25/17 4535  Last data filed at 03/25/17 0358   Gross per 24 hour   Intake             1520 ml   Output              700 ml   Net              820 ml      Physical Exam   Constitutional: She is oriented to person, place, and time. She appears well-developed and well-nourished.   HENT:   Head: Normocephalic.   Nose: Nose normal.   Mouth/Throat: Oropharynx is clear and moist.   Eyes: Conjunctivae and EOM are normal.   Neck: Normal range of motion. No JVD present.    Cardiovascular: Normal rate, regular rhythm, normal heart sounds and intact distal pulses.  Exam reveals no friction rub.    No murmur heard.  Pulmonary/Chest: Effort normal and breath sounds normal. No respiratory distress. She has no wheezes.   Abdominal: Soft. Bowel sounds are normal. She exhibits distension. There is tenderness.   Musculoskeletal: Normal range of motion.   Neurological: She is alert and oriented to person, place, and time.   Skin: Skin is warm and dry. Ecchymosis to R abdomen and R groin.  Ecchymosis to R abdomen    Psychiatric: She has a normal mood and affect. Her behavior is normal. Thought content normal.       Significant Labs:   CBC:   Recent Labs  Lab 03/23/17  2250 03/24/17  1334 03/25/17  0447   WBC 16.87* 21.16* 28.02*   HGB 7.8* 9.5* 8.4*   HCT 23.9* 28.3* 24.9*    228 214     CMP:   Recent Labs  Lab 03/23/17  2250 03/24/17  1334 03/25/17  0447   * 133*  134* 133*   K 5.3* 4.7  4.7 4.6   CL 99 100  100 97   CO2 20* 22*  22* 23   * 343*  350* 284*   BUN 51* 55*  55* 65*   CREATININE 2.1* 2.1*  2.1* 2.9*   CALCIUM 9.1 8.7  8.7 8.2*   PROT 6.0 5.4*  --    ALBUMIN 3.3* 3.2*  --    BILITOT 0.5 1.2*  --    ALKPHOS 68 57  --    AST 39 23  --    ALT 80* 71*  --    ANIONGAP 13 11  12 13   EGFRNONAA 22* 22*  22* 15*       Significant Imaging:   Imaging Results         CT Renal Stone Study ABD Pelvis WO (Final result) Result time:  03/24/17 08:15:57    Final result by Jayjay Roberts MD (03/24/17 08:15:57)    Impression:             Large hematoma in the right lateral abdominal wall measuring 23.0 x 14.0 x 7.8 cm.        All CT scans at this facility use dose modulation, iterative reconstruction and/or weight based dosing when appropriate to reduce radiation dose to as low as reasonably achievable.      Electronically signed by: JAYJAY ROBERTS MD  Date:     03/24/17  Time:    08:15     Narrative:    Exam: CT abdomen and pelvis without intravenous  contrast.    Technique: Axial CT images performed through the abdomen and pelvis without intravenous contrast. Multiplanar reformats were performed and interpreted.    Comparison: None    Clinical History: right flank pain.   Abdominal pain    Findings:       There is a large hematoma in the right lateral abdominal wall measuring 23.0 x 14.0 x 7.8 cm.    There is some scarring in the left lung base.    No urolithiasis, hydronephrosis, or perinephric stranding.  The liver, spleen, kidneys, adrenal glands, and pancreas have a normal noncontrasted appearance.   The gallbladder is unremarkable.  No peritoneal free fluid, free air, or inflammatory change.     The bowel is nondistended and within normal limits.    There is calcific atherosclerotic disease of the normal caliber abdominal aorta.    The urinary bladder is unremarkable. The patient has had a hysterectomy.    No significant osseous abnormality is identified.            X-Ray Chest 1 View (Final result) Result time:  03/24/17 08:08:56    Final result by Jayjay Roberts MD (03/24/17 08:08:56)    Impression:     Stable cardiomegaly.  No acute lung infiltrate.            Electronically signed by: JAYJAY ROBERTS MD  Date:     03/24/17  Time:    08:08     Narrative:    Exam: Portable chest radiograph    Clinical History:   .  Weakness.    Comparison: Chest x-ray, 03/17/2017.    Findings:.     Sternotomy wires are noted.  There is cardiomegaly. There is calcific atherosclerotic disease of the thoracic aorta.  There is platelike atelectasis and/or bandlike scarring in the left midlung field.  No consolidation. No pleural effusion or pneumothorax or pulmonary edema.            RADIOLOGY REPORT (Final result) Result time:  03/24/17 09:50:41        Assessment/Plan:      * Acute blood loss anemia  Secondary to Intra-abdominal bleed  -Hgb drop from 11.8  to 7 with patient symptomatic  -repeat H/H stable but trending downward  - transfuse 2 units of PRBC   -will repeat CBC in  am      Coronary artery disease involving coronary bypass graft without angina pectoris  Hold Aspirin and Plavix  - continue Bystolic       Essential hypertension  Resume Bystolic   hydralazine prn      Type 2 diabetes mellitus with hyperglycemia, without long-term current use of insulin  accuchecks and sliding scale insulin      Chronic atrial fibrillation  Rate currently controlled  continue Bystolic and Tikosyn  hold Xarelto and ASA      Chronic diastolic heart failure  Compensated;   continue Bystolic       Simple chronic bronchitis  Continue symbicort  duonebs prn  -sputum culture results pending      DOMINGO (acute kidney injury)  IVF continued  - avoid nephrotoxins  - monitor       Abdominal wall hematoma  -General Surgery consulted  -no surgical intervention required at this time  -may need IR embolization   -IV antibiotics initiated (Flagyl and Rocephin)  -will continue monitor      Abnormal liver enzymes  -GI following  -LFTs trending downward  -will continue to follow repeat lab results      Leukocytosis  -trending upward  -blood cultures with NGTD  -UA unremarkble  -pt afebrile  -chest xray showed no acute lung infiltrate  -IV antibiotics initiated (Flagyl and Rocephin)  -will follow repeat lab results      VTE Risk Mitigation         Ordered     Place sequential compression device  Until discontinued      03/24/17 0111          Kathi Mcdaniels NP  Department of Hospital Medicine   [facility]

## 2017-03-25 NOTE — SUBJECTIVE & OBJECTIVE
Subjective:     Interval History: Continued abd pain but improving. No diarrhea. Decreased UOP. No fevers, chills.     Review of Systems   Constitutional: Positive for fatigue. Negative for chills and fever.   Respiratory: Negative for cough, chest tightness and shortness of breath.    Cardiovascular: Negative for chest pain, palpitations and leg swelling.   Gastrointestinal: Positive for abdominal pain. Negative for abdominal distention, blood in stool, constipation, diarrhea, nausea and vomiting.   Genitourinary: Positive for decreased urine volume. Negative for difficulty urinating.   Neurological: Negative for dizziness and headaches.     Objective:     Vital Signs (Most Recent):  Temp: 97.7 °F (36.5 °C) (03/25/17 0358)  Pulse: 75 (03/25/17 0705)  Resp: 18 (03/25/17 0705)  BP: (!) 123/55 (03/25/17 0358)  SpO2: 97 % (03/25/17 0705) Vital Signs (24h Range):  Temp:  [97.5 °F (36.4 °C)-98.4 °F (36.9 °C)] 97.7 °F (36.5 °C)  Pulse:  [] 75  Resp:  [18-20] 18  SpO2:  [0 %-100 %] 97 %  BP: (108-157)/(55-79) 123/55     Weight: 58.1 kg (128 lb) (03/23/17 2214)  Body mass index is 23.41 kg/(m^2).      Intake/Output Summary (Last 24 hours) at 03/25/17 0849  Last data filed at 03/25/17 0358   Gross per 24 hour   Intake          1843.75 ml   Output              700 ml   Net          1143.75 ml       Lines/Drains/Airways     Peripheral Intravenous Line                 Peripheral IV - Single Lumen 03/23/17 2359 Right Forearm 1 day         Peripheral IV - Single Lumen 03/24/17 0840 Left Upper Arm 1 day                Physical Exam   Constitutional: She is oriented to person, place, and time. She appears well-developed and well-nourished.   Eyes:   Pallor noted   Cardiovascular:   Irregularly irregularly    Pulmonary/Chest: Effort normal and breath sounds normal.   Abdominal: Soft. Bowel sounds are normal. She exhibits no distension. There is tenderness.   Neurological: She is alert and oriented to person, place, and time.    Nursing note and vitals reviewed.      Significant Labs:  CBC:   Recent Labs  Lab 03/23/17  2250 03/24/17  1334 03/25/17  0447   WBC 16.87* 21.16* 28.02*   HGB 7.8* 9.5* 8.4*   HCT 23.9* 28.3* 24.9*    228 214     BMP:   Recent Labs  Lab 03/25/17  0447   *   *   K 4.6   CL 97   CO2 23   BUN 65*   CREATININE 2.9*   CALCIUM 8.2*     Coagulation:   Recent Labs  Lab 03/23/17  2250   INR 2.0*   APTT 22.9         Significant Imaging:  No new imaging     Scheduled Meds:   arformoterol  15 mcg Nebulization BID    And    budesonide  0.5 mg Nebulization BID    dofetilide  125 mcg Oral Q12H    escitalopram oxalate  10 mg Oral Daily    hydroxychloroquine  200 mg Oral Daily    nebivolol  5 mg Oral Daily    pantoprazole  40 mg Intravenous Daily     Continuous Infusions:   PRN Meds:.sodium chloride, acetaminophen, albuterol-ipratropium 2.5mg-0.5mg/3mL, dextrose 50%, glucagon (human recombinant), insulin aspart, morphine, nitroGLYCERIN, ondansetron

## 2017-03-25 NOTE — SUBJECTIVE & OBJECTIVE
Interval History: c/o increased mucus production and cough. rlq pain is similar to yesterday.     Medications:  Continuous Infusions:   Scheduled Meds:   arformoterol  15 mcg Nebulization BID    And    budesonide  0.5 mg Nebulization BID    dofetilide  125 mcg Oral Q12H    escitalopram oxalate  10 mg Oral Daily    hydroxychloroquine  200 mg Oral Daily    nebivolol  5 mg Oral Daily    pantoprazole  40 mg Intravenous Daily     PRN Meds:sodium chloride, acetaminophen, albuterol-ipratropium 2.5mg-0.5mg/3mL, dextrose 50%, glucagon (human recombinant), insulin aspart, morphine, nitroGLYCERIN, ondansetron     Review of patient's allergies indicates:   Allergen Reactions    Codeine Nausea And Vomiting    Lisinopril      Other reaction(s): cough    Pacerone [amiodarone] Nausea And Vomiting    Sulfa (sulfonamide antibiotics) Nausea And Vomiting    Celecoxib Palpitations    Ciprofloxacin Hives, Itching and Rash    Colesevelam Rash and Nausea And Vomiting    Doxycycline Rash    Statins-hmg-coa reductase inhibitors Rash     Myalgia(can take atorvastatin ok -no rhabdo)per nurse josé antonio and patient  / stomach upset     Objective:     Vital Signs (Most Recent):  Temp: 97.7 °F (36.5 °C) (03/25/17 0358)  Pulse: 75 (03/25/17 0705)  Resp: 18 (03/25/17 0705)  BP: (!) 123/55 (03/25/17 0358)  SpO2: 97 % (03/25/17 0705) Vital Signs (24h Range):  Temp:  [97.5 °F (36.4 °C)-98.4 °F (36.9 °C)] 97.7 °F (36.5 °C)  Pulse:  [] 75  Resp:  [18-20] 18  SpO2:  [0 %-100 %] 97 %  BP: (108-157)/(55-79) 123/55     Weight: 58.1 kg (128 lb)  Body mass index is 23.41 kg/(m^2).    Intake/Output - Last 3 Shifts       03/23 0700 - 03/24 0659 03/24 0700 - 03/25 0659 03/25 0700 - 03/26 0659    P.O.  1520     Blood 306.3 323.8     Total Intake(mL/kg) 306.3 (5.3) 1843.8 (31.8)     Urine (mL/kg/hr)  700 (0.5)     Total Output   700      Net +306.3 +1143.8             Urine Occurrence  3 x           Physical Exam   Constitutional: She appears  well-developed and well-nourished.   HENT:   Head: Normocephalic and atraumatic.   Eyes: EOM are normal.   Cardiovascular: Normal rate and regular rhythm.    Pulmonary/Chest: Effort normal. No respiratory distress. She has rales.   Abdominal: Soft. Mass: hematoma to rlq/flank is about same size. Tenderness over hematoma. There is tenderness (rlq and flank).   Musculoskeletal: Normal range of motion.   Skin: Skin is warm and dry.   Psychiatric: She has a normal mood and affect. Thought content normal.   Vitals reviewed.      Significant Labs:  CBC:   Recent Labs  Lab 03/25/17  0447   WBC 28.02*   RBC 2.91*   HGB 8.4*   HCT 24.9*      MCV 86   MCH 28.9   MCHC 33.7     CMP:   Recent Labs  Lab 03/24/17  1334 03/25/17  0447   *  350* 284*   CALCIUM 8.7  8.7 8.2*   ALBUMIN 3.2*  --    PROT 5.4*  --    *  134* 133*   K 4.7  4.7 4.6   CO2 22*  22* 23     100 97   BUN 55*  55* 65*   CREATININE 2.1*  2.1* 2.9*   ALKPHOS 57  --    ALT 71*  --    AST 23  --    BILITOT 1.2*  --          Significant Diagnostics:  no new studies

## 2017-03-25 NOTE — ASSESSMENT & PLAN NOTE
Secondary to Intra-abdominal bleed  -Hgb drop from 11.8  to 7 with patient symptomatic  -repeat H/H stable but trending downward  - transfuse 2 units of PRBC   -will repeat CBC in am

## 2017-03-25 NOTE — ASSESSMENT & PLAN NOTE
-trending upward  -blood cultures with NGTD  -UA unremarkble  -pt afebrile  -chest xray showed no acute lung infiltrate  -IV antibiotics initiated (Flagyl and Rocephin)  -will follow repeat lab results

## 2017-03-25 NOTE — ASSESSMENT & PLAN NOTE
-General Surgery consulted  -no surgical intervention required at this time  -may need IR embolization   -IV antibiotics initiated (Flagyl and Rocephin)  -will continue monitor

## 2017-03-25 NOTE — ASSESSMENT & PLAN NOTE
Likely related to abdominal wall hematoma. No other clear source. No diarrhea. If pt has diarrhea would check C. Diff due to recent abx.

## 2017-03-25 NOTE — PROGRESS NOTES
[facility]   Gastroenterology  Progress Note    Patient Name: Marcel Montalvo  MRN: [id number]  Admission Date: 3/23/2017  Hospital Length of Stay: 1 days  Code Status: Prior   Attending Provider: Moy Sanderson MD  Consulting Provider: Natanael Beasley MD  Primary Care Physician: Alexandra Moreno MD  Principal Problem: Acute blood loss anemia    Last Recorded LACE+ Scores     None          Subjective:     Interval History: Continued abd pain but improving. No diarrhea. Decreased UOP. No fevers, chills.     Review of Systems   Constitutional: Positive for fatigue. Negative for chills and fever.   Respiratory: Negative for cough, chest tightness and shortness of breath.    Cardiovascular: Negative for chest pain, palpitations and leg swelling.   Gastrointestinal: Positive for abdominal pain. Negative for abdominal distention, blood in stool, constipation, diarrhea, nausea and vomiting.   Genitourinary: Positive for decreased urine volume. Negative for difficulty urinating.   Neurological: Negative for dizziness and headaches.     Objective:     Vital Signs (Most Recent):  Temp: 97.7 °F (36.5 °C) (03/25/17 0358)  Pulse: 75 (03/25/17 0705)  Resp: 18 (03/25/17 0705)  BP: (!) 123/55 (03/25/17 0358)  SpO2: 97 % (03/25/17 0705) Vital Signs (24h Range):  Temp:  [97.5 °F (36.4 °C)-98.4 °F (36.9 °C)] 97.7 °F (36.5 °C)  Pulse:  [] 75  Resp:  [18-20] 18  SpO2:  [0 %-100 %] 97 %  BP: (108-157)/(55-79) 123/55     Weight: 58.1 kg (128 lb) (03/23/17 2214)  Body mass index is 23.41 kg/(m^2).      Intake/Output Summary (Last 24 hours) at 03/25/17 0849  Last data filed at 03/25/17 0358   Gross per 24 hour   Intake          1843.75 ml   Output              700 ml   Net          1143.75 ml       Lines/Drains/Airways     Peripheral Intravenous Line                 Peripheral IV - Single Lumen 03/23/17 9989 Right Forearm 1 day         Peripheral IV - Single Lumen 03/24/17 0840 Left Upper Arm 1 day                Physical  Exam   Constitutional: She is oriented to person, place, and time. She appears well-developed and well-nourished.   Eyes:   Pallor noted   Cardiovascular:   Irregularly irregularly    Pulmonary/Chest: Effort normal and breath sounds normal.   Abdominal: Soft. Bowel sounds are normal. She exhibits no distension. There is tenderness.   Neurological: She is alert and oriented to person, place, and time.   Nursing note and vitals reviewed.      Significant Labs:  CBC:   Recent Labs  Lab 03/23/17  2250 03/24/17  1334 03/25/17  0447   WBC 16.87* 21.16* 28.02*   HGB 7.8* 9.5* 8.4*   HCT 23.9* 28.3* 24.9*    228 214     BMP:   Recent Labs  Lab 03/25/17  0447   *   *   K 4.6   CL 97   CO2 23   BUN 65*   CREATININE 2.9*   CALCIUM 8.2*     Coagulation:   Recent Labs  Lab 03/23/17  2250   INR 2.0*   APTT 22.9         Significant Imaging:  No new imaging     Scheduled Meds:   arformoterol  15 mcg Nebulization BID    And    budesonide  0.5 mg Nebulization BID    dofetilide  125 mcg Oral Q12H    escitalopram oxalate  10 mg Oral Daily    hydroxychloroquine  200 mg Oral Daily    nebivolol  5 mg Oral Daily    pantoprazole  40 mg Intravenous Daily     Continuous Infusions:   PRN Meds:.sodium chloride, acetaminophen, albuterol-ipratropium 2.5mg-0.5mg/3mL, dextrose 50%, glucagon (human recombinant), insulin aspart, morphine, nitroGLYCERIN, ondansetron      Assessment/Plan:     * Acute blood loss anemia  Due to abdominal wall hematoma. No evidence of GI bleeding.    Abdominal wall hematoma  Per surgery.     DOMINGO (acute kidney injury)  Renal function worsening. Per hospital medicine.    Leukocytosis  Likely related to abdominal wall hematoma. No other clear source. No diarrhea. If pt has diarrhea would check C. Diff due to recent abx.    Chronic atrial fibrillation  Rate controlled. Off anticoagulation due to abdominal wall hematoma.    Abnormal liver enzymes  Stable. F/U as outpatient.      VTE Risk Mitigation          Ordered     Place sequential compression device  Until discontinued      03/24/17 0111          Thank you for your consult. I will sign off. Please contact us if you have any additional questions.    Natanael Beasley MD  Gastroenterology  [facility]

## 2017-03-25 NOTE — SUBJECTIVE & OBJECTIVE
Interval History:     Review of Systems   Constitutional: Positive for activity change and fatigue. Negative for appetite change, chills and fever.   HENT: Negative for congestion, sinus pressure, sore throat and trouble swallowing.    Eyes: Negative for redness, itching and visual disturbance.   Respiratory: Positive for cough. Negative for chest tightness, shortness of breath and wheezing.    Cardiovascular: Negative for chest pain, palpitations and leg swelling.   Gastrointestinal: Positive for abdominal pain. Negative for abdominal distention, diarrhea, nausea and vomiting.   Endocrine: Negative for cold intolerance and heat intolerance.   Genitourinary: Negative for difficulty urinating, dysuria, flank pain, frequency and urgency.   Musculoskeletal: Negative for arthralgias, back pain and myalgias.   Skin: Negative for color change and wound.   Allergic/Immunologic: Negative for environmental allergies and food allergies.   Neurological: Positive for weakness. Negative for dizziness, syncope and headaches.   Hematological: Does not bruise/bleed easily.   Psychiatric/Behavioral: Negative for confusion and sleep disturbance. The patient is not nervous/anxious.      Objective:     Vital Signs (Most Recent):  Temp: 98 °F (36.7 °C) (03/25/17 1241)  Pulse: 81 (03/25/17 1241)  Resp: 18 (03/25/17 1241)  BP: 122/88 (03/25/17 1241)  SpO2: 100 % (03/25/17 1241) Vital Signs (24h Range):  Temp:  [97.7 °F (36.5 °C)-98 °F (36.7 °C)] 98 °F (36.7 °C)  Pulse:  [] 81  Resp:  [18-20] 18  SpO2:  [97 %-100 %] 100 %  BP: (108-135)/(55-88) 122/88     Weight: 58.1 kg (128 lb)  Body mass index is 23.41 kg/(m^2).    Intake/Output Summary (Last 24 hours) at 03/25/17 8915  Last data filed at 03/25/17 0358   Gross per 24 hour   Intake             1520 ml   Output              700 ml   Net              820 ml      Physical Exam   Constitutional: She is oriented to person, place, and time. She appears well-developed and well-nourished.    HENT:   Head: Normocephalic.   Nose: Nose normal.   Mouth/Throat: Oropharynx is clear and moist.   Eyes: Conjunctivae and EOM are normal.   Neck: Normal range of motion. No JVD present.   Cardiovascular: Normal rate, regular rhythm, normal heart sounds and intact distal pulses.  Exam reveals no friction rub.    No murmur heard.  Pulmonary/Chest: Effort normal and breath sounds normal. No respiratory distress. She has no wheezes.   Abdominal: Soft. Bowel sounds are normal. She exhibits distension. There is tenderness.   Musculoskeletal: Normal range of motion.   Neurological: She is alert and oriented to person, place, and time.   Skin: Skin is warm and dry.   Ecchymosis to R abdomen    Psychiatric: She has a normal mood and affect. Her behavior is normal. Thought content normal.       Significant Labs:   CBC:   Recent Labs  Lab 03/23/17  2250 03/24/17  1334 03/25/17  0447   WBC 16.87* 21.16* 28.02*   HGB 7.8* 9.5* 8.4*   HCT 23.9* 28.3* 24.9*    228 214     CMP:   Recent Labs  Lab 03/23/17  2250 03/24/17  1334 03/25/17  0447   * 133*  134* 133*   K 5.3* 4.7  4.7 4.6   CL 99 100  100 97   CO2 20* 22*  22* 23   * 343*  350* 284*   BUN 51* 55*  55* 65*   CREATININE 2.1* 2.1*  2.1* 2.9*   CALCIUM 9.1 8.7  8.7 8.2*   PROT 6.0 5.4*  --    ALBUMIN 3.3* 3.2*  --    BILITOT 0.5 1.2*  --    ALKPHOS 68 57  --    AST 39 23  --    ALT 80* 71*  --    ANIONGAP 13 11  12 13   EGFRNONAA 22* 22*  22* 15*       Significant Imaging:   Imaging Results         CT Renal Stone Study ABD Pelvis WO (Final result) Result time:  03/24/17 08:15:57    Final result by Jayjay Roberts MD (03/24/17 08:15:57)    Impression:             Large hematoma in the right lateral abdominal wall measuring 23.0 x 14.0 x 7.8 cm.        All CT scans at this facility use dose modulation, iterative reconstruction and/or weight based dosing when appropriate to reduce radiation dose to as low as reasonably  achievable.      Electronically signed by: JAYJAY ROBERTS MD  Date:     03/24/17  Time:    08:15     Narrative:    Exam: CT abdomen and pelvis without intravenous contrast.    Technique: Axial CT images performed through the abdomen and pelvis without intravenous contrast. Multiplanar reformats were performed and interpreted.    Comparison: None    Clinical History: right flank pain.   Abdominal pain    Findings:       There is a large hematoma in the right lateral abdominal wall measuring 23.0 x 14.0 x 7.8 cm.    There is some scarring in the left lung base.    No urolithiasis, hydronephrosis, or perinephric stranding.  The liver, spleen, kidneys, adrenal glands, and pancreas have a normal noncontrasted appearance.   The gallbladder is unremarkable.  No peritoneal free fluid, free air, or inflammatory change.     The bowel is nondistended and within normal limits.    There is calcific atherosclerotic disease of the normal caliber abdominal aorta.    The urinary bladder is unremarkable. The patient has had a hysterectomy.    No significant osseous abnormality is identified.            X-Ray Chest 1 View (Final result) Result time:  03/24/17 08:08:56    Final result by Jayjay Roberts MD (03/24/17 08:08:56)    Impression:     Stable cardiomegaly.  No acute lung infiltrate.            Electronically signed by: JAYJAY ROBERTS MD  Date:     03/24/17  Time:    08:08     Narrative:    Exam: Portable chest radiograph    Clinical History:   .  Weakness.    Comparison: Chest x-ray, 03/17/2017.    Findings:.     Sternotomy wires are noted.  There is cardiomegaly. There is calcific atherosclerotic disease of the thoracic aorta.  There is platelike atelectasis and/or bandlike scarring in the left midlung field.  No consolidation. No pleural effusion or pneumothorax or pulmonary edema.            RADIOLOGY REPORT (Final result) Result time:  03/24/17 09:50:41

## 2017-03-26 PROBLEM — K92.2 UPPER GI BLEED: Status: RESOLVED | Noted: 2017-03-24 | Resolved: 2017-03-26

## 2017-03-26 PROBLEM — K59.09 CONSTIPATION, CHRONIC: Status: ACTIVE | Noted: 2017-03-26

## 2017-03-26 LAB
ALBUMIN SERPL BCP-MCNC: 3.1 G/DL
ALP SERPL-CCNC: 63 U/L
ALT SERPL W/O P-5'-P-CCNC: 75 U/L
ANION GAP SERPL CALC-SCNC: 9 MMOL/L
APTT BLDCRRT: 27.9 SEC
AST SERPL-CCNC: 32 U/L
BASOPHILS # BLD AUTO: 0.01 K/UL
BASOPHILS NFR BLD: 0 %
BILIRUB SERPL-MCNC: 1.3 MG/DL
BUN SERPL-MCNC: 53 MG/DL
CALCIUM SERPL-MCNC: 8.2 MG/DL
CHLORIDE SERPL-SCNC: 100 MMOL/L
CO2 SERPL-SCNC: 26 MMOL/L
CREAT SERPL-MCNC: 2 MG/DL
DIFFERENTIAL METHOD: ABNORMAL
EOSINOPHIL # BLD AUTO: 0 K/UL
EOSINOPHIL NFR BLD: 0 %
ERYTHROCYTE [DISTWIDTH] IN BLOOD BY AUTOMATED COUNT: 15.8 %
EST. GFR  (AFRICAN AMERICAN): 27 ML/MIN/1.73 M^2
EST. GFR  (NON AFRICAN AMERICAN): 24 ML/MIN/1.73 M^2
GLUCOSE SERPL-MCNC: 213 MG/DL
HCT VFR BLD AUTO: 27.2 %
HCT VFR BLD AUTO: 28.1 %
HGB BLD-MCNC: 9.6 G/DL
HGB BLD-MCNC: 9.9 G/DL
INR PPP: 1.2
LYMPHOCYTES # BLD AUTO: 2 K/UL
LYMPHOCYTES NFR BLD: 8.3 %
MCH RBC QN AUTO: 29.5 PG
MCHC RBC AUTO-ENTMCNC: 35.2 %
MCV RBC AUTO: 84 FL
MONOCYTES # BLD AUTO: 1.7 K/UL
MONOCYTES NFR BLD: 6.8 %
NEUTROPHILS # BLD AUTO: 21 K/UL
NEUTROPHILS NFR BLD: 85.7 %
PLATELET # BLD AUTO: 173 K/UL
PMV BLD AUTO: 10.1 FL
POCT GLUCOSE: 157 MG/DL (ref 70–110)
POCT GLUCOSE: 162 MG/DL (ref 70–110)
POCT GLUCOSE: 179 MG/DL (ref 70–110)
POCT GLUCOSE: 189 MG/DL (ref 70–110)
POCT GLUCOSE: 289 MG/DL (ref 70–110)
POCT GLUCOSE: 63 MG/DL (ref 70–110)
POTASSIUM SERPL-SCNC: 4.1 MMOL/L
PROT SERPL-MCNC: 5.5 G/DL
PROTHROMBIN TIME: 12.1 SEC
RBC # BLD AUTO: 3.36 M/UL
SODIUM SERPL-SCNC: 135 MMOL/L
WBC # BLD AUTO: 24.72 K/UL

## 2017-03-26 PROCEDURE — 94640 AIRWAY INHALATION TREATMENT: CPT

## 2017-03-26 PROCEDURE — 25000003 PHARM REV CODE 250: Performed by: NURSE PRACTITIONER

## 2017-03-26 PROCEDURE — 27000221 HC OXYGEN, UP TO 24 HOURS

## 2017-03-26 PROCEDURE — 63600175 PHARM REV CODE 636 W HCPCS: Performed by: NURSE PRACTITIONER

## 2017-03-26 PROCEDURE — 25000003 PHARM REV CODE 250: Performed by: INTERNAL MEDICINE

## 2017-03-26 PROCEDURE — 85014 HEMATOCRIT: CPT

## 2017-03-26 PROCEDURE — 25000003 PHARM REV CODE 250: Performed by: HOSPITALIST

## 2017-03-26 PROCEDURE — 94664 DEMO&/EVAL PT USE INHALER: CPT

## 2017-03-26 PROCEDURE — 21400001 HC TELEMETRY ROOM

## 2017-03-26 PROCEDURE — 85025 COMPLETE CBC W/AUTO DIFF WBC: CPT

## 2017-03-26 PROCEDURE — 85018 HEMOGLOBIN: CPT

## 2017-03-26 PROCEDURE — 94799 UNLISTED PULMONARY SVC/PX: CPT

## 2017-03-26 PROCEDURE — 80053 COMPREHEN METABOLIC PANEL: CPT

## 2017-03-26 PROCEDURE — 94761 N-INVAS EAR/PLS OXIMETRY MLT: CPT

## 2017-03-26 PROCEDURE — 36415 COLL VENOUS BLD VENIPUNCTURE: CPT

## 2017-03-26 PROCEDURE — 99232 SBSQ HOSP IP/OBS MODERATE 35: CPT | Mod: ,,, | Performed by: INTERNAL MEDICINE

## 2017-03-26 PROCEDURE — 99231 SBSQ HOSP IP/OBS SF/LOW 25: CPT | Mod: ,,, | Performed by: PHYSICIAN ASSISTANT

## 2017-03-26 PROCEDURE — 36430 TRANSFUSION BLD/BLD COMPNT: CPT

## 2017-03-26 PROCEDURE — 85730 THROMBOPLASTIN TIME PARTIAL: CPT

## 2017-03-26 PROCEDURE — 85610 PROTHROMBIN TIME: CPT

## 2017-03-26 PROCEDURE — 63600175 PHARM REV CODE 636 W HCPCS: Performed by: HOSPITALIST

## 2017-03-26 RX ORDER — POLYETHYLENE GLYCOL 3350 17 G/17G
17 POWDER, FOR SOLUTION ORAL DAILY
Status: DISCONTINUED | OUTPATIENT
Start: 2017-03-26 | End: 2017-03-29 | Stop reason: HOSPADM

## 2017-03-26 RX ORDER — DOCUSATE SODIUM 100 MG/1
100 CAPSULE, LIQUID FILLED ORAL 2 TIMES DAILY
Status: DISCONTINUED | OUTPATIENT
Start: 2017-03-26 | End: 2017-03-29 | Stop reason: HOSPADM

## 2017-03-26 RX ORDER — CYCLOBENZAPRINE HCL 5 MG
5 TABLET ORAL 3 TIMES DAILY
Status: DISCONTINUED | OUTPATIENT
Start: 2017-03-26 | End: 2017-03-29 | Stop reason: HOSPADM

## 2017-03-26 RX ORDER — CYCLOBENZAPRINE HCL 5 MG
5 TABLET ORAL 3 TIMES DAILY
Status: DISCONTINUED | OUTPATIENT
Start: 2017-03-26 | End: 2017-03-26

## 2017-03-26 RX ADMIN — DOFETILIDE 125 MCG: 0.12 CAPSULE ORAL at 09:03

## 2017-03-26 RX ADMIN — CEFTRIAXONE 1 G: 1 INJECTION, SOLUTION INTRAVENOUS at 06:03

## 2017-03-26 RX ADMIN — ESCITALOPRAM OXALATE 10 MG: 10 TABLET, FILM COATED ORAL at 08:03

## 2017-03-26 RX ADMIN — DOFETILIDE 125 MCG: 0.12 CAPSULE ORAL at 08:03

## 2017-03-26 RX ADMIN — ARFORMOTEROL TARTRATE 15 MCG: 15 SOLUTION RESPIRATORY (INHALATION) at 07:03

## 2017-03-26 RX ADMIN — CEFTRIAXONE 1 G: 1 INJECTION, SOLUTION INTRAVENOUS at 05:03

## 2017-03-26 RX ADMIN — ACETAMINOPHEN 650 MG: 325 TABLET ORAL at 09:03

## 2017-03-26 RX ADMIN — METRONIDAZOLE 500 MG: 500 INJECTION, SOLUTION INTRAVENOUS at 08:03

## 2017-03-26 RX ADMIN — METRONIDAZOLE 500 MG: 500 INJECTION, SOLUTION INTRAVENOUS at 11:03

## 2017-03-26 RX ADMIN — BUDESONIDE 0.5 MG: 0.5 SUSPENSION RESPIRATORY (INHALATION) at 07:03

## 2017-03-26 RX ADMIN — NEBIVOLOL HYDROCHLORIDE 5 MG: 5 TABLET ORAL at 08:03

## 2017-03-26 RX ADMIN — DOCUSATE SODIUM 100 MG: 100 CAPSULE, LIQUID FILLED ORAL at 09:03

## 2017-03-26 RX ADMIN — FAMOTIDINE 20 MG: 20 INJECTION, SOLUTION INTRAVENOUS at 08:03

## 2017-03-26 RX ADMIN — IRON SUCROSE 200 MG: 20 INJECTION, SOLUTION INTRAVENOUS at 12:03

## 2017-03-26 RX ADMIN — ACETAMINOPHEN 650 MG: 325 TABLET ORAL at 10:03

## 2017-03-26 RX ADMIN — METRONIDAZOLE 500 MG: 500 INJECTION, SOLUTION INTRAVENOUS at 06:03

## 2017-03-26 RX ADMIN — PANTOPRAZOLE SODIUM 600 MG: 40 TABLET, DELAYED RELEASE ORAL at 09:03

## 2017-03-26 RX ADMIN — PANTOPRAZOLE SODIUM 600 MG: 40 TABLET, DELAYED RELEASE ORAL at 08:03

## 2017-03-26 RX ADMIN — DOCUSATE SODIUM 100 MG: 100 CAPSULE, LIQUID FILLED ORAL at 10:03

## 2017-03-26 RX ADMIN — METRONIDAZOLE 500 MG: 500 INJECTION, SOLUTION INTRAVENOUS at 12:03

## 2017-03-26 RX ADMIN — CYCLOBENZAPRINE HYDROCHLORIDE 5 MG: 5 TABLET, FILM COATED ORAL at 09:03

## 2017-03-26 RX ADMIN — CYCLOBENZAPRINE HYDROCHLORIDE 5 MG: 5 TABLET, FILM COATED ORAL at 12:03

## 2017-03-26 RX ADMIN — POLYETHYLENE GLYCOL 3350 17 G: 17 POWDER, FOR SOLUTION ORAL at 10:03

## 2017-03-26 RX ADMIN — HYDROXYCHLOROQUINE SULFATE 200 MG: 200 TABLET, FILM COATED ORAL at 08:03

## 2017-03-26 RX ADMIN — ARFORMOTEROL TARTRATE 15 MCG: 15 SOLUTION RESPIRATORY (INHALATION) at 08:03

## 2017-03-26 NOTE — ASSESSMENT & PLAN NOTE
-trending downward  -blood and urine cultures with NGTD  -UA unremarkble  -pt afebrile  -chest xray showed no acute lung infiltrate  -IV antibiotics continued (Flagyl and Rocephin)  -will follow repeat lab results

## 2017-03-26 NOTE — ASSESSMENT & PLAN NOTE
Monitor H/H. If continues to drift down she may need exploration and ligation of bleeding vessel. Continue abdominal binder.

## 2017-03-26 NOTE — PROGRESS NOTES
[facility] -   General Surgery  Progress Note    Subjective:     History of Present Illness:  Marcel Montalvo presented to the ED with right sided abdominal pain, weakness, and melena that began about 3 days ago. She was recently d/c from the hospital after tx for acute CHF and bronchitis. She reports bouts of coughing. She is on Xarelto for afib. She began to noticed right side abdominal pain and swelling that is worse with movement and coughing. She has not noticed increase in swelling since admission to the hospital. Non contrast CT shows a large abdominal wall hematoma. H/H decreased and pt is receiving blood. Stool was positive for occult blood and GI has been consulted.     Post-Op Info:  * No surgery found *         Interval History: pt feels better today after additional blood. Less pain to right flank/abdomen.     Medications:  Continuous Infusions:   sodium chloride 0.9% 75 mL/hr at 03/25/17 1155     Scheduled Meds:   arformoterol  15 mcg Nebulization BID    And    budesonide  0.5 mg Nebulization BID    cefTRIAXone (ROCEPHIN) IVPB  1 g Intravenous Q12H    dofetilide  125 mcg Oral Q12H    escitalopram oxalate  10 mg Oral Daily    famotidine  20 mg Intravenous Daily    guaifenesin  600 mg Oral BID    hydroxychloroquine  200 mg Oral Daily    metronidazole  500 mg Intravenous Q8H    nebivolol  5 mg Oral Daily     PRN Meds:sodium chloride, sodium chloride, acetaminophen, albuterol-ipratropium 2.5mg-0.5mg/3mL, cyclobenzaprine, dextrose 50%, glucagon (human recombinant), insulin aspart, morphine, nitroGLYCERIN, ondansetron     Review of patient's allergies indicates:   Allergen Reactions    Codeine Nausea And Vomiting    Lisinopril      Other reaction(s): cough    Pacerone [amiodarone] Nausea And Vomiting    Sulfa (sulfonamide antibiotics) Nausea And Vomiting    Celecoxib Palpitations    Ciprofloxacin Hives, Itching and Rash    Colesevelam Rash and Nausea And Vomiting     Doxycycline Rash    Statins-hmg-coa reductase inhibitors Rash     Myalgia(can take atorvastatin ok -no rhabdo)per nurse josé antonio and patient  / stomach upset     Objective:     Vital Signs (Most Recent):  Temp: 98.4 °F (36.9 °C) (03/26/17 0300)  Pulse: 73 (03/26/17 0803)  Resp: 18 (03/26/17 0803)  BP: (!) 112/52 (03/26/17 0300)  SpO2: 99 % (03/26/17 0803) Vital Signs (24h Range):  Temp:  [97.9 °F (36.6 °C)-98.6 °F (37 °C)] 98.4 °F (36.9 °C)  Pulse:  [72-87] 73  Resp:  [16-20] 18  SpO2:  [92 %-100 %] 99 %  BP: (101-145)/(47-88) 112/52     Weight: 60 kg (132 lb 4.4 oz)  Body mass index is 24.19 kg/(m^2).    Intake/Output - Last 3 Shifts       03/24 0700 - 03/25 0659 03/25 0700 - 03/26 0659 03/26 0700 - 03/27 0659    P.O. 1520 840     I.V. (mL/kg)  1420 (23.7)     Blood 323.8 368.3     IV Piggyback  300     Total Intake(mL/kg) 1843.8 (31.8) 2928.3 (48.8)     Urine (mL/kg/hr) 700 (0.5) 950 (0.7)     Total Output 700 950      Net +1143.8 +1978.3             Urine Occurrence 3 x 1 x           Physical Exam   Constitutional: She is oriented to person, place, and time.   Cardiovascular: Normal rate.    Pulmonary/Chest: Effort normal.   Abdominal: Soft. She exhibits mass. There is tenderness.   Hematoma with bruising drifting down right flank. Hematoma feels stable. She is wearing abdominal binder     Neurological: She is alert and oriented to person, place, and time.   Skin: Skin is warm and dry.       Significant Labs:  CBC:   Recent Labs  Lab 03/25/17  1526 03/26/17  0130   WBC 27.24*  --    RBC 2.29*  --    HGB 6.8* 9.6*   HCT 19.4* 27.2*     --    MCV 85  --    MCH 29.7  --    MCHC 35.1  --      Await todays labs    Significant Diagnostics:  no new studies    Assessment/Plan:     Abdominal wall hematoma  Monitor H/H. If continues to drift down she may need exploration and ligation of bleeding vessel. Continue abdominal binder.       Chayo Obregon PA-C  General Surgery  [facility] [place]

## 2017-03-26 NOTE — PLAN OF CARE
Problem: Patient Care Overview  Goal: Plan of Care Review  Outcome: Ongoing (interventions implemented as appropriate)  Pt. Currently resting in bed. 2U PRBC infused; pt. Tolerated well; repeat H/H ordered per MD request. NSR on monitor HR 80s. 3L NC sat %. IV abx given and NS infusing at 75 ml/hr. Safety/fall precautions maintained; spouse at bedside. Plan of care reviewed with pt. Will continue to monitor.

## 2017-03-26 NOTE — SUBJECTIVE & OBJECTIVE
Subjective:     Interval History: Feeling much better today. Abd pain improving after cyclobenzaprine. Did have drop in hgb overnight and received 2u PRBC with good response. No bowel movement for 2 days. No F/C. No N/V. Has chronic issues with constipation and takes colace at home. Tolerating diet.    Review of Systems   Constitutional: Negative for chills, fatigue and fever.   Respiratory: Positive for cough. Negative for chest tightness and shortness of breath.    Cardiovascular: Negative for chest pain, palpitations and leg swelling.   Gastrointestinal: Positive for abdominal pain and constipation. Negative for abdominal distention, blood in stool, diarrhea, nausea and vomiting.   Genitourinary: Negative for difficulty urinating.   Neurological: Negative for dizziness and headaches.     Objective:     Vital Signs (Most Recent):  Temp: 98.4 °F (36.9 °C) (03/26/17 0300)  Pulse: 73 (03/26/17 0803)  Resp: 18 (03/26/17 0803)  BP: (!) 112/52 (03/26/17 0300)  SpO2: 99 % (03/26/17 0803) Vital Signs (24h Range):  Temp:  [97.9 °F (36.6 °C)-98.6 °F (37 °C)] 98.4 °F (36.9 °C)  Pulse:  [72-87] 73  Resp:  [16-20] 18  SpO2:  [92 %-100 %] 99 %  BP: (101-145)/(47-88) 112/52     Weight: 60 kg (132 lb 4.4 oz) (03/26/17 0300)  Body mass index is 24.19 kg/(m^2).      Intake/Output Summary (Last 24 hours) at 03/26/17 0927  Last data filed at 03/26/17 0600   Gross per 24 hour   Intake          2928.33 ml   Output              950 ml   Net          1978.33 ml       Lines/Drains/Airways     Peripheral Intravenous Line                 Peripheral IV - Single Lumen 03/24/17 0840 Left Upper Arm 2 days                Physical Exam   Constitutional: She is oriented to person, place, and time. She appears well-developed.   Eyes: No scleral icterus.   Cardiovascular: Normal rate, regular rhythm and normal heart sounds.    No murmur heard.  Pulmonary/Chest: Effort normal and breath sounds normal. She has no wheezes. She has no rales.    Abdominal: Soft. Bowel sounds are normal. She exhibits no distension. There is no hepatosplenomegaly. There is tenderness.   Tender in right abdomen at site of hematoma. Large amount of ecchymosis.   Musculoskeletal: She exhibits no edema.   Neurological: She is alert and oriented to person, place, and time.   Psychiatric: She has a normal mood and affect. Her behavior is normal.       Significant Labs:  CBC:   Recent Labs  Lab 03/24/17  1334 03/25/17  0447 03/25/17  1526 03/26/17  0130   WBC 21.16* 28.02* 27.24*  --    HGB 9.5* 8.4* 6.8* 9.6*   HCT 28.3* 24.9* 19.4* 27.2*    214 220  --      CMP:   Recent Labs  Lab 03/25/17  1526   *   CALCIUM 7.9*   ALBUMIN 2.9*   PROT 5.1*   *   K 4.3   CO2 22*   CL 98   BUN 74*   CREATININE 3.1*   ALKPHOS 52*   ALT 76*   AST 39   BILITOT 0.7     Coagulation:   Recent Labs  Lab 03/26/17  0526   INR 1.2   APTT 27.9         Significant Imaging:  No recent issues.     Scheduled Meds:   arformoterol  15 mcg Nebulization BID    And    budesonide  0.5 mg Nebulization BID    cefTRIAXone (ROCEPHIN) IVPB  1 g Intravenous Q12H    docusate sodium  100 mg Oral BID    dofetilide  125 mcg Oral Q12H    escitalopram oxalate  10 mg Oral Daily    famotidine  20 mg Intravenous Daily    guaifenesin  600 mg Oral BID    hydroxychloroquine  200 mg Oral Daily    metronidazole  500 mg Intravenous Q8H    nebivolol  5 mg Oral Daily     Continuous Infusions:   sodium chloride 0.9% 75 mL/hr at 03/25/17 1155     PRN Meds:.sodium chloride, sodium chloride, acetaminophen, albuterol-ipratropium 2.5mg-0.5mg/3mL, cyclobenzaprine, dextrose 50%, glucagon (human recombinant), insulin aspart, morphine, nitroGLYCERIN, ondansetron

## 2017-03-26 NOTE — ASSESSMENT & PLAN NOTE
Secondary to Intra-abdominal bleed   -Elevated INR dropped to 1.2  -Hgb initially dropped from 11.8  to 7 with patient symptomatic  -repeat H/H stable post transfusion of 2 units of PRBC   -will repeat CBC in am

## 2017-03-26 NOTE — PROGRESS NOTES
[facility] Hospital Medicine  Progress Note    Patient Name: Marcel Montalvo  MRN: [id number]  Patient Class: IP- Inpatient   Admission Date: 3/23/2017  Length of Stay: 2 days  Attending Physician: Moy Sanderson MD  Primary Care Provider: Alexandra Moreno MD        Subjective:     Principal Problem:Acute blood loss anemia    HPI:  76 year old female with h/o CHF, COPD, Afib, NIDDM presents complaining of generalized weakness/fatigue for the past day.  Has been getting lightheaded when she stands up.  Has some shortness of breath and cough but has been improving.  Reports black/tarry stools and is having right sided abdominal pain that is dull/achy in nature with sharp/stabbing exacerbations.  Has some nausea, no vomiting.  Was admitted last week with CHF/COPD.    Hospital Course:  03/24/17-Pt admitted to Telemetry Unit for acute blood loss anemia with GI consulted.  Pt reports weakness and pain at site of hematoma.  H/H stable post transfusion of 2 units PRBCs.  Analgesia prn and flexeril continued for pain.  CT confirmed presence of abdominal hematoma.  INR trended downward.  Abdominal binder placed per General Surgery.  BUN/Creatinine and lactate levels improved with mild gentle hydration.  IV antibiotics (Rocephin and Flagyl) initiated.  Incentive spirometry encouraged with Mucinex continued.  GI following and does not suspect active bleeding at this time. General Surgery following with ongoing evaluation for need of surgical procedure.  Blood and urine culture with NGTD.  Sputum culture results.      Interval History: Pt pain controlled with Flexeril.  H/H stable post transfusion of 2 units PRBCs.  INR at 1.2 with BUN/Creatinine improved with gentle hydration.  Lactate decreased to 2.1 and WBC trending downward. General Surgery following H/H with plan for surgical intervention if decline noted.      Review of Systems   Constitutional: Positive for activity change and fatigue. Negative for appetite  change, chills and fever.   HENT: Negative for congestion, sinus pressure, sore throat and trouble swallowing.    Eyes: Negative for redness, itching and visual disturbance.   Respiratory: Positive for cough. Negative for chest tightness, shortness of breath and wheezing.    Cardiovascular: Negative for chest pain, palpitations and leg swelling.   Gastrointestinal: Positive for abdominal pain. Negative for abdominal distention, diarrhea, nausea and vomiting.   Endocrine: Negative for cold intolerance and heat intolerance.   Genitourinary: Negative for difficulty urinating, dysuria, flank pain, frequency and urgency.   Musculoskeletal: Negative for arthralgias, back pain and myalgias.   Skin: Negative for color change and wound.   Allergic/Immunologic: Negative for environmental allergies and food allergies.   Neurological: Positive for weakness. Negative for dizziness, syncope and headaches.   Hematological: Does not bruise/bleed easily.   Psychiatric/Behavioral: Negative for confusion and sleep disturbance. The patient is not nervous/anxious.      Objective:     Vital Signs (Most Recent):  Temp: 98.4 °F (36.9 °C) (03/26/17 0300)  Pulse: 73 (03/26/17 0803)  Resp: 18 (03/26/17 0803)  BP: (!) 112/52 (03/26/17 0300)  SpO2: 99 % (03/26/17 0803) Vital Signs (24h Range):  Temp:  [97.9 °F (36.6 °C)-98.6 °F (37 °C)] 98.4 °F (36.9 °C)  Pulse:  [72-87] 73  Resp:  [16-20] 18  SpO2:  [92 %-100 %] 99 %  BP: (101-145)/(47-88) 112/52     Weight: 60 kg (132 lb 4.4 oz)  Body mass index is 24.19 kg/(m^2).    Intake/Output Summary (Last 24 hours) at 03/26/17 1218  Last data filed at 03/26/17 1000   Gross per 24 hour   Intake          3078.33 ml   Output              850 ml   Net          2228.33 ml      Physical Exam   Constitutional: She is oriented to person, place, and time. She appears well-developed and well-nourished.   HENT:   Head: Normocephalic.   Nose: Nose normal.   Mouth/Throat: Oropharynx is clear and moist.   Eyes:  Conjunctivae and EOM are normal.   Neck: Normal range of motion. No JVD present.   Cardiovascular: Normal rate, regular rhythm, normal heart sounds and intact distal pulses.  Exam reveals no friction rub.    No murmur heard.  Pulmonary/Chest: Effort normal. No respiratory distress. She has no wheezes. She has rales.   Abdominal: Soft. Bowel sounds are normal. She exhibits distension. There is tenderness.   Hematoma to R abdominal wall     Musculoskeletal: Normal range of motion.   Neurological: She is alert and oriented to person, place, and time.   Skin: Skin is warm and dry.   Ecchymosis to R abdomen    Psychiatric: She has a normal mood and affect. Her behavior is normal. Thought content normal.       Significant Labs:   CBC:   Recent Labs  Lab 03/25/17  0447 03/25/17  1526 03/26/17  0130 03/26/17  0845   WBC 28.02* 27.24*  --  24.72*   HGB 8.4* 6.8* 9.6* 9.9*   HCT 24.9* 19.4* 27.2* 28.1*    220  --  173     CMP:   Recent Labs  Lab 03/24/17  1334 03/25/17  0447 03/25/17  1526 03/26/17  0845   *  134* 133* 133* 135*   K 4.7  4.7 4.6 4.3 4.1     100 97 98 100   CO2 22*  22* 23 22* 26   *  350* 284* 200* 213*   BUN 55*  55* 65* 74* 53*   CREATININE 2.1*  2.1* 2.9* 3.1* 2.0*   CALCIUM 8.7  8.7 8.2* 7.9* 8.2*   PROT 5.4*  --  5.1* 5.5*   ALBUMIN 3.2*  --  2.9* 3.1*   BILITOT 1.2*  --  0.7 1.3*   ALKPHOS 57  --  52* 63   AST 23  --  39 32   ALT 71*  --  76* 75*   ANIONGAP 11  12 13 13 9   EGFRNONAA 22*  22* 15* 14* 24*       Significant Imaging:   Imaging Results         CT Renal Stone Study ABD Pelvis WO (Final result) Result time:  03/24/17 08:15:57    Final result by Jayjay Roberts MD (03/24/17 08:15:57)    Impression:             Large hematoma in the right lateral abdominal wall measuring 23.0 x 14.0 x 7.8 cm.        All CT scans at this facility use dose modulation, iterative reconstruction and/or weight based dosing when appropriate to reduce radiation dose to as low as  reasonably achievable.      Electronically signed by: JAYJAY ROBERTS MD  Date:     03/24/17  Time:    08:15     Narrative:    Exam: CT abdomen and pelvis without intravenous contrast.    Technique: Axial CT images performed through the abdomen and pelvis without intravenous contrast. Multiplanar reformats were performed and interpreted.    Comparison: None    Clinical History: right flank pain.   Abdominal pain    Findings:       There is a large hematoma in the right lateral abdominal wall measuring 23.0 x 14.0 x 7.8 cm.    There is some scarring in the left lung base.    No urolithiasis, hydronephrosis, or perinephric stranding.  The liver, spleen, kidneys, adrenal glands, and pancreas have a normal noncontrasted appearance.   The gallbladder is unremarkable.  No peritoneal free fluid, free air, or inflammatory change.     The bowel is nondistended and within normal limits.    There is calcific atherosclerotic disease of the normal caliber abdominal aorta.    The urinary bladder is unremarkable. The patient has had a hysterectomy.    No significant osseous abnormality is identified.            X-Ray Chest 1 View (Final result) Result time:  03/24/17 08:08:56    Final result by Jayjay Roberts MD (03/24/17 08:08:56)    Impression:     Stable cardiomegaly.  No acute lung infiltrate.            Electronically signed by: JAYJAY ROBERTS MD  Date:     03/24/17  Time:    08:08     Narrative:    Exam: Portable chest radiograph    Clinical History:   .  Weakness.    Comparison: Chest x-ray, 03/17/2017.    Findings:.     Sternotomy wires are noted.  There is cardiomegaly. There is calcific atherosclerotic disease of the thoracic aorta.  There is platelike atelectasis and/or bandlike scarring in the left midlung field.  No consolidation. No pleural effusion or pneumothorax or pulmonary edema.            RADIOLOGY REPORT (Final result) Result time:  03/24/17 09:50:41        Assessment/Plan:      * Acute blood loss anemia  Secondary  to Intra-abdominal bleed   -Elevated INR dropped to 1.2  -Hgb initially dropped from 11.8  to 7 with patient symptomatic  -repeat H/H stable post transfusion of 2 units of PRBC   -will repeat CBC in am    Coronary artery disease involving coronary bypass graft without angina pectoris  Hold Aspirin and Plavix  - continue Bystolic     Essential hypertension  Resume Bystolic   hydralazine prn    Type 2 diabetes mellitus with hyperglycemia, without long-term current use of insulin  accuchecks and sliding scale insulin    Chronic atrial fibrillation  Rate currently controlled  continue Bystolic and Tikosyn  hold Xarelto and ASA    Chronic diastolic heart failure  Compensated;   continue Bystolic   -will give IV Lasix if needed    Simple chronic bronchitis  Continue symbicort  duonebs prn  -sputum culture results noted    DOMINGO (acute kidney injury)  -improved  - gentle hydration continued  - avoid nephrotoxins  - monitor     Abdominal wall hematoma  -General Surgery consulted  -no surgical intervention required at this time  -may need IR embolization   -IV antibiotics initiated (Flagyl and Rocephin)  -Abdominal Binder in place  -INR 1.2  -will continue monitor    Abnormal liver enzymes  -GI following  -LFTs trending downward  -will continue to follow repeat lab results    Leukocytosis  -trending downward  -blood and urine cultures with NGTD  -UA unremarkble  -pt afebrile  -chest xray showed no acute lung infiltrate  -IV antibiotics continued (Flagyl and Rocephin)  -will follow repeat lab results    Constipation, chronic  -Colace and Miralax initiated    VTE Risk Mitigation         Ordered     Place sequential compression device  Until discontinued      03/24/17 0111          Kathi Mcdaniels NP  Department of Hospital Medicine   [facility]

## 2017-03-26 NOTE — SUBJECTIVE & OBJECTIVE
Interval History: pt feels better today after additional blood. Less pain to right flank/abdomen.     Medications:  Continuous Infusions:   sodium chloride 0.9% 75 mL/hr at 03/25/17 1155     Scheduled Meds:   arformoterol  15 mcg Nebulization BID    And    budesonide  0.5 mg Nebulization BID    cefTRIAXone (ROCEPHIN) IVPB  1 g Intravenous Q12H    dofetilide  125 mcg Oral Q12H    escitalopram oxalate  10 mg Oral Daily    famotidine  20 mg Intravenous Daily    guaifenesin  600 mg Oral BID    hydroxychloroquine  200 mg Oral Daily    metronidazole  500 mg Intravenous Q8H    nebivolol  5 mg Oral Daily     PRN Meds:sodium chloride, sodium chloride, acetaminophen, albuterol-ipratropium 2.5mg-0.5mg/3mL, cyclobenzaprine, dextrose 50%, glucagon (human recombinant), insulin aspart, morphine, nitroGLYCERIN, ondansetron     Review of patient's allergies indicates:   Allergen Reactions    Codeine Nausea And Vomiting    Lisinopril      Other reaction(s): cough    Pacerone [amiodarone] Nausea And Vomiting    Sulfa (sulfonamide antibiotics) Nausea And Vomiting    Celecoxib Palpitations    Ciprofloxacin Hives, Itching and Rash    Colesevelam Rash and Nausea And Vomiting    Doxycycline Rash    Statins-hmg-coa reductase inhibitors Rash     Myalgia(can take atorvastatin ok -no rhabdo)per nurse josé antonio and patient  / stomach upset     Objective:     Vital Signs (Most Recent):  Temp: 98.4 °F (36.9 °C) (03/26/17 0300)  Pulse: 73 (03/26/17 0803)  Resp: 18 (03/26/17 0803)  BP: (!) 112/52 (03/26/17 0300)  SpO2: 99 % (03/26/17 0803) Vital Signs (24h Range):  Temp:  [97.9 °F (36.6 °C)-98.6 °F (37 °C)] 98.4 °F (36.9 °C)  Pulse:  [72-87] 73  Resp:  [16-20] 18  SpO2:  [92 %-100 %] 99 %  BP: (101-145)/(47-88) 112/52     Weight: 60 kg (132 lb 4.4 oz)  Body mass index is 24.19 kg/(m^2).    Intake/Output - Last 3 Shifts       03/24 0700 - 03/25 0659 03/25 0700 - 03/26 0659 03/26 0700 - 03/27 0659    P.O. 1520 840     I.V. (mL/kg)   1420 (23.7)     Blood 323.8 368.3     IV Piggyback  300     Total Intake(mL/kg) 1843.8 (31.8) 2928.3 (48.8)     Urine (mL/kg/hr) 700 (0.5) 950 (0.7)     Total Output 700 950      Net +1143.8 +1978.3             Urine Occurrence 3 x 1 x           Physical Exam   Constitutional: She is oriented to person, place, and time.   Cardiovascular: Normal rate.    Pulmonary/Chest: Effort normal.   Abdominal: Soft. She exhibits mass. There is tenderness.   Hematoma with bruising drifting down right flank. Hematoma feels stable. She is wearing abdominal binder     Neurological: She is alert and oriented to person, place, and time.   Skin: Skin is warm and dry.       Significant Labs:  CBC:   Recent Labs  Lab 03/25/17  1526 03/26/17  0130   WBC 27.24*  --    RBC 2.29*  --    HGB 6.8* 9.6*   HCT 19.4* 27.2*     --    MCV 85  --    MCH 29.7  --    MCHC 35.1  --      Await todays labs    Significant Diagnostics:  no new studies

## 2017-03-26 NOTE — ASSESSMENT & PLAN NOTE
-General Surgery consulted  -no surgical intervention required at this time  -may need IR embolization   -IV antibiotics initiated (Flagyl and Rocephin)  -Abdominal Binder in place  -INR 1.2  -will continue monitor

## 2017-03-26 NOTE — ASSESSMENT & PLAN NOTE
Due to abdominal wall hematoma. No evidence of GI bleeding. Good response to blood overnight after another sudden drop. Awaiting repeat CBC this AM.

## 2017-03-26 NOTE — PROGRESS NOTES
[facility]   Gastroenterology  Progress Note    Patient Name: Marcel Montalvo  MRN: [id number]  Admission Date: 3/23/2017  Hospital Length of Stay: 2 days  Code Status: Prior   Attending Provider: Moy Sanderson MD  Consulting Provider: Natanael Beasley MD  Primary Care Physician: Alexandra Moreno MD  Principal Problem: Acute blood loss anemia    Last Recorded LACE+ Scores     None          Subjective:     Interval History: Feeling much better today. Abd pain improving after cyclobenzaprine. Did have drop in hgb overnight and received 2u PRBC with good response. No bowel movement for 2 days. No F/C. No N/V. Has chronic issues with constipation and takes colace at home. Tolerating diet.    Review of Systems   Constitutional: Negative for chills, fatigue and fever.   Respiratory: Positive for cough. Negative for chest tightness and shortness of breath.    Cardiovascular: Negative for chest pain, palpitations and leg swelling.   Gastrointestinal: Positive for abdominal pain and constipation. Negative for abdominal distention, blood in stool, diarrhea, nausea and vomiting.   Genitourinary: Negative for difficulty urinating.   Neurological: Negative for dizziness and headaches.     Objective:     Vital Signs (Most Recent):  Temp: 98.4 °F (36.9 °C) (03/26/17 0300)  Pulse: 73 (03/26/17 0803)  Resp: 18 (03/26/17 0803)  BP: (!) 112/52 (03/26/17 0300)  SpO2: 99 % (03/26/17 0803) Vital Signs (24h Range):  Temp:  [97.9 °F (36.6 °C)-98.6 °F (37 °C)] 98.4 °F (36.9 °C)  Pulse:  [72-87] 73  Resp:  [16-20] 18  SpO2:  [92 %-100 %] 99 %  BP: (101-145)/(47-88) 112/52     Weight: 60 kg (132 lb 4.4 oz) (03/26/17 0300)  Body mass index is 24.19 kg/(m^2).      Intake/Output Summary (Last 24 hours) at 03/26/17 0927  Last data filed at 03/26/17 0600   Gross per 24 hour   Intake          2928.33 ml   Output              950 ml   Net          1978.33 ml       Lines/Drains/Airways     Peripheral Intravenous Line                  Peripheral IV - Single Lumen 03/24/17 0840 Left Upper Arm 2 days                Physical Exam   Constitutional: She is oriented to person, place, and time. She appears well-developed.   Eyes: No scleral icterus.   Cardiovascular: Normal rate, regular rhythm and normal heart sounds.    No murmur heard.  Pulmonary/Chest: Effort normal and breath sounds normal. She has no wheezes. She has no rales.   Abdominal: Soft. Bowel sounds are normal. She exhibits no distension. There is no hepatosplenomegaly. There is tenderness.   Tender in right abdomen at site of hematoma. Large amount of ecchymosis.   Musculoskeletal: She exhibits no edema.   Neurological: She is alert and oriented to person, place, and time.   Psychiatric: She has a normal mood and affect. Her behavior is normal.       Significant Labs:  CBC:   Recent Labs  Lab 03/24/17  1334 03/25/17  0447 03/25/17  1526 03/26/17  0130   WBC 21.16* 28.02* 27.24*  --    HGB 9.5* 8.4* 6.8* 9.6*   HCT 28.3* 24.9* 19.4* 27.2*    214 220  --      CMP:   Recent Labs  Lab 03/25/17  1526   *   CALCIUM 7.9*   ALBUMIN 2.9*   PROT 5.1*   *   K 4.3   CO2 22*   CL 98   BUN 74*   CREATININE 3.1*   ALKPHOS 52*   ALT 76*   AST 39   BILITOT 0.7     Coagulation:   Recent Labs  Lab 03/26/17  0526   INR 1.2   APTT 27.9         Significant Imaging:  No recent issues.     Scheduled Meds:   arformoterol  15 mcg Nebulization BID    And    budesonide  0.5 mg Nebulization BID    cefTRIAXone (ROCEPHIN) IVPB  1 g Intravenous Q12H    docusate sodium  100 mg Oral BID    dofetilide  125 mcg Oral Q12H    escitalopram oxalate  10 mg Oral Daily    famotidine  20 mg Intravenous Daily    guaifenesin  600 mg Oral BID    hydroxychloroquine  200 mg Oral Daily    metronidazole  500 mg Intravenous Q8H    nebivolol  5 mg Oral Daily     Continuous Infusions:   sodium chloride 0.9% 75 mL/hr at 03/25/17 1155     PRN Meds:.sodium chloride, sodium chloride, acetaminophen,  albuterol-ipratropium 2.5mg-0.5mg/3mL, cyclobenzaprine, dextrose 50%, glucagon (human recombinant), insulin aspart, morphine, nitroGLYCERIN, ondansetron      Assessment/Plan:     * Acute blood loss anemia  Due to abdominal wall hematoma. No evidence of GI bleeding. Good response to blood overnight after another sudden drop. Awaiting repeat CBC this AM.     Abdominal wall hematoma  Per surgery.     DOMINGO (acute kidney injury)  Renal function worsening. Per hospital medicine. Pantoprazole on hold due to renal issues.     Leukocytosis  Improved a little yesterday. Awaiting repeat CBC today. Cx pending and negative so far. On abx per hospital medicine.    Abnormal liver enzymes  Stable. F/U as outpatient. Repeat LFTs pending today.    Constipation, chronic  Restart Colace 100mg bid.       VTE Risk Mitigation         Ordered     Place sequential compression device  Until discontinued      03/24/17 0111          Thank you for your consult. I will follow-up with patient. Please contact us if you have any additional questions.    Natanael Beasley MD  Gastroenterology  [facility]

## 2017-03-26 NOTE — ASSESSMENT & PLAN NOTE
Improved a little yesterday. Awaiting repeat CBC today. Cx pending and negative so far. On abx per hospital medicine.

## 2017-03-26 NOTE — PLAN OF CARE
Problem: Anemia (Adult)  Goal: Symptom Improvement  Patient will demonstrate the desired outcomes by discharge/transition of care.   Outcome: Ongoing (interventions implemented as appropriate)  Pt rt flank hematoma bigger with more bruising. HH dropped critically reported to MD. Dr. Kahn ordered abdominal binder. Placed on pt, tolerating.

## 2017-03-26 NOTE — SUBJECTIVE & OBJECTIVE
Interval History: Pt pain controlled with Flexeril.  H/H stable post transfusion of 2 units PRBCs.  INR at 1.2 with BUN/Creatinine improved with gentle hydration.  Lactate decreased to 2.1 and WBC trending downward. General Surgery following H/H with plan for surgical intervention if decline noted.      Review of Systems   Constitutional: Positive for activity change and fatigue. Negative for appetite change, chills and fever.   HENT: Negative for congestion, sinus pressure, sore throat and trouble swallowing.    Eyes: Negative for redness, itching and visual disturbance.   Respiratory: Positive for cough. Negative for chest tightness, shortness of breath and wheezing.    Cardiovascular: Negative for chest pain, palpitations and leg swelling.   Gastrointestinal: Positive for abdominal pain. Negative for abdominal distention, diarrhea, nausea and vomiting.   Endocrine: Negative for cold intolerance and heat intolerance.   Genitourinary: Negative for difficulty urinating, dysuria, flank pain, frequency and urgency.   Musculoskeletal: Negative for arthralgias, back pain and myalgias.   Skin: Negative for color change and wound.   Allergic/Immunologic: Negative for environmental allergies and food allergies.   Neurological: Positive for weakness. Negative for dizziness, syncope and headaches.   Hematological: Does not bruise/bleed easily.   Psychiatric/Behavioral: Negative for confusion and sleep disturbance. The patient is not nervous/anxious.      Objective:     Vital Signs (Most Recent):  Temp: 98.4 °F (36.9 °C) (03/26/17 0300)  Pulse: 73 (03/26/17 0803)  Resp: 18 (03/26/17 0803)  BP: (!) 112/52 (03/26/17 0300)  SpO2: 99 % (03/26/17 0803) Vital Signs (24h Range):  Temp:  [97.9 °F (36.6 °C)-98.6 °F (37 °C)] 98.4 °F (36.9 °C)  Pulse:  [72-87] 73  Resp:  [16-20] 18  SpO2:  [92 %-100 %] 99 %  BP: (101-145)/(47-88) 112/52     Weight: 60 kg (132 lb 4.4 oz)  Body mass index is 24.19 kg/(m^2).    Intake/Output Summary (Last  24 hours) at 03/26/17 1218  Last data filed at 03/26/17 1000   Gross per 24 hour   Intake          3078.33 ml   Output              850 ml   Net          2228.33 ml      Physical Exam   Constitutional: She is oriented to person, place, and time. She appears well-developed and well-nourished.   HENT:   Head: Normocephalic.   Nose: Nose normal.   Mouth/Throat: Oropharynx is clear and moist.   Eyes: Conjunctivae and EOM are normal.   Neck: Normal range of motion. No JVD present.   Cardiovascular: Normal rate, regular rhythm, normal heart sounds and intact distal pulses.  Exam reveals no friction rub.    No murmur heard.  Pulmonary/Chest: Effort normal. No respiratory distress. She has no wheezes. She has rales.   Abdominal: Soft. Bowel sounds are normal. She exhibits distension. There is tenderness.   Hematoma to R abdominal wall     Musculoskeletal: Normal range of motion.   Neurological: She is alert and oriented to person, place, and time.   Skin: Skin is warm and dry.   Ecchymosis to R abdomen    Psychiatric: She has a normal mood and affect. Her behavior is normal. Thought content normal.       Significant Labs:   CBC:   Recent Labs  Lab 03/25/17  0447 03/25/17  1526 03/26/17  0130 03/26/17  0845   WBC 28.02* 27.24*  --  24.72*   HGB 8.4* 6.8* 9.6* 9.9*   HCT 24.9* 19.4* 27.2* 28.1*    220  --  173     CMP:   Recent Labs  Lab 03/24/17  1334 03/25/17  0447 03/25/17  1526 03/26/17  0845   *  134* 133* 133* 135*   K 4.7  4.7 4.6 4.3 4.1     100 97 98 100   CO2 22*  22* 23 22* 26   *  350* 284* 200* 213*   BUN 55*  55* 65* 74* 53*   CREATININE 2.1*  2.1* 2.9* 3.1* 2.0*   CALCIUM 8.7  8.7 8.2* 7.9* 8.2*   PROT 5.4*  --  5.1* 5.5*   ALBUMIN 3.2*  --  2.9* 3.1*   BILITOT 1.2*  --  0.7 1.3*   ALKPHOS 57  --  52* 63   AST 23  --  39 32   ALT 71*  --  76* 75*   ANIONGAP 11  12 13 13 9   EGFRNONAA 22*  22* 15* 14* 24*       Significant Imaging:   Imaging Results         CT Renal Stone  Study ABD Pelvis WO (Final result) Result time:  03/24/17 08:15:57    Final result by Jayjay Roberts MD (03/24/17 08:15:57)    Impression:             Large hematoma in the right lateral abdominal wall measuring 23.0 x 14.0 x 7.8 cm.        All CT scans at this facility use dose modulation, iterative reconstruction and/or weight based dosing when appropriate to reduce radiation dose to as low as reasonably achievable.      Electronically signed by: JAYJAY ROBERTS MD  Date:     03/24/17  Time:    08:15     Narrative:    Exam: CT abdomen and pelvis without intravenous contrast.    Technique: Axial CT images performed through the abdomen and pelvis without intravenous contrast. Multiplanar reformats were performed and interpreted.    Comparison: None    Clinical History: right flank pain.   Abdominal pain    Findings:       There is a large hematoma in the right lateral abdominal wall measuring 23.0 x 14.0 x 7.8 cm.    There is some scarring in the left lung base.    No urolithiasis, hydronephrosis, or perinephric stranding.  The liver, spleen, kidneys, adrenal glands, and pancreas have a normal noncontrasted appearance.   The gallbladder is unremarkable.  No peritoneal free fluid, free air, or inflammatory change.     The bowel is nondistended and within normal limits.    There is calcific atherosclerotic disease of the normal caliber abdominal aorta.    The urinary bladder is unremarkable. The patient has had a hysterectomy.    No significant osseous abnormality is identified.            X-Ray Chest 1 View (Final result) Result time:  03/24/17 08:08:56    Final result by Jayjay Roberts MD (03/24/17 08:08:56)    Impression:     Stable cardiomegaly.  No acute lung infiltrate.            Electronically signed by: JAYJAY ROBERTS MD  Date:     03/24/17  Time:    08:08     Narrative:    Exam: Portable chest radiograph    Clinical History:   .  Weakness.    Comparison: Chest x-ray, 03/17/2017.    Findings:.     Sternotomy wires  are noted.  There is cardiomegaly. There is calcific atherosclerotic disease of the thoracic aorta.  There is platelike atelectasis and/or bandlike scarring in the left midlung field.  No consolidation. No pleural effusion or pneumothorax or pulmonary edema.            RADIOLOGY REPORT (Final result) Result time:  03/24/17 09:50:41

## 2017-03-27 LAB
ALBUMIN SERPL BCP-MCNC: 2.7 G/DL
ALP SERPL-CCNC: 53 U/L
ALT SERPL W/O P-5'-P-CCNC: 58 U/L
ANION GAP SERPL CALC-SCNC: 7 MMOL/L
AST SERPL-CCNC: 26 U/L
BASOPHILS # BLD AUTO: 0 K/UL
BASOPHILS NFR BLD: 0 %
BILIRUB SERPL-MCNC: 0.7 MG/DL
BUN SERPL-MCNC: 33 MG/DL
CALCIUM SERPL-MCNC: 7.9 MG/DL
CHLORIDE SERPL-SCNC: 106 MMOL/L
CO2 SERPL-SCNC: 27 MMOL/L
CREAT SERPL-MCNC: 1.3 MG/DL
DIFFERENTIAL METHOD: ABNORMAL
EOSINOPHIL # BLD AUTO: 0.1 K/UL
EOSINOPHIL NFR BLD: 0.5 %
ERYTHROCYTE [DISTWIDTH] IN BLOOD BY AUTOMATED COUNT: 16.6 %
EST. GFR  (AFRICAN AMERICAN): 46 ML/MIN/1.73 M^2
EST. GFR  (NON AFRICAN AMERICAN): 40 ML/MIN/1.73 M^2
GLUCOSE SERPL-MCNC: 151 MG/DL
HCT VFR BLD AUTO: 25.9 %
HCT VFR BLD AUTO: 27.5 %
HGB BLD-MCNC: 8.6 G/DL
HGB BLD-MCNC: 9.2 G/DL
LYMPHOCYTES # BLD AUTO: 1.6 K/UL
LYMPHOCYTES NFR BLD: 9.4 %
MCH RBC QN AUTO: 28.8 PG
MCHC RBC AUTO-ENTMCNC: 33.2 %
MCV RBC AUTO: 87 FL
MONOCYTES # BLD AUTO: 1.5 K/UL
MONOCYTES NFR BLD: 9 %
NEUTROPHILS # BLD AUTO: 13.3 K/UL
NEUTROPHILS NFR BLD: 81.1 %
PLATELET # BLD AUTO: 172 K/UL
PMV BLD AUTO: 9.8 FL
POCT GLUCOSE: 134 MG/DL (ref 70–110)
POCT GLUCOSE: 170 MG/DL (ref 70–110)
POCT GLUCOSE: 172 MG/DL (ref 70–110)
POCT GLUCOSE: 179 MG/DL (ref 70–110)
POTASSIUM SERPL-SCNC: 4.3 MMOL/L
PROT SERPL-MCNC: 4.9 G/DL
RBC # BLD AUTO: 2.99 M/UL
SODIUM SERPL-SCNC: 140 MMOL/L
WBC # BLD AUTO: 16.41 K/UL

## 2017-03-27 PROCEDURE — 63600175 PHARM REV CODE 636 W HCPCS: Performed by: NURSE PRACTITIONER

## 2017-03-27 PROCEDURE — 63600175 PHARM REV CODE 636 W HCPCS: Performed by: HOSPITALIST

## 2017-03-27 PROCEDURE — 80053 COMPREHEN METABOLIC PANEL: CPT

## 2017-03-27 PROCEDURE — 94640 AIRWAY INHALATION TREATMENT: CPT

## 2017-03-27 PROCEDURE — 85018 HEMOGLOBIN: CPT

## 2017-03-27 PROCEDURE — 25000242 PHARM REV CODE 250 ALT 637 W/ HCPCS: Performed by: HOSPITALIST

## 2017-03-27 PROCEDURE — 85014 HEMATOCRIT: CPT

## 2017-03-27 PROCEDURE — 27000221 HC OXYGEN, UP TO 24 HOURS

## 2017-03-27 PROCEDURE — 25000003 PHARM REV CODE 250: Performed by: HOSPITALIST

## 2017-03-27 PROCEDURE — 21400001 HC TELEMETRY ROOM

## 2017-03-27 PROCEDURE — 94799 UNLISTED PULMONARY SVC/PX: CPT

## 2017-03-27 PROCEDURE — 99231 SBSQ HOSP IP/OBS SF/LOW 25: CPT | Mod: ,,, | Performed by: PHYSICIAN ASSISTANT

## 2017-03-27 PROCEDURE — 25000003 PHARM REV CODE 250: Performed by: INTERNAL MEDICINE

## 2017-03-27 PROCEDURE — 36415 COLL VENOUS BLD VENIPUNCTURE: CPT

## 2017-03-27 PROCEDURE — 85025 COMPLETE CBC W/AUTO DIFF WBC: CPT

## 2017-03-27 PROCEDURE — 25000003 PHARM REV CODE 250: Performed by: NURSE PRACTITIONER

## 2017-03-27 RX ADMIN — ARFORMOTEROL TARTRATE 15 MCG: 15 SOLUTION RESPIRATORY (INHALATION) at 07:03

## 2017-03-27 RX ADMIN — METRONIDAZOLE 500 MG: 500 INJECTION, SOLUTION INTRAVENOUS at 03:03

## 2017-03-27 RX ADMIN — CEFTRIAXONE 1 G: 1 INJECTION, SOLUTION INTRAVENOUS at 04:03

## 2017-03-27 RX ADMIN — BUDESONIDE 0.5 MG: 0.5 SUSPENSION RESPIRATORY (INHALATION) at 08:03

## 2017-03-27 RX ADMIN — PANTOPRAZOLE SODIUM 600 MG: 40 TABLET, DELAYED RELEASE ORAL at 08:03

## 2017-03-27 RX ADMIN — CYCLOBENZAPRINE HYDROCHLORIDE 5 MG: 5 TABLET, FILM COATED ORAL at 09:03

## 2017-03-27 RX ADMIN — CEFTRIAXONE 1 G: 1 INJECTION, SOLUTION INTRAVENOUS at 05:03

## 2017-03-27 RX ADMIN — DOCUSATE SODIUM 100 MG: 100 CAPSULE, LIQUID FILLED ORAL at 08:03

## 2017-03-27 RX ADMIN — FAMOTIDINE 20 MG: 20 INJECTION, SOLUTION INTRAVENOUS at 08:03

## 2017-03-27 RX ADMIN — ACETAMINOPHEN 650 MG: 325 TABLET ORAL at 09:03

## 2017-03-27 RX ADMIN — DOFETILIDE 125 MCG: 0.12 CAPSULE ORAL at 09:03

## 2017-03-27 RX ADMIN — POLYETHYLENE GLYCOL 3350 17 G: 17 POWDER, FOR SOLUTION ORAL at 08:03

## 2017-03-27 RX ADMIN — ACETAMINOPHEN 650 MG: 325 TABLET ORAL at 03:03

## 2017-03-27 RX ADMIN — IPRATROPIUM BROMIDE AND ALBUTEROL SULFATE 3 ML: .5; 3 SOLUTION RESPIRATORY (INHALATION) at 07:03

## 2017-03-27 RX ADMIN — DOFETILIDE 125 MCG: 0.12 CAPSULE ORAL at 08:03

## 2017-03-27 RX ADMIN — ESCITALOPRAM OXALATE 10 MG: 10 TABLET, FILM COATED ORAL at 08:03

## 2017-03-27 RX ADMIN — PANTOPRAZOLE SODIUM 600 MG: 40 TABLET, DELAYED RELEASE ORAL at 09:03

## 2017-03-27 RX ADMIN — HYDROXYCHLOROQUINE SULFATE 200 MG: 200 TABLET, FILM COATED ORAL at 08:03

## 2017-03-27 RX ADMIN — CYCLOBENZAPRINE HYDROCHLORIDE 5 MG: 5 TABLET, FILM COATED ORAL at 05:03

## 2017-03-27 RX ADMIN — DOCUSATE SODIUM 100 MG: 100 CAPSULE, LIQUID FILLED ORAL at 09:03

## 2017-03-27 RX ADMIN — ARFORMOTEROL TARTRATE 15 MCG: 15 SOLUTION RESPIRATORY (INHALATION) at 08:03

## 2017-03-27 RX ADMIN — CYCLOBENZAPRINE HYDROCHLORIDE 5 MG: 5 TABLET, FILM COATED ORAL at 01:03

## 2017-03-27 RX ADMIN — METRONIDAZOLE 500 MG: 500 INJECTION, SOLUTION INTRAVENOUS at 08:03

## 2017-03-27 RX ADMIN — NEBIVOLOL HYDROCHLORIDE 5 MG: 5 TABLET ORAL at 08:03

## 2017-03-27 RX ADMIN — METRONIDAZOLE 500 MG: 500 INJECTION, SOLUTION INTRAVENOUS at 11:03

## 2017-03-27 RX ADMIN — BUDESONIDE 0.5 MG: 0.5 SUSPENSION RESPIRATORY (INHALATION) at 07:03

## 2017-03-27 NOTE — PROGRESS NOTES
[facility] Hospital Medicine  Progress Note    Patient Name: Marcel Montalvo  MRN: [id number]  Patient Class: IP- Inpatient   Admission Date: 3/23/2017  Length of Stay: 3 days  Attending Physician: Randy Valdez MD  Primary Care Provider: Alexandra Moreno MD        Subjective:     Principal Problem:Acute blood loss anemia    HPI:  76 year old female with h/o CHF, COPD, Afib, NIDDM presents complaining of generalized weakness/fatigue for the past day.  Has been getting lightheaded when she stands up.  Has some shortness of breath and cough but has been improving.  Reports black/tarry stools and is having right sided abdominal pain that is dull/achy in nature with sharp/stabbing exacerbations.  Has some nausea, no vomiting.  Was admitted last week with CHF/COPD.    Hospital Course:  03/24/17-Pt admitted to Telemetry Unit for acute blood loss anemia with GI consulted.  Pt reports weakness and pain at site of hematoma.  H/H stable post transfusion of 2 units PRBCs.  Analgesia prn and flexeril continued for pain.  CT confirmed presence of abdominal hematoma.  INR trended downward.  Abdominal binder placed per General Surgery.  BUN/Creatinine and lactate levels improved with mild gentle hydration.  IV antibiotics (Rocephin and Flagyl) initiated.  Incentive spirometry encouraged with Mucinex continued.  GI following and does not suspect active bleeding at this time. General Surgery following with ongoing evaluation for need of surgical procedure.  Blood and urine culture with NGTD.  Sputum culture results.  3/27/17- Patient improving steadily. Hgb improved today to 9.2 - no sign of active bleeding. Medical management employed. Plan for D/C in progress.        Interval History: Pt pain controlled with Flexeril.  H/H stable post transfusion of 2 units PRBCs.  INR at 1.2 with BUN/Creatinine improved with gentle hydration.  Lactate decreased to 2.1 and WBC trending downward. General Surgery following H/H with  plan for surgical intervention if decline noted.      Review of Systems   Constitutional: Positive for activity change and fatigue. Negative for appetite change, chills and fever.   HENT: Negative for congestion, sinus pressure, sore throat and trouble swallowing.    Eyes: Negative for redness, itching and visual disturbance.   Respiratory: Positive for cough. Negative for chest tightness, shortness of breath and wheezing.    Cardiovascular: Negative for chest pain, palpitations and leg swelling.   Gastrointestinal: Positive for abdominal pain. Negative for abdominal distention, diarrhea, nausea and vomiting.   Endocrine: Negative for cold intolerance and heat intolerance.   Genitourinary: Negative for difficulty urinating, dysuria, flank pain, frequency and urgency.   Musculoskeletal: Negative for arthralgias, back pain and myalgias.   Skin: Negative for color change and wound.   Allergic/Immunologic: Negative for environmental allergies and food allergies.   Neurological: Positive for weakness. Negative for dizziness, syncope and headaches.   Hematological: Does not bruise/bleed easily.   Psychiatric/Behavioral: Negative for confusion and sleep disturbance. The patient is not nervous/anxious.      Objective:     Vital Signs (Most Recent):  Temp: 99 °F (37.2 °C) (03/27/17 1550)  Pulse: 96 (03/27/17 1554)  Resp: 18 (03/27/17 1554)  BP: 114/62 (03/27/17 1550)  SpO2: 97 % (03/27/17 1554) Vital Signs (24h Range):  Temp:  [97.7 °F (36.5 °C)-99 °F (37.2 °C)] 99 °F (37.2 °C)  Pulse:  [] 96  Resp:  [18-20] 18  SpO2:  [91 %-100 %] 97 %  BP: (107-127)/(4-73) 114/62     Weight: 63.6 kg (140 lb 3.4 oz)  Body mass index is 25.65 kg/(m^2).    Intake/Output Summary (Last 24 hours) at 03/27/17 1621  Last data filed at 03/27/17 1527   Gross per 24 hour   Intake             1505 ml   Output             2500 ml   Net             -995 ml      Physical Exam   Constitutional: She is oriented to person, place, and time. She appears  well-developed and well-nourished.   HENT:   Head: Normocephalic.   Nose: Nose normal.   Mouth/Throat: Oropharynx is clear and moist.   Eyes: Conjunctivae and EOM are normal.   Neck: Normal range of motion. No JVD present.   Cardiovascular: Normal rate, regular rhythm, normal heart sounds and intact distal pulses.  Exam reveals no friction rub.    No murmur heard.  Pulmonary/Chest: Effort normal. No respiratory distress. She has no wheezes. She has rales.   Abdominal: Soft. Bowel sounds are normal. She exhibits distension. There is tenderness.   Hematoma to R abdominal wall     Musculoskeletal: Normal range of motion.   Neurological: She is alert and oriented to person, place, and time.   Skin: Skin is warm and dry. There is erythema.   Ecchymosis to R abdomen / L UE   Psychiatric: She has a normal mood and affect. Her behavior is normal. Thought content normal.       Significant Labs:   CBC:     Recent Labs  Lab 03/26/17  0845 03/27/17  0433 03/27/17  1155   WBC 24.72* 16.41*  --    HGB 9.9* 8.6* 9.2*   HCT 28.1* 25.9* 27.5*    172  --      CMP:     Recent Labs  Lab 03/26/17  0845 03/27/17  0433   * 140   K 4.1 4.3    106   CO2 26 27   * 151*   BUN 53* 33*   CREATININE 2.0* 1.3   CALCIUM 8.2* 7.9*   PROT 5.5* 4.9*   ALBUMIN 3.1* 2.7*   BILITOT 1.3* 0.7   ALKPHOS 63 53*   AST 32 26   ALT 75* 58*   ANIONGAP 9 7*   EGFRNONAA 24* 40*       Significant Imaging:   Imaging Results         CT Renal Stone Study ABD Pelvis WO (Final result) Result time:  03/24/17 08:15:57    Final result by Jayjay Roberts MD (03/24/17 08:15:57)    Impression:             Large hematoma in the right lateral abdominal wall measuring 23.0 x 14.0 x 7.8 cm.        All CT scans at this facility use dose modulation, iterative reconstruction and/or weight based dosing when appropriate to reduce radiation dose to as low as reasonably achievable.      Electronically signed by: JAYJAY ROBERTS MD  Date:      03/24/17  Time:    08:15     Narrative:    Exam: CT abdomen and pelvis without intravenous contrast.    Technique: Axial CT images performed through the abdomen and pelvis without intravenous contrast. Multiplanar reformats were performed and interpreted.    Comparison: None    Clinical History: right flank pain.   Abdominal pain    Findings:       There is a large hematoma in the right lateral abdominal wall measuring 23.0 x 14.0 x 7.8 cm.    There is some scarring in the left lung base.    No urolithiasis, hydronephrosis, or perinephric stranding.  The liver, spleen, kidneys, adrenal glands, and pancreas have a normal noncontrasted appearance.   The gallbladder is unremarkable.  No peritoneal free fluid, free air, or inflammatory change.     The bowel is nondistended and within normal limits.    There is calcific atherosclerotic disease of the normal caliber abdominal aorta.    The urinary bladder is unremarkable. The patient has had a hysterectomy.    No significant osseous abnormality is identified.            X-Ray Chest 1 View (Final result) Result time:  03/24/17 08:08:56    Final result by Jayjay Roberts MD (03/24/17 08:08:56)    Impression:     Stable cardiomegaly.  No acute lung infiltrate.            Electronically signed by: JAYJAY ROBERTS MD  Date:     03/24/17  Time:    08:08     Narrative:    Exam: Portable chest radiograph    Clinical History:   .  Weakness.    Comparison: Chest x-ray, 03/17/2017.    Findings:.     Sternotomy wires are noted.  There is cardiomegaly. There is calcific atherosclerotic disease of the thoracic aorta.  There is platelike atelectasis and/or bandlike scarring in the left midlung field.  No consolidation. No pleural effusion or pneumothorax or pulmonary edema.            RADIOLOGY REPORT (Final result) Result time:  03/24/17 09:50:41        Assessment/Plan:      * Acute blood loss anemia  Secondary to Intra-abdominal bleed   -Elevated INR dropped to 1.2  -Hgb initially  dropped from 11.8  to 7 with patient symptomatic  -Hgb 9.2 today Improving from 8.6 this AM  - Medical Management  - Plan for discharge    Coronary artery disease involving coronary bypass graft without angina pectoris  Hold Aspirin and Plavix  - continue Bystolic       Essential hypertension  Resume Bystolic   hydralazine prn      Type 2 diabetes mellitus with hyperglycemia, without long-term current use of insulin  accuchecks and sliding scale insulin      Chronic atrial fibrillation  Rate currently controlled  continue Bystolic and Tikosyn  hold Xarelto and ASA      Chronic diastolic heart failure  Compensated;   continue Bystolic   -will give IV Lasix if needed      Simple chronic bronchitis  Continue symbicort  duonebs prn  -sputum culture results noted        DOMINGO (acute kidney injury)  -improved / Resolved  - gentle hydration continued  - avoid nephrotoxins  - monitor       Abdominal wall hematoma  -General Surgery consulted  -no surgical intervention required at this time  -may need IR embolization   -IV antibiotics initiated (Flagyl and Rocephin)  -Abdominal Binder in place  -INR 1.2  -will continue monitor        Abnormal liver enzymes  -GI following  -LFTs trending downward  -will continue to follow repeat lab results  - Improved      Leukocytosis  -trending downward  -blood and urine cultures with NGTD  -UA unremarkble  -pt afebrile  -chest xray showed no acute lung infiltrate  -IV antibiotics continued (Flagyl and Rocephin)  -Improved      Constipation, chronic  -Colace and Miralax initiated    VTE Risk Mitigation         Ordered     Place sequential compression device  Until discontinued      03/24/17 0111          Randy Valdez MD  Department of Hospital Medicine   [facility]

## 2017-03-27 NOTE — ASSESSMENT & PLAN NOTE
-trending downward  -blood and urine cultures with NGTD  -UA unremarkble  -pt afebrile  -chest xray showed no acute lung infiltrate  -IV antibiotics continued (Flagyl and Rocephin)  -Improved

## 2017-03-27 NOTE — ASSESSMENT & PLAN NOTE
Secondary to Intra-abdominal bleed   -Elevated INR dropped to 1.2  -Hgb initially dropped from 11.8  to 7 with patient symptomatic  -Hgb 9.2 today Improving from 8.6 this AM  - Medical Management  - Plan for discharge

## 2017-03-27 NOTE — PLAN OF CARE
Met with patient and patient's  in hospital room for Discharge Planning Reassessment.  Patient stating that she is feeling better and is hopeful that she will not require surgery and will be discharged home soon.  Patient's  remains involved and supportive.  Patient and  continue to indicate having no needs or concerns.    PLAN:  Will continue to follow       03/27/17 1351   Discharge Reassessment   Assessment Type Discharge Planning Reassessment   Can the patient answer the patient profile reliably? Yes, cognitively intact   How does the patient rate their overall health at the present time? Good   Describe the patient's ability to walk at the present time. No restrictions   How often would a person be available to care for the patient? Whenever needed   Number of comorbid conditions (as recorded on the chart) Four   During the past month, has the patient often been bothered by feeling down, depressed or hopeless? No   During the past month, has the patient often been bothered by little interest or pleasure in doing things? No   Discharge plan remains the same: Yes   Discharge Plan A Home with family   Discharge Plan B Home with family;Home Health   Change in patient condition or support system No   Patient choice form signed by patient/caregiver N/A

## 2017-03-27 NOTE — PROGRESS NOTES
[facility]   General Surgery  Progress Note    Subjective:     History of Present Illness:  Marcel Montalvo presented to the ED with right sided abdominal pain, weakness, and melena that began about 3 days ago. She was recently d/c from the hospital after tx for acute CHF and bronchitis. She reports bouts of coughing. She is on Xarelto for afib. She began to noticed right side abdominal pain and swelling that is worse with movement and coughing. She has not noticed increase in swelling since admission to the hospital. Non contrast CT shows a large abdominal wall hematoma. H/H decreased and pt is receiving blood. Stool was positive for occult blood and GI has been consulted.     Post-Op Info:  * No surgery found *         Interval History: pt feels well today. No complaints    Medications:  Continuous Infusions:   sodium chloride 0.9% 50 mL/hr at 03/26/17 1226     Scheduled Meds:   arformoterol  15 mcg Nebulization BID    And    budesonide  0.5 mg Nebulization BID    cefTRIAXone (ROCEPHIN) IVPB  1 g Intravenous Q12H    cyclobenzaprine  5 mg Oral TID    docusate sodium  100 mg Oral BID    dofetilide  125 mcg Oral Q12H    escitalopram oxalate  10 mg Oral Daily    famotidine  20 mg Intravenous Daily    guaifenesin  600 mg Oral BID    hydroxychloroquine  200 mg Oral Daily    metronidazole  500 mg Intravenous Q8H    nebivolol  5 mg Oral Daily    polyethylene glycol  17 g Oral Daily     PRN Meds:sodium chloride, acetaminophen, albuterol-ipratropium 2.5mg-0.5mg/3mL, dextrose 50%, glucagon (human recombinant), insulin aspart, morphine, nitroGLYCERIN, ondansetron     Review of patient's allergies indicates:   Allergen Reactions    Codeine Nausea And Vomiting    Lisinopril      Other reaction(s): cough    Pacerone [amiodarone] Nausea And Vomiting    Sulfa (sulfonamide antibiotics) Nausea And Vomiting    Celecoxib Palpitations    Ciprofloxacin Hives, Itching and Rash    Colesevelam Rash and  Nausea And Vomiting    Doxycycline Rash    Statins-hmg-coa reductase inhibitors Rash     Myalgia(can take atorvastatin ok -no rhabdo)per nurse josé antonio and patient  / stomach upset     Objective:     Vital Signs (Most Recent):  Temp: 97.7 °F (36.5 °C) (03/27/17 0812)  Pulse: 96 (03/27/17 0812)  Resp: 18 (03/27/17 0812)  BP: 127/73 (03/27/17 0812)  SpO2: 100 % (03/27/17 0812) Vital Signs (24h Range):  Temp:  [97.7 °F (36.5 °C)-98.4 °F (36.9 °C)] 97.7 °F (36.5 °C)  Pulse:  [] 96  Resp:  [18-20] 18  SpO2:  [91 %-100 %] 100 %  BP: (107-127)/(4-73) 127/73     Weight: 63.6 kg (140 lb 3.4 oz)  Body mass index is 25.65 kg/(m^2).    Intake/Output - Last 3 Shifts       03/25 0700 - 03/26 0659 03/26 0700 - 03/27 0659 03/27 0700 - 03/28 0659    P.O. 840 600     I.V. (mL/kg) 1420 (23.7) 1185.8 (18.6)     Blood 368.3      IV Piggyback 300 550     Total Intake(mL/kg) 2928.3 (48.8) 2335.8 (36.7)     Urine (mL/kg/hr) 950 (0.7) 3100 (2)     Stool  0 (0)     Total Output 950 3100      Net +1978.3 -764.2             Urine Occurrence 1 x            Physical Exam   Constitutional: She is oriented to person, place, and time. She appears well-developed and well-nourished.   HENT:   Head: Normocephalic and atraumatic.   Eyes: EOM are normal.   Cardiovascular: Normal rate and regular rhythm.    Pulmonary/Chest: Effort normal. No respiratory distress.   Abdominal: Soft. She exhibits mass. There is tenderness.   Bruising in rlq   Musculoskeletal: Normal range of motion.   Neurological: She is alert and oriented to person, place, and time.   Skin: Skin is warm and dry.   Psychiatric: She has a normal mood and affect. Thought content normal.   Vitals reviewed.      Significant Labs:  CBC:   Recent Labs  Lab 03/27/17  0433   WBC 16.41*   RBC 2.99*   HGB 8.6*   HCT 25.9*      MCV 87   MCH 28.8   MCHC 33.2     CMP:   Recent Labs  Lab 03/27/17  0433   *   CALCIUM 7.9*   ALBUMIN 2.7*   PROT 4.9*      K 4.3   CO2 27       BUN 33*   CREATININE 1.3   ALKPHOS 53*   ALT 58*   AST 26   BILITOT 0.7         Significant Diagnostics:  no new results    Assessment/Plan:     Abdominal wall hematoma  Pt made NPO for possible procedure today. Will recheck H/H around noon and if continues to drift down we need to consider exploration.       Chayo Obregon PA-C  General Surgery  [facility]

## 2017-03-27 NOTE — ASSESSMENT & PLAN NOTE
Pt made NPO for possible procedure today. Will recheck H/H around noon and if continues to drift down we need to consider exploration.

## 2017-03-27 NOTE — SUBJECTIVE & OBJECTIVE
Interval History: Pt pain controlled with Flexeril.  H/H stable post transfusion of 2 units PRBCs.  INR at 1.2 with BUN/Creatinine improved with gentle hydration.  Lactate decreased to 2.1 and WBC trending downward. General Surgery following H/H with plan for surgical intervention if decline noted.      Review of Systems   Constitutional: Positive for activity change and fatigue. Negative for appetite change, chills and fever.   HENT: Negative for congestion, sinus pressure, sore throat and trouble swallowing.    Eyes: Negative for redness, itching and visual disturbance.   Respiratory: Positive for cough. Negative for chest tightness, shortness of breath and wheezing.    Cardiovascular: Negative for chest pain, palpitations and leg swelling.   Gastrointestinal: Positive for abdominal pain. Negative for abdominal distention, diarrhea, nausea and vomiting.   Endocrine: Negative for cold intolerance and heat intolerance.   Genitourinary: Negative for difficulty urinating, dysuria, flank pain, frequency and urgency.   Musculoskeletal: Negative for arthralgias, back pain and myalgias.   Skin: Negative for color change and wound.   Allergic/Immunologic: Negative for environmental allergies and food allergies.   Neurological: Positive for weakness. Negative for dizziness, syncope and headaches.   Hematological: Does not bruise/bleed easily.   Psychiatric/Behavioral: Negative for confusion and sleep disturbance. The patient is not nervous/anxious.      Objective:     Vital Signs (Most Recent):  Temp: 99 °F (37.2 °C) (03/27/17 1550)  Pulse: 96 (03/27/17 1554)  Resp: 18 (03/27/17 1554)  BP: 114/62 (03/27/17 1550)  SpO2: 97 % (03/27/17 1554) Vital Signs (24h Range):  Temp:  [97.7 °F (36.5 °C)-99 °F (37.2 °C)] 99 °F (37.2 °C)  Pulse:  [] 96  Resp:  [18-20] 18  SpO2:  [91 %-100 %] 97 %  BP: (107-127)/(4-73) 114/62     Weight: 63.6 kg (140 lb 3.4 oz)  Body mass index is 25.65 kg/(m^2).    Intake/Output Summary (Last 24  hours) at 03/27/17 1621  Last data filed at 03/27/17 1527   Gross per 24 hour   Intake             1505 ml   Output             2500 ml   Net             -995 ml      Physical Exam   Constitutional: She is oriented to person, place, and time. She appears well-developed and well-nourished.   HENT:   Head: Normocephalic.   Nose: Nose normal.   Mouth/Throat: Oropharynx is clear and moist.   Eyes: Conjunctivae and EOM are normal.   Neck: Normal range of motion. No JVD present.   Cardiovascular: Normal rate, regular rhythm, normal heart sounds and intact distal pulses.  Exam reveals no friction rub.    No murmur heard.  Pulmonary/Chest: Effort normal. No respiratory distress. She has no wheezes. She has rales.   Abdominal: Soft. Bowel sounds are normal. She exhibits distension. There is tenderness.   Hematoma to R abdominal wall     Musculoskeletal: Normal range of motion.   Neurological: She is alert and oriented to person, place, and time.   Skin: Skin is warm and dry. There is erythema.   Ecchymosis to R abdomen / L UE   Psychiatric: She has a normal mood and affect. Her behavior is normal. Thought content normal.       Significant Labs:   CBC:     Recent Labs  Lab 03/26/17  0845 03/27/17  0433 03/27/17  1155   WBC 24.72* 16.41*  --    HGB 9.9* 8.6* 9.2*   HCT 28.1* 25.9* 27.5*    172  --      CMP:     Recent Labs  Lab 03/26/17  0845 03/27/17  0433   * 140   K 4.1 4.3    106   CO2 26 27   * 151*   BUN 53* 33*   CREATININE 2.0* 1.3   CALCIUM 8.2* 7.9*   PROT 5.5* 4.9*   ALBUMIN 3.1* 2.7*   BILITOT 1.3* 0.7   ALKPHOS 63 53*   AST 32 26   ALT 75* 58*   ANIONGAP 9 7*   EGFRNONAA 24* 40*       Significant Imaging:   Imaging Results         CT Renal Stone Study ABD Pelvis WO (Final result) Result time:  03/24/17 08:15:57    Final result by Jayjay Roberts MD (03/24/17 08:15:57)    Impression:             Large hematoma in the right lateral abdominal wall measuring 23.0 x 14.0 x 7.8 cm.        All CT  scans at this facility use dose modulation, iterative reconstruction and/or weight based dosing when appropriate to reduce radiation dose to as low as reasonably achievable.      Electronically signed by: JAYJAY ROBERTS MD  Date:     03/24/17  Time:    08:15     Narrative:    Exam: CT abdomen and pelvis without intravenous contrast.    Technique: Axial CT images performed through the abdomen and pelvis without intravenous contrast. Multiplanar reformats were performed and interpreted.    Comparison: None    Clinical History: right flank pain.   Abdominal pain    Findings:       There is a large hematoma in the right lateral abdominal wall measuring 23.0 x 14.0 x 7.8 cm.    There is some scarring in the left lung base.    No urolithiasis, hydronephrosis, or perinephric stranding.  The liver, spleen, kidneys, adrenal glands, and pancreas have a normal noncontrasted appearance.   The gallbladder is unremarkable.  No peritoneal free fluid, free air, or inflammatory change.     The bowel is nondistended and within normal limits.    There is calcific atherosclerotic disease of the normal caliber abdominal aorta.    The urinary bladder is unremarkable. The patient has had a hysterectomy.    No significant osseous abnormality is identified.            X-Ray Chest 1 View (Final result) Result time:  03/24/17 08:08:56    Final result by Jayjay Roberts MD (03/24/17 08:08:56)    Impression:     Stable cardiomegaly.  No acute lung infiltrate.            Electronically signed by: JAYJAY ROBERTS MD  Date:     03/24/17  Time:    08:08     Narrative:    Exam: Portable chest radiograph    Clinical History:   .  Weakness.    Comparison: Chest x-ray, 03/17/2017.    Findings:.     Sternotomy wires are noted.  There is cardiomegaly. There is calcific atherosclerotic disease of the thoracic aorta.  There is platelike atelectasis and/or bandlike scarring in the left midlung field.  No consolidation. No pleural effusion or pneumothorax or  pulmonary edema.            RADIOLOGY REPORT (Final result) Result time:  03/24/17 09:50:41

## 2017-03-27 NOTE — PLAN OF CARE
Problem: Patient Care Overview  Goal: Plan of Care Review  Outcome: Ongoing (interventions implemented as appropriate)  Patient remains free from falls, fall precaution in place. Stand by assist.  VS stable. Abdominal binder in place. IV abx infusing.  No other C/O at this time.Call bell and belongings within reach, reminded to call for assistance.

## 2017-03-27 NOTE — PLAN OF CARE
Problem: Patient Care Overview  Goal: Plan of Care Review  Outcome: Ongoing (interventions implemented as appropriate)  Pt. Currently resting in bed; Afib on monitor HR currently controlled. 3L NC tolerating well. C/o of pain to lateral side; pain meds given . IVF infusing at 50 ml/hr. Safety/fall precautions maintained. Plan of care reviewed with pt. Will continue to monitor.

## 2017-03-28 LAB
ALBUMIN SERPL BCP-MCNC: 3 G/DL
ALP SERPL-CCNC: 69 U/L
ALT SERPL W/O P-5'-P-CCNC: 61 U/L
ANION GAP SERPL CALC-SCNC: 7 MMOL/L
AST SERPL-CCNC: 31 U/L
BACTERIA SPEC AEROBE CULT: NORMAL
BASOPHILS # BLD AUTO: 0.01 K/UL
BASOPHILS NFR BLD: 0.1 %
BILIRUB SERPL-MCNC: 1 MG/DL
BUN SERPL-MCNC: 20 MG/DL
CALCIUM SERPL-MCNC: 8 MG/DL
CHLORIDE SERPL-SCNC: 105 MMOL/L
CO2 SERPL-SCNC: 26 MMOL/L
CREAT SERPL-MCNC: 1 MG/DL
DIFFERENTIAL METHOD: ABNORMAL
EOSINOPHIL # BLD AUTO: 0.1 K/UL
EOSINOPHIL NFR BLD: 0.9 %
ERYTHROCYTE [DISTWIDTH] IN BLOOD BY AUTOMATED COUNT: 16.7 %
EST. GFR  (AFRICAN AMERICAN): >60 ML/MIN/1.73 M^2
EST. GFR  (NON AFRICAN AMERICAN): 54 ML/MIN/1.73 M^2
GLUCOSE SERPL-MCNC: 135 MG/DL
GRAM STN SPEC: NORMAL
HCT VFR BLD AUTO: 27.6 %
HGB BLD-MCNC: 9.1 G/DL
LYMPHOCYTES # BLD AUTO: 1.6 K/UL
LYMPHOCYTES NFR BLD: 11.4 %
MAGNESIUM SERPL-MCNC: 1.7 MG/DL
MCH RBC QN AUTO: 29.7 PG
MCHC RBC AUTO-ENTMCNC: 33 %
MCV RBC AUTO: 90 FL
MONOCYTES # BLD AUTO: 1.4 K/UL
MONOCYTES NFR BLD: 10.1 %
NEUTROPHILS # BLD AUTO: 10.8 K/UL
NEUTROPHILS NFR BLD: 77.5 %
PLATELET # BLD AUTO: 201 K/UL
PMV BLD AUTO: 9.8 FL
POCT GLUCOSE: 130 MG/DL (ref 70–110)
POCT GLUCOSE: 145 MG/DL (ref 70–110)
POTASSIUM SERPL-SCNC: 4.2 MMOL/L
PROT SERPL-MCNC: 5.5 G/DL
RBC # BLD AUTO: 3.06 M/UL
SODIUM SERPL-SCNC: 138 MMOL/L
WBC # BLD AUTO: 13.96 K/UL

## 2017-03-28 PROCEDURE — 83735 ASSAY OF MAGNESIUM: CPT

## 2017-03-28 PROCEDURE — 25000003 PHARM REV CODE 250: Performed by: HOSPITALIST

## 2017-03-28 PROCEDURE — 85025 COMPLETE CBC W/AUTO DIFF WBC: CPT

## 2017-03-28 PROCEDURE — 25000003 PHARM REV CODE 250: Performed by: INTERNAL MEDICINE

## 2017-03-28 PROCEDURE — 94799 UNLISTED PULMONARY SVC/PX: CPT

## 2017-03-28 PROCEDURE — 21400001 HC TELEMETRY ROOM

## 2017-03-28 PROCEDURE — 94664 DEMO&/EVAL PT USE INHALER: CPT

## 2017-03-28 PROCEDURE — 94640 AIRWAY INHALATION TREATMENT: CPT

## 2017-03-28 PROCEDURE — 99231 SBSQ HOSP IP/OBS SF/LOW 25: CPT | Mod: ,,, | Performed by: PHYSICIAN ASSISTANT

## 2017-03-28 PROCEDURE — 25000003 PHARM REV CODE 250: Performed by: NURSE PRACTITIONER

## 2017-03-28 PROCEDURE — 80053 COMPREHEN METABOLIC PANEL: CPT

## 2017-03-28 PROCEDURE — 63600175 PHARM REV CODE 636 W HCPCS: Performed by: NURSE PRACTITIONER

## 2017-03-28 PROCEDURE — 25000242 PHARM REV CODE 250 ALT 637 W/ HCPCS: Performed by: HOSPITALIST

## 2017-03-28 PROCEDURE — 63600175 PHARM REV CODE 636 W HCPCS: Performed by: HOSPITALIST

## 2017-03-28 PROCEDURE — 36415 COLL VENOUS BLD VENIPUNCTURE: CPT

## 2017-03-28 RX ADMIN — CEFTRIAXONE 1 G: 1 INJECTION, SOLUTION INTRAVENOUS at 04:03

## 2017-03-28 RX ADMIN — METRONIDAZOLE 500 MG: 500 INJECTION, SOLUTION INTRAVENOUS at 08:03

## 2017-03-28 RX ADMIN — HYDROXYCHLOROQUINE SULFATE 200 MG: 200 TABLET, FILM COATED ORAL at 08:03

## 2017-03-28 RX ADMIN — PANTOPRAZOLE SODIUM 600 MG: 40 TABLET, DELAYED RELEASE ORAL at 08:03

## 2017-03-28 RX ADMIN — ARFORMOTEROL TARTRATE 15 MCG: 15 SOLUTION RESPIRATORY (INHALATION) at 08:03

## 2017-03-28 RX ADMIN — ACETAMINOPHEN 650 MG: 325 TABLET ORAL at 03:03

## 2017-03-28 RX ADMIN — DOFETILIDE 125 MCG: 0.12 CAPSULE ORAL at 09:03

## 2017-03-28 RX ADMIN — CYCLOBENZAPRINE HYDROCHLORIDE 5 MG: 5 TABLET, FILM COATED ORAL at 05:03

## 2017-03-28 RX ADMIN — DOFETILIDE 125 MCG: 0.12 CAPSULE ORAL at 08:03

## 2017-03-28 RX ADMIN — PANTOPRAZOLE SODIUM 600 MG: 40 TABLET, DELAYED RELEASE ORAL at 09:03

## 2017-03-28 RX ADMIN — ACETAMINOPHEN 650 MG: 325 TABLET ORAL at 09:03

## 2017-03-28 RX ADMIN — IPRATROPIUM BROMIDE AND ALBUTEROL SULFATE 3 ML: .5; 3 SOLUTION RESPIRATORY (INHALATION) at 08:03

## 2017-03-28 RX ADMIN — CEFTRIAXONE 1 G: 1 INJECTION, SOLUTION INTRAVENOUS at 05:03

## 2017-03-28 RX ADMIN — BUDESONIDE 0.5 MG: 0.5 SUSPENSION RESPIRATORY (INHALATION) at 08:03

## 2017-03-28 RX ADMIN — CYCLOBENZAPRINE HYDROCHLORIDE 5 MG: 5 TABLET, FILM COATED ORAL at 02:03

## 2017-03-28 RX ADMIN — CYCLOBENZAPRINE HYDROCHLORIDE 5 MG: 5 TABLET, FILM COATED ORAL at 09:03

## 2017-03-28 RX ADMIN — METRONIDAZOLE 500 MG: 500 INJECTION, SOLUTION INTRAVENOUS at 11:03

## 2017-03-28 RX ADMIN — ESCITALOPRAM OXALATE 10 MG: 10 TABLET, FILM COATED ORAL at 08:03

## 2017-03-28 RX ADMIN — NEBIVOLOL HYDROCHLORIDE 5 MG: 5 TABLET ORAL at 08:03

## 2017-03-28 RX ADMIN — METRONIDAZOLE 500 MG: 500 INJECTION, SOLUTION INTRAVENOUS at 05:03

## 2017-03-28 RX ADMIN — POLYETHYLENE GLYCOL 3350 17 G: 17 POWDER, FOR SOLUTION ORAL at 08:03

## 2017-03-28 RX ADMIN — FAMOTIDINE 20 MG: 20 INJECTION, SOLUTION INTRAVENOUS at 08:03

## 2017-03-28 RX ADMIN — SODIUM CHLORIDE: 0.9 INJECTION, SOLUTION INTRAVENOUS at 06:03

## 2017-03-28 RX ADMIN — DOCUSATE SODIUM 100 MG: 100 CAPSULE, LIQUID FILLED ORAL at 08:03

## 2017-03-28 RX ADMIN — DOCUSATE SODIUM 100 MG: 100 CAPSULE, LIQUID FILLED ORAL at 09:03

## 2017-03-28 NOTE — PROGRESS NOTES
[facility] Hospital Medicine  Progress Note    Patient Name: Marcel Montalvo  MRN: [id number]  Patient Class: IP- Inpatient   Admission Date: 3/23/2017  Length of Stay: 4 days  Attending Physician: Randy Valdez MD  Primary Care Provider: Alexandra Moreno MD        Subjective:     Principal Problem:Acute blood loss anemia    HPI:  76 year old female with h/o CHF, COPD, Afib, NIDDM presents complaining of generalized weakness/fatigue for the past day.  Has been getting lightheaded when she stands up.  Has some shortness of breath and cough but has been improving.  Reports black/tarry stools and is having right sided abdominal pain that is dull/achy in nature with sharp/stabbing exacerbations.  Has some nausea, no vomiting.  Was admitted last week with CHF/COPD.    Hospital Course:  03/24/17-Pt admitted to Telemetry Unit for acute blood loss anemia with GI consulted.  Pt reports weakness and pain at site of hematoma.  H/H stable post transfusion of 2 units PRBCs.  Analgesia prn and flexeril continued for pain.  CT confirmed presence of abdominal hematoma.  INR trended downward.  Abdominal binder placed per General Surgery.  BUN/Creatinine and lactate levels improved with mild gentle hydration.  IV antibiotics (Rocephin and Flagyl) initiated.  Incentive spirometry encouraged with Mucinex continued.  GI following and does not suspect active bleeding at this time. General Surgery following with ongoing evaluation for need of surgical procedure.  Blood and urine culture with NGTD.  Sputum culture results.  3/27/17- Patient improving steadily. Hgb improved today to 9.2 - no sign of active bleeding. Medical management employed. Plan for D/C in progress.        Interval History: Pt pain controlled. She is improved. She is hesitant to resume anticoagulation. Plan for D/C tomorrow if stable      Review of Systems   Constitutional: Positive for activity change and fatigue. Negative for appetite change, chills  and fever.   HENT: Negative for congestion, sinus pressure, sore throat and trouble swallowing.    Eyes: Negative for redness, itching and visual disturbance.   Respiratory: Positive for cough. Negative for chest tightness, shortness of breath and wheezing.    Cardiovascular: Negative for chest pain, palpitations and leg swelling.   Gastrointestinal: Positive for abdominal pain. Negative for abdominal distention, diarrhea, nausea and vomiting.   Endocrine: Negative for cold intolerance and heat intolerance.   Genitourinary: Negative for difficulty urinating, dysuria, flank pain, frequency and urgency.   Musculoskeletal: Negative for arthralgias, back pain and myalgias.   Skin: Negative for color change and wound.   Allergic/Immunologic: Negative for environmental allergies and food allergies.   Neurological: Positive for weakness. Negative for dizziness, syncope and headaches.   Hematological: Does not bruise/bleed easily.   Psychiatric/Behavioral: Negative for confusion and sleep disturbance. The patient is not nervous/anxious.      Objective:     Vital Signs (Most Recent):  Temp: 97.8 °F (36.6 °C) (03/28/17 1608)  Pulse: 91 (03/28/17 1608)  Resp: 18 (03/28/17 1608)  BP: 131/70 (03/28/17 1608)  SpO2: 98 % (03/28/17 1608) Vital Signs (24h Range):  Temp:  [97.8 °F (36.6 °C)-98.2 °F (36.8 °C)] 97.8 °F (36.6 °C)  Pulse:  [] 91  Resp:  [18-20] 18  SpO2:  [95 %-100 %] 98 %  BP: (103-137)/(44-72) 131/70     Weight: 63.6 kg (140 lb 3.4 oz)  Body mass index is 25.65 kg/(m^2).    Intake/Output Summary (Last 24 hours) at 03/28/17 1836  Last data filed at 03/28/17 1742   Gross per 24 hour   Intake             3445 ml   Output             2100 ml   Net             1345 ml      Physical Exam   Constitutional: She is oriented to person, place, and time. She appears well-developed and well-nourished.   HENT:   Head: Normocephalic.   Nose: Nose normal.   Mouth/Throat: Oropharynx is clear and moist.   Eyes: Conjunctivae and  EOM are normal.   Neck: Normal range of motion. No JVD present.   Cardiovascular: Normal rate, regular rhythm, normal heart sounds and intact distal pulses.  Exam reveals no friction rub.    No murmur heard.  Pulmonary/Chest: Effort normal. No respiratory distress. She has no wheezes. She has rales.   Abdominal: Soft. Bowel sounds are normal. She exhibits distension. There is tenderness.   Hematoma to R abdominal wall     Musculoskeletal: Normal range of motion.   Neurological: She is alert and oriented to person, place, and time.   Skin: Skin is warm and dry. There is erythema.   Ecchymosis to R abdomen / L UE   Psychiatric: She has a normal mood and affect. Her behavior is normal. Thought content normal.       Significant Labs:   CBC:     Recent Labs  Lab 03/27/17  0433 03/27/17  1155 03/28/17  0529   WBC 16.41*  --  13.96*   HGB 8.6* 9.2* 9.1*   HCT 25.9* 27.5* 27.6*     --  201     CMP:     Recent Labs  Lab 03/27/17  0433 03/28/17  0529    138   K 4.3 4.2    105   CO2 27 26   * 135*   BUN 33* 20   CREATININE 1.3 1.0   CALCIUM 7.9* 8.0*   PROT 4.9* 5.5*   ALBUMIN 2.7* 3.0*   BILITOT 0.7 1.0   ALKPHOS 53* 69   AST 26 31   ALT 58* 61*   ANIONGAP 7* 7*   EGFRNONAA 40* 54*       Significant Imaging:   Imaging Results         CT Renal Stone Study ABD Pelvis WO (Final result) Result time:  03/24/17 08:15:57    Final result by Jayjay Roberts MD (03/24/17 08:15:57)    Impression:             Large hematoma in the right lateral abdominal wall measuring 23.0 x 14.0 x 7.8 cm.        All CT scans at this facility use dose modulation, iterative reconstruction and/or weight based dosing when appropriate to reduce radiation dose to as low as reasonably achievable.      Electronically signed by: JAYJAY ROBERTS MD  Date:     03/24/17  Time:    08:15     Narrative:    Exam: CT abdomen and pelvis without intravenous contrast.    Technique: Axial CT images performed through the abdomen and pelvis without  intravenous contrast. Multiplanar reformats were performed and interpreted.    Comparison: None    Clinical History: right flank pain.   Abdominal pain    Findings:       There is a large hematoma in the right lateral abdominal wall measuring 23.0 x 14.0 x 7.8 cm.    There is some scarring in the left lung base.    No urolithiasis, hydronephrosis, or perinephric stranding.  The liver, spleen, kidneys, adrenal glands, and pancreas have a normal noncontrasted appearance.   The gallbladder is unremarkable.  No peritoneal free fluid, free air, or inflammatory change.     The bowel is nondistended and within normal limits.    There is calcific atherosclerotic disease of the normal caliber abdominal aorta.    The urinary bladder is unremarkable. The patient has had a hysterectomy.    No significant osseous abnormality is identified.            X-Ray Chest 1 View (Final result) Result time:  03/24/17 08:08:56    Final result by Jayjay Roberts MD (03/24/17 08:08:56)    Impression:     Stable cardiomegaly.  No acute lung infiltrate.            Electronically signed by: JAYJAY ROBERTS MD  Date:     03/24/17  Time:    08:08     Narrative:    Exam: Portable chest radiograph    Clinical History:   .  Weakness.    Comparison: Chest x-ray, 03/17/2017.    Findings:.     Sternotomy wires are noted.  There is cardiomegaly. There is calcific atherosclerotic disease of the thoracic aorta.  There is platelike atelectasis and/or bandlike scarring in the left midlung field.  No consolidation. No pleural effusion or pneumothorax or pulmonary edema.            RADIOLOGY REPORT (Final result) Result time:  03/24/17 09:50:41        Assessment/Plan:      * Acute blood loss anemia  Secondary to Intra-abdominal bleed     -Hgb initially dropped from 11.8  to 7 with patient symptomatic  -Hgb 9.1 today  - Medical Management  - Plan for discharge    Coronary artery disease involving coronary bypass graft without angina pectoris  Hold Aspirin and  Plavix  - continue Bystolic       Essential hypertension  Resume Bystolic   hydralazine prn      Type 2 diabetes mellitus with hyperglycemia, without long-term current use of insulin  accuchecks and sliding scale insulin      Chronic atrial fibrillation  Rate currently controlled  continue Bystolic and Tikosyn  hold Xarelto and ASA      Simple chronic bronchitis  Continue symbicort  duonebs prn  -sputum culture results noted        DOMINGO (acute kidney injury)  -improved / Resolved  - gentle hydration continued  - avoid nephrotoxins  - monitor       Abdominal wall hematoma  -no surgical intervention required at this time  -IV antibiotics initiated (Flagyl and Rocephin)  -will continue monitor        Abnormal liver enzymes  -GI following  -LFTs stable  - Improved      Leukocytosis  -trending downward  -blood and urine cultures with NGTD  -UA unremarkble  -pt afebrile  -chest xray showed no acute lung infiltrate  -IV antibiotics continued (Flagyl and Rocephin)  -Improved      Constipation, chronic  -Colace and Miralax     VTE Risk Mitigation         Ordered     Place sequential compression device  Until discontinued      03/24/17 0111          Randy Valdez MD  Department of Hospital Medicine   [facility]

## 2017-03-28 NOTE — ASSESSMENT & PLAN NOTE
H/h stable x 2 draws. Hematoma stable in size.  Recommend wait an additional day before restarting anticoagulants.

## 2017-03-28 NOTE — PLAN OF CARE
Problem: Patient Care Overview  Goal: Plan of Care Review  Outcome: Ongoing (interventions implemented as appropriate)  Patient currently resting quietly in bed. Patient awake, alert, oriented x4. VS currently stable. Patient A. Fib on monitor at this time. Patient currently receiving NS @ 50mL/hr. Patient receiving O2 @ 2L nasal cannula. Fall prevention reviewed with patient. Patient educated and encouraged to use the call light for any needs. Patient free of falls. Patient complained of pain early in shift which was relieved by prn tylenol. Patient has no complaints of pain at this time. Plan of care reviewed with patient. Patient has no further questions about plan of care at this time. Will continue to monitor.

## 2017-03-28 NOTE — ASSESSMENT & PLAN NOTE
Secondary to Intra-abdominal bleed     -Hgb initially dropped from 11.8  to 7 with patient symptomatic  -Hgb 9.1 today  - Medical Management  - Plan for discharge

## 2017-03-28 NOTE — SUBJECTIVE & OBJECTIVE
Interval History: no complaints, feels well.     Medications:  Continuous Infusions:   sodium chloride 0.9% 50 mL/hr at 03/28/17 0603     Scheduled Meds:   arformoterol  15 mcg Nebulization BID    And    budesonide  0.5 mg Nebulization BID    cefTRIAXone (ROCEPHIN) IVPB  1 g Intravenous Q12H    cyclobenzaprine  5 mg Oral TID    docusate sodium  100 mg Oral BID    dofetilide  125 mcg Oral Q12H    escitalopram oxalate  10 mg Oral Daily    famotidine  20 mg Intravenous Daily    guaifenesin  600 mg Oral BID    hydroxychloroquine  200 mg Oral Daily    metronidazole  500 mg Intravenous Q8H    nebivolol  5 mg Oral Daily    polyethylene glycol  17 g Oral Daily     PRN Meds:sodium chloride, acetaminophen, albuterol-ipratropium 2.5mg-0.5mg/3mL, dextrose 50%, glucagon (human recombinant), insulin aspart, morphine, nitroGLYCERIN, ondansetron     Review of patient's allergies indicates:   Allergen Reactions    Codeine Nausea And Vomiting    Lisinopril      Other reaction(s): cough    Pacerone [amiodarone] Nausea And Vomiting    Sulfa (sulfonamide antibiotics) Nausea And Vomiting    Celecoxib Palpitations    Ciprofloxacin Hives, Itching and Rash    Colesevelam Rash and Nausea And Vomiting    Doxycycline Rash    Statins-hmg-coa reductase inhibitors Rash     Myalgia(can take atorvastatin ok -no rhabdo)per nurse josé antonio and patient  / stomach upset     Objective:     Vital Signs (Most Recent):  Temp: 98.1 °F (36.7 °C) (03/28/17 0400)  Pulse: 89 (03/28/17 0817)  Resp: 20 (03/28/17 0817)  BP: 120/64 (03/28/17 0400)  SpO2: 95 % (03/28/17 0817) Vital Signs (24h Range):  Temp:  [98.1 °F (36.7 °C)-99 °F (37.2 °C)] 98.1 °F (36.7 °C)  Pulse:  [] 89  Resp:  [18-20] 20  SpO2:  [95 %-100 %] 95 %  BP: (103-121)/(44-64) 120/64     Weight: 63.6 kg (140 lb 3.4 oz)  Body mass index is 25.65 kg/(m^2).    Intake/Output - Last 3 Shifts       03/26 0700 - 03/27 0659 03/27 0700 - 03/28 0659 03/28 0700 - 03/29 0659    P.O. 600  840 360    I.V. (mL/kg) 1185.8 (18.6) 1202.5 (18.9)     Blood       IV Piggyback 550 400     Total Intake(mL/kg) 2335.8 (36.7) 2442.5 (38.4) 360 (5.7)    Urine (mL/kg/hr) 3100 (2) 1700 (1.1) 300 (1.4)    Stool 0 (0) 0 (0)     Total Output 3100 1700 300    Net -764.2 +742.5 +60           Stool Occurrence  0 x           Physical Exam   Constitutional: She is oriented to person, place, and time. She appears well-developed and well-nourished.   HENT:   Head: Normocephalic and atraumatic.   Eyes: EOM are normal.   Neck: Neck supple.   Cardiovascular: Normal rate and regular rhythm.    Pulmonary/Chest: Effort normal and breath sounds normal. No respiratory distress.   Abdominal: Soft.   Extensive bruising of lower abdomen. Hematoma stable.    Musculoskeletal: Normal range of motion.   Neurological: She is alert and oriented to person, place, and time.   Skin: Skin is warm and dry.   Psychiatric: She has a normal mood and affect. Thought content normal.   Vitals reviewed.      Significant Labs:  CBC:   Recent Labs  Lab 03/28/17  0529   WBC 13.96*   RBC 3.06*   HGB 9.1*   HCT 27.6*      MCV 90   MCH 29.7   MCHC 33.0     CMP:   Recent Labs  Lab 03/28/17  0529   *   CALCIUM 8.0*   ALBUMIN 3.0*   PROT 5.5*      K 4.2   CO2 26      BUN 20   CREATININE 1.0   ALKPHOS 69   ALT 61*   AST 31   BILITOT 1.0       Significant Diagnostics:  no new results

## 2017-03-28 NOTE — ASSESSMENT & PLAN NOTE
-no surgical intervention required at this time  -IV antibiotics initiated (Flagyl and Rocephin)  -will continue monitor

## 2017-03-28 NOTE — PLAN OF CARE
Problem: Patient Care Overview  Goal: Plan of Care Review  Outcome: Ongoing (interventions implemented as appropriate)  Pt has been free from falls, injury or trauma this shift.  POC reviewed with Pt and spouse.  Both verbalized understanding.  Bed low and locked.  Call light within reach.  Pt instructed to call for assistance.  Pt has been A fib on tele monitor this shift with a rate between 90's and 105.  Pt has ambulated in hallway x 2 and tolerated well.  Warm compresses and cold compresses applied to Pts right side d/t discomfort.  Pt has bruising covering entire lower back and abdomen down right leg.  Will continue to monitor.

## 2017-03-28 NOTE — PROGRESS NOTES
[facility] -   General Surgery  Progress Note    Subjective:     History of Present Illness:  Marcel Montalvo presented to the ED with right sided abdominal pain, weakness, and melena that began about 3 days ago. She was recently d/c from the hospital after tx for acute CHF and bronchitis. She reports bouts of coughing. She is on Xarelto for afib. She began to noticed right side abdominal pain and swelling that is worse with movement and coughing. She has not noticed increase in swelling since admission to the hospital. Non contrast CT shows a large abdominal wall hematoma. H/H decreased and pt is receiving blood. Stool was positive for occult blood and GI has been consulted.     Post-Op Info:  * No surgery found *         Interval History: no complaints, feels well.     Medications:  Continuous Infusions:   sodium chloride 0.9% 50 mL/hr at 03/28/17 0603     Scheduled Meds:   arformoterol  15 mcg Nebulization BID    And    budesonide  0.5 mg Nebulization BID    cefTRIAXone (ROCEPHIN) IVPB  1 g Intravenous Q12H    cyclobenzaprine  5 mg Oral TID    docusate sodium  100 mg Oral BID    dofetilide  125 mcg Oral Q12H    escitalopram oxalate  10 mg Oral Daily    famotidine  20 mg Intravenous Daily    guaifenesin  600 mg Oral BID    hydroxychloroquine  200 mg Oral Daily    metronidazole  500 mg Intravenous Q8H    nebivolol  5 mg Oral Daily    polyethylene glycol  17 g Oral Daily     PRN Meds:sodium chloride, acetaminophen, albuterol-ipratropium 2.5mg-0.5mg/3mL, dextrose 50%, glucagon (human recombinant), insulin aspart, morphine, nitroGLYCERIN, ondansetron     Review of patient's allergies indicates:   Allergen Reactions    Codeine Nausea And Vomiting    Lisinopril      Other reaction(s): cough    Pacerone [amiodarone] Nausea And Vomiting    Sulfa (sulfonamide antibiotics) Nausea And Vomiting    Celecoxib Palpitations    Ciprofloxacin Hives, Itching and Rash    Colesevelam Rash and Nausea  And Vomiting    Doxycycline Rash    Statins-hmg-coa reductase inhibitors Rash     Myalgia(can take atorvastatin ok -no rhabdo)per nurse josé antonio and patient  / stomach upset     Objective:     Vital Signs (Most Recent):  Temp: 98.1 °F (36.7 °C) (03/28/17 0400)  Pulse: 89 (03/28/17 0817)  Resp: 20 (03/28/17 0817)  BP: 120/64 (03/28/17 0400)  SpO2: 95 % (03/28/17 0817) Vital Signs (24h Range):  Temp:  [98.1 °F (36.7 °C)-99 °F (37.2 °C)] 98.1 °F (36.7 °C)  Pulse:  [] 89  Resp:  [18-20] 20  SpO2:  [95 %-100 %] 95 %  BP: (103-121)/(44-64) 120/64     Weight: 63.6 kg (140 lb 3.4 oz)  Body mass index is 25.65 kg/(m^2).    Intake/Output - Last 3 Shifts       03/26 0700 - 03/27 0659 03/27 0700 - 03/28 0659 03/28 0700 - 03/29 0659    P.O. 600 840 360    I.V. (mL/kg) 1185.8 (18.6) 1202.5 (18.9)     Blood       IV Piggyback 550 400     Total Intake(mL/kg) 2335.8 (36.7) 2442.5 (38.4) 360 (5.7)    Urine (mL/kg/hr) 3100 (2) 1700 (1.1) 300 (1.4)    Stool 0 (0) 0 (0)     Total Output 3100 1700 300    Net -764.2 +742.5 +60           Stool Occurrence  0 x           Physical Exam   Constitutional: She is oriented to person, place, and time. She appears well-developed and well-nourished.   HENT:   Head: Normocephalic and atraumatic.   Eyes: EOM are normal.   Neck: Neck supple.   Cardiovascular: Normal rate and regular rhythm.    Pulmonary/Chest: Effort normal and breath sounds normal. No respiratory distress.   Abdominal: Soft.   Extensive bruising of lower abdomen. Hematoma stable.    Musculoskeletal: Normal range of motion.   Neurological: She is alert and oriented to person, place, and time.   Skin: Skin is warm and dry.   Psychiatric: She has a normal mood and affect. Thought content normal.   Vitals reviewed.      Significant Labs:  CBC:   Recent Labs  Lab 03/28/17  0529   WBC 13.96*   RBC 3.06*   HGB 9.1*   HCT 27.6*      MCV 90   MCH 29.7   MCHC 33.0     CMP:   Recent Labs  Lab 03/28/17  0529   *   CALCIUM 8.0*    ALBUMIN 3.0*   PROT 5.5*      K 4.2   CO2 26      BUN 20   CREATININE 1.0   ALKPHOS 69   ALT 61*   AST 31   BILITOT 1.0       Significant Diagnostics:  no new results    Assessment/Plan:     Abdominal wall hematoma  H/h stable x 2 draws. Hematoma stable in size.  Recommend wait an additional day before restarting anticoagulants.       Chayo Obregon PA-C  General Surgery  [facility]

## 2017-03-28 NOTE — PROGRESS NOTES
Patient currently refusing to wear abdominal binder. Patient reports that it is uncomfortable and causing her to stay awake. Patient currently in no distress. Will continue to monitor.

## 2017-03-28 NOTE — SUBJECTIVE & OBJECTIVE
Interval History: Pt pain controlled. She is improved. She is hesitant to resume anticoagulation. Plan for D/C tomorrow if stable      Review of Systems   Constitutional: Positive for activity change and fatigue. Negative for appetite change, chills and fever.   HENT: Negative for congestion, sinus pressure, sore throat and trouble swallowing.    Eyes: Negative for redness, itching and visual disturbance.   Respiratory: Positive for cough. Negative for chest tightness, shortness of breath and wheezing.    Cardiovascular: Negative for chest pain, palpitations and leg swelling.   Gastrointestinal: Positive for abdominal pain. Negative for abdominal distention, diarrhea, nausea and vomiting.   Endocrine: Negative for cold intolerance and heat intolerance.   Genitourinary: Negative for difficulty urinating, dysuria, flank pain, frequency and urgency.   Musculoskeletal: Negative for arthralgias, back pain and myalgias.   Skin: Negative for color change and wound.   Allergic/Immunologic: Negative for environmental allergies and food allergies.   Neurological: Positive for weakness. Negative for dizziness, syncope and headaches.   Hematological: Does not bruise/bleed easily.   Psychiatric/Behavioral: Negative for confusion and sleep disturbance. The patient is not nervous/anxious.      Objective:     Vital Signs (Most Recent):  Temp: 97.8 °F (36.6 °C) (03/28/17 1608)  Pulse: 91 (03/28/17 1608)  Resp: 18 (03/28/17 1608)  BP: 131/70 (03/28/17 1608)  SpO2: 98 % (03/28/17 1608) Vital Signs (24h Range):  Temp:  [97.8 °F (36.6 °C)-98.2 °F (36.8 °C)] 97.8 °F (36.6 °C)  Pulse:  [] 91  Resp:  [18-20] 18  SpO2:  [95 %-100 %] 98 %  BP: (103-137)/(44-72) 131/70     Weight: 63.6 kg (140 lb 3.4 oz)  Body mass index is 25.65 kg/(m^2).    Intake/Output Summary (Last 24 hours) at 03/28/17 1836  Last data filed at 03/28/17 1742   Gross per 24 hour   Intake             3445 ml   Output             2100 ml   Net             1345 ml       Physical Exam   Constitutional: She is oriented to person, place, and time. She appears well-developed and well-nourished.   HENT:   Head: Normocephalic.   Nose: Nose normal.   Mouth/Throat: Oropharynx is clear and moist.   Eyes: Conjunctivae and EOM are normal.   Neck: Normal range of motion. No JVD present.   Cardiovascular: Normal rate, regular rhythm, normal heart sounds and intact distal pulses.  Exam reveals no friction rub.    No murmur heard.  Pulmonary/Chest: Effort normal. No respiratory distress. She has no wheezes. She has rales.   Abdominal: Soft. Bowel sounds are normal. She exhibits distension. There is tenderness.   Hematoma to R abdominal wall     Musculoskeletal: Normal range of motion.   Neurological: She is alert and oriented to person, place, and time.   Skin: Skin is warm and dry. There is erythema.   Ecchymosis to R abdomen / L UE   Psychiatric: She has a normal mood and affect. Her behavior is normal. Thought content normal.       Significant Labs:   CBC:     Recent Labs  Lab 03/27/17  0433 03/27/17  1155 03/28/17  0529   WBC 16.41*  --  13.96*   HGB 8.6* 9.2* 9.1*   HCT 25.9* 27.5* 27.6*     --  201     CMP:     Recent Labs  Lab 03/27/17  0433 03/28/17  0529    138   K 4.3 4.2    105   CO2 27 26   * 135*   BUN 33* 20   CREATININE 1.3 1.0   CALCIUM 7.9* 8.0*   PROT 4.9* 5.5*   ALBUMIN 2.7* 3.0*   BILITOT 0.7 1.0   ALKPHOS 53* 69   AST 26 31   ALT 58* 61*   ANIONGAP 7* 7*   EGFRNONAA 40* 54*       Significant Imaging:   Imaging Results         CT Renal Stone Study ABD Pelvis WO (Final result) Result time:  03/24/17 08:15:57    Final result by Jayjay Roberts MD (03/24/17 08:15:57)    Impression:             Large hematoma in the right lateral abdominal wall measuring 23.0 x 14.0 x 7.8 cm.        All CT scans at this facility use dose modulation, iterative reconstruction and/or weight based dosing when appropriate to reduce radiation dose to as low as reasonably  achievable.      Electronically signed by: JAYJAY ROBERTS MD  Date:     03/24/17  Time:    08:15     Narrative:    Exam: CT abdomen and pelvis without intravenous contrast.    Technique: Axial CT images performed through the abdomen and pelvis without intravenous contrast. Multiplanar reformats were performed and interpreted.    Comparison: None    Clinical History: right flank pain.   Abdominal pain    Findings:       There is a large hematoma in the right lateral abdominal wall measuring 23.0 x 14.0 x 7.8 cm.    There is some scarring in the left lung base.    No urolithiasis, hydronephrosis, or perinephric stranding.  The liver, spleen, kidneys, adrenal glands, and pancreas have a normal noncontrasted appearance.   The gallbladder is unremarkable.  No peritoneal free fluid, free air, or inflammatory change.     The bowel is nondistended and within normal limits.    There is calcific atherosclerotic disease of the normal caliber abdominal aorta.    The urinary bladder is unremarkable. The patient has had a hysterectomy.    No significant osseous abnormality is identified.            X-Ray Chest 1 View (Final result) Result time:  03/24/17 08:08:56    Final result by Jayjay Roberts MD (03/24/17 08:08:56)    Impression:     Stable cardiomegaly.  No acute lung infiltrate.            Electronically signed by: JAYJAY ROBERTS MD  Date:     03/24/17  Time:    08:08     Narrative:    Exam: Portable chest radiograph    Clinical History:   .  Weakness.    Comparison: Chest x-ray, 03/17/2017.    Findings:.     Sternotomy wires are noted.  There is cardiomegaly. There is calcific atherosclerotic disease of the thoracic aorta.  There is platelike atelectasis and/or bandlike scarring in the left midlung field.  No consolidation. No pleural effusion or pneumothorax or pulmonary edema.            RADIOLOGY REPORT (Final result) Result time:  03/24/17 09:50:41

## 2017-03-29 ENCOUNTER — TELEPHONE (OUTPATIENT)
Dept: INTERNAL MEDICINE | Facility: CLINIC | Age: 77
End: 2017-03-29

## 2017-03-29 VITALS
HEIGHT: 62 IN | WEIGHT: 140.19 LBS | HEART RATE: 93 BPM | TEMPERATURE: 98 F | OXYGEN SATURATION: 98 % | DIASTOLIC BLOOD PRESSURE: 70 MMHG | SYSTOLIC BLOOD PRESSURE: 126 MMHG | BODY MASS INDEX: 25.8 KG/M2 | RESPIRATION RATE: 18 BRPM

## 2017-03-29 PROBLEM — D72.829 LEUKOCYTOSIS: Status: RESOLVED | Noted: 2017-03-25 | Resolved: 2017-03-29

## 2017-03-29 PROBLEM — K59.09 CONSTIPATION, CHRONIC: Status: RESOLVED | Noted: 2017-03-26 | Resolved: 2017-03-29

## 2017-03-29 LAB
ALBUMIN SERPL BCP-MCNC: 3.2 G/DL
ALP SERPL-CCNC: 85 U/L
ALT SERPL W/O P-5'-P-CCNC: 62 U/L
ANION GAP SERPL CALC-SCNC: 8 MMOL/L
AST SERPL-CCNC: 40 U/L
BACTERIA BLD CULT: NORMAL
BACTERIA BLD CULT: NORMAL
BASOPHILS # BLD AUTO: 0 K/UL
BASOPHILS NFR BLD: 0 %
BILIRUB SERPL-MCNC: 1.4 MG/DL
BUN SERPL-MCNC: 17 MG/DL
CALCIUM SERPL-MCNC: 8.1 MG/DL
CHLORIDE SERPL-SCNC: 105 MMOL/L
CO2 SERPL-SCNC: 24 MMOL/L
CREAT SERPL-MCNC: 1 MG/DL
DIFFERENTIAL METHOD: ABNORMAL
EOSINOPHIL # BLD AUTO: 0.1 K/UL
EOSINOPHIL NFR BLD: 1.1 %
ERYTHROCYTE [DISTWIDTH] IN BLOOD BY AUTOMATED COUNT: 18.1 %
EST. GFR  (AFRICAN AMERICAN): >60 ML/MIN/1.73 M^2
EST. GFR  (NON AFRICAN AMERICAN): 54 ML/MIN/1.73 M^2
GLUCOSE SERPL-MCNC: 125 MG/DL
HCT VFR BLD AUTO: 29.8 %
HGB BLD-MCNC: 9.6 G/DL
LYMPHOCYTES # BLD AUTO: 1.7 K/UL
LYMPHOCYTES NFR BLD: 13.4 %
MCH RBC QN AUTO: 29.9 PG
MCHC RBC AUTO-ENTMCNC: 32.2 %
MCV RBC AUTO: 93 FL
MONOCYTES # BLD AUTO: 1.6 K/UL
MONOCYTES NFR BLD: 12.8 %
NEUTROPHILS # BLD AUTO: 9.2 K/UL
NEUTROPHILS NFR BLD: 72.7 %
PLATELET # BLD AUTO: 215 K/UL
PMV BLD AUTO: 9.8 FL
POCT GLUCOSE: 117 MG/DL (ref 70–110)
POCT GLUCOSE: 164 MG/DL (ref 70–110)
POCT GLUCOSE: 178 MG/DL (ref 70–110)
POCT GLUCOSE: 183 MG/DL (ref 70–110)
POTASSIUM SERPL-SCNC: 4.3 MMOL/L
PROT SERPL-MCNC: 5.9 G/DL
RBC # BLD AUTO: 3.21 M/UL
SODIUM SERPL-SCNC: 137 MMOL/L
WBC # BLD AUTO: 12.7 K/UL

## 2017-03-29 PROCEDURE — 80053 COMPREHEN METABOLIC PANEL: CPT

## 2017-03-29 PROCEDURE — 85025 COMPLETE CBC W/AUTO DIFF WBC: CPT

## 2017-03-29 PROCEDURE — 25000003 PHARM REV CODE 250: Performed by: INTERNAL MEDICINE

## 2017-03-29 PROCEDURE — 94761 N-INVAS EAR/PLS OXIMETRY MLT: CPT

## 2017-03-29 PROCEDURE — 63600175 PHARM REV CODE 636 W HCPCS: Performed by: NURSE PRACTITIONER

## 2017-03-29 PROCEDURE — 25000003 PHARM REV CODE 250: Performed by: HOSPITALIST

## 2017-03-29 PROCEDURE — 25000003 PHARM REV CODE 250: Performed by: NURSE PRACTITIONER

## 2017-03-29 PROCEDURE — 63600175 PHARM REV CODE 636 W HCPCS: Performed by: HOSPITALIST

## 2017-03-29 PROCEDURE — 36415 COLL VENOUS BLD VENIPUNCTURE: CPT

## 2017-03-29 PROCEDURE — 94640 AIRWAY INHALATION TREATMENT: CPT

## 2017-03-29 RX ORDER — METRONIDAZOLE 500 MG/1
500 TABLET ORAL EVERY 8 HOURS
Status: DISCONTINUED | OUTPATIENT
Start: 2017-03-29 | End: 2017-03-29 | Stop reason: HOSPADM

## 2017-03-29 RX ORDER — METRONIDAZOLE 500 MG/1
500 TABLET ORAL EVERY 8 HOURS
Qty: 30 TABLET | Refills: 0 | Status: SHIPPED | OUTPATIENT
Start: 2017-03-29 | End: 2017-04-12

## 2017-03-29 RX ORDER — TRAMADOL HYDROCHLORIDE 50 MG/1
50 TABLET ORAL EVERY 6 HOURS PRN
Status: DISCONTINUED | OUTPATIENT
Start: 2017-03-29 | End: 2017-03-29 | Stop reason: HOSPADM

## 2017-03-29 RX ORDER — IPRATROPIUM BROMIDE AND ALBUTEROL SULFATE 2.5; .5 MG/3ML; MG/3ML
3 SOLUTION RESPIRATORY (INHALATION)
Qty: 120 VIAL | Refills: 0 | Status: SHIPPED | OUTPATIENT
Start: 2017-03-29 | End: 2018-01-01 | Stop reason: ALTCHOICE

## 2017-03-29 RX ORDER — FAMOTIDINE 20 MG/1
20 TABLET, FILM COATED ORAL DAILY
Status: DISCONTINUED | OUTPATIENT
Start: 2017-03-29 | End: 2017-03-29 | Stop reason: HOSPADM

## 2017-03-29 RX ADMIN — METRONIDAZOLE 500 MG: 500 TABLET ORAL at 12:03

## 2017-03-29 RX ADMIN — HYDROXYCHLOROQUINE SULFATE 200 MG: 200 TABLET, FILM COATED ORAL at 10:03

## 2017-03-29 RX ADMIN — NEBIVOLOL HYDROCHLORIDE 5 MG: 5 TABLET ORAL at 10:03

## 2017-03-29 RX ADMIN — CEFTRIAXONE 1 G: 1 INJECTION, SOLUTION INTRAVENOUS at 05:03

## 2017-03-29 RX ADMIN — DOCUSATE SODIUM 100 MG: 100 CAPSULE, LIQUID FILLED ORAL at 10:03

## 2017-03-29 RX ADMIN — ESCITALOPRAM OXALATE 10 MG: 10 TABLET, FILM COATED ORAL at 10:03

## 2017-03-29 RX ADMIN — TRAMADOL HYDROCHLORIDE 50 MG: 50 TABLET, COATED ORAL at 10:03

## 2017-03-29 RX ADMIN — ACETAMINOPHEN 650 MG: 325 TABLET ORAL at 05:03

## 2017-03-29 RX ADMIN — CYCLOBENZAPRINE HYDROCHLORIDE 5 MG: 5 TABLET, FILM COATED ORAL at 05:03

## 2017-03-29 RX ADMIN — ARFORMOTEROL TARTRATE 15 MCG: 15 SOLUTION RESPIRATORY (INHALATION) at 08:03

## 2017-03-29 RX ADMIN — PANTOPRAZOLE SODIUM 600 MG: 40 TABLET, DELAYED RELEASE ORAL at 10:03

## 2017-03-29 RX ADMIN — FAMOTIDINE 20 MG: 20 TABLET ORAL at 12:03

## 2017-03-29 RX ADMIN — DOFETILIDE 125 MCG: 0.12 CAPSULE ORAL at 10:03

## 2017-03-29 RX ADMIN — BUDESONIDE 0.5 MG: 0.5 SUSPENSION RESPIRATORY (INHALATION) at 08:03

## 2017-03-29 NOTE — DISCHARGE SUMMARY
[facility] Hospital Medicine  Discharge Summary      Patient Name: Marcel Montalvo  MRN: [id number]  Admission Date: 3/23/2017  Hospital Length of Stay: 5 days  Discharge Date and Time:  03/29/2017 1:01 PM  Attending Physician: Randy Valdez MD   Discharging Provider: Randy Valdez MD  Primary Care Provider: Alexandra Moreno MD      HPI:   76 year old female with h/o CHF, COPD, Afib, NIDDM presents complaining of generalized weakness/fatigue for the past day.  Has been getting lightheaded when she stands up.  Has some shortness of breath and cough but has been improving.  Reports black/tarry stools and is having right sided abdominal pain that is dull/achy in nature with sharp/stabbing exacerbations.  Has some nausea, no vomiting.  Was admitted last week with CHF/COPD.    * No surgery found *      Indwelling Lines/Drains at time of discharge:   Lines/Drains/Airways          No matching active lines, drains, or airways        Hospital Course:   03/24/17-Pt admitted to Telemetry Unit for acute blood loss anemia with GI consulted.  Pt reports weakness and pain at site of hematoma.  H/H stable post transfusion of 2 units PRBCs.  Analgesia prn and flexeril continued for pain.  CT confirmed presence of abdominal hematoma.  INR trended downward.  Abdominal binder placed per General Surgery.  BUN/Creatinine and lactate levels improved with mild gentle hydration.  IV antibiotics (Rocephin and Flagyl) initiated.  Incentive spirometry encouraged with Mucinex continued.  GI following and does not suspect active bleeding at this time. General Surgery following with ongoing evaluation for need of surgical procedure.  Blood and urine culture with NGTD.  Sputum culture results.  3/27/17- Patient improving steadily. Hgb improved today to 9.2 - no sign of active bleeding. Medical management employed. Plan for D/C in progress.  3/29/17 - Patient improved clinically. Case reviewed with cardiology Dr Fletcher for  anticoagulation recs. His input was the patient needs to resume anticoagulation and follow up with EP as scheduled. Patient advised of recs and elected to resume Xarelto understanding she has a risk of bleeding again. Patient declined changing to another med for this purpose. States she will follow closely with her PCP / Cardiologist / EP.    Patient was seen and examined today and deemed stable for a safe discharge to home.          Consults:   Consults         Status Ordering Provider     Inpatient consult to Gastroenterology  Once     Provider:  Natanael Beasley MD    Completed MANJULA AGEE     Inpatient consult to General Surgery  Once     Provider:  Addi Kahn MD    Completed MANJULA AGEE          Significant Diagnostic Studies: Labs:   BMP:   Recent Labs  Lab 03/28/17  0529 03/29/17  0514   * 125*    137   K 4.2 4.3    105   CO2 26 24   BUN 20 17   CREATININE 1.0 1.0   CALCIUM 8.0* 8.1*   MG 1.7  --    , CMP   Recent Labs  Lab 03/28/17  0529 03/29/17  0514    137   K 4.2 4.3    105   CO2 26 24   * 125*   BUN 20 17   CREATININE 1.0 1.0   CALCIUM 8.0* 8.1*   PROT 5.5* 5.9*   ALBUMIN 3.0* 3.2*   BILITOT 1.0 1.4*   ALKPHOS 69 85   AST 31 40   ALT 61* 62*   ANIONGAP 7* 8   ESTGFRAFRICA >60 >60   EGFRNONAA 54* 54*   , CBC   Recent Labs  Lab 03/28/17  0529 03/29/17  0514   WBC 13.96* 12.70   HGB 9.1* 9.6*   HCT 27.6* 29.8*    215   , INR   Lab Results   Component Value Date    INR 1.2 03/26/2017    INR 1.3 (H) 03/25/2017    INR 2.0 (H) 03/23/2017   , Troponin   Recent Labs  Lab 03/23/17  2250   TROPONINI 0.028*    and All labs within the past 24 hours have been reviewed    Pending Diagnostic Studies:     None        Final Active Diagnoses:    Diagnosis Date Noted POA    PRINCIPAL PROBLEM:  Acute blood loss anemia [D62] 03/24/2017 Yes    Chronic diastolic heart failure [I50.32] 03/24/2017 Yes     Chronic    Simple chronic bronchitis [J41.0] 03/24/2017 Yes      "Chronic    DOMINGO (acute kidney injury) [N17.9] 03/24/2017 Yes    Abdominal wall hematoma [S30.1XXA] 03/24/2017 Yes    Abnormal liver enzymes [R74.8] 03/24/2017 Yes    Chronic atrial fibrillation [I48.2] 09/02/2016 Yes     Chronic    Type 2 diabetes mellitus with hyperglycemia, without long-term current use of insulin [E11.65] 01/11/2016 Yes     Chronic    Essential hypertension [I10] 08/07/2013 Yes     Chronic    Coronary artery disease involving coronary bypass graft without angina pectoris [I25.810]  Yes     Chronic      Problems Resolved During this Admission:    Diagnosis Date Noted Date Resolved POA    Constipation, chronic [K59.09] 03/26/2017 03/29/2017 Yes    Leukocytosis [D72.829] 03/25/2017 03/29/2017 Yes      No new Assessment & Plan notes have been filed under this hospital service since the last note was generated.  Service: Hospital Medicine      Discharged Condition: stable    Disposition: Home-Health Care [facility]    Follow Up:  Follow-up Information     Follow up with Alexandra Moreno MD In 3 days.    Specialty:  Family Medicine    Contact information:    [id number] [facility]  [address]  [facility] [id number] [phone]          Patient Instructions:     NEBULIZER FOR HOME USE   Order Specific Question Answer Comments   Height: 5' 2" (1.575 m)    Weight: 58.1 kg (128 lb)    Does patient have medical equipment at home? other (see comments) Nebulizer   Length of need (1-99 months): 99      Ambulatory referral to Home Health   Referral Priority: Routine Referral Type: Home Health   Referral Reason: Specialty Services Required    Requested Specialty: Home Health Services    Number of Visits Requested: 1      Diet Cardiac     Activity as tolerated       Medications:  Reconciled Home Medications:   Current Discharge Medication List      START taking these medications    Details   metronidazole (FLAGYL) 500 MG tablet Take 1 tablet (500 mg total) by mouth every 8 (eight) hours.  Qty: 30 tablet, Refills: 0    "      CONTINUE these medications which have NOT CHANGED    Details   albuterol (PROAIR HFA) 90 mcg/actuation inhaler Inhale 2 puffs into the lungs every 6 (six) hours as needed.  Qty: 1 Inhaler, Refills: 11    Associated Diagnoses: Asthma with COPD      aspirin (ECOTRIN) 81 MG EC tablet Take 1 tablet (81 mg total) by mouth once daily.  Qty: 30 tablet, Refills: 6    Associated Diagnoses: Coronary artery disease      atorvastatin (LIPITOR) 40 MG tablet TAKE ONE TABLET BY MOUTH NIGHTLY  Qty: 90 tablet, Refills: 0      azelastine (ASTELIN) 137 mcg (0.1 %) nasal spray 2 sprays (274 mcg total) by Nasal route 2 (two) times daily.  Qty: 30 mL, Refills: 12      budesonide-formoterol 160-4.5 mcg (SYMBICORT) 160-4.5 mcg/actuation HFAA Inhale 2 puffs into the lungs every 12 (twelve) hours. Wash out mouth after using  Qty: 1 Inhaler, Refills: 12    Associated Diagnoses: Asthma with COPD      BYSTOLIC 5 mg Tab TAKE ONE TABLET BY MOUTH TWICE DAILY  Qty: 180 tablet, Refills: 6      carboxymethylcellulose (REFRESH PLUS) 0.5 % Dpet Place 1 drop into both eyes 3 (three) times daily as needed.      diclofenac sodium (VOLTAREN) 1 % Gel Apply 2 g topically once daily.  Qty: 3 Tube, Refills: 4      dofetilide (TIKOSYN) 125 MCG Cap Take 1 capsule (125 mcg total) by mouth every 12 (twelve) hours.  Qty: 60 capsule, Refills: 1      escitalopram oxalate (LEXAPRO) 10 MG tablet TAKE ONE-HALF TO ONE TABLET BY MOUTH EVERY DAY  Qty: 90 tablet, Refills: 3      fluticasone (FLONASE) 50 mcg/actuation nasal spray 2 sprays by Each Nare route once daily.  Qty: 1 Bottle, Refills: 11    Associated Diagnoses: Acute recurrent maxillary sinusitis      furosemide (LASIX) 20 MG tablet Take 1 tablet (20 mg total) by mouth as directed.  Qty: 90 tablet, Refills: 4      guaifenesin (MUCINEX) 600 mg 12 hr tablet Take 2 tablets (1,200 mg total) by mouth 2 (two) times daily.  Qty: 60 tablet, Refills: 11    Associated Diagnoses: Asthma with COPD      hydroxychloroquine  (PLAQUENIL) 200 mg tablet TAKE ONE TABLET BY MOUTH ONCE DAILY  Qty: 90 tablet, Refills: 0    Associated Diagnoses: Sjogren's syndrome      nitroGLYCERIN (NITROSTAT) 0.4 MG SL tablet Place 1 tablet (0.4 mg total) under the tongue every 5 (five) minutes as needed for Chest pain.  Qty: 25 tablet, Refills: 6    Associated Diagnoses: CAD (coronary artery disease); GERD (gastroesophageal reflux disease)      pantoprazole (PROTONIX) 40 MG tablet Take 1 tablet (40 mg total) by mouth once daily.  Qty: 90 tablet, Refills: 3      phenobarb-hyoscy-atropine-scop (BELLADONNA-PHENOBARBITAL) 16.2-0.1037 -0.0194 mg/5 mL Elix Take 5 mLs by mouth daily as needed (abdominal pain).  Qty: 180 mL, Refills: 1      potassium chloride (KLOR-CON) 10 MEQ TbSR Take 1 tablet (10 mEq total) by mouth once daily.  Qty: 30 tablet, Refills: 6    Associated Diagnoses: Unspecified essential hypertension; Coronary artery disease involving native coronary artery, angina presence unspecified, unspecified whether native or transplanted heart      pramoxine 1 % Foam Place rectally 3 (three) times daily as needed.  Qty: 15 g, Refills: 1      PREMARIN vaginal cream PLACE 1 GRAM VAGINALLY TWICE A WEEK  Qty: 30 g, Refills: 3      RESTASIS 0.05 % ophthalmic emulsion INSTILL ONE DROP INTO EACH EYE TWICE DAILY  Qty: 60 each, Refills: 12      rivaroxaban (XARELTO) 20 mg Tab Take 1 tablet (20 mg total) by mouth daily with dinner or evening meal.  Qty: 90 tablet, Refills: 3      vitamin D 1000 units Tab Take 2,000 Units by mouth once daily.      benzonatate (TESSALON) 100 MG capsule Take 1 capsule (100 mg total) by mouth 3 (three) times daily as needed for Cough.  Qty: 30 capsule, Refills: 1    Associated Diagnoses: Acute recurrent maxillary sinusitis      hyoscyamine (ANASPAZ) 0.125 mg TbDL Take 0.125 mg by mouth every 6 (six) hours as needed for Cramping.      tramadol (ULTRAM) 50 mg tablet Take 1 tablet (50 mg total) by mouth 3 (three) times daily as needed.  Qty:  90 tablet, Refills: 3      triamcinolone acetonide 0.1% (KENALOG) 0.1 % ointment AAA bid prn  Qty: 60 g, Refills: 1    Associated Diagnoses: Nummular eczema         STOP taking these medications       amoxicillin-clavulanate 875-125mg (AUGMENTIN) 875-125 mg per tablet Comments:   Reason for Stopping:         predniSONE (DELTASONE) 20 MG tablet Comments:   Reason for Stopping:             Time spent on the discharge of patient: 40 minutes    Randy Valdez MD  Department of Hospital Medicine  [facility]

## 2017-03-29 NOTE — PLAN OF CARE
HH orders and Order for nebulizer, as well as D/C Order, noted in Epic.  Followed up with patient and patient's  in hospital room and determined HH preference and HME agency preference.  Patient Preference Form signed reflecting HH preference as [facility] and H[facility] (for nebulizer) preference as [facility].  Patient and  report having no other needs or concerns at this time.    Contacted Krystal with [facility], notified her of patient's discharge today, and made referral.  Krystal indicates that their [place] office will accept patient for HH SN, PT, OT services.  Krystal came here to [facility] and gathered HH order all needed patient information to complete HH referral.  Info also faxed via [facility].  Regarding nebulizer, contacted Lefty with [facility] who states that [facility] is not contracted with patient's insurance to provide nebulizer to patient; however, patient's MRN provided to Lefty, with Lefty stating that she will contact another HME company to provide nebulizer and that nebulizer will be delivered to patient's home.  Patient and  made aware of above.  Regarding PCP outpatient follow-up appointment, contacted patient's PCP's office and rep there stating that PCP's nurse will contact patient with appointment to see PCP within 3 days of today.  Patient and  also made aware of this.    D/C PLAN:  Home with , with HH SN, PT, OT via [facility]; nebulizer via [facility] coordinating; and outpatient follow-up as recommended       03/29/17 1446   Final Note   Assessment Type Final Discharge Note   Discharge Disposition Home-Health   What phone number can be called within the next 1-3 days to see how you are doing after discharge? [phone]   Hospital Follow Up  Appt(s) scheduled? Yes   Referral to Outpatient Case Management complete? n/a   Referral to / orders for Home Health Complete? Yes   Did you assess the readiness or willingness of the family or caregiver to  support self management of care? Yes   Right Care Referral Info   Post Acute Recommendation Home-care   Referral Type HH SN, PT, OT   Facility Name [facility], [address], [address] [place]  [address]

## 2017-03-29 NOTE — PLAN OF CARE
Problem: Patient Care Overview  Goal: Plan of Care Review  Outcome: Ongoing (interventions implemented as appropriate)  Patient currently resting quietly in bed. Patient awake, alert, and oriented x4. VS currently stable. Patient A. Fib on monitor at this time. Patient currently receiving NS @ 50mL/hr. Patient receiving O2 @ 2L nasal cannula. Fall prevention reviewed with patient. Patient educated and encouraged to use the call light for any needs. Patient call light within reach. Patient free of falls. Patient complained of abdominal pain early in shift, which was relieved by prn tylenol. Patient has no complaints of pain at this time. Plan of care reviewed with patient. Patient has no further questions about plan of care at this time. Will continue to monitor.

## 2017-03-29 NOTE — TELEPHONE ENCOUNTER
Tried calling pt, someone answered, stated whom I was and where I was calling from and then line was disconnected.    Tried calling back, no answer.

## 2017-03-29 NOTE — TELEPHONE ENCOUNTER
----- Message from Jd Becerra sent at 3/29/2017  1:35 PM CDT -----  Contact: Chandan -  from [facility] [place]   States she is calling to have pt worked in for a hospital follow up 3 days from today and can be reached at [phone]//that's the number to the pt

## 2017-03-29 NOTE — PROGRESS NOTES
Pt discharged per MD orders.  IV and tele monitor removed.  Discharge instructions given to Pt and spouse.  Both verbalized understanding.  HH and DME set up prior to discharge.  Pt transported to private vehicle via wheelchair.

## 2017-03-31 ENCOUNTER — PATIENT OUTREACH (OUTPATIENT)
Dept: ADMINISTRATIVE | Facility: CLINIC | Age: 77
End: 2017-03-31
Payer: MEDICARE

## 2017-03-31 NOTE — PATIENT INSTRUCTIONS
Anemia, Type Not Specified (Adult)  Red blood cells carry oxygen to the tissues of your body. Anemia is a condition in which you have too few red blood cells. You need iron to make red blood cells. The most common cause of anemia is not having enough iron. This may be because of:  · Loss of blood. This can be caused by heavy menstrual periods. It can also be caused by bleeding from the stomach or intestines.  · Poor diet. You may not be eating enough foods that contain iron.  Other causes of anemia include certain vitamin deficiencies, chronic kidney disease, and certain other chronic illnesses.  Anemia makes you to feel tired and run down. When anemia becomes severe, your skin becomes pale. You may feel short of breath after physical activity. Other symptoms include:  · Headaches  · Dizziness  · Leg cramps with physical activity  · Drowsiness  Home care  Follow these guidelines when caring for yourself at home:  · Dont overexert yourself.  · Talk with your healthcare provider before traveling by air or traveling to high altitudes.  Follow-up care  Follow up with your healthcare provider, or as advised. You may need other blood tests to find out the exact cause of your anemia. If you had testing done today, it may take several days to get all of the results. You can follow up with your own healthcare provider to get the results.  When to seek medical advice  Call your healthcare provider right away if any of these occur:  · Shortness of breath or chest pain  · Dizziness or fainting gets worse  · Vomiting blood or passing red or black-colored stool  Date Last Reviewed: 2/25/2016  © 6270-7842 [employer]. [address] [place] [address], [place] [address]. All rights reserved. This information is not intended as a substitute for professional medical care. Always follow your healthcare professional's instructions.

## 2017-04-03 ENCOUNTER — OFFICE VISIT (OUTPATIENT)
Dept: INTERNAL MEDICINE | Facility: CLINIC | Age: 77
End: 2017-04-03
Payer: MEDICARE

## 2017-04-03 ENCOUNTER — LAB VISIT (OUTPATIENT)
Dept: LAB | Facility: HOSPITAL | Age: 77
End: 2017-04-03
Attending: FAMILY MEDICINE
Payer: MEDICARE

## 2017-04-03 VITALS
HEIGHT: 62 IN | TEMPERATURE: 98 F | BODY MASS INDEX: 25.11 KG/M2 | WEIGHT: 136.44 LBS | SYSTOLIC BLOOD PRESSURE: 126 MMHG | HEART RATE: 66 BPM | DIASTOLIC BLOOD PRESSURE: 82 MMHG

## 2017-04-03 DIAGNOSIS — I48.19 PERSISTENT ATRIAL FIBRILLATION: ICD-10-CM

## 2017-04-03 DIAGNOSIS — R10.9 ABDOMINAL PAIN, OTHER SPECIFIED SITE: ICD-10-CM

## 2017-04-03 DIAGNOSIS — S30.1XXA ABDOMINAL WALL HEMATOMA, INITIAL ENCOUNTER: ICD-10-CM

## 2017-04-03 DIAGNOSIS — R10.9 ABDOMINAL PAIN, OTHER SPECIFIED SITE: Primary | ICD-10-CM

## 2017-04-03 DIAGNOSIS — E11.9 TYPE 2 DIABETES MELLITUS WITHOUT COMPLICATION: ICD-10-CM

## 2017-04-03 DIAGNOSIS — E78.5 HYPERLIPIDEMIA, UNSPECIFIED HYPERLIPIDEMIA TYPE: ICD-10-CM

## 2017-04-03 LAB
BASOPHILS # BLD AUTO: 0.02 K/UL
BASOPHILS NFR BLD: 0.3 %
DIFFERENTIAL METHOD: ABNORMAL
EOSINOPHIL # BLD AUTO: 0.1 K/UL
EOSINOPHIL NFR BLD: 1.3 %
ERYTHROCYTE [DISTWIDTH] IN BLOOD BY AUTOMATED COUNT: 21 %
HCT VFR BLD AUTO: 32.8 %
HGB BLD-MCNC: 10.2 G/DL
LYMPHOCYTES # BLD AUTO: 0.7 K/UL
LYMPHOCYTES NFR BLD: 11.6 %
MCH RBC QN AUTO: 30.6 PG
MCHC RBC AUTO-ENTMCNC: 31.1 %
MCV RBC AUTO: 99 FL
MONOCYTES # BLD AUTO: 0.7 K/UL
MONOCYTES NFR BLD: 11.4 %
NEUTROPHILS # BLD AUTO: 4.7 K/UL
NEUTROPHILS NFR BLD: 74.9 %
PLATELET # BLD AUTO: 202 K/UL
PMV BLD AUTO: 11.4 FL
RBC # BLD AUTO: 3.33 M/UL
WBC # BLD AUTO: 6.32 K/UL

## 2017-04-03 PROCEDURE — 85025 COMPLETE CBC W/AUTO DIFF WBC: CPT

## 2017-04-03 PROCEDURE — 99999 PR PBB SHADOW E&M-EST. PATIENT-LVL III: CPT | Mod: PBBFAC,,, | Performed by: FAMILY MEDICINE

## 2017-04-03 PROCEDURE — 83036 HEMOGLOBIN GLYCOSYLATED A1C: CPT

## 2017-04-03 PROCEDURE — 36415 COLL VENOUS BLD VENIPUNCTURE: CPT | Mod: PO

## 2017-04-03 PROCEDURE — 80053 COMPREHEN METABOLIC PANEL: CPT

## 2017-04-03 PROCEDURE — 80061 LIPID PANEL: CPT

## 2017-04-03 RX ORDER — OXYCODONE AND ACETAMINOPHEN 5; 325 MG/1; MG/1
1 TABLET ORAL EVERY 6 HOURS PRN
Qty: 30 TABLET | Refills: 0 | Status: SHIPPED | OUTPATIENT
Start: 2017-04-03 | End: 2017-04-27

## 2017-04-03 RX ORDER — AZELASTINE 1 MG/ML
1 SPRAY, METERED NASAL 2 TIMES DAILY
COMMUNITY

## 2017-04-03 NOTE — MR AVS SNAPSHOT
[facility]  [address] [place] [address]  Phone: [phone]  Fax: [phone]                  Marcel Montalvo   4/3/2017 9:00 AM   Office Visit    Description:  Female : 1940   Provider:  Alexandra Moreno MD   Department:  [facility]           Reason for Visit     Hospital Follow Up           Diagnoses this Visit        Comments    Abdominal pain, other specified site    -  Primary pt not tolerating tylenol or tramadol for pain. no interaction with percocet with tikosyn. will start 1/2 -1 tablet every 6hrs for pain. 2 colace per day.     Abdominal wall hematoma, initial encounter     Large hematoma in the right lateral abdominal wall measuring 23.0 x 14.0 x 7.8 cm.3/24/17            Persistent atrial fibrillation         Hyperlipidemia, unspecified hyperlipidemia type                To Do List           Future Appointments        Provider Department Dept Phone    4/3/2017 11:20 AM LABORATORY, [place] [facility] [phone]    2017 2:20 PM [facility] [phone]    2017 3:20 PM Virgilio Perez MD [facility] [phone]    2017 9:40 AM [facility] [phone]    2017 9:00 AM LABORATORY, [place] [facility] [phone]      Goals (5 Years of Data)     None      Follow-Up and Disposition     Return in about 10 days (around 2017) for yolanda for pain/abdominal wall hematoma.       These Medications        Disp Refills Start End    oxycodone-acetaminophen (PERCOCET) 5-325 mg per tablet 30 tablet 0 4/3/2017     Take 1 tablet by mouth every 6 (six) hours as needed for Pain. - Oral    Pharmacy: [id number] - [place]OREL - [address] [facility] Ph #: [phone]         [facility]     [facility] Nurse Care Line -  Assistance  Unless otherwise directed by your provider,  please contact [facility], our nurse care line that is available for 24/7 assistance.     Registered nurses in the [facility] provide: appointment scheduling, clinical advisement, health education, and other advisory services.  Call: [phone] (toll free)               Medications           Message regarding Medications     Verify the changes and/or additions to your medication regime listed below are the same as discussed with your clinician today.  If any of these changes or additions are incorrect, please notify your healthcare provider.        START taking these NEW medications        Refills    oxycodone-acetaminophen (PERCOCET) 5-325 mg per tablet 0    Sig: Take 1 tablet by mouth every 6 (six) hours as needed for Pain.    Class: Normal    Route: Oral           Verify that the below list of medications is an accurate representation of the medications you are currently taking.  If none reported, the list may be blank. If incorrect, please contact your healthcare provider. Carry this list with you in case of emergency.           Current Medications     albuterol (PROAIR HFA) 90 mcg/actuation inhaler Inhale 2 puffs into the lungs every 6 (six) hours as needed.    albuterol-ipratropium 2.5mg-0.5mg/3mL (DUO-NEB) 0.5 mg-3 mg(2.5 mg base)/3 mL nebulizer solution Take 3 mLs by nebulization every 6 (six) hours while awake. Rescue    aspirin (ECOTRIN) 81 MG EC tablet Take 1 tablet (81 mg total) by mouth once daily.    atorvastatin (LIPITOR) 40 MG tablet TAKE ONE TABLET BY MOUTH NIGHTLY    azelastine (ASTELIN) 137 mcg (0.1 %) nasal spray 1 spray by Nasal route 2 (two) times daily.    benzonatate (TESSALON) 100 MG capsule Take 1 capsule (100 mg total) by mouth 3 (three) times daily as needed for Cough.    budesonide-formoterol 160-4.5 mcg (SYMBICORT) 160-4.5 mcg/actuation HFAA Inhale 2 puffs into the lungs every 12 (twelve) hours. Wash out mouth after using    BYSTOLIC 5 mg Tab TAKE ONE TABLET BY  MOUTH TWICE DAILY    carboxymethylcellulose (REFRESH PLUS) 0.5 % Dpet Place 1 drop into both eyes 3 (three) times daily as needed.    diclofenac sodium (VOLTAREN) 1 % Gel Apply 2 g topically once daily.    dofetilide (TIKOSYN) 125 MCG Cap Take 1 capsule (125 mcg total) by mouth every 12 (twelve) hours.    escitalopram oxalate (LEXAPRO) 10 MG tablet TAKE ONE-HALF TO ONE TABLET BY MOUTH EVERY DAY    fluticasone (FLONASE) 50 mcg/actuation nasal spray 2 sprays by Each Nare route once daily.    furosemide (LASIX) 20 MG tablet Take 1 tablet (20 mg total) by mouth as directed.    guaifenesin (MUCINEX) 600 mg 12 hr tablet Take 2 tablets (1,200 mg total) by mouth 2 (two) times daily.    hydroxychloroquine (PLAQUENIL) 200 mg tablet TAKE ONE TABLET BY MOUTH ONCE DAILY    hyoscyamine (ANASPAZ) 0.125 mg TbDL Take 0.125 mg by mouth every 6 (six) hours as needed for Cramping.    metronidazole (FLAGYL) 500 MG tablet Take 1 tablet (500 mg total) by mouth every 8 (eight) hours.    nitroGLYCERIN (NITROSTAT) 0.4 MG SL tablet Place 1 tablet (0.4 mg total) under the tongue every 5 (five) minutes as needed for Chest pain.    pantoprazole (PROTONIX) 40 MG tablet Take 1 tablet (40 mg total) by mouth once daily.    phenobarb-hyoscy-atropine-scop (BELLADONNA-PHENOBARBITAL) 16.2-0.1037 -0.0194 mg/5 mL Elix Take 5 mLs by mouth daily as needed (abdominal pain).    potassium chloride (KLOR-CON) 10 MEQ TbSR Take 1 tablet (10 mEq total) by mouth once daily.    pramoxine 1 % Foam Place rectally 3 (three) times daily as needed.    PREMARIN vaginal cream PLACE 1 GRAM VAGINALLY TWICE A WEEK    RESTASIS 0.05 % ophthalmic emulsion INSTILL ONE DROP INTO EACH EYE TWICE DAILY    rivaroxaban (XARELTO) 20 mg Tab Take 1 tablet (20 mg total) by mouth daily with dinner or evening meal.    tramadol (ULTRAM) 50 mg tablet Take 1 tablet (50 mg total) by mouth 3 (three) times daily as needed.    triamcinolone acetonide 0.1% (KENALOG) 0.1 % ointment AAA bid prn     "vitamin D 1000 units Tab Take 2,000 Units by mouth once daily.    oxycodone-acetaminophen (PERCOCET) 5-325 mg per tablet Take 1 tablet by mouth every 6 (six) hours as needed for Pain.           Clinical Reference Information           Your Vitals Were     BP Pulse Temp Height Weight BMI    126/82 66 97.8 °F (36.6 °C) (Tympanic) 5' 2" (1.575 m) 61.9 kg (136 lb 7.4 oz) 24.96 kg/m2      Blood Pressure          Most Recent Value    BP  126/82      Allergies as of 4/3/2017     Codeine    Lisinopril    Pacerone [Amiodarone]    Sulfa (Sulfonamide Antibiotics)    Celecoxib    Ciprofloxacin    Colesevelam    Doxycycline    Statins-hmg-coa Reductase Inhibitors      Immunizations Administered on Date of Encounter - 4/3/2017     None      Orders Placed During Today's Visit     Future Labs/Procedures Expected by Expires    CBC auto differential  4/3/2017 6/2/2018    Comprehensive metabolic panel  4/3/2017 6/2/2018    Lipid panel  4/3/2017 6/2/2018      Language Assistance Services     ATTENTION: Language assistance services are available, free of charge. Please call [phone].      ATENCIÓN: Si habla español, tiene a marte disposición servicios gratuitos de asistencia lingüística. Llame al [phone].     CHLOÉ Ý: N?u b?n nói Ti?ng Vi?t, có các d?ch v? h? tr? ngôn ng? mi?n phí dành cho b?n. G?i s? [phone].         [facility] complies with applicable Federal civil rights laws and does not discriminate on the basis of race, color, national origin, age, disability, or sex.        "

## 2017-04-03 NOTE — PROGRESS NOTES
Subjective:      Patient ID: Marcel Montalvo is a 76 y.o. female.    Chief Complaint: Hospital Follow Up    HPI Comments: Transitional Care Note    Family and/or Caretaker present at visit?  Yes.  Diagnostic tests reviewed/disposition: No diagnosic tests pending after this hospitalization.  Disease/illness education: hematoma, acute blood loss.   Home health/community services discussion/referrals: Patient has home health established at  [facility]  Establishment or re-establishment of referral orders for community resources: No other necessary community resources.   Discussion with other health care providers: No discussion with other health care providers necessary.     [facility] james labs of cbc and urine on 3/31/17 H/h now 10/32.9, mixed urine culture.   Pt having pain 10/10 today from abdominal pain. Constant pain. Only taking tramadol and tylenol currently. Took 2 tylenol this am. On flagyl. No diarrhea. URI symptoms improving.   CT scan on discharge 3/24/17  Large hematoma in the right lateral abdominal wall measuring 23.0 x 14.0 x 7.8 cm. Still with significant bruising and pain. Uncertain what to do. Still on xarelto. Willing to do labs today.       Below is copy of her d.c summary.     HPI:   76 year old female with h/o CHF, COPD, Afib, NIDDM presents complaining of generalized weakness/fatigue for the past day. Has been getting lightheaded when she stands up. Has some shortness of breath and cough but has been improving. Reports black/tarry stools and is having right sided abdominal pain that is dull/achy in nature with sharp/stabbing exacerbations. Has some nausea, no vomiting. Was admitted last week with CHF/COPD.      Hospital Course:   03/24/17-Pt admitted to Telemetry Unit for acute blood loss anemia with GI consulted. Pt reports weakness and pain at site of hematoma. H/H stable post transfusion of 2 units PRBCs. Analgesia prn and flexeril continued for pain. CT confirmed presence of abdominal  hematoma. INR trended downward. Abdominal binder placed per General Surgery. BUN/Creatinine and lactate levels improved with mild gentle hydration. IV antibiotics (Rocephin and Flagyl) initiated. Incentive spirometry encouraged with Mucinex continued. GI following and does not suspect active bleeding at this time. General Surgery following with ongoing evaluation for need of surgical procedure. Blood and urine culture with NGTD. Sputum culture results.  3/27/17- Patient improving steadily. Hgb improved today to 9.2 - no sign of active bleeding. Medical management employed. Plan for D/C in progress.  3/29/17 - Patient improved clinically. Case reviewed with cardiology Dr Fletcher for anticoagulation recs. His input was the patient needs to resume anticoagulation and follow up with EP as scheduled. Patient advised of recs and elected to resume Xarelto understanding she has a risk of bleeding again. Patient declined changing to another med for this purpose. States she will follow closely with her PCP / Cardiologist / EP.     Patient was seen and examined today and deemed stable for a safe discharge to home.            Lab Results   Component Value Date    WBC 6.32 04/03/2017    HGB 10.2 (L) 04/03/2017    HCT 32.8 (L) 04/03/2017     04/03/2017    CHOL 105 (L) 04/03/2017    CHOL 105 (L) 04/03/2017    TRIG 72 04/03/2017    TRIG 72 04/03/2017    HDL 41 04/03/2017    HDL 41 04/03/2017    ALT 41 04/03/2017    AST 43 (H) 04/03/2017     04/03/2017    K 4.5 04/03/2017     04/03/2017    CREATININE 0.9 04/03/2017    BUN 18 04/03/2017    CO2 28 04/03/2017    TSH 3.720 09/06/2016    INR 1.2 03/26/2017    HGBA1C 6.0 04/03/2017       Review of Systems   Constitutional: Positive for activity change, appetite change and fatigue. Negative for fever and unexpected weight change.   Respiratory: Negative for cough and shortness of breath.    Cardiovascular: Negative for chest pain, palpitations and leg swelling.    Gastrointestinal: Positive for abdominal pain. Negative for abdominal distention and constipation.   Musculoskeletal: Positive for myalgias.   Skin: Positive for color change and wound.   Neurological: Negative for weakness.   Psychiatric/Behavioral: Positive for dysphoric mood. Negative for sleep disturbance.     Objective:     Physical Exam   Constitutional: She appears well-developed and well-nourished.   HENT:   Head: Normocephalic and atraumatic.   Right Ear: Tympanic membrane normal.   Left Ear: Tympanic membrane normal.   Mouth/Throat: Oropharynx is clear and moist.   Eyes: Conjunctivae and EOM are normal.   Neck: Normal range of motion. Neck supple.   Cardiovascular: Normal rate.  An irregularly irregular rhythm present.   Pulmonary/Chest: Effort normal and breath sounds normal.   Skin: Bruising noted.        Psychiatric: She has a normal mood and affect. Her behavior is normal.   Nursing note and vitals reviewed.    Assessment:     1. Abdominal pain, other specified site    2. Abdominal wall hematoma, initial encounter    3. Persistent atrial fibrillation    4. Hyperlipidemia, unspecified hyperlipidemia type      Plan:   Marcel was seen today for hospital follow up.    Diagnoses and all orders for this visit:    Abdominal pain, other specified site  Comments:  pt not tolerating tylenol or tramadol for pain. no interaction with percocet with tikosyn. will start 1/2 -1 tablet every 6hrs for pain. 2 colace per day.   Orders:  -     oxycodone-acetaminophen (PERCOCET) 5-325 mg per tablet; Take 1 tablet by mouth every 6 (six) hours as needed for Pain.  -     Comprehensive metabolic panel; Future  -     Lipid panel; Future  -     CBC auto differential; Future    Abdominal wall hematoma, initial encounter  Comments:  Large hematoma in the right lateral abdominal wall measuring 23.0 x 14.0 x 7.8 cm.3/24/17- uncertain of need for further imaging. Will message Gen surg for recommendations.           Orders:  -      oxycodone-acetaminophen (PERCOCET) 5-325 mg per tablet; Take 1 tablet by mouth every 6 (six) hours as needed for Pain.  -     Comprehensive metabolic panel; Future  -     Lipid panel; Future  -     CBC auto differential; Future    Persistent atrial fibrillation- cont with cards   -     Comprehensive metabolic panel; Future  -     Lipid panel; Future  -     CBC auto differential; Future    Hyperlipidemia, unspecified hyperlipidemia type - stable, Continue with current medications and interventions. Labs reviewed.       -     Comprehensive metabolic panel; Future  -     Lipid panel; Future  -     CBC auto differential; Future          Return in about 10 days (around 4/13/2017) for yolanda for pain/abdominal wall hematoma.

## 2017-04-03 NOTE — Clinical Note
Chayo, on this patient, was it recommended to repeat the CT scan at any point to monitor for resolution of the hematoma? Just checking to know for followup. Thanks. Alexandra Moreno MD

## 2017-04-04 LAB
ALBUMIN SERPL BCP-MCNC: 3.1 G/DL
ALP SERPL-CCNC: 84 U/L
ALT SERPL W/O P-5'-P-CCNC: 41 U/L
ANION GAP SERPL CALC-SCNC: 6 MMOL/L
AST SERPL-CCNC: 43 U/L
BILIRUB SERPL-MCNC: 1.7 MG/DL
BUN SERPL-MCNC: 18 MG/DL
CALCIUM SERPL-MCNC: 8.8 MG/DL
CHLORIDE SERPL-SCNC: 105 MMOL/L
CHOLEST/HDLC SERPL: 2.6 {RATIO}
CHOLEST/HDLC SERPL: 2.6 {RATIO}
CO2 SERPL-SCNC: 28 MMOL/L
CREAT SERPL-MCNC: 0.9 MG/DL
EST. GFR  (AFRICAN AMERICAN): >60 ML/MIN/1.73 M^2
EST. GFR  (NON AFRICAN AMERICAN): >60 ML/MIN/1.73 M^2
ESTIMATED AVG GLUCOSE: 126 MG/DL
GLUCOSE SERPL-MCNC: 206 MG/DL
HBA1C MFR BLD HPLC: 6 %
HDL/CHOLESTEROL RATIO: 39 %
HDL/CHOLESTEROL RATIO: 39 %
HDLC SERPL-MCNC: 105 MG/DL
HDLC SERPL-MCNC: 105 MG/DL
HDLC SERPL-MCNC: 41 MG/DL
HDLC SERPL-MCNC: 41 MG/DL
LDLC SERPL CALC-MCNC: 49.6 MG/DL
LDLC SERPL CALC-MCNC: 49.6 MG/DL
NONHDLC SERPL-MCNC: 64 MG/DL
NONHDLC SERPL-MCNC: 64 MG/DL
POTASSIUM SERPL-SCNC: 4.5 MMOL/L
PROT SERPL-MCNC: 5.9 G/DL
SODIUM SERPL-SCNC: 139 MMOL/L
TRIGL SERPL-MCNC: 72 MG/DL
TRIGL SERPL-MCNC: 72 MG/DL

## 2017-04-11 ENCOUNTER — TELEPHONE (OUTPATIENT)
Dept: INTERNAL MEDICINE | Facility: CLINIC | Age: 77
End: 2017-04-11

## 2017-04-11 DIAGNOSIS — M35.00 SJOGREN'S SYNDROME: ICD-10-CM

## 2017-04-11 RX ORDER — HYDROXYCHLOROQUINE SULFATE 200 MG/1
TABLET, FILM COATED ORAL
Qty: 90 TABLET | Refills: 1 | Status: SHIPPED | OUTPATIENT
Start: 2017-04-11 | End: 2017-01-01 | Stop reason: SDUPTHER

## 2017-04-11 RX ORDER — ATORVASTATIN CALCIUM 40 MG/1
TABLET, FILM COATED ORAL
Qty: 90 TABLET | Refills: 6 | Status: SHIPPED | OUTPATIENT
Start: 2017-04-11 | End: 2018-01-01 | Stop reason: SDUPTHER

## 2017-04-11 NOTE — TELEPHONE ENCOUNTER
Called and notified pt, she verbalized understanding. She is going to keep follow up due to still having concerns with area. She would like you to look at it again.

## 2017-04-11 NOTE — TELEPHONE ENCOUNTER
Please inform ms oswald that no followup CT scan is needed on her hematoma per Gen Surg unless there are further problems.

## 2017-04-12 ENCOUNTER — LAB VISIT (OUTPATIENT)
Dept: LAB | Facility: HOSPITAL | Age: 77
End: 2017-04-12
Attending: FAMILY MEDICINE
Payer: MEDICARE

## 2017-04-12 ENCOUNTER — OFFICE VISIT (OUTPATIENT)
Dept: INTERNAL MEDICINE | Facility: CLINIC | Age: 77
End: 2017-04-12
Payer: MEDICARE

## 2017-04-12 VITALS
HEIGHT: 62 IN | OXYGEN SATURATION: 98 % | WEIGHT: 130.5 LBS | DIASTOLIC BLOOD PRESSURE: 60 MMHG | SYSTOLIC BLOOD PRESSURE: 120 MMHG | BODY MASS INDEX: 24.01 KG/M2 | TEMPERATURE: 98 F | HEART RATE: 100 BPM

## 2017-04-12 DIAGNOSIS — E78.5 HYPERLIPIDEMIA, UNSPECIFIED HYPERLIPIDEMIA TYPE: ICD-10-CM

## 2017-04-12 DIAGNOSIS — S30.1XXD ABDOMINAL WALL HEMATOMA, SUBSEQUENT ENCOUNTER: Primary | ICD-10-CM

## 2017-04-12 DIAGNOSIS — E11.9 TYPE 2 DIABETES MELLITUS WITHOUT COMPLICATION, WITHOUT LONG-TERM CURRENT USE OF INSULIN: ICD-10-CM

## 2017-04-12 DIAGNOSIS — Z79.01 LONG-TERM (CURRENT) USE OF ANTICOAGULANTS: ICD-10-CM

## 2017-04-12 DIAGNOSIS — R60.0 BILATERAL EDEMA OF LOWER EXTREMITY: ICD-10-CM

## 2017-04-12 DIAGNOSIS — S30.1XXD ABDOMINAL WALL HEMATOMA, SUBSEQUENT ENCOUNTER: ICD-10-CM

## 2017-04-12 DIAGNOSIS — R09.82 POSTNASAL DRIP: ICD-10-CM

## 2017-04-12 LAB
ANION GAP SERPL CALC-SCNC: 7 MMOL/L
BASOPHILS # BLD AUTO: 0.03 K/UL
BASOPHILS NFR BLD: 0.6 %
BUN SERPL-MCNC: 16 MG/DL
CALCIUM SERPL-MCNC: 9.6 MG/DL
CHLORIDE SERPL-SCNC: 104 MMOL/L
CO2 SERPL-SCNC: 28 MMOL/L
CREAT SERPL-MCNC: 1.1 MG/DL
DIFFERENTIAL METHOD: ABNORMAL
EOSINOPHIL # BLD AUTO: 0.1 K/UL
EOSINOPHIL NFR BLD: 2.3 %
ERYTHROCYTE [DISTWIDTH] IN BLOOD BY AUTOMATED COUNT: 20.7 %
EST. GFR  (AFRICAN AMERICAN): 56 ML/MIN/1.73 M^2
EST. GFR  (NON AFRICAN AMERICAN): 48.5 ML/MIN/1.73 M^2
GLUCOSE SERPL-MCNC: 136 MG/DL
HCT VFR BLD AUTO: 37.2 %
HGB BLD-MCNC: 11 G/DL
LYMPHOCYTES # BLD AUTO: 1.3 K/UL
LYMPHOCYTES NFR BLD: 26 %
MCH RBC QN AUTO: 30.3 PG
MCHC RBC AUTO-ENTMCNC: 29.6 %
MCV RBC AUTO: 103 FL
MONOCYTES # BLD AUTO: 0.7 K/UL
MONOCYTES NFR BLD: 13.8 %
NEUTROPHILS # BLD AUTO: 3 K/UL
NEUTROPHILS NFR BLD: 57.1 %
PLATELET # BLD AUTO: 451 K/UL
PMV BLD AUTO: 11.5 FL
POTASSIUM SERPL-SCNC: 4.3 MMOL/L
RBC # BLD AUTO: 3.63 M/UL
SODIUM SERPL-SCNC: 139 MMOL/L
WBC # BLD AUTO: 5.16 K/UL

## 2017-04-12 PROCEDURE — 80048 BASIC METABOLIC PNL TOTAL CA: CPT

## 2017-04-12 PROCEDURE — 99214 OFFICE O/P EST MOD 30 MIN: CPT | Mod: S$PBB,,, | Performed by: FAMILY MEDICINE

## 2017-04-12 PROCEDURE — 85025 COMPLETE CBC W/AUTO DIFF WBC: CPT

## 2017-04-12 PROCEDURE — 99999 PR PBB SHADOW E&M-EST. PATIENT-LVL III: CPT | Mod: PBBFAC,,, | Performed by: FAMILY MEDICINE

## 2017-04-12 PROCEDURE — 36415 COLL VENOUS BLD VENIPUNCTURE: CPT | Mod: PO

## 2017-04-12 NOTE — MR AVS SNAPSHOT
[facility]  [address] [place] [address]  Phone: [phone]  Fax: [phone]                  Marcel Montalvo   2017 2:20 PM   Office Visit    Description:  Female : 1940   Provider:  Alexandra Moreno MD   [facility]  [facility]           Reason for Visit     Follow-up           Diagnoses this Visit        Comments    Abdominal wall hematoma, subsequent encounter    -  Primary slowly improving, still painful and still large, but less bruising. wanting to keep close followup. no indication for additional ct scanning at this time.     Postnasal drip     add back astelin, decrease mucinex to daily, cont with flonase.     Bilateral edema of lower extremity     cont with lasix daily for nwo.     Hyperlipidemia, unspecified hyperlipidemia type         Long-term (current) use of anticoagulants         Type 2 diabetes mellitus without complication, without long-term current use of insulin                To Do List           Future Appointments        Provider Department Dept Phone    2017 3:20 PM Virgilio Perez MD [facility] - Arrhythmia [phone]    2017 1:00 PM [facility] [phone]    2017 9:40 AM Alexandra [facility]no [facility] [phone]    2017 9:00 AM [facility] [phone]    2017 9:30 AM [facility] [phone]      Goals (5 Years of Data)     None      Follow-Up and Disposition     Return in about 14 days (around 2017) for hematoma followup.      [facility]     [facility] Care Line - / Assistance  Unless otherwise directed by your provider, please contact [facility], our nurse care line that is available for  assistance.     Registered nurses in the [facility] provide: appointment scheduling, clinical advisement, health education, and other  advisory services.  Call: [phone] (toll free)               Medications           Message regarding Medications     Verify the changes and/or additions to your medication regime listed below are the same as discussed with your clinician today.  If any of these changes or additions are incorrect, please notify your healthcare provider.        STOP taking these medications     metronidazole (FLAGYL) 500 MG tablet Take 1 tablet (500 mg total) by mouth every 8 (eight) hours.           Verify that the below list of medications is an accurate representation of the medications you are currently taking.  If none reported, the list may be blank. If incorrect, please contact your healthcare provider. Carry this list with you in case of emergency.           Current Medications     albuterol (PROAIR HFA) 90 mcg/actuation inhaler Inhale 2 puffs into the lungs every 6 (six) hours as needed.    albuterol-ipratropium 2.5mg-0.5mg/3mL (DUO-NEB) 0.5 mg-3 mg(2.5 mg base)/3 mL nebulizer solution Take 3 mLs by nebulization every 6 (six) hours while awake. Rescue    aspirin (ECOTRIN) 81 MG EC tablet Take 1 tablet (81 mg total) by mouth once daily.    atorvastatin (LIPITOR) 40 MG tablet TAKE ONE TABLET BY MOUTH NIGHTLY    azelastine (ASTELIN) 137 mcg (0.1 %) nasal spray 1 spray by Nasal route 2 (two) times daily.    benzonatate (TESSALON) 100 MG capsule Take 1 capsule (100 mg total) by mouth 3 (three) times daily as needed for Cough.    budesonide-formoterol 160-4.5 mcg (SYMBICORT) 160-4.5 mcg/actuation HFAA Inhale 2 puffs into the lungs every 12 (twelve) hours. Wash out mouth after using    BYSTOLIC 5 mg Tab TAKE ONE TABLET BY MOUTH TWICE DAILY    carboxymethylcellulose (REFRESH PLUS) 0.5 % Dpet Place 1 drop into both eyes 3 (three) times daily as needed.    diclofenac sodium (VOLTAREN) 1 % Gel Apply 2 g topically once daily.    dofetilide (TIKOSYN) 125 MCG Cap Take 1 capsule (125 mcg total) by mouth every 12 (twelve) hours.     "escitalopram oxalate (LEXAPRO) 10 MG tablet TAKE ONE-HALF TO ONE TABLET BY MOUTH EVERY DAY    fluticasone (FLONASE) 50 mcg/actuation nasal spray 2 sprays by Each Nare route once daily.    furosemide (LASIX) 20 MG tablet Take 1 tablet (20 mg total) by mouth as directed.    guaifenesin (MUCINEX) 600 mg 12 hr tablet Take 2 tablets (1,200 mg total) by mouth 2 (two) times daily.    hydroxychloroquine (PLAQUENIL) 200 mg tablet TAKE ONE TABLET BY MOUTH ONCE DAILY    hyoscyamine (ANASPAZ) 0.125 mg TbDL Take 0.125 mg by mouth every 6 (six) hours as needed for Cramping.    nitroGLYCERIN (NITROSTAT) 0.4 MG SL tablet Place 1 tablet (0.4 mg total) under the tongue every 5 (five) minutes as needed for Chest pain.    pantoprazole (PROTONIX) 40 MG tablet Take 1 tablet (40 mg total) by mouth once daily.    phenobarb-hyoscy-atropine-scop (BELLADONNA-PHENOBARBITAL) 16.2-0.1037 -0.0194 mg/5 mL Elix Take 5 mLs by mouth daily as needed (abdominal pain).    potassium chloride (KLOR-CON) 10 MEQ TbSR Take 1 tablet (10 mEq total) by mouth once daily.    pramoxine 1 % Foam Place rectally 3 (three) times daily as needed.    PREMARIN vaginal cream PLACE 1 GRAM VAGINALLY TWICE A WEEK    RESTASIS 0.05 % ophthalmic emulsion INSTILL ONE DROP INTO EACH EYE TWICE DAILY    rivaroxaban (XARELTO) 20 mg Tab Take 1 tablet (20 mg total) by mouth daily with dinner or evening meal.    tramadol (ULTRAM) 50 mg tablet Take 1 tablet (50 mg total) by mouth 3 (three) times daily as needed.    triamcinolone acetonide 0.1% (KENALOG) 0.1 % ointment AAA bid prn    vitamin D 1000 units Tab Take 2,000 Units by mouth once daily.    oxycodone-acetaminophen (PERCOCET) 5-325 mg per tablet Take 1 tablet by mouth every 6 (six) hours as needed for Pain.           Clinical Reference Information           Your Vitals Were     BP Pulse Temp Height Weight SpO2    120/60 100 98 °F (36.7 °C) 5' 2" (1.575 m) 59.2 kg (130 lb 8.2 oz) 98%    BMI                23.87 kg/m2          Blood " Pressure          Most Recent Value    BP  120/60      Allergies as of 4/12/2017     Codeine    Lisinopril    Pacerone [Amiodarone]    Sulfa (Sulfonamide Antibiotics)    Celecoxib    Ciprofloxacin    Colesevelam    Doxycycline    Statins-hmg-coa Reductase Inhibitors      Immunizations Administered on Date of Encounter - 4/12/2017     None      Orders Placed During Today's Visit     Future Labs/Procedures Expected by Expires    Basic metabolic panel  4/12/2017 6/11/2018    CBC auto differential  4/12/2017 6/11/2018      Language Assistance Services     ATTENTION: Language assistance services are available, free of charge. Please call [phone].      ATENCIÓN: Si habla español, tiene a marte disposición servicios gratuitos de asistencia lingüística. Llame al [phone].     CHÚ Ý: N?u b?n nói Ti?ng Vi?t, có các d?ch v? h? tr? ngôn ng? mi?n phí dành cho b?n. G?i s? [phone].         [facility] complies with applicable Federal civil rights laws and does not discriminate on the basis of race, color, national origin, age, disability, or sex.

## 2017-04-12 NOTE — PROGRESS NOTES
Subjective:      Patient ID: Marcel Montalvo is a 76 y.o. female.    Chief Complaint: Follow-up (10 day)    HPI Comments:  Patient's coming in today for a ten-day follow-up of her large abdominal wall hematoma.  She states it's very tender still.  It's very hard to the touch.  However the pain is slowly and steadily improving.  She still feels very fatigued and tired but she is able to do more.  She has nowhere near her baseline at this time.  She's been using a heating pad some.  Bruising has improved some.  I did touch base with the physician assistant at Gen. surgery and they stated that there was no recommendation for repeat CT scan unless there was worsening problems.  The patient would like to continue seeing me every 2-4 weeks to follow-up on this hematoma until it's gone because she reports she has a lot of medical issues and is afraid that she will ignore something.  Large hematoma in the right lateral abdominal wall measuring 23.0 x 14.0 x 7.8 cm. Still with significant bruising and pain. Uncertain what to do. Still on xarelto.    On her blood work her anemia is steadily improving.  The last outside labs show that her hemoglobin was in the upper tens.    She does still report that she's having some leg swelling.  If she takes a daily Lasix it seems to help with the leg swelling however she stopped a few her daily she gets worse swelling.    She does still report a lot of hoarseness and postnasal drip.  Initially when all of this occurred she was having a lot of sinus congestion and she's been taking 2 Mucinex a day and Flonase.  She's not been taking the Astelin.    She's going to be meeting with cardiology in about 9 days.  This is to look at again her atrial fibrillation and her rate control.        Lab Results   Component Value Date    WBC 6.32 04/03/2017    HGB 10.2 (L) 04/03/2017    HCT 32.8 (L) 04/03/2017     04/03/2017    CHOL 105 (L) 04/03/2017    CHOL 105 (L) 04/03/2017    TRIG 72 04/03/2017     TRIG 72 04/03/2017    HDL 41 04/03/2017    HDL 41 04/03/2017    ALT 41 04/03/2017    AST 43 (H) 04/03/2017     04/03/2017    K 4.5 04/03/2017     04/03/2017    CREATININE 0.9 04/03/2017    BUN 18 04/03/2017    CO2 28 04/03/2017    TSH 3.720 09/06/2016    INR 1.2 03/26/2017    HGBA1C 6.0 04/03/2017       Review of Systems   Constitutional: Positive for activity change, appetite change and fatigue. Negative for unexpected weight change.   HENT: Positive for postnasal drip and voice change. Negative for congestion and trouble swallowing.    Respiratory: Negative for cough and shortness of breath.    Gastrointestinal: Positive for abdominal distention, abdominal pain and nausea. Negative for constipation, diarrhea and vomiting.   Musculoskeletal: Positive for arthralgias and myalgias.   Psychiatric/Behavioral: Negative for decreased concentration, dysphoric mood and sleep disturbance. The patient is not nervous/anxious.      Objective:     Physical Exam   Constitutional: She appears well-developed and well-nourished.   HENT:   Head: Normocephalic and atraumatic.   Cardiovascular: Normal rate and regular rhythm.    Pulmonary/Chest: Effort normal and breath sounds normal.   Abdominal: There is tenderness in the right upper quadrant.       Skin: Bruising and ecchymosis noted.   Vitals reviewed.    Assessment:     1. Abdominal wall hematoma, subsequent encounter    2. Postnasal drip    3. Bilateral edema of lower extremity    4. Hyperlipidemia, unspecified hyperlipidemia type    5. Long-term (current) use of anticoagulants    6. Type 2 diabetes mellitus without complication, without long-term current use of insulin      Plan:   Marcel was seen today for follow-up.    Diagnoses and all orders for this visit:    Abdominal wall hematoma, subsequent encounter  Comments:  slowly improving, still painful and still large, but less bruising. wanting to keep close followup. no indication for additional ct scanning at  this time. See back in 2 weeks. Labs today  Orders:  -     Basic metabolic panel; Future  -     CBC auto differential; Future    Postnasal drip  Comments:  add back astelin, decrease mucinex to daily, cont with flonase.     Bilateral edema of lower extremity  Comments:  cont with lasix daily for now.   Orders:  -     Basic metabolic panel; Future  -     CBC auto differential; Future    Hyperlipidemia, unspecified hyperlipidemia type- stable and improved.     Long-term (current) use of anticoagulants- on xarelto. Check h/h today    Type 2 diabetes mellitus without complication, without long-term current use of insulin- improved with a1c at 6.           Return in about 14 days (around 4/26/2017) for hematoma followup.

## 2017-04-21 ENCOUNTER — CLINICAL SUPPORT (OUTPATIENT)
Dept: CARDIOLOGY | Facility: CLINIC | Age: 77
End: 2017-04-21
Payer: MEDICARE

## 2017-04-21 ENCOUNTER — OFFICE VISIT (OUTPATIENT)
Dept: CARDIOLOGY | Facility: CLINIC | Age: 77
End: 2017-04-21
Payer: MEDICARE

## 2017-04-21 VITALS
HEART RATE: 110 BPM | BODY MASS INDEX: 22.63 KG/M2 | WEIGHT: 123 LBS | DIASTOLIC BLOOD PRESSURE: 76 MMHG | SYSTOLIC BLOOD PRESSURE: 146 MMHG | HEIGHT: 62 IN

## 2017-04-21 DIAGNOSIS — E11.9 TYPE 2 DIABETES MELLITUS WITHOUT COMPLICATION, WITHOUT LONG-TERM CURRENT USE OF INSULIN: ICD-10-CM

## 2017-04-21 DIAGNOSIS — I48.19 PERSISTENT ATRIAL FIBRILLATION: Primary | ICD-10-CM

## 2017-04-21 DIAGNOSIS — I48.0 PAROXYSMAL ATRIAL FIBRILLATION: ICD-10-CM

## 2017-04-21 DIAGNOSIS — Z95.1 S/P CABG (CORONARY ARTERY BYPASS GRAFT): ICD-10-CM

## 2017-04-21 DIAGNOSIS — Z98.890 S/P CAROTID ENDARTERECTOMY: ICD-10-CM

## 2017-04-21 DIAGNOSIS — S30.1XXD ABDOMINAL WALL HEMATOMA, SUBSEQUENT ENCOUNTER: ICD-10-CM

## 2017-04-21 DIAGNOSIS — I35.1 NONRHEUMATIC AORTIC VALVE INSUFFICIENCY: ICD-10-CM

## 2017-04-21 DIAGNOSIS — Z79.01 LONG-TERM (CURRENT) USE OF ANTICOAGULANTS: ICD-10-CM

## 2017-04-21 DIAGNOSIS — I50.32 CHRONIC DIASTOLIC HEART FAILURE: Chronic | ICD-10-CM

## 2017-04-21 DIAGNOSIS — I65.23 BILATERAL CAROTID ARTERY STENOSIS: ICD-10-CM

## 2017-04-21 DIAGNOSIS — Z98.61 S/P PTCA (PERCUTANEOUS TRANSLUMINAL CORONARY ANGIOPLASTY): ICD-10-CM

## 2017-04-21 DIAGNOSIS — Z79.01 ON ANTICOAGULANT THERAPY: ICD-10-CM

## 2017-04-21 PROCEDURE — 93010 ELECTROCARDIOGRAM REPORT: CPT | Mod: S$PBB,,, | Performed by: INTERNAL MEDICINE

## 2017-04-21 PROCEDURE — 99999 PR PBB SHADOW E&M-EST. PATIENT-LVL II: CPT | Mod: PBBFAC,,, | Performed by: INTERNAL MEDICINE

## 2017-04-21 PROCEDURE — 99212 OFFICE O/P EST SF 10 MIN: CPT | Mod: PBBFAC,25,PO | Performed by: INTERNAL MEDICINE

## 2017-04-21 PROCEDURE — 99215 OFFICE O/P EST HI 40 MIN: CPT | Mod: S$PBB,,, | Performed by: INTERNAL MEDICINE

## 2017-04-21 RX ORDER — PROPAFENONE HYDROCHLORIDE 225 MG/1
225 CAPSULE, EXTENDED RELEASE ORAL EVERY 12 HOURS
Qty: 60 CAPSULE | Refills: 11 | Status: SHIPPED | OUTPATIENT
Start: 2017-04-21 | End: 2017-01-01 | Stop reason: ALTCHOICE

## 2017-04-21 NOTE — PROGRESS NOTES
"  Subjective:   Patient ID:  Marcel Montalvo is a 76 y.o. female     Chief complaint: AF    HPI  From my last note (10/28/16):  She had PVI last month (had had RFA of the CTI in the past).   Update since then:  "I feel great"  Details of the PVI:  1.  Successful transseptal catheterization.  2.  Successful electroanatomical mapping of the left atrium and pulmonary veins with appropriate MRI registration.  3.  Successful cryoablative isolation of all pulmonary veins (2 on the left and 1 common on the right).  4.  Normal HV intervals and AV reed Wenckebach point pre and post ablation.  5.  Elevated CVP.  6. The procedural endpoints were excellent   She is still on tikosyn.   She has had no issues at all since the PVI on 9/26.  ECG today shows NSR with normal intervals  Update since then:  Recently admitted with Acute Systolic CHF. Treated with IV lasix and Nebs-- she felt better but still had orthopnea and some sore throat. She also had acute Asthma exacerbation for which steroids added and she gradually started to improve. Cardiology continued her Tikosyn and an echo was done which showed preserved LVF after resuming her Tikosyn. She also had PAH and DD on her echo.    Her cough, sore throat and SOB improved with Steroids and Avelox and lasix.   Also had acute blood loss anemia from a subcutaneous, muscular bleed. H/H stable post transfusion of 2 units PRBCs. CT confirmed presence of abdominal wall hematoma. OAC  DCed. Abdominal binder placed per General Surgery.   Had ARF initially >> BUN/Creatinine and lactate levels improved with mild gentle hydration. IV antibiotics (Rocephin and Flagyl) initiated. Incentive spirometry encouraged with Mucinex continued.   Anticoagulation resumed on discharge.    She is here for follow up. She agreed to resume Xarelto understanding she has a risk of bleeding again. Patient declined changing to another med for this purpose.   She is now back in AF despite being on continued " Tikosyn. She feels very poorly.  I have reviewed the actual image of the ECG tracing obtained today and it shows AF with  bpm and o/w normal intervals       Current Outpatient Prescriptions   Medication Sig    albuterol (PROAIR HFA) 90 mcg/actuation inhaler Inhale 2 puffs into the lungs every 6 (six) hours as needed.    albuterol-ipratropium 2.5mg-0.5mg/3mL (DUO-NEB) 0.5 mg-3 mg(2.5 mg base)/3 mL nebulizer solution Take 3 mLs by nebulization every 6 (six) hours while awake. Rescue    aspirin (ECOTRIN) 81 MG EC tablet Take 1 tablet (81 mg total) by mouth once daily.    atorvastatin (LIPITOR) 40 MG tablet TAKE ONE TABLET BY MOUTH NIGHTLY    azelastine (ASTELIN) 137 mcg (0.1 %) nasal spray 1 spray by Nasal route 2 (two) times daily.    benzonatate (TESSALON) 100 MG capsule Take 1 capsule (100 mg total) by mouth 3 (three) times daily as needed for Cough.    budesonide-formoterol 160-4.5 mcg (SYMBICORT) 160-4.5 mcg/actuation HFAA Inhale 2 puffs into the lungs every 12 (twelve) hours. Wash out mouth after using    BYSTOLIC 5 mg Tab TAKE ONE TABLET BY MOUTH TWICE DAILY (Patient taking differently: TAKE ONE TABLET BY MOUTH DAILY)    carboxymethylcellulose (REFRESH PLUS) 0.5 % Dpet Place 1 drop into both eyes 3 (three) times daily as needed.    diclofenac sodium (VOLTAREN) 1 % Gel Apply 2 g topically once daily.    escitalopram oxalate (LEXAPRO) 10 MG tablet TAKE ONE-HALF TO ONE TABLET BY MOUTH EVERY DAY    fluticasone (FLONASE) 50 mcg/actuation nasal spray 2 sprays by Each Nare route once daily.    furosemide (LASIX) 20 MG tablet Take 1 tablet (20 mg total) by mouth as directed.    guaifenesin (MUCINEX) 600 mg 12 hr tablet Take 2 tablets (1,200 mg total) by mouth 2 (two) times daily. (Patient taking differently: Take 1,200 mg by mouth 2 (two) times daily. As needed for flares)    hydroxychloroquine (PLAQUENIL) 200 mg tablet TAKE ONE TABLET BY MOUTH ONCE DAILY    hyoscyamine (ANASPAZ) 0.125 mg TbDL Take  0.125 mg by mouth every 6 (six) hours as needed for Cramping.    nitroGLYCERIN (NITROSTAT) 0.4 MG SL tablet Place 1 tablet (0.4 mg total) under the tongue every 5 (five) minutes as needed for Chest pain.    pantoprazole (PROTONIX) 40 MG tablet Take 1 tablet (40 mg total) by mouth once daily.    phenobarb-hyoscy-atropine-scop (BELLADONNA-PHENOBARBITAL) 16.2-0.1037 -0.0194 mg/5 mL Elix Take 5 mLs by mouth daily as needed (abdominal pain).    potassium chloride (KLOR-CON) 10 MEQ TbSR Take 1 tablet (10 mEq total) by mouth once daily.    pramoxine 1 % Foam Place rectally 3 (three) times daily as needed.    PREMARIN vaginal cream PLACE 1 GRAM VAGINALLY TWICE A WEEK    RESTASIS 0.05 % ophthalmic emulsion INSTILL ONE DROP INTO EACH EYE TWICE DAILY    rivaroxaban (XARELTO) 20 mg Tab Take 1 tablet (20 mg total) by mouth daily with dinner or evening meal.    tramadol (ULTRAM) 50 mg tablet Take 1 tablet (50 mg total) by mouth 3 (three) times daily as needed.    triamcinolone acetonide 0.1% (KENALOG) 0.1 % ointment AAA bid prn    vitamin D 1000 units Tab Take 2,000 Units by mouth once daily.    propafenone (RYTHMOL SR) 225 MG Cp12 Take 1 capsule (225 mg total) by mouth every 12 (twelve) hours.     Current Facility-Administered Medications   Medication    denosumab (PROLIA) injection 60 mg     Review of Systems   Constitution: Positive for malaise/fatigue. Negative for chills, decreased appetite, diaphoresis, fever, weakness, night sweats, weight gain and weight loss.   HENT: Negative.  Negative for headaches and nosebleeds.    Eyes: Negative.  Negative for blurred vision and visual disturbance.   Cardiovascular: Positive for irregular heartbeat. Negative for chest pain, claudication, cyanosis, dyspnea on exertion, leg swelling, near-syncope, orthopnea, palpitations, paroxysmal nocturnal dyspnea and syncope.   Respiratory: Positive for shortness of breath. Negative for cough, hemoptysis, sleep disturbances due to  breathing, snoring, sputum production and wheezing.    Endocrine: Negative.  Negative for heat intolerance.   Hematologic/Lymphatic: Negative for adenopathy and bleeding problem. Bruises/bleeds easily.   Skin: Negative.  Negative for rash.   Musculoskeletal: Negative.  Negative for muscle weakness and myalgias.   Gastrointestinal: Negative.  Negative for abdominal pain, anorexia, melena, nausea and vomiting.   Genitourinary: Negative.  Negative for menorrhagia.   Neurological: Negative.  Negative for excessive daytime sleepiness, dizziness, loss of balance, seizures and vertigo.   Psychiatric/Behavioral: Negative.  Negative for altered mental status and depression. The patient is not nervous/anxious.    Allergic/Immunologic: Negative.        Objective:   Physical Exam   Constitutional: She is oriented to person, place, and time. She appears well-developed and well-nourished.   HENT:   Head: Normocephalic and atraumatic.   Right Ear: External ear normal.   Left Ear: External ear normal.   Neck: Normal range of motion. Neck supple. No thyromegaly present.   Cardiovascular: Normal rate, regular rhythm, normal heart sounds and intact distal pulses.  Exam reveals no gallop, no S3, no S4, no friction rub, no midsystolic click and no opening snap.    No murmur heard.  Pulses:       Carotid pulses are 2+ on the right side, and 2+ on the left side.       Radial pulses are 2+ on the right side, and 2+ on the left side.        Dorsalis pedis pulses are 2+ on the right side, and 2+ on the left side.        Posterior tibial pulses are 2+ on the right side, and 2+ on the left side.   Pulmonary/Chest: Effort normal and breath sounds normal.   Abdominal: Soft. She exhibits no distension. There is no tenderness.   Musculoskeletal:        Right ankle: She exhibits no swelling.        Left ankle: She exhibits no swelling.        Right lower leg: She exhibits no swelling.        Left lower leg: She exhibits no swelling.   Neurological:  "She is alert and oriented to person, place, and time. She has normal strength. No cranial nerve deficit. Gait normal.   Skin: Skin is warm. No rash noted. No cyanosis. Nails show no clubbing.   Psychiatric: She has a normal mood and affect. Her speech is normal and behavior is normal. Thought content normal. Cognition and memory are normal.   Nursing note and vitals reviewed.    BP (!) 146/76 (BP Location: Right arm, Patient Position: Sitting, BP Method: Manual)  Pulse 110  Ht 5' 2" (1.575 m)  Wt 55.8 kg (123 lb)  BMI 22.5 kg/m2     Assessment:      1. Persistent atrial fibrillation    2. Chronic diastolic heart failure    3. Type 2 diabetes mellitus without complication, without long-term current use of insulin    4. Bilateral carotid artery stenosis    5. S/P CABG (coronary artery bypass graft)    6. S/P carotid endarterectomy    7. Long-term (current) use of anticoagulants    8. S/P PTCA (percutaneous transluminal coronary angioplasty)    9. Nonrheumatic aortic valve insufficiency    10. On anticoagulant therapy    11. Abdominal wall hematoma, subsequent encounter        Plan:    DCCV  After load with rythmol-- to be started two days after DC Tikosyn.  No orders of the defined types were placed in this encounter.    Return post DCCV.  Medications Discontinued During This Encounter   Medication Reason    dofetilide (TIKOSYN) 125 MCG Cap Therapy not effective     New Prescriptions    PROPAFENONE (RYTHMOL SR) 225 MG CP12    Take 1 capsule (225 mg total) by mouth every 12 (twelve) hours.     Modified Medications    No medications on file              "

## 2017-04-27 ENCOUNTER — OFFICE VISIT (OUTPATIENT)
Dept: INTERNAL MEDICINE | Facility: CLINIC | Age: 77
End: 2017-04-27
Payer: MEDICARE

## 2017-04-27 VITALS
WEIGHT: 124.56 LBS | SYSTOLIC BLOOD PRESSURE: 142 MMHG | HEIGHT: 62 IN | OXYGEN SATURATION: 99 % | DIASTOLIC BLOOD PRESSURE: 68 MMHG | TEMPERATURE: 98 F | BODY MASS INDEX: 22.92 KG/M2 | HEART RATE: 84 BPM

## 2017-04-27 DIAGNOSIS — D62 ACUTE POSTHEMORRHAGIC ANEMIA: ICD-10-CM

## 2017-04-27 DIAGNOSIS — E11.59 TYPE 2 DIABETES MELLITUS WITH OTHER CIRCULATORY COMPLICATION, WITHOUT LONG-TERM CURRENT USE OF INSULIN: ICD-10-CM

## 2017-04-27 DIAGNOSIS — I48.20 CHRONIC ATRIAL FIBRILLATION: Chronic | ICD-10-CM

## 2017-04-27 DIAGNOSIS — Z79.01 ON ANTICOAGULANT THERAPY: ICD-10-CM

## 2017-04-27 DIAGNOSIS — S30.1XXD ABDOMINAL WALL HEMATOMA, SUBSEQUENT ENCOUNTER: Primary | ICD-10-CM

## 2017-04-27 DIAGNOSIS — E78.5 HYPERLIPIDEMIA, UNSPECIFIED HYPERLIPIDEMIA TYPE: ICD-10-CM

## 2017-04-27 PROCEDURE — 99999 PR PBB SHADOW E&M-EST. PATIENT-LVL III: CPT | Mod: PBBFAC,,, | Performed by: FAMILY MEDICINE

## 2017-04-27 PROCEDURE — 99213 OFFICE O/P EST LOW 20 MIN: CPT | Mod: PBBFAC,PO | Performed by: FAMILY MEDICINE

## 2017-04-27 PROCEDURE — 99496 TRANSJ CARE MGMT HIGH F2F 7D: CPT | Mod: S$PBB,,, | Performed by: FAMILY MEDICINE

## 2017-04-27 PROCEDURE — 99214 OFFICE O/P EST MOD 30 MIN: CPT | Mod: S$PBB,,, | Performed by: FAMILY MEDICINE

## 2017-04-27 NOTE — PROGRESS NOTES
Subjective:      Patient ID: Marcel Montalvo is a 76 y.o. female.    Chief Complaint: Follow-up (f/u hematoma)    HPI Comments:  Patient's coming in today for a 14-day follow-up of her large abdominal wall hematoma.  She states it's improving.  Still hard to the touch but steadily getting smaller.  She's actually doing a little bit more activity now.     Still on xarelto.  Interested in seeing if she could be on a lower dose of the Xarelto.    On her blood work her anemia is steadily improving.  The last outside labs show that her hemoglobin was in the upper tens.  Now in the 11's.  No further bruising.    She is now on different medications for the atrial fibrillation and the goal is to keep her on this antiarrhythmic for a while and then for Dr. Valero to go back in and do another ablation.     she is scheduled to see me in 2 weeks for her annual exam.      Lab Results   Component Value Date    WBC 5.16 04/12/2017    HGB 11.0 (L) 04/12/2017    HCT 37.2 04/12/2017     (H) 04/12/2017    CHOL 105 (L) 04/03/2017    CHOL 105 (L) 04/03/2017    TRIG 72 04/03/2017    TRIG 72 04/03/2017    HDL 41 04/03/2017    HDL 41 04/03/2017    ALT 41 04/03/2017    AST 43 (H) 04/03/2017     04/12/2017    K 4.3 04/12/2017     04/12/2017    CREATININE 1.1 04/12/2017    BUN 16 04/12/2017    CO2 28 04/12/2017    TSH 3.720 09/06/2016    INR 1.2 03/26/2017    HGBA1C 6.0 04/03/2017       Review of Systems   Constitutional: Negative for activity change, appetite change, fatigue and unexpected weight change.   Respiratory: Negative for cough and shortness of breath.    Cardiovascular: Negative for chest pain and palpitations.   Gastrointestinal: Positive for abdominal distention and abdominal pain. Negative for constipation, diarrhea, nausea and vomiting.   Psychiatric/Behavioral: Negative for dysphoric mood. The patient is not nervous/anxious.      Objective:     Physical Exam   Constitutional: She appears well-developed and  well-nourished.   HENT:   Head: Normocephalic and atraumatic.   Cardiovascular: Normal rate and regular rhythm.    Pulmonary/Chest: Effort normal and breath sounds normal.   Abdominal: There is tenderness in the right upper quadrant.       Skin: Bruising and ecchymosis noted.   Vitals reviewed.    Assessment:     1. Abdominal wall hematoma, subsequent encounter    2. Chronic atrial fibrillation    3. Acute posthemorrhagic anemia    4. On anticoagulant therapy    5. Type 2 diabetes mellitus with other circulatory complication, without long-term current use of insulin    6. Hyperlipidemia, unspecified hyperlipidemia type      Plan:   Marcel was seen today for follow-up.    Diagnoses and all orders for this visit:    Abdominal wall hematoma, subsequent encounter-steadily improving continue with careful monitoring.  -     TSH; Future  -     T4, free; Future  -     Lipid panel; Future  -     Comprehensive metabolic panel; Future  -     CBC auto differential; Future  -     Hemoglobin A1c; Future    Chronic atrial fibrillation-being managed by cardiology on new antiarrhythmic going to be going for an ablation in the next 1-2 months.  -     TSH; Future  -     T4, free; Future  -     Lipid panel; Future  -     Comprehensive metabolic panel; Future  -     CBC auto differential; Future  -     Hemoglobin A1c; Future    Acute posthemorrhagic anemia-steadily improving lab work next week last hemoglobin 11  -     TSH; Future  -     T4, free; Future  -     Lipid panel; Future  -     Comprehensive metabolic panel; Future  -     CBC auto differential; Future  -     Hemoglobin A1c; Future    On anticoagulant therapy-on aspirin and Xarelto will discuss lower dosages with cardiologist  -     TSH; Future  -     T4, free; Future  -     Lipid panel; Future  -     Comprehensive metabolic panel; Future  -     CBC auto differential; Future  -     Hemoglobin A1c; Future    Type 2 diabetes mellitus with other circulatory complication, without  long-term current use of insulin-lab work prior to next visit    -     TSH; Future  -     T4, free; Future  -     Lipid panel; Future  -     Comprehensive metabolic panel; Future  -     CBC auto differential; Future  -     Hemoglobin A1c; Future    Hyperlipidemia, unspecified hyperlipidemia type-lab work prior to next visit  -     TSH; Future  -     T4, free; Future  -     Lipid panel; Future  -     Comprehensive metabolic panel; Future  -     CBC auto differential; Future  -     Hemoglobin A1c; Future            Return if symptoms worsen or fail to improve.

## 2017-04-27 NOTE — PROGRESS NOTES
Spoke with patient and so far tolerating Propafenone (Rythmol 225 mg Q12h)will get Ekg on Tuesday and possibly be scheduled for Cardioversion the next week 5/8-12 with Dr. Fletcher.

## 2017-05-02 ENCOUNTER — LAB VISIT (OUTPATIENT)
Dept: LAB | Facility: HOSPITAL | Age: 77
End: 2017-05-02
Attending: FAMILY MEDICINE
Payer: MEDICARE

## 2017-05-02 ENCOUNTER — CLINICAL SUPPORT (OUTPATIENT)
Dept: CARDIOLOGY | Facility: CLINIC | Age: 77
End: 2017-05-02
Payer: MEDICARE

## 2017-05-02 DIAGNOSIS — E11.59 TYPE 2 DIABETES MELLITUS WITH OTHER CIRCULATORY COMPLICATION, WITHOUT LONG-TERM CURRENT USE OF INSULIN: ICD-10-CM

## 2017-05-02 DIAGNOSIS — D62 ACUTE POSTHEMORRHAGIC ANEMIA: ICD-10-CM

## 2017-05-02 DIAGNOSIS — Z79.01 ON ANTICOAGULANT THERAPY: ICD-10-CM

## 2017-05-02 DIAGNOSIS — I48.20 CHRONIC ATRIAL FIBRILLATION: Chronic | ICD-10-CM

## 2017-05-02 DIAGNOSIS — S30.1XXD ABDOMINAL WALL HEMATOMA, SUBSEQUENT ENCOUNTER: ICD-10-CM

## 2017-05-02 DIAGNOSIS — I48.0 PAROXYSMAL ATRIAL FIBRILLATION: ICD-10-CM

## 2017-05-02 DIAGNOSIS — E78.5 HYPERLIPIDEMIA, UNSPECIFIED HYPERLIPIDEMIA TYPE: ICD-10-CM

## 2017-05-02 LAB
ALBUMIN SERPL BCP-MCNC: 3.8 G/DL
ALP SERPL-CCNC: 166 U/L
ALT SERPL W/O P-5'-P-CCNC: 34 U/L
ANION GAP SERPL CALC-SCNC: 7 MMOL/L
AST SERPL-CCNC: 39 U/L
BASOPHILS # BLD AUTO: 0.07 K/UL
BASOPHILS NFR BLD: 0.9 %
BILIRUB SERPL-MCNC: 0.5 MG/DL
BUN SERPL-MCNC: 20 MG/DL
CALCIUM SERPL-MCNC: 10.1 MG/DL
CHLORIDE SERPL-SCNC: 104 MMOL/L
CHOLEST/HDLC SERPL: 4.6 {RATIO}
CO2 SERPL-SCNC: 28 MMOL/L
CREAT SERPL-MCNC: 1.2 MG/DL
DIFFERENTIAL METHOD: ABNORMAL
EOSINOPHIL # BLD AUTO: 0.3 K/UL
EOSINOPHIL NFR BLD: 3.4 %
ERYTHROCYTE [DISTWIDTH] IN BLOOD BY AUTOMATED COUNT: 16.7 %
EST. GFR  (AFRICAN AMERICAN): 50.4 ML/MIN/1.73 M^2
EST. GFR  (NON AFRICAN AMERICAN): 43.7 ML/MIN/1.73 M^2
GLUCOSE SERPL-MCNC: 125 MG/DL
HCT VFR BLD AUTO: 40.1 %
HDL/CHOLESTEROL RATIO: 21.9 %
HDLC SERPL-MCNC: 192 MG/DL
HDLC SERPL-MCNC: 42 MG/DL
HGB BLD-MCNC: 12.3 G/DL
LDLC SERPL CALC-MCNC: 117.2 MG/DL
LYMPHOCYTES # BLD AUTO: 1.9 K/UL
LYMPHOCYTES NFR BLD: 24.4 %
MCH RBC QN AUTO: 31.1 PG
MCHC RBC AUTO-ENTMCNC: 30.7 %
MCV RBC AUTO: 101 FL
MONOCYTES # BLD AUTO: 0.9 K/UL
MONOCYTES NFR BLD: 11.8 %
NEUTROPHILS # BLD AUTO: 4.7 K/UL
NEUTROPHILS NFR BLD: 59.2 %
NONHDLC SERPL-MCNC: 150 MG/DL
PLATELET # BLD AUTO: 232 K/UL
PMV BLD AUTO: 11.6 FL
POTASSIUM SERPL-SCNC: 4.3 MMOL/L
PROT SERPL-MCNC: 7.3 G/DL
RBC # BLD AUTO: 3.96 M/UL
SODIUM SERPL-SCNC: 139 MMOL/L
T4 FREE SERPL-MCNC: 1.14 NG/DL
TRIGL SERPL-MCNC: 164 MG/DL
TSH SERPL DL<=0.005 MIU/L-ACNC: 3.54 UIU/ML
WBC # BLD AUTO: 7.94 K/UL

## 2017-05-02 PROCEDURE — 93010 ELECTROCARDIOGRAM REPORT: CPT | Mod: ,,, | Performed by: INTERNAL MEDICINE

## 2017-05-02 PROCEDURE — 93005 ELECTROCARDIOGRAM TRACING: CPT | Mod: PBBFAC,PO | Performed by: INTERNAL MEDICINE

## 2017-05-03 LAB
ESTIMATED AVG GLUCOSE: 137 MG/DL
HBA1C MFR BLD HPLC: 6.4 %

## 2017-05-04 ENCOUNTER — TELEPHONE (OUTPATIENT)
Dept: CARDIOLOGY | Facility: CLINIC | Age: 77
End: 2017-05-04

## 2017-05-04 DIAGNOSIS — I48.0 PAF (PAROXYSMAL ATRIAL FIBRILLATION): Primary | ICD-10-CM

## 2017-05-04 NOTE — TELEPHONE ENCOUNTER
Spoke with patient and offered dates for scheduling Cardioversion, stated she's been feeling better and heart rate has not been as fast.  Readings 122/49 right arm/ pulse 83, 121/75 left arm/ pulse 68, 122/53 right arm/ pulse 65  Will repeat Ekg on Thursday and plan for Cardioversion on Friday for 10 AM

## 2017-05-11 ENCOUNTER — OFFICE VISIT (OUTPATIENT)
Dept: INTERNAL MEDICINE | Facility: CLINIC | Age: 77
End: 2017-05-11
Payer: MEDICARE

## 2017-05-11 ENCOUNTER — CLINICAL SUPPORT (OUTPATIENT)
Dept: CARDIOLOGY | Facility: CLINIC | Age: 77
End: 2017-05-11
Payer: MEDICARE

## 2017-05-11 ENCOUNTER — TELEPHONE (OUTPATIENT)
Dept: CARDIOLOGY | Facility: CLINIC | Age: 77
End: 2017-05-11

## 2017-05-11 VITALS
HEART RATE: 70 BPM | DIASTOLIC BLOOD PRESSURE: 70 MMHG | BODY MASS INDEX: 23.74 KG/M2 | HEIGHT: 62 IN | SYSTOLIC BLOOD PRESSURE: 138 MMHG | WEIGHT: 129 LBS | TEMPERATURE: 98 F

## 2017-05-11 DIAGNOSIS — S30.1XXD ABDOMINAL WALL HEMATOMA, SUBSEQUENT ENCOUNTER: ICD-10-CM

## 2017-05-11 DIAGNOSIS — Z79.01 ON ANTICOAGULANT THERAPY: ICD-10-CM

## 2017-05-11 DIAGNOSIS — Z00.00 MEDICARE ANNUAL WELLNESS VISIT, SUBSEQUENT: Primary | ICD-10-CM

## 2017-05-11 DIAGNOSIS — J32.0 CHRONIC MAXILLARY SINUSITIS: ICD-10-CM

## 2017-05-11 DIAGNOSIS — I48.0 PAF (PAROXYSMAL ATRIAL FIBRILLATION): ICD-10-CM

## 2017-05-11 DIAGNOSIS — I48.0 PAF (PAROXYSMAL ATRIAL FIBRILLATION): Primary | ICD-10-CM

## 2017-05-11 DIAGNOSIS — E11.9 TYPE 2 DIABETES MELLITUS WITHOUT COMPLICATION, WITHOUT LONG-TERM CURRENT USE OF INSULIN: ICD-10-CM

## 2017-05-11 DIAGNOSIS — E78.5 HYPERLIPIDEMIA, UNSPECIFIED HYPERLIPIDEMIA TYPE: ICD-10-CM

## 2017-05-11 DIAGNOSIS — R74.8 ELEVATED ALKALINE PHOSPHATASE MEASUREMENT: ICD-10-CM

## 2017-05-11 DIAGNOSIS — M81.0 OSTEOPOROSIS, POSTMENOPAUSAL: ICD-10-CM

## 2017-05-11 DIAGNOSIS — I48.20 CHRONIC ATRIAL FIBRILLATION: Chronic | ICD-10-CM

## 2017-05-11 LAB
CREAT UR-MCNC: 101 MG/DL
MICROALBUMIN UR DL<=1MG/L-MCNC: 42 UG/ML
MICROALBUMIN/CREATININE RATIO: 41.6 UG/MG

## 2017-05-11 PROCEDURE — 99999 PR PBB SHADOW E&M-EST. PATIENT-LVL III: CPT | Mod: PBBFAC,,, | Performed by: FAMILY MEDICINE

## 2017-05-11 PROCEDURE — 93010 ELECTROCARDIOGRAM REPORT: CPT | Mod: ,,, | Performed by: INTERNAL MEDICINE

## 2017-05-11 PROCEDURE — G0439 PPPS, SUBSEQ VISIT: HCPCS | Mod: ,,, | Performed by: FAMILY MEDICINE

## 2017-05-11 PROCEDURE — 93005 ELECTROCARDIOGRAM TRACING: CPT | Mod: PBBFAC,PO | Performed by: INTERNAL MEDICINE

## 2017-05-11 RX ORDER — PANTOPRAZOLE SODIUM 40 MG/1
40 TABLET, DELAYED RELEASE ORAL DAILY
Qty: 90 TABLET | Refills: 3 | Status: SHIPPED | OUTPATIENT
Start: 2017-05-11 | End: 2017-01-01 | Stop reason: SDUPTHER

## 2017-05-11 RX ORDER — ESCITALOPRAM OXALATE 10 MG/1
TABLET ORAL
Qty: 90 TABLET | Refills: 3 | Status: SHIPPED | OUTPATIENT
Start: 2017-05-11

## 2017-05-11 NOTE — MR AVS SNAPSHOT
[facility]  [address] [place] [address]  Phone: [phone]  Fax: [phone]                  Marcel Montalvo   2017 9:40 AM   Office Visit    Description:  Female : 1940   Provider:  Alexandra Moreno MD   Department:  [facility]           Reason for Visit     Annual Exam           Diagnoses this Visit        Comments    Routine general medical examination at a health care facility    -  Primary     Elevated alkaline phosphatase measurement         Abdominal wall hematoma, subsequent encounter         Chronic atrial fibrillation         Hyperlipidemia, unspecified hyperlipidemia type         Type 2 diabetes mellitus without complication, without long-term current use of insulin         Osteoporosis, postmenopausal         On anticoagulant therapy         Chronic maxillary sinusitis                To Do List           Future Appointments        Provider Department Dept Phone    2017 9:00 AM LABORATORY, [place] [facility] [phone]    2017 9:30 AM [facility] [phone]    2017 10:00 AM Luana Knight PA-C [facility] - Rheumatology [phone]    8/3/2017 8:40 AM LABORATORY, [place] [facility] [phone]    8/10/2017 9:00 AM Alexandra Moreno [facility] [phone]      Your Future Surgeries/Procedures     May 12, 2017   Surgery with Kinjal Fletcher MD   [facility])    [id number] [facility] [address] [phone]              Goals (5 Years of Data)     None      Follow-Up and Disposition     Return in about 3 months (around 2017) for hematoma, elev alk phos,dm.       These Medications        Disp Refills Start End    pantoprazole (PROTONIX) 40 MG tablet 90 tablet 3 2017     Take 1 tablet (40 mg total) by mouth once daily. - Oral    Pharmacy:  [facility] [address] [place] [address] Ph #: [phone]       escitalopram oxalate (LEXAPRO) 10 MG tablet 90 tablet 3 5/11/2017     TAKE ONE-HALF TO ONE TABLET BY MOUTH EVERY DAY    Pharmacy: [facility] [address] [place] [address] Ph #: [phone]         [facility]     [facility] Nurse Care Line - 24/7 Assistance  Unless otherwise directed by your provider, please contact [facility], our nurse care line that is available for 24/7 assistance.     Registered nurses in the [facility] provide: appointment scheduling, clinical advisement, health education, and other advisory services.  Call: [phone] (toll free)               Medications           Message regarding Medications     Verify the changes and/or additions to your medication regime listed below are the same as discussed with your clinician today.  If any of these changes or additions are incorrect, please notify your healthcare provider.             Verify that the below list of medications is an accurate representation of the medications you are currently taking.  If none reported, the list may be blank. If incorrect, please contact your healthcare provider. Carry this list with you in case of emergency.           Current Medications     albuterol (PROAIR HFA) 90 mcg/actuation inhaler Inhale 2 puffs into the lungs every 6 (six) hours as needed.    albuterol-ipratropium 2.5mg-0.5mg/3mL (DUO-NEB) 0.5 mg-3 mg(2.5 mg base)/3 mL nebulizer solution Take 3 mLs by nebulization every 6 (six) hours while awake. Rescue    aspirin (ECOTRIN) 81 MG EC tablet Take 1 tablet (81 mg total) by mouth once daily.    atorvastatin (LIPITOR) 40 MG tablet TAKE ONE TABLET BY MOUTH NIGHTLY    azelastine (ASTELIN) 137 mcg (0.1 %) nasal spray 1 spray by Nasal route 2 (two) times daily.    benzonatate (TESSALON) 100 MG capsule Take 1 capsule (100 mg total) by mouth 3 (three) times daily as needed for Cough.     budesonide-formoterol 160-4.5 mcg (SYMBICORT) 160-4.5 mcg/actuation HFAA Inhale 2 puffs into the lungs every 12 (twelve) hours. Wash out mouth after using    BYSTOLIC 5 mg Tab TAKE ONE TABLET BY MOUTH TWICE DAILY    carboxymethylcellulose (REFRESH PLUS) 0.5 % Dpet Place 1 drop into both eyes 3 (three) times daily as needed.    diclofenac sodium (VOLTAREN) 1 % Gel Apply 2 g topically once daily.    escitalopram oxalate (LEXAPRO) 10 MG tablet TAKE ONE-HALF TO ONE TABLET BY MOUTH EVERY DAY    fluticasone (FLONASE) 50 mcg/actuation nasal spray 2 sprays by Each Nare route once daily.    furosemide (LASIX) 20 MG tablet Take 1 tablet (20 mg total) by mouth as directed.    guaifenesin (MUCINEX) 600 mg 12 hr tablet Take 2 tablets (1,200 mg total) by mouth 2 (two) times daily.    hydroxychloroquine (PLAQUENIL) 200 mg tablet TAKE ONE TABLET BY MOUTH ONCE DAILY    hyoscyamine (ANASPAZ) 0.125 mg TbDL Take 0.125 mg by mouth every 6 (six) hours as needed for Cramping.    nitroGLYCERIN (NITROSTAT) 0.4 MG SL tablet Place 1 tablet (0.4 mg total) under the tongue every 5 (five) minutes as needed for Chest pain.    pantoprazole (PROTONIX) 40 MG tablet Take 1 tablet (40 mg total) by mouth once daily.    phenobarb-hyoscy-atropine-scop (BELLADONNA-PHENOBARBITAL) 16.2-0.1037 -0.0194 mg/5 mL Elix Take 5 mLs by mouth daily as needed (abdominal pain).    potassium chloride (KLOR-CON) 10 MEQ TbSR Take 1 tablet (10 mEq total) by mouth once daily.    pramoxine 1 % Foam Place rectally 3 (three) times daily as needed.    PREMARIN vaginal cream PLACE 1 GRAM VAGINALLY TWICE A WEEK    propafenone (RYTHMOL SR) 225 MG Cp12 Take 1 capsule (225 mg total) by mouth every 12 (twelve) hours.    RESTASIS 0.05 % ophthalmic emulsion INSTILL ONE DROP INTO EACH EYE TWICE DAILY    rivaroxaban (XARELTO) 20 mg Tab Take 1 tablet (20 mg total) by mouth daily with dinner or evening meal.    tramadol (ULTRAM) 50 mg tablet Take 1 tablet (50 mg total) by mouth 3 (three)  "times daily as needed.    triamcinolone acetonide 0.1% (KENALOG) 0.1 % ointment AAA bid prn    vitamin D 1000 units Tab Take 2,000 Units by mouth once daily.           Clinical Reference Information           Your Vitals Were     BP Pulse Temp Height Weight BMI    138/70 70 98 °F (36.7 °C) 5' 2" (1.575 m) 58.5 kg (128 lb 15.5 oz) 23.59 kg/m2      Blood Pressure          Most Recent Value    BP  138/70      Allergies as of 5/11/2017     Codeine    Lisinopril    Pacerone [Amiodarone]    Sulfa (Sulfonamide Antibiotics)    Celecoxib    Ciprofloxacin    Colesevelam    Doxycycline    Statins-hmg-coa Reductase Inhibitors      Immunizations Administered on Date of Encounter - 5/11/2017     None      Orders Placed During Today's Visit      Normal Orders This Visit    Microalbumin/creatinine urine ratio     Future Labs/Procedures Expected by Expires    ALKALINE PHOSPHATASE, ISOENZYMES  5/11/2017 7/10/2018    Hemoglobin A1c  8/9/2017 11/7/2017    CBC auto differential  8/11/2017 5/11/2018    Comprehensive metabolic panel  8/11/2017 5/11/2018      Language Assistance Services     ATTENTION: Language assistance services are available, free of charge. Please call [phone].      ATENCIÓN: Si habla racheal, tiene a marte disposición servicios gratuitos de asistencia lingüística. Llame al [phone].     [facility] Ý: N?u b?n nói Ti?ng Vi?t, có các d?ch v? h? tr? ngôn ng? mi?n phí dành cho b?n. G?i s? [phone].         [facility] complies with applicable Federal civil rights laws and does not discriminate on the basis of race, color, national origin, age, disability, or sex.        "

## 2017-05-11 NOTE — TELEPHONE ENCOUNTER
----- Message from Kinjal Fletcher MD sent at 5/11/2017  4:34 PM CDT -----  Regarding: RE: Review Ekg  Still in afib  ----- Message -----     From: Deepa Cardoso LPN     Sent: 5/11/2017   4:26 PM       To: Kinjal Fletcher MD  Subject: Review Ekg                                       Dr. Fletcher please review her ekg, her rate is slower.    Keep on as planned 930 CV    Thanks, Deepa

## 2017-05-11 NOTE — PROGRESS NOTES
Subjective:      Patient ID: Marcel Montalvo is a 77 y.o. female.    Chief Complaint: Annual Exam    HPI Comments: Patient presents today for prevention exam.  Since I last saw her she's been doing well.  1 month since her hospitalization for the large abdominal wall hematoma.  She states it's improving.  Still hard to the touch but steadily getting smaller.  She's actually doing a little bit more activity now.     Still on xarelto.   On her blood work her anemia is now resolved.     She is now on different medications for the atrial fibrillation and the goal is to keep her on this antiarrhythmic for a while and then for Dr. Fletcher  to go back in and do another ablation.  She is still in rate controlled afib today on ekg.      Patient also has multiple rheumatologic issues with osteopenia so brings in multiple joint pains.  She reports when she does take the tramadol seems to help but she saves it. Tylenol helps at night.   The patient also has a history of GERD which has been controlled with Protonix.  Needing refill.     Patient also with diet controlled diabetes.  Currently not on any additional medications.  She is actually not really been watching her diet much either.  A1c is at 6.6. Foot exam normal.     Anxiety is well controlled with low dose lexapro at 5mg.      Chronic sinusitis- improved with multiple IV abx with last hospitalization.   Medicare wellness assessment:    Depression screening  1. In the past 2 weeks she has the patient felt down, depressed or hopeless? No  2. Over the past 2 weeks as the patient felt little interest or pleasure in doing things?  No   Functional Ability/ Safety Screening  1. Was the  Patient's timed up and go test unsteady or longer than 30 seconds?  No   2. Has the patient needed help with transportation shopping preparing meals housework laundry medications are managing money?  No  3.  If the patient's home have rugs in the hallway, back grab bars in the bathroom, or poor  lighting? No, not necessary per patient.   4.has there been any hearing difficulties?  No          Lab Results   Component Value Date    WBC 7.94 05/02/2017    HGB 12.3 05/02/2017    HCT 40.1 05/02/2017     05/02/2017    CHOL 192 05/02/2017    TRIG 164 (H) 05/02/2017    HDL 42 05/02/2017    ALT 34 05/02/2017    AST 39 05/02/2017     05/02/2017    K 4.3 05/02/2017     05/02/2017    CREATININE 1.2 05/02/2017    BUN 20 05/02/2017    CO2 28 05/02/2017    TSH 3.535 05/02/2017    INR 1.2 03/26/2017    HGBA1C 6.4 (H) 05/02/2017       Review of Systems   Constitutional: Positive for appetite change. Negative for activity change, fatigue and unexpected weight change.   HENT: Positive for congestion. Negative for postnasal drip and rhinorrhea.    Respiratory: Negative for cough and shortness of breath.    Cardiovascular: Negative for chest pain and palpitations.   Gastrointestinal: Positive for abdominal pain. Negative for abdominal distention, blood in stool, constipation, diarrhea, nausea and vomiting.   Musculoskeletal: Positive for arthralgias.   Neurological: Negative for weakness, light-headedness and headaches.   Psychiatric/Behavioral: Negative for decreased concentration and dysphoric mood. The patient is not nervous/anxious.      Objective:     Physical Exam   Constitutional: She is oriented to person, place, and time. She appears well-developed and well-nourished.   HENT:   Head: Normocephalic and atraumatic.   Right Ear: External ear normal.   Left Ear: External ear normal.   Mouth/Throat: Oropharynx is clear and moist.   Eyes: EOM are normal.   Neck: Normal range of motion. Neck supple. No thyromegaly present.   Cardiovascular: Normal rate.  An irregularly irregular rhythm present. Exam reveals no gallop and no friction rub.    No murmur heard.  Pulmonary/Chest: Effort normal. No respiratory distress. She has no wheezes. She has no rales.   Abdominal: Soft. Bowel sounds are normal. She exhibits  no distension. There is tenderness. There is no rebound.       Musculoskeletal: Normal range of motion. She exhibits no edema.   Lymphadenopathy:     She has no cervical adenopathy.   Neurological: She is alert and oriented to person, place, and time.   Skin: Skin is warm and dry. No rash noted.   Psychiatric: She has a normal mood and affect. Her behavior is normal. Judgment and thought content normal.   Vitals reviewed.    Assessment:     1. Routine general medical examination at a health care facility    2. Elevated alkaline phosphatase measurement    3. Abdominal wall hematoma, subsequent encounter    4. Chronic atrial fibrillation    5. Hyperlipidemia, unspecified hyperlipidemia type    6. Type 2 diabetes mellitus without complication, without long-term current use of insulin    7. Osteoporosis, postmenopausal    8. On anticoagulant therapy    9. Chronic maxillary sinusitis      Plan:   Marcel was seen today for annual exam.    Diagnoses and all orders for this visit:    Routine general medical examination at a health care facility- - labs reviewed. Discussed Health Maintenance issues.     -     Hemoglobin A1c; Future  -     Comprehensive metabolic panel; Future  -     ALKALINE PHOSPHATASE, ISOENZYMES; Future  -     CBC auto differential; Future    Elevated alkaline phosphatase measurement- new. Finding. Repeat with isoenzymes in 3 months.   -     Hemoglobin A1c; Future  -     Comprehensive metabolic panel; Future  -     ALKALINE PHOSPHATASE, ISOENZYMES; Future  -     CBC auto differential; Future    Abdominal wall hematoma, subsequent encounter- improving with rest, heat.   -     Hemoglobin A1c; Future  -     Comprehensive metabolic panel; Future  -     ALKALINE PHOSPHATASE, ISOENZYMES; Future  -     CBC auto differential; Future    Chronic atrial fibrillation- seeing dr galdamez tomorrow.   -     Hemoglobin A1c; Future  -     Comprehensive metabolic panel; Future  -     ALKALINE PHOSPHATASE, ISOENZYMES;  Future  -     CBC auto differential; Future    Hyperlipidemia, unspecified hyperlipidemia type - elevated some due to diet, Continue with current medications and interventions. Labs reviewed.     -     Hemoglobin A1c; Future  -     Comprehensive metabolic panel; Future  -     ALKALINE PHOSPHATASE, ISOENZYMES; Future  -     CBC auto differential; Future    Type 2 diabetes mellitus without complication, without long-term current use of insulin - stable, Continue with diet and interventions. Labs reviewed.     -     Microalbumin/creatinine urine ratio  -     Hemoglobin A1c; Future  -     Comprehensive metabolic panel; Future  -     ALKALINE PHOSPHATASE, ISOENZYMES; Future  -     CBC auto differential; Future    Osteoporosis, postmenopausal- stable.   -     Hemoglobin A1c; Future  -     Comprehensive metabolic panel; Future  -     ALKALINE PHOSPHATASE, ISOENZYMES; Future  -     CBC auto differential; Future    On anticoagulant therapy- - stable, Continue with current medications and interventions. Labs reviewed.     -     Hemoglobin A1c; Future  -     Comprehensive metabolic panel; Future  -     ALKALINE PHOSPHATASE, ISOENZYMES; Future  -     CBC auto differential; Future    Chronic maxillary sinusitis- add nasal saline and continue with flonase.   -     Hemoglobin A1c; Future  -     Comprehensive metabolic panel; Future  -     ALKALINE PHOSPHATASE, ISOENZYMES; Future  -     CBC auto differential; Future    Other orders  -     pantoprazole (PROTONIX) 40 MG tablet; Take 1 tablet (40 mg total) by mouth once daily.  -     escitalopram oxalate (LEXAPRO) 10 MG tablet; TAKE ONE-HALF TO ONE TABLET BY MOUTH EVERY DAY            Return in about 3 months (around 8/11/2017) for hematoma, elev alk phos,dm.

## 2017-05-12 ENCOUNTER — SURGERY (OUTPATIENT)
Age: 77
End: 2017-05-12

## 2017-05-12 ENCOUNTER — ANESTHESIA (OUTPATIENT)
Dept: CARDIOLOGY | Facility: HOSPITAL | Age: 77
End: 2017-05-12
Payer: MEDICARE

## 2017-05-12 ENCOUNTER — HOSPITAL ENCOUNTER (OUTPATIENT)
Facility: HOSPITAL | Age: 77
Discharge: HOME | End: 2017-05-12
Attending: INTERNAL MEDICINE | Admitting: INTERNAL MEDICINE
Payer: MEDICARE

## 2017-05-12 ENCOUNTER — ANESTHESIA EVENT (OUTPATIENT)
Dept: CARDIOLOGY | Facility: HOSPITAL | Age: 77
End: 2017-05-12
Payer: MEDICARE

## 2017-05-12 VITALS
BODY MASS INDEX: 22.82 KG/M2 | TEMPERATURE: 98 F | RESPIRATION RATE: 16 BRPM | SYSTOLIC BLOOD PRESSURE: 131 MMHG | HEART RATE: 72 BPM | HEIGHT: 62 IN | OXYGEN SATURATION: 97 % | DIASTOLIC BLOOD PRESSURE: 56 MMHG | WEIGHT: 124 LBS

## 2017-05-12 DIAGNOSIS — I48.0 PAROXYSMAL ATRIAL FIBRILLATION: ICD-10-CM

## 2017-05-12 DIAGNOSIS — I48.91 ATRIAL FIBRILLATION: ICD-10-CM

## 2017-05-12 PROCEDURE — 25000003 PHARM REV CODE 250: Performed by: NURSE ANESTHETIST, CERTIFIED REGISTERED

## 2017-05-12 PROCEDURE — 37000008 HC ANESTHESIA 1ST 15 MINUTES: Performed by: INTERNAL MEDICINE

## 2017-05-12 PROCEDURE — 63600175 PHARM REV CODE 636 W HCPCS: Performed by: NURSE ANESTHETIST, CERTIFIED REGISTERED

## 2017-05-12 PROCEDURE — 37000009 HC ANESTHESIA EA ADD 15 MINS: Performed by: INTERNAL MEDICINE

## 2017-05-12 PROCEDURE — 93005 ELECTROCARDIOGRAM TRACING: CPT

## 2017-05-12 PROCEDURE — 92960 CARDIOVERSION ELECTRIC EXT: CPT

## 2017-05-12 PROCEDURE — 25000003 PHARM REV CODE 250

## 2017-05-12 PROCEDURE — 92960 CARDIOVERSION ELECTRIC EXT: CPT | Mod: ,,, | Performed by: INTERNAL MEDICINE

## 2017-05-12 PROCEDURE — 63600175 PHARM REV CODE 636 W HCPCS

## 2017-05-12 PROCEDURE — 93010 ELECTROCARDIOGRAM REPORT: CPT | Mod: 76,59,, | Performed by: INTERNAL MEDICINE

## 2017-05-12 RX ORDER — PROPOFOL 10 MG/ML
VIAL (ML) INTRAVENOUS
Status: DISCONTINUED | OUTPATIENT
Start: 2017-05-12 | End: 2017-05-12

## 2017-05-12 RX ORDER — LIDOCAINE HYDROCHLORIDE 10 MG/ML
INJECTION INFILTRATION; PERINEURAL
Status: DISCONTINUED | OUTPATIENT
Start: 2017-05-12 | End: 2017-05-12

## 2017-05-12 RX ORDER — SODIUM CHLORIDE 9 MG/ML
INJECTION, SOLUTION INTRAVENOUS CONTINUOUS
Status: DISCONTINUED | OUTPATIENT
Start: 2017-05-12 | End: 2017-05-12 | Stop reason: HOSPADM

## 2017-05-12 RX ADMIN — SODIUM CHLORIDE: 9 INJECTION, SOLUTION INTRAVENOUS at 09:05

## 2017-05-12 RX ADMIN — LIDOCAINE HYDROCHLORIDE 50 MG: 10 INJECTION, SOLUTION INFILTRATION; PERINEURAL at 09:05

## 2017-05-12 RX ADMIN — PROPOFOL 20 MG: 10 INJECTION, EMULSION INTRAVENOUS at 09:05

## 2017-05-12 RX ADMIN — PROPOFOL 50 MG: 10 INJECTION, EMULSION INTRAVENOUS at 09:05

## 2017-05-12 NOTE — ANESTHESIA POSTPROCEDURE EVALUATION
"Anesthesia Post Evaluation    Patient: Marcel Montalvo    Procedure(s) Performed: Procedure(s) (LRB):  CARDIOVERSION (N/A)    Final Anesthesia Type: MAC  Patient location during evaluation: PACU  Patient participation: Yes- Able to Participate  Level of consciousness: awake and alert  Post-procedure vital signs: reviewed and stable  Pain management: adequate  Airway patency: patent  PONV status at discharge: No PONV  Anesthetic complications: no      Cardiovascular status: blood pressure returned to baseline  Respiratory status: unassisted, spontaneous ventilation and room air  Hydration status: euvolemic  Follow-up not needed.  Comments: Pt location CVRU        Visit Vitals    BP (!) 151/65 (BP Location: Left arm, Patient Position: Sitting, BP Method: Automatic)    Pulse 80    Temp 36.4 °C (97.6 °F) (Oral)    Resp 17    Ht 5' 2" (1.575 m)    Wt 56.2 kg (124 lb)    SpO2 97%    Breastfeeding No    BMI 22.68 kg/m2       Pain/Lidia Score: Pain Assessment Performed: Yes (5/12/2017  8:33 AM)  Presence of Pain: denies (5/12/2017  8:33 AM)  Lidia Score: 10 (5/12/2017  8:33 AM)      "

## 2017-05-12 NOTE — INTERVAL H&P NOTE
The patient has been examined and the H&P has been reviewed:    I concur with the findings and no changes have occurred since H&P was written.    Anesthesia/Surgery risks, benefits and alternative options discussed and understood by patient/family.  I have explained the risks, benefits , and alternatives of the procedure in detail.the patient voices understanding and all questions have been answered.the patient agrees to proceed as planned.          Active Hospital Problems    Diagnosis  POA    Atrial fibrillation [I48.91]  Yes     Priority: High      Resolved Hospital Problems    Diagnosis Date Resolved POA   No resolved problems to display.

## 2017-05-12 NOTE — ANESTHESIA PREPROCEDURE EVALUATION
05/12/2017  Marcel Montalvo is a 77 y.o., female.    Anesthesia Evaluation    I have reviewed the Patient Summary Reports.    I have reviewed the Nursing Notes.   I have reviewed the Medications.     Review of Systems  Anesthesia Hx:  No problems with previous Anesthesia  Neg history of prior surgery. Denies Family Hx of Anesthesia complications.   Denies Personal Hx of Anesthesia complications.   Social:  Non-Smoker, Alcohol Use    Hematology/Oncology:  Hematology Normal   Oncology Normal     EENT/Dental:EENT/Dental Normal   Cardiovascular:   Exercise tolerance: poor Hypertension, well controlled CAD  CABG/stent Dysrhythmias atrial fibrillation CONCLUSIONS     1 - Biatrial enlargement.     2 - Concentric hypertrophy.     3 - Normal left ventricular systolic function (EF 55-60%).     4 - Right ventricular enlargement with low normal to mildly depressed systolic function.     5 - Pulmonary hypertension. The estimated PA systolic pressure is 67 mmHg.     6 - Trivial mitral regurgitation.     7 - Trivial to mild tricuspid regurgitation.     8 - Intermediate central venous pressure.     Atrial fibrillation  Rightward axis  Incomplete right bundle branch block  Septal infarct ,age undetermined  Abnormal ECG   Pulmonary:   Asthma moderate Shortness of breath (with exertion)    Renal/:   Chronic Renal Disease    Hepatic/GI:   GERD    Musculoskeletal:   Arthritis     Neurological:   CVA, no residual symptoms Neuromuscular Disease,    Endocrine:   Diabetes, well controlled, type 2        Physical Exam  General:  Well nourished    Airway/Jaw/Neck:  Airway Findings: Mouth Opening: Normal    Eyes/Ears/Nose:  EYES/EARS/NOSE FINDINGS: Normal    Chest/Lungs:  Chest/Lungs Findings: Clear to auscultation, Normal Respiratory Rate     Heart/Vascular:  Heart Findings: Rate: Normal  Rhythm: Irregularly Irregular  Sounds: Normal   Heart murmur: negative Vascular Findings: Normal    Abdomen:  Abdomen Findings:  Tenderness     Musculoskeletal:  Musculoskeletal Findings: Normal   Skin:  Skin Findings: Normal    Mental Status:  Mental Status Findings:  Alert and Oriented, Cooperative         Anesthesia Plan  Type of Anesthesia, risks & benefits discussed:  Anesthesia Type:  MAC  Patient's Preference:   Intra-op Monitoring Plan:   Intra-op Monitoring Plan Comments:   Post Op Pain Control Plan:   Post Op Pain Control Plan Comments:   Induction:   IV  Beta Blocker:  Patient is not currently on a Beta-Blocker (No further documentation required).       Informed Consent: Patient understands risks and agrees with Anesthesia plan.  Questions answered. Anesthesia consent signed with patient.  ASA Score: 3     Day of Surgery Review of History & Physical: I have interviewed and examined the patient. I have reviewed the patient's H&P dated:  There are no significant changes.          Ready For Surgery From Anesthesia Perspective.

## 2017-05-12 NOTE — PLAN OF CARE
1030 VSS STABLE AWAKE AND ALERT AMB IN ROOM CM AND PIV DC'D REDRESSED DISCHARGE INSTRUCTIONS GIVEN TO PT AND  1040 DISCHARGED PER W/C WITH BELONGINGS ACCOMPANIED BY THIS RN AND

## 2017-05-12 NOTE — IP AVS SNAPSHOT
[address] [place] [address]           Patient Discharge Instructions   Our goal is to set you up for success. This packet includes information on your condition, medications, and your home care.  It will help you care for yourself to prevent having to return to the hospital.     Please ask your nurse if you have any questions.      There are many details to remember when preparing to leave the hospital. Here is what you will need to do:    1. Take your medicine. If you are prescribed medications, review your Medication List on the following pages. You may have new medications to  at the pharmacy and others that you'll need to stop taking. Review the instructions for how and when to take your medications. Talk with your doctor or nurses if you are unsure of what to do.     2. Go to your follow-up appointments. Specific follow-up information is listed in the following pages. Your may be contacted by a nurse or clinical provider about future appointments. Be sure we have all of the phone numbers to reach you. Please contact your provider's office if you are unable to make an appointment.     3. Watch for warning signs. Your doctor or nurse will give you detailed warning signs to watch for and when to call for assistance. These instructions may also include educational information about your condition. If you experience any of warning signs to your health, call your doctor.               ** Verify the list of medication(s) below is accurate and up to date. Carry this with you in case of emergency. If your medications have changed, please notify your healthcare provider.             Medication List      CHANGE how you take these medications        Additional Info                      BYSTOLIC 5 MG Tab   Quantity:  180 tablet   Refills:  6   What changed:  See the new instructions.   Generic drug:  nebivolol    Instructions:  TAKE ONE TABLET BY MOUTH TWICE DAILY      Begin Date    AM    Noon    PM    Bedtime       guaifenesin 600 mg 12 hr tablet   Commonly known as:  MUCINEX   Quantity:  60 tablet   Refills:  11   Dose:  1200 mg   What changed:  additional instructions    Instructions:  Take 2 tablets (1,200 mg total) by mouth 2 (two) times daily.     Begin Date    AM    Noon    PM    Bedtime         CONTINUE taking these medications        Additional Info                      albuterol 90 mcg/actuation inhaler   Commonly known as:  PROAIR HFA   Quantity:  1 Inhaler   Refills:  11   Dose:  2 puff    Instructions:  Inhale 2 puffs into the lungs every 6 (six) hours as needed.     Begin Date    AM    Noon    PM    Bedtime       albuterol-ipratropium 2.5mg-0.5mg/3mL 0.5 mg-3 mg(2.5 mg base)/3 mL nebulizer solution   Commonly known as:  DUO-NEB   Quantity:  120 vial   Refills:  0   Dose:  3 mL    Instructions:  Take 3 mLs by nebulization every 6 (six) hours while awake. Rescue     Begin Date    AM    Noon    PM    Bedtime       ANASPAZ 0.125 mg Tbdl   Refills:  0   Dose:  0.125 mg   Generic drug:  hyoscyamine    Instructions:  Take 0.125 mg by mouth every 6 (six) hours as needed for Cramping.     Begin Date    AM    Noon    PM    Bedtime       aspirin 81 MG EC tablet   Commonly known as:  ECOTRIN   Quantity:  30 tablet   Refills:  6   Dose:  81 mg    Instructions:  Take 1 tablet (81 mg total) by mouth once daily.     Begin Date    AM    Noon    PM    Bedtime       atorvastatin 40 MG tablet   Commonly known as:  LIPITOR   Quantity:  90 tablet   Refills:  6    Instructions:  TAKE ONE TABLET BY MOUTH NIGHTLY     Begin Date    AM    Noon    PM    Bedtime       azelastine 137 mcg (0.1 %) nasal spray   Commonly known as:  ASTELIN   Refills:  0   Dose:  1 spray    Instructions:  1 spray by Nasal route 2 (two) times daily.     Begin Date    AM    Noon    PM    Bedtime       benzonatate 100 MG capsule   Commonly known as:  TESSALON   Quantity:  30 capsule   Refills:  1   Dose:  100 mg     Instructions:  Take 1 capsule (100 mg total) by mouth 3 (three) times daily as needed for Cough.     Begin Date    AM    Noon    PM    Bedtime       budesonide-formoterol 160-4.5 mcg 160-4.5 mcg/actuation Hfaa   Commonly known as:  SYMBICORT   Quantity:  1 Inhaler   Refills:  12   Dose:  2 puff    Instructions:  Inhale 2 puffs into the lungs every 12 (twelve) hours. Wash out mouth after using     Begin Date    AM    Noon    PM    Bedtime       carboxymethylcellulose 0.5 % Dpet   Commonly known as:  REFRESH PLUS   Refills:  0   Dose:  1 drop    Instructions:  Place 1 drop into both eyes 3 (three) times daily as needed.     Begin Date    AM    Noon    PM    Bedtime       diclofenac sodium 1 % Gel   Commonly known as:  VOLTAREN   Quantity:  3 Tube   Refills:  4   Dose:  2 g    Instructions:  Apply 2 g topically once daily.     Begin Date    AM    Noon    PM    Bedtime       escitalopram oxalate 10 MG tablet   Commonly known as:  LEXAPRO   Quantity:  90 tablet   Refills:  3    Instructions:  TAKE ONE-HALF TO ONE TABLET BY MOUTH EVERY DAY     Begin Date    AM    Noon    PM    Bedtime       fluticasone 50 mcg/actuation nasal spray   Commonly known as:  FLONASE   Quantity:  1 Bottle   Refills:  11   Dose:  2 spray    Instructions:  2 sprays by Each Nare route once daily.     Begin Date    AM    Noon    PM    Bedtime       furosemide 20 MG tablet   Commonly known as:  LASIX   Quantity:  90 tablet   Refills:  4   Dose:  20 mg    Instructions:  Take 1 tablet (20 mg total) by mouth as directed.     Begin Date    AM    Noon    PM    Bedtime       hydroxychloroquine 200 mg tablet   Commonly known as:  PLAQUENIL   Quantity:  90 tablet   Refills:  1    Instructions:  TAKE ONE TABLET BY MOUTH ONCE DAILY     Begin Date    AM    Noon    PM    Bedtime       nitroGLYCERIN 0.4 MG SL tablet   Commonly known as:  NITROSTAT   Quantity:  25 tablet   Refills:  6   Dose:  0.4 mg    Instructions:  Place 1 tablet (0.4 mg total) under the tongue  every 5 (five) minutes as needed for Chest pain.     Begin Date    AM    Noon    PM    Bedtime       pantoprazole 40 MG tablet   Commonly known as:  PROTONIX   Quantity:  90 tablet   Refills:  3   Dose:  40 mg    Instructions:  Take 1 tablet (40 mg total) by mouth once daily.     Begin Date    AM    Noon    PM    Bedtime       phenobarb-hyoscy-atropine-scop 16.2-0.1037 -0.0194 mg/5 mL Elix   Commonly known as:  BELLADONNA-PHENOBARBITAL   Quantity:  180 mL   Refills:  1   Dose:  5 mL    Instructions:  Take 5 mLs by mouth daily as needed (abdominal pain).     Begin Date    AM    Noon    PM    Bedtime       potassium chloride 10 MEQ Tbsr   Commonly known as:  KLOR-CON   Quantity:  30 tablet   Refills:  6   Dose:  10 mEq    Instructions:  Take 1 tablet (10 mEq total) by mouth once daily.     Begin Date    AM    Noon    PM    Bedtime       pramoxine 1 % Foam   Quantity:  15 g   Refills:  1    Instructions:  Place rectally 3 (three) times daily as needed.     Begin Date    AM    Noon    PM    Bedtime       PREMARIN vaginal cream   Quantity:  30 g   Refills:  3   Generic drug:  conjugated estrogens    Instructions:  PLACE 1 GRAM VAGINALLY TWICE A WEEK     Begin Date    AM    Noon    PM    Bedtime       propafenone 225 MG Cp12   Commonly known as:  RYTHMOL SR   Quantity:  60 capsule   Refills:  11   Dose:  225 mg    Instructions:  Take 1 capsule (225 mg total) by mouth every 12 (twelve) hours.     Begin Date    AM    Noon    PM    Bedtime       RESTASIS 0.05 % ophthalmic emulsion   Quantity:  60 each   Refills:  12   Generic drug:  cycloSPORINE    Instructions:  INSTILL ONE DROP INTO EACH EYE TWICE DAILY     Begin Date    AM    Noon    PM    Bedtime       rivaroxaban 20 mg Tab   Commonly known as:  XARELTO   Quantity:  90 tablet   Refills:  3   Dose:  20 mg    Instructions:  Take 1 tablet (20 mg total) by mouth daily with dinner or evening meal.     Begin Date    AM    Noon    PM    Bedtime       tramadol 50 mg tablet    Commonly known as:  ULTRAM   Quantity:  90 tablet   Refills:  3   Dose:  50 mg    Instructions:  Take 1 tablet (50 mg total) by mouth 3 (three) times daily as needed.     Begin Date    AM    Noon    PM    Bedtime       triamcinolone acetonide 0.1% 0.1 % ointment   Commonly known as:  KENALOG   Quantity:  60 g   Refills:  1    Instructions:  AAA bid prn     Begin Date    AM    Noon    PM    Bedtime       vitamin D 1000 units Tab   Refills:  0   Dose:  2000 Units    Instructions:  Take 2,000 Units by mouth once daily.     Begin Date    AM    Noon    PM    Bedtime                  Please bring to all follow up appointments:    1. A copy of your discharge instructions.  2. All medicines you are currently taking in their original bottles.  3. Identification and insurance card.    Please arrive 15 minutes ahead of scheduled appointment time.    Please call 24 hours in advance if you must reschedule your appointment and/or time.        Your Scheduled Appointments     Jun 02, 2017 10:00 AM CDT   24 Hour Holter with HOLTER, [facility] - Cardiology ([facility])    [address] [facility] [address] [phone]            Jun 02, 2017 10:30 AM CDT   EKG with EKG, [facility] - Cardiology ([facility])    [address] [place] [id number] [phone]            Jun 16, 2017 12:20 PM CDT   Established Patient Visit with Virgilio Perez MD   [facility] - Arrhythmia ([facility])    [address] [facility]  [place] [id number] [phone]            Jul 12, 2017  9:00 AM CDT   Non-Fasting Lab with LABORATORY, [facility]    [address] [place] [id number] [phone]            Jul 12, 2017  9:30 AM CDT   Bone Density with [facility]   [facility] ([facility])    [address] [place] [id number] [phone]              Follow-up Information     Follow  up with Virgilio Perez MD In 2 weeks.    Specialties:  Cardiology, Electrophysiology    Contact information:    Lianne Hodge  [facility] [address]  [phone]          Discharge Instructions     Future Orders    Activity as tolerated     Call MD for:  difficulty breathing, headache or visual disturbances     Call MD for:  extreme fatigue     Call MD for:  hives     Call MD for:  persistent dizziness or light-headedness     Call MD for:  persistent nausea and vomiting     Call MD for:  redness, tenderness, or signs of infection (pain, swelling, redness, odor or green/yellow discharge around incision site)     Call MD for:  severe uncontrolled pain     Call MD for:  temperature >100.4     Diet general     Questions:    Total calories:      Fat restriction, if any:      Protein restriction, if any:      Na restriction, if any:      Fluid restriction:      Additional restrictions:      No dressing needed         Discharge Instructions         ACTIVITY LEVEL  You may feel sleepy for several hours.  Rest until you are more awake.  Gradually resume your normal activities over the next 24 hours.  You should avoid heavy lifting, strenuous activity or operating equipment for 24 hours.    DIET  At home, begin with liquids and then progress to normal foods if you don't experience nausea.    MEDICATIONS  Continue home medications as before procedure.  You may take Tylenol every four hours as needed for minor discomfort at pad sites or chest soreness.  Resume your normal Coumadin regimen and continue to have your blood tested weekly as directed by your physician.    FOLLOW UP  You will be scheduled for a follow up appointment and EKG within two weeks of your procedure.          Primary Diagnosis     Your primary diagnosis was:  Atrial Fibrillation (Irregular Heartbeat)      Admission Information     Date & Time Provider Department [facility]    5/12/2017  7:19 AM Kinjal Fletcher MD [facility] [id number]     "  Care Providers     Provider Role Specialty Primary office phone    Kinjal Fletcher MD Attending Provider Cardiology [phone]    Kinjal Fletcher MD Surgeon  Cardiology [phone]      Your Vitals Were     BP Pulse Temp Resp Height Weight    131/56 72 98.1 °F (36.7 °C) (Oral) 16 5' 2" (1.575 m) 56.2 kg (124 lb)    SpO2 BMI             97% 22.68 kg/m2         Recent Lab Values        10/8/2013 3/13/2014 3/16/2015 1/5/2016 9/6/2016 3/14/2017 4/3/2017 5/2/2017     10:01 AM  8:55 AM  8:00 AM  9:39 AM  2:45 PM  2:01 PM  9:53 AM  7:45 AM    A1C 6.4 (H) 6.5 (H) 6.8 (H) 6.6 (H) 6.7 (H) 6.3 (H) 6.0 6.4 (H)    Comment for A1C at  2:45 PM on 9/6/2016:  According to ADA guidelines, hemoglobin A1C <7.0% represents  optimal control in non-pregnant diabetic patients.  Different  metrics may apply to specific populations.   Standards of Medical Care in Diabetes - 2016.  For the purpose of screening for the presence of diabetes:  <5.7%     Consistent with the absence of diabetes  5.7-6.4%  Consistent with increasing risk for diabetes   (prediabetes)  >or=6.5%  Consistent with diabetes  Currently no consensus exists for use of hemoglobin A1C  for diagnosis of diabetes for children.      Comment for A1C at  2:01 PM on 3/14/2017:  According to ADA guidelines, hemoglobin A1C <7.0% represents  optimal control in non-pregnant diabetic patients.  Different  metrics may apply to specific populations.   Standards of Medical Care in Diabetes - 2016.  For the purpose of screening for the presence of diabetes:  <5.7%     Consistent with the absence of diabetes  5.7-6.4%  Consistent with increasing risk for diabetes   (prediabetes)  >or=6.5%  Consistent with diabetes  Currently no consensus exists for use of hemoglobin A1C  for diagnosis of diabetes for children.      Comment for A1C at  9:53 AM on 4/3/2017:  According to ADA guidelines, hemoglobin A1C <7.0% represents  optimal control in non-pregnant diabetic patients.  Different  metrics " may apply to specific populations.   Standards of Medical Care in Diabetes - 2016.  For the purpose of screening for the presence of diabetes:  <5.7%     Consistent with the absence of diabetes  5.7-6.4%  Consistent with increasing risk for diabetes   (prediabetes)  >or=6.5%  Consistent with diabetes  Currently no consensus exists for use of hemoglobin A1C  for diagnosis of diabetes for children.      Comment for A1C at  7:45 AM on 5/2/2017:  According to ADA guidelines, hemoglobin A1C <7.0% represents  optimal control in non-pregnant diabetic patients.  Different  metrics may apply to specific populations.   Standards of Medical Care in Diabetes - 2016.  For the purpose of screening for the presence of diabetes:  <5.7%     Consistent with the absence of diabetes  5.7-6.4%  Consistent with increasing risk for diabetes   (prediabetes)  >or=6.5%  Consistent with diabetes  Currently no consensus exists for use of hemoglobin A1C  for diagnosis of diabetes for children.        Pending Labs     Order Current Status    CBC with Auto Differential In process      Allergies as of 5/12/2017        Reactions    Codeine Nausea And Vomiting    Lisinopril     Other reaction(s): cough    Pacerone [Amiodarone] Nausea And Vomiting    Sulfa (Sulfonamide Antibiotics) Nausea And Vomiting    Celecoxib Palpitations    Ciprofloxacin Hives, Itching, Rash    Colesevelam Rash, Nausea And Vomiting    Doxycycline Rash    Statins-hmg-coa Reductase Inhibitors Rash    Myalgia(can take atorvastatin ok -no rhabdo)per nurse josé antonio and patient  / stomach upset      [facility] Nurse Care Line - 24/7 Assistance  Unless otherwise directed by your provider, please contact [facility], our nurse care line that is available for 24/7 assistance.     Registered nurses in the [facility] provide clinical advisement, health education, appointment booking, and other advisory services.  Call for this free service at  [phone].        Advance Directives     An advance directive is a document which, in the event you are no longer able to make decisions for yourself, tells your healthcare team what kind of treatment you do or do not want to receive, or who you would like to make those decisions for you.  If you do not currently have an advance directive, [facility] encourages you to create one.  For more information call:  [phone]), [phone] ([phone]),  or log on to [email or web address].        Language Assistance Services     ATTENTION: Language assistance services are available, free of charge. Please call [phone].      ATENCIÓN: Si sirenala racheal, tiene a marte disposición servicios gratuitos de asistencia lingüística. Llame al [phone].     CHÚ Ý: N?u b?n nói Ti?ng Vi?t, có các d?ch v? h? tr? ngôn ng? mi?n phí dành cho b?n. G?i s? [phone].        Stroke Education              Heart Failure Education       Heart Failure: Being Active  You have a condition called heart failure. Being active doesnt mean that you have to wear yourself out. Even a little movement each day helps to strengthen your heart. If you cant get out to exercise, you can do simple stretching and strengthening exercises at home. These are good ways to keep you well-conditioned and prevent you and your heart from becoming excessively weak.    Ideas to get you started  · Add a little movement to things you do now. Walk to mail letters. Park your car at the far end of the parking lot and walk to the store. Walk up a flight of stairs instead of taking the elevator.  · Choose activities you enjoy. You might walk, swim, or ride an exercise bike. Things like gardening and washing the car count, too. Other possibilities include: washing dishes, walking the dog, walking around the mall, and doing aerobic activities with friends.  · Join a group exercise program at a [facility] or [facility], a senior center, or a community  center. Or look into a hospital cardiac rehabilitation program. Ask your doctor if you qualify.  Tips to keep you going  · Get up and get dressed each day. Go to a coffee shop and read a newspaper or go somewhere that you'll be in the presence of other active people. Youll feel more like being active.  · Make a plan. Choose one or more activities that you enjoy and that you can easily do. Then plan to do at least one each day. You might write your plan on a calendar.  · Go with a friend or a group if you like company. This can help you feel supported and stay motivated, too.  · Plan social events that you enjoy. This will keep you mentally engaged as well as physically motivated to do things you find pleasure in.  For your safety  · Talk with your healthcare provider before starting an exercise program.  · Exercise indoors when its too hot or too cold outside, or when the air quality is poor. Try walking at a shopping mall.  · Wear socks and sturdy shoes to maintain your balance and prevent falls.  · Start slowly. Do a few minutes several times a day at first. Increase your time and speed little by little.  · Stop and rest whenever you feel tired or get short of breath.  · Dont push yourself on days when you dont feel well.  Date Last Reviewed: 3/20/2016  © 2645-3382 [employer]. [address] [place] [address], [place] [id number]. All rights reserved. This information is not intended as a substitute for professional medical care. Always follow your healthcare professional's instructions.              Heart Failure: Evaluating Your Heart  You have a condition called heart failure. To evaluate your condition, your doctor will examine you, ask questions, and do some tests. Along with looking for signs of heart failure, the doctor looks for any other health problems that may have led to heart failure. The results of your evaluation will help your doctor form a treatment plan.  Health history and physical  exam  Your visit will start with a health history. Tell the doctor about any symptoms youve noticed and about all medicines you take. Then youll have a physical exam. This includes listening to your heartbeat and breathing. Youll also be checked for swelling (edema) in your legs and neck. When you have fluid buildup or fluid in the lungs, it may be called congestive heart failure.  Diagnosing heart failure     During an echocardiogram, sound waves bounce off the heart. These are converted into a picture on the screen.   The following may be done to help your doctor form a diagnosis:  · X-rays show the size and shape of your heart. These pictures can also show fluid in your lungs.  · An electrocardiogram (ECG or EKG) shows the pattern of your heartbeat. Small pads (electrodes) are placed on your chest, arms, and legs. Wires connect the pads to the ECG machine, which records your hearts electrical signals. This can give the doctor information about heart function.  · An echocardiogram uses ultrasound waves to show the structure and movement of your heart muscle. This shows how well the heart pumps. It also shows the thickness of the heart walls, and if the heart is enlarged. It is one of the most useful, non-invasive tests as it provides information about the heart's general function. This helps your doctor make treatment decisions.  · Lab tests evaluate small amounts of blood or urine for signs of problems. A BNP lab test can help diagnose and evaluate heart failure. BNP stands for B-type natriuretic peptide. The ventricles secrete more BNP when heart failure worsens. Lab tests can also provide information about metabolic dysfunction or heart dysfunction.  Your treatment plan  Based on the results of your evaluation and tests, your doctor will develop a treatment plan. This plan is designed to relieve some of your heart failure symptoms and help make you more comfortable. Your treatment plan may  include:  · Medicine to help your heart work better and improve your quality of life  · Changes in what you eat and drink to help prevent fluid from backing up in your body  · Daily monitoring of your weight and heart failure symptoms to see how well your treatment plan is working  · Exercise to help you stay healthy  · Help with quitting smoking  · Emotional and psychological support to help adjust to the changes  · Referrals to other specialists to make sure you are being treated comprehensively  Date Last Reviewed: 3/21/2016  © 9795-0399 [employer]. [address] [place] [address]. All rights reserved. This information is not intended as a substitute for professional medical care. Always follow your healthcare professional's instructions.              Heart Failure: Making Changes to Your Diet  You have a condition called heart failure. When you have heart failure, excess fluid is more likely to build up in your body because your heart isn't working well. This makes the heart work harder to pump blood. Fluid buildup causes symptoms such as shortness of breath and swelling (edema). This is often referred to as congestive heart failure or CHF. Controlling the amount of salt (sodium) you eat may help stop fluid from building up. Your doctor may also tell you to reduce the amount of fluid you drink.  Reading food labels    Your healthcare provider will tell you how much sodium you can eat each day. Read food labels to keep track. Keep in mind that certain foods are high in salt. These include canned, frozen, and processed foods. Check the amount of sodium in each serving. Watch out for high-sodium ingredients. These include MSG (monosodium glutamate), baking soda, and sodium phosphate.   Eating less salt  Give yourself time to get used to eating less salt. It may take a little while. Here are some tips to help:  · Take the saltshaker off the table. Replace it with salt-free herb mixes and  spices.  · Eat fresh or plain frozen vegetables. These have much less salt than canned vegetables.  · Choose low-sodium snacks like sodium-free pretzels, crackers, or air-popped popcorn.  · Dont add salt to your food when youre cooking. Instead, season your foods with pepper, lemon, garlic, or onion.  · When you eat out, ask that your food be cooked without added salt.  · Avoid eating fried foods as these often have a great deal of salt.  If youre told to limit fluids  You may need to limit how much fluid you have to help prevent swelling. This includes anything that is liquid at room temperature, such as ice cream and soup. If your doctor tells you to limit fluid, try these tips:  · Measure drinks in a measuring cup before you drink them. This will help you meet daily goals.  · Chill drinks to make them more refreshing.  · Suck on frozen lemon wedges to quench thirst.  · Only drink when youre thirsty.  · Chew sugarless gum or suck on hard candy to keep your mouth moist.  · Weigh yourself daily to know if your body's fluid content is rising.  My sodium goal  Your healthcare provider may give you a sodium goal to meet each day. This includes sodium found in food as well as salt that you add. My goal is to eat no more than ___________ mg of sodium per day.     When to call your doctor  Call your doctor right away if you have any symptoms of worsening heart failure. These can include:  · Sudden weight gain  · Increased swelling of your legs or ankles  · Trouble breathing when youre resting or at night  · Increase in the number of pillows you have to sleep on  · Chest pain, pressure, discomfort, or pain in the jaw, neck, or back   Date Last Reviewed: 3/21/2016  © 7197-0024 [employer]. [address] [place] [address], [place] [address]. All rights reserved. This information is not intended as a substitute for professional medical care. Always follow your healthcare professional's instructions.               Heart Failure: Medicines to Help Your Heart    You have a condition called heart failure (also known as congestive heart failure, or CHF). Your doctor will likely prescribe medicines for heart failure and any underlying health problems you have. Most heart failure patients take one or more types of medicinen. Your healthcare provider will work to find the combination of medicines that works best for you.  Heart failure medicines  Here are the most common heart failure medicines:  · ACE inhibitors lower blood pressure and decrease strain on the heart. This makes it easier for the heart to pump. Angiotensin receptor blockers have similar effects. These are prescribed for some patients instead of ACE inhibitors.  · Beta-blockers relieve stress on the heart. They also improve symptoms. They may also improve the heart's pumping action over time.  · Diuretics (also called water pills) help rid your body of excess water. This can help rid your body of swelling (edema). Having less fluid to pump means your heart doesnt have to work as hard. Some diuretics make your body lose a mineral called potassium. Your doctor will tell you if you need to take supplements or eat more foods high in potassium.  · Digoxin helps your heart pump with more strength. This helps your heart pump more blood with each beat. So, more oxygen-rich blood travels to the rest of the body.  · Aldosterone antagonists help alter hormones and decrease strain on the heart.  · Hydralazine and nitrates are two separate medicines used together to treat heart failure. They may come in one combination pill. They lower blood pressure and decrease how hard the heart has to pump.  Medicines for related conditions  Controlling other heart problems helps keep heart failure under control, too. Depending on other heart problems you have, medicines may be prescribed to:  · Lower blood pressure (antihypertensives).  · Lower cholesterol levels (statins).  · Prevent  blood clots (anticoagulants or aspirin).  · Keep the heartbeat steady (antiarrhythmics).  Date Last Reviewed: 3/5/2016  © 9658-5089 [employer]. [address] [place] [address], [place] [address]. All rights reserved. This information is not intended as a substitute for professional medical care. Always follow your healthcare professional's instructions.              Heart Failure: Procedures That May Help    The heart is a muscle that pumps oxygen-rich blood to all parts of the body. When you have heart failure, the heart is not able to pump as well as it should. Blood and fluid may back up into the lungs (congestive heart failure), and some parts of the body dont get enough oxygen-rich blood to work normally. These problems lead to the symptoms of heart failure.     Certain procedures may help the heart pump better in some cases of heart failure. Some procedures are done to treat health problems that may have caused the heart failure such as coronary artery disease or heart rhythm problems. For more serious heart failure, other options are available.  Treating artery and valve problems  If you have coronary artery disease or valve disease, procedures may be done to improve blood flow. This helps the heart pump better, which can improve heart failure symptoms. First, your doctor may do a cardiac catheterization to help detect clogged blood vessels or valve damage. During this procedure, a  thin tube (catheter) in inserted into a blood vessel and guided to the heart. There a dye is injected and a special type of X-ray (angiogram) is taken of the blood vessels. Procedures to open a blocked artery or fix damaged valves can also be done using catheterization.  · Angioplasty uses a balloon-tipped instrument at the end of the catheter. The balloon is inflated to widen the narrowed artery. In many cases, a stent is expanded to further support the narrowed artery. A stent is a metal mesh tube.  · Valve surgery  repairs or replacement of faulty valves can also be done during catheterization so blood can flow properly through the chambers of the heart.  Bypass surgery is another option to help treat blocked arteries. It uses a healthy blood vessel from elsewhere in the body. The healthy blood vessel is attached above and below the blocked area so that blood can flow around the blocked artery.  Treating heart rhythm problems  A device may be placed in the chest to help a weak heart maintain a healthy, heartbeat so the heart can pump more effectively:  · Pacemaker. A pacemaker is an implanted device that regulates your heartbeat electronically. It monitors your heart's rhythm and generates a painless electric impulse that helps the heart beat in a regular rhythm. A pacemaker is programmed to meet your specific heart rhythm needs.  · Biventricular pacing/cardiac resynchronization therapy. A type of pacemaker that paces both pumping chambers of the heart at the same time to coordinate contractions and to improve the heart's function. Some people with heart failure are candidates for this therapy.  · Implantable cardioverter defibrillator. A device similar to a pacemaker that senses when the heart is beating too fast and delivers an electrical shock to convert the fast rhythm to a normal rhythm. This can be a life saving device.  In severe cases  In more serious cases of heart failure when other treatments no longer work, other options may include:  · Ventricular assist devices (VADs). These are mechanical devices used to take over the pumping function for one or both of the heart's ventricles, or pumping chambers. A VAD may be necessary when heart failure progresses to the point that medicines and other treatments no longer help. In some cases, a VAD may be used as a bridge to transplant.  · Heart transplant. This is replacing the diseased heart with a healthy one from a donor. This is an option for a few people who are very  sick. A heart transplant is very serious and not an option for all patients. Your doctor can tell you more.  Date Last Reviewed: 3/20/2016  © 7180-2904 [employer]. [address] [place] [address], [place] [address]. All rights reserved. This information is not intended as a substitute for professional medical care. Always follow your healthcare professional's instructions.              Heart Failure: Tracking Your Weight  You have a condition called heart failure. When you have heart failure, a sudden weight gain or a steady rise in weight is a warning sign that your body is retaining too much water and salt. This could mean your heart failure is getting worse. If left untreated, it can cause problems for your lungs and result in shortness of breath. Weighing yourself each day is the best way to know if youre retaining water. If your weight goes up quickly, call your doctor. You will be given instructions on how to get rid of the excess water. You will likely need medicines and to avoid salt. This will help your heart work better.  Call your doctor if you gain more than 2 pounds in 1 day, more than 5 pounds in 1 week, or whatever weight gain you were told to report by your doctor. This is often a sign of worsening heart failure and needs to be evaluated and treated. Your doctor will tell you what to do next.   Tips for weighing yourself    · Weigh yourself at the same time each morning, wearing the same clothes. Weigh yourself after urinating and before eating.  · Use the same scale each day. Make sure the numbers are easy to read. Put the scale on a flat, hard surface -- not on a rug or carpet.  · Do not stop weighing yourself. If you forget one day, weigh again the next morning.  How to use your weight chart  · Keep your weight chart near the scale. Write your weight on the chart as soon as you get off the scale.  · Fill in the month and the start date on the chart. Then write down your weight each day.  Your chart will look like this:    · If you miss a day, leave the space blank. Weigh yourself the next day and write your weight in the next space.  · Take your weight chart with you when you go to see your doctor.  Date Last Reviewed: 3/20/2016  © 8833-1416 [employer]. [address] [place] [address], [place] [address]. All rights reserved. This information is not intended as a substitute for professional medical care. Always follow your healthcare professional's instructions.              Heart Failure: Warning Signs of a Flare-Up  You have a condition called heart failure. Once you have heart failure, flare-ups can happen. Below are signs that can mean your heart failure is getting worse. If you notice any of these warning signs, call your healthcare provider.  Swelling    · Your feet, ankles, or lower legs get puffier.  · You notice skin changes on your lower legs.  · Your shoes feel too tight.  · Your clothes are tighter in the waist.  · You have trouble getting rings on or off your fingers.  Shortness of breath  · You have to breathe harder even when youre doing your normal activities or when youre resting.  · You are short of breath walking up stairs or even short distances.  · You wake up at night short of breath or coughing.  · You need to use more pillows or sit up to sleep.  · You wake up tired or restless.  Other warning signs  · You feel weaker, dizzy, or more tired.  · You have chest pain or changes in your heartbeat.  · You have a cough that wont go away.  · You cant remember things or dont feel like eating.  Tracking your weight  Gaining weight is often the first warning sign that heart failure is getting worse. Gaining even a few pounds can be a sign that your body is retaining excess water and salt. Weighing yourself each day in the morning after you urinate and before you eat, is the best way to know if you're retaining water. Get a scale that is easy to read and make sure you wear the  same clothes and use the same scale every time you weigh. Your healthcare provider will show you how to track your weight. Call your doctor if you gain more than 2 pounds in 1 day, 5 pounds in 1 week, or whatever weight gain you were told to report by your doctor. This is often a sign of worsening heart failure and needs to be evaluated and treated before it compromises your breathing. Your doctor will tell you what to do next.    Date Last Reviewed: 3/15/2016  © 2601-2064 [employer]. [address] [place] [address], [place] [address]. All rights reserved. This information is not intended as a substitute for professional medical care. Always follow your healthcare professional's instructions.              Diabetes Discharge Instructions                                   [employer] Information [facility] complies with applicable Federal civil rights laws and does not discriminate on the basis of race, color, national origin, age, disability, or sex.

## 2017-05-12 NOTE — TRANSFER OF CARE
"Anesthesia Transfer of Care Note    Patient: Marcel Montalvo    Procedure(s) Performed: Procedure(s) (LRB):  CARDIOVERSION (N/A)    Patient location: PACU    Anesthesia Type: MAC    Transport from OR: Transported from OR on room air with adequate spontaneous ventilation    Post pain: adequate analgesia    Post assessment: no apparent anesthetic complications    Post vital signs: stable    Level of consciousness: awake    Nausea/Vomiting: no nausea/vomiting    Complications: none          Last vitals:   Visit Vitals    BP (!) 151/65 (BP Location: Left arm, Patient Position: Sitting, BP Method: Automatic)    Pulse 80    Temp 36.4 °C (97.6 °F) (Oral)    Resp 17    Ht 5' 2" (1.575 m)    Wt 56.2 kg (124 lb)    SpO2 97%    Breastfeeding No    BMI 22.68 kg/m2     "

## 2017-05-12 NOTE — ANESTHESIA RELEASE NOTE
"Anesthesia Release from PACU Note    Patient: Marecl Montalvo    Procedure(s) Performed: Procedure(s) (LRB):  CARDIOVERSION (N/A)    Anesthesia type: MAC    Post pain: Adequate analgesia    Post assessment: no apparent anesthetic complications, tolerated procedure well and no evidence of recall    Last Vitals:   Visit Vitals    BP (!) 151/65 (BP Location: Left arm, Patient Position: Sitting, BP Method: Automatic)    Pulse 80    Temp 36.4 °C (97.6 °F) (Oral)    Resp 17    Ht 5' 2" (1.575 m)    Wt 56.2 kg (124 lb)    SpO2 97%    Breastfeeding No    BMI 22.68 kg/m2       Post vital signs: stable    Level of consciousness: awake, alert  and oriented    Nausea/Vomiting: no nausea/no vomiting    Complications: none    Airway Patency: patent    Respiratory: unassisted, spontaneous ventilation, room air    Cardiovascular: stable and blood pressure at baseline    Hydration: euvolemic  "

## 2017-05-12 NOTE — BRIEF OP NOTE
Date: 05/12/2017  Surgeon/Physician: Oseas Fletcher MD  Assistants: none    Pre Op Diagnosis: afib    Post OP Diagnosis: sinus rythm    Procedure Performed: cardioversion    ANESTHESIA:RN IV SEDATION    COMPLICATION: none    Specimen / Tissue Removed: No    Estimated Blood Loss: <50 cc    Prostetics/Devices: None    Findings / Operative Note:   Successful cardioversion to sinus rhythm at 73/min      PLAN:  Continue current therapy f/u with DR PRASAD      Discharge Note  Short Stay      SUMMARY     Admit Date: 5/12/2017    Attending Physician: Kinjal Fletcher MD     Discharge Physician: Oseas Fletcher MD     Discharge Date: 5/12/2017    Final Diagnosis: atrial fibrillation successfully cardioverted to sinus rythm    Outcome of Stay:patient tolerated procedure well no complication she was deemed stable for discharge    Disposition: Home or Self Care    Patient Instructions:   Current Discharge Medication List      CONTINUE these medications which have NOT CHANGED    Details   albuterol (PROAIR HFA) 90 mcg/actuation inhaler Inhale 2 puffs into the lungs every 6 (six) hours as needed.  Qty: 1 Inhaler, Refills: 11    Associated Diagnoses: Asthma with COPD      albuterol-ipratropium 2.5mg-0.5mg/3mL (DUO-NEB) 0.5 mg-3 mg(2.5 mg base)/3 mL nebulizer solution Take 3 mLs by nebulization every 6 (six) hours while awake. Rescue  Qty: 120 vial, Refills: 0      aspirin (ECOTRIN) 81 MG EC tablet Take 1 tablet (81 mg total) by mouth once daily.  Qty: 30 tablet, Refills: 6    Associated Diagnoses: Coronary artery disease      atorvastatin (LIPITOR) 40 MG tablet TAKE ONE TABLET BY MOUTH NIGHTLY  Qty: 90 tablet, Refills: 6      azelastine (ASTELIN) 137 mcg (0.1 %) nasal spray 1 spray by Nasal route 2 (two) times daily.      budesonide-formoterol 160-4.5 mcg (SYMBICORT) 160-4.5 mcg/actuation HFAA Inhale 2 puffs into the lungs every 12 (twelve) hours. Wash out mouth after using  Qty: 1 Inhaler, Refills: 12    Associated Diagnoses:  Asthma with COPD      BYSTOLIC 5 mg Tab TAKE ONE TABLET BY MOUTH TWICE DAILY  Qty: 180 tablet, Refills: 6      carboxymethylcellulose (REFRESH PLUS) 0.5 % Dpet Place 1 drop into both eyes 3 (three) times daily as needed.      escitalopram oxalate (LEXAPRO) 10 MG tablet TAKE ONE-HALF TO ONE TABLET BY MOUTH EVERY DAY  Qty: 90 tablet, Refills: 3      fluticasone (FLONASE) 50 mcg/actuation nasal spray 2 sprays by Each Nare route once daily.  Qty: 1 Bottle, Refills: 11    Associated Diagnoses: Acute recurrent maxillary sinusitis      furosemide (LASIX) 20 MG tablet Take 1 tablet (20 mg total) by mouth as directed.  Qty: 90 tablet, Refills: 4      guaifenesin (MUCINEX) 600 mg 12 hr tablet Take 2 tablets (1,200 mg total) by mouth 2 (two) times daily.  Qty: 60 tablet, Refills: 11    Associated Diagnoses: Asthma with COPD      hydroxychloroquine (PLAQUENIL) 200 mg tablet TAKE ONE TABLET BY MOUTH ONCE DAILY  Qty: 90 tablet, Refills: 1    Associated Diagnoses: Sjogren's syndrome      hyoscyamine (ANASPAZ) 0.125 mg TbDL Take 0.125 mg by mouth every 6 (six) hours as needed for Cramping.      pantoprazole (PROTONIX) 40 MG tablet Take 1 tablet (40 mg total) by mouth once daily.  Qty: 90 tablet, Refills: 3      potassium chloride (KLOR-CON) 10 MEQ TbSR Take 1 tablet (10 mEq total) by mouth once daily.  Qty: 30 tablet, Refills: 6    Associated Diagnoses: Unspecified essential hypertension; Coronary artery disease involving native coronary artery, angina presence unspecified, unspecified whether native or transplanted heart      PREMARIN vaginal cream PLACE 1 GRAM VAGINALLY TWICE A WEEK  Qty: 30 g, Refills: 3      propafenone (RYTHMOL SR) 225 MG Cp12 Take 1 capsule (225 mg total) by mouth every 12 (twelve) hours.  Qty: 60 capsule, Refills: 11      RESTASIS 0.05 % ophthalmic emulsion INSTILL ONE DROP INTO EACH EYE TWICE DAILY  Qty: 60 each, Refills: 12      rivaroxaban (XARELTO) 20 mg Tab Take 1 tablet (20 mg total) by mouth daily  with dinner or evening meal.  Qty: 90 tablet, Refills: 3      tramadol (ULTRAM) 50 mg tablet Take 1 tablet (50 mg total) by mouth 3 (three) times daily as needed.  Qty: 90 tablet, Refills: 3      triamcinolone acetonide 0.1% (KENALOG) 0.1 % ointment AAA bid prn  Qty: 60 g, Refills: 1    Associated Diagnoses: Nummular eczema      vitamin D 1000 units Tab Take 2,000 Units by mouth once daily.      benzonatate (TESSALON) 100 MG capsule Take 1 capsule (100 mg total) by mouth 3 (three) times daily as needed for Cough.  Qty: 30 capsule, Refills: 1    Associated Diagnoses: Acute recurrent maxillary sinusitis      diclofenac sodium (VOLTAREN) 1 % Gel Apply 2 g topically once daily.  Qty: 3 Tube, Refills: 4      nitroGLYCERIN (NITROSTAT) 0.4 MG SL tablet Place 1 tablet (0.4 mg total) under the tongue every 5 (five) minutes as needed for Chest pain.  Qty: 25 tablet, Refills: 6    Associated Diagnoses: CAD (coronary artery disease); GERD (gastroesophageal reflux disease)      phenobarb-hyoscy-atropine-scop (BELLADONNA-PHENOBARBITAL) 16.2-0.1037 -0.0194 mg/5 mL Elix Take 5 mLs by mouth daily as needed (abdominal pain).  Qty: 180 mL, Refills: 1      pramoxine 1 % Foam Place rectally 3 (three) times daily as needed.  Qty: 15 g, Refills: 1             Discharge Procedure Orders (must include Diet, Follow-up, Activity)    Discharge Procedure Orders (must include Diet, Follow-up, Activity)  Diet general     Activity as tolerated     Call MD for:  temperature >100.4     Call MD for:  persistent nausea and vomiting     Call MD for:  severe uncontrolled pain     Call MD for:  difficulty breathing, headache or visual disturbances     Call MD for:  redness, tenderness, or signs of infection (pain, swelling, redness, odor or green/yellow discharge around incision site)     Call MD for:  hives     Call MD for:  persistent dizziness or light-headedness     Call MD for:  extreme fatigue     No dressing needed       Follow-up Information      Follow up with Virgilio Perez MD In 2 weeks.    Specialties:  Cardiology, Electrophysiology    Contact information:    [facility] [id number] [phone]

## 2017-06-02 ENCOUNTER — APPOINTMENT (OUTPATIENT)
Dept: CARDIOLOGY | Facility: CLINIC | Age: 77
End: 2017-06-02
Payer: MEDICARE

## 2017-06-02 ENCOUNTER — CLINICAL SUPPORT (OUTPATIENT)
Dept: CARDIOLOGY | Facility: CLINIC | Age: 77
End: 2017-06-02
Payer: MEDICARE

## 2017-06-02 DIAGNOSIS — I48.0 PAROXYSMAL ATRIAL FIBRILLATION: ICD-10-CM

## 2017-06-02 DIAGNOSIS — I48.0 PAF (PAROXYSMAL ATRIAL FIBRILLATION): ICD-10-CM

## 2017-06-02 PROCEDURE — 93226 XTRNL ECG REC<48 HR SCAN A/R: CPT | Mod: PBBFAC,PO | Performed by: NUCLEAR MEDICINE

## 2017-06-02 PROCEDURE — 93227 XTRNL ECG REC<48 HR R&I: CPT | Mod: S$PBB,,, | Performed by: NUCLEAR MEDICINE

## 2017-06-02 PROCEDURE — 93010 ELECTROCARDIOGRAM REPORT: CPT | Mod: S$PBB,,, | Performed by: INTERNAL MEDICINE

## 2017-06-02 PROCEDURE — 93005 ELECTROCARDIOGRAM TRACING: CPT | Mod: PBBFAC,PO | Performed by: INTERNAL MEDICINE

## 2017-06-03 DIAGNOSIS — I48.0 PAF (PAROXYSMAL ATRIAL FIBRILLATION): Primary | ICD-10-CM

## 2017-06-03 RX ORDER — NEBIVOLOL HYDROCHLORIDE 5 MG/1
TABLET ORAL
Qty: 180 TABLET | Refills: 3 | Status: SHIPPED | OUTPATIENT
Start: 2017-06-03 | End: 2017-06-08 | Stop reason: SDUPTHER

## 2017-06-08 RX ORDER — NEBIVOLOL 5 MG/1
TABLET ORAL
Qty: 180 TABLET | Refills: 3 | Status: SHIPPED | OUTPATIENT
Start: 2017-06-08

## 2017-06-16 PROBLEM — D62 ACUTE BLOOD LOSS ANEMIA: Status: RESOLVED | Noted: 2017-03-24 | Resolved: 2017-01-01

## 2017-06-16 NOTE — PROGRESS NOTES
"  Subjective:   Patient ID:  Marcel Montalvo is a 77 y.o. female     Chief complaint: AF    HPI  From my last note (4/21/17):  She had PVI 9/26/2016 (had had RFA of the CTI in the past).   Update  A month later:  "I feel great"  Details of the PVI:  1.  Successful transseptal catheterization.  2.  Successful electroanatomical mapping of the left atrium and pulmonary veins with appropriate MRI registration.  3.  Successful cryoablative isolation of all pulmonary veins (2 on the left and 1 common on the right).  4.  Normal HV intervals and AV reed Wenckebach point pre and post ablation.  5.  Elevated CVP.  6. The procedural endpoints were excellent   She is still on tikosyn.   She has had no issues at all since the PVI on 9/26.  ECG today shows NSR with normal intervals  Update 4/21/17:  Recently admitted with Acute Systolic CHF. Treated with IV lasix and Nebs-- she felt better but still had orthopnea and some sore throat. She also had acute Asthma exacerbation for which steroids added and she gradually started to improve. Cardiology continued her Tikosyn and an echo was done which showed preserved LVF after resuming her Tikosyn. She also had PAH and DD on her echo.    Her cough, sore throat and SOB improved with Steroids and Avelox and lasix.   Also had acute blood loss anemia from a subcutaneous, muscular bleed. H/H stable post transfusion of 2 units PRBCs. CT confirmed presence of abdominal wall hematoma. OAC  DCed. Abdominal binder placed per General Surgery.   Had ARF initially >> BUN/Creatinine and lactate levels improved with mild gentle hydration. IV antibiotics (Rocephin and Flagyl) initiated. Incentive spirometry encouraged with Mucinex continued.   Anticoagulation resumed on discharge.    She is here for follow up. She agreed to resume Xarelto understanding she has a risk of bleeding again. Patient declined changing to another med for this purpose.   She is now back in AF despite being on continued Tikosyn. " She feels very poorly.  I have reviewed the actual image of the ECG tracing obtained today and it shows AF with  bpm and o/w normal intervals     Update since then:  She had DCCV  After load with rythmol-- to be started two days after DC Tikosyn.This was done on 5/12 and she was back in AF on the next ECG on 6/2. She said that she required two attempts to be in NSR. She felt well for a couple days then again back in AF, lacking energy and getting easy RODARTE-- not as bad as in the past but still feeling poorly and frustrated.   Current Outpatient Prescriptions   Medication Sig    albuterol (PROAIR HFA) 90 mcg/actuation inhaler Inhale 2 puffs into the lungs every 6 (six) hours as needed.    albuterol-ipratropium 2.5mg-0.5mg/3mL (DUO-NEB) 0.5 mg-3 mg(2.5 mg base)/3 mL nebulizer solution Take 3 mLs by nebulization every 6 (six) hours while awake. Rescue    aspirin (ECOTRIN) 81 MG EC tablet Take 1 tablet (81 mg total) by mouth once daily.    atorvastatin (LIPITOR) 40 MG tablet TAKE ONE TABLET BY MOUTH NIGHTLY    azelastine (ASTELIN) 137 mcg (0.1 %) nasal spray 1 spray by Nasal route 2 (two) times daily.    benzonatate (TESSALON) 100 MG capsule Take 1 capsule (100 mg total) by mouth 3 (three) times daily as needed for Cough.    budesonide-formoterol 160-4.5 mcg (SYMBICORT) 160-4.5 mcg/actuation HFAA Inhale 2 puffs into the lungs every 12 (twelve) hours. Wash out mouth after using    carboxymethylcellulose (REFRESH PLUS) 0.5 % Dpet Place 1 drop into both eyes 3 (three) times daily as needed.    diclofenac sodium (VOLTAREN) 1 % Gel Apply 2 g topically once daily.    escitalopram oxalate (LEXAPRO) 10 MG tablet TAKE ONE-HALF TO ONE TABLET BY MOUTH EVERY DAY    flecainide (TAMBOCOR) 100 MG Tab Take 100 mg by mouth every 12 (twelve) hours.    fluticasone (FLONASE) 50 mcg/actuation nasal spray 2 sprays by Each Nare route once daily.    furosemide (LASIX) 20 MG tablet Take 1 tablet (20 mg total) by mouth as  directed.    guaifenesin (MUCINEX) 600 mg 12 hr tablet Take 2 tablets (1,200 mg total) by mouth 2 (two) times daily. (Patient taking differently: Take 1,200 mg by mouth 2 (two) times daily. As needed for flares)    hydroxychloroquine (PLAQUENIL) 200 mg tablet TAKE ONE TABLET BY MOUTH ONCE DAILY    hyoscyamine (ANASPAZ) 0.125 mg TbDL Take 0.125 mg by mouth every 6 (six) hours as needed for Cramping.    nebivolol (BYSTOLIC) 5 MG Tab TAKE ONE TABLET BY MOUTH TWICE DAILY    nitroGLYCERIN (NITROSTAT) 0.4 MG SL tablet Place 1 tablet (0.4 mg total) under the tongue every 5 (five) minutes as needed for Chest pain.    pantoprazole (PROTONIX) 40 MG tablet Take 1 tablet (40 mg total) by mouth once daily.    phenobarb-hyoscy-atropine-scop (BELLADONNA-PHENOBARBITAL) 16.2-0.1037 -0.0194 mg/5 mL Elix Take 5 mLs by mouth daily as needed (abdominal pain).    potassium chloride (KLOR-CON) 10 MEQ TbSR Take 1 tablet (10 mEq total) by mouth once daily.    pramoxine 1 % Foam Place rectally 3 (three) times daily as needed.    PREMARIN vaginal cream PLACE 1 GRAM VAGINALLY TWICE A WEEK    RESTASIS 0.05 % ophthalmic emulsion INSTILL ONE DROP INTO EACH EYE TWICE DAILY    rivaroxaban (XARELTO) 20 mg Tab Take 1 tablet (20 mg total) by mouth daily with dinner or evening meal.    tramadol (ULTRAM) 50 mg tablet Take 1 tablet (50 mg total) by mouth 3 (three) times daily as needed.    triamcinolone acetonide 0.1% (KENALOG) 0.1 % ointment AAA bid prn    vitamin D 1000 units Tab Take 2,000 Units by mouth once daily.     Current Facility-Administered Medications   Medication    denosumab (PROLIA) injection 60 mg     Review of Systems   Constitution: Positive for malaise/fatigue. Negative for chills, decreased appetite, diaphoresis, fever, weakness, night sweats, weight gain and weight loss.   HENT: Negative.  Negative for headaches.    Eyes: Negative.  Negative for blurred vision.   Cardiovascular: Positive for dyspnea on exertion and  irregular heartbeat. Negative for chest pain, claudication, cyanosis, leg swelling, near-syncope, orthopnea, palpitations, paroxysmal nocturnal dyspnea and syncope.   Respiratory: Negative for cough, hemoptysis, shortness of breath, sleep disturbances due to breathing, snoring, sputum production and wheezing.    Endocrine: Negative.  Negative for heat intolerance.   Hematologic/Lymphatic: Negative for adenopathy and bleeding problem. Bruises/bleeds easily.   Skin: Negative.    Musculoskeletal: Negative.  Negative for muscle weakness and myalgias.   Gastrointestinal: Negative.  Negative for melena, nausea and vomiting.   Genitourinary: Negative.  Negative for nocturia.   Neurological: Negative.  Negative for excessive daytime sleepiness, dizziness and light-headedness.   Psychiatric/Behavioral: Negative.  Negative for depression, memory loss and substance abuse. The patient does not have insomnia and is not nervous/anxious.    Allergic/Immunologic: Negative.        Objective:   Physical Exam   Constitutional: She is oriented to person, place, and time. She appears well-developed and well-nourished.   HENT:   Head: Normocephalic and atraumatic.   Right Ear: External ear normal.   Left Ear: External ear normal.   Neck: Normal range of motion. Neck supple. No thyromegaly present.   Cardiovascular: Normal rate, normal heart sounds and intact distal pulses.  An irregularly irregular rhythm present. Exam reveals no gallop, no S3, no S4, no friction rub, no midsystolic click and no opening snap.    No murmur heard.  Pulses:       Carotid pulses are 2+ on the right side, and 2+ on the left side.       Radial pulses are 2+ on the right side, and 2+ on the left side.        Dorsalis pedis pulses are 2+ on the right side, and 2+ on the left side.        Posterior tibial pulses are 2+ on the right side, and 2+ on the left side.   Pulmonary/Chest: Effort normal and breath sounds normal.   Abdominal: Soft. She exhibits no  "distension. There is no tenderness.   Musculoskeletal:        Right ankle: She exhibits no swelling.        Left ankle: She exhibits no swelling.        Right lower leg: She exhibits no swelling.        Left lower leg: She exhibits no swelling.   Neurological: She is alert and oriented to person, place, and time. She has normal strength. No cranial nerve deficit. Gait normal.   Skin: Skin is warm. No rash noted. No cyanosis. Nails show no clubbing.   Psychiatric: She has a normal mood and affect. Her speech is normal and behavior is normal. Thought content normal. Cognition and memory are normal.   Nursing note and vitals reviewed.    BP (!) 150/68 (BP Location: Right arm, Patient Position: Sitting, BP Method: Manual)   Pulse 83   Ht 5' 2" (1.575 m)   Wt 57.4 kg (126 lb 8.7 oz)   SpO2 96%   BMI 23.15 kg/m²      Assessment:      1. Persistent atrial fibrillation    2. S/P CABG (coronary artery bypass graft)    3. Chronic diastolic heart failure    4. Coronary artery disease involving coronary bypass graft of native heart without angina pectoris    5. Essential hypertension    6. Typical atrial flutter    7. Sjogren's syndrome with keratoconjunctivitis sicca    8. Type 2 diabetes mellitus without complication, without long-term current use of insulin    9. Hyperlipidemia, unspecified hyperlipidemia type    10. Bilateral carotid artery stenosis    11. S/P carotid endarterectomy    12. Long-term (current) use of anticoagulants    13. S/P PTCA (percutaneous transluminal coronary angioplasty)    14. Nonrheumatic aortic valve insufficiency    15. Rectal bleeding    16. Hemorrhoids, unspecified hemorrhoid type    17. Acute blood loss anemia    18. Abdominal wall hematoma, sequela        Plan:   I had a very long talk with her and her  re: options  At this point, she has failed a PVI, has had a successful CTI RFA, has had side effects related to amio, has failed rythmol and Tikosyn post PVI.  Her rate is " relatively well controlled on Holter and ECGs (although, she could have inappropriate acceleration with exertion) and her RR variability, although present is not very dramatic. She thus, may be best treated with resumption of NSR  Her options include;  Switch to Flecainide (easy to do, usually more effective than rythmol. But still a IC drug.Has helpful parasympatholytic effects. She has no prior MI nor CHF although she has CAD sp CABG but also has no Angina)  Switch to Q/Verapamil or to Norpace-- will require admission-- this has the advantage of using a class of AA meds that has not been introduced previously. (Class IA meds with parasympatholytic effect).   Switch to Sotalol -- more traditional in pats with CAD but likely to be ineffective and requires admission  Switch to Multaq/Ranexa-- easy to do- outpatient. Effectiveness enhanced post PVI.  Repeat PVI -- would use RFA this time. May add roof and floor lines.  Rate control with Verapamil -- less likely to control her Sx  Rate and variability control with AVN RFA preceded by His-RV pacing. Would be more effective than rate control with meds but carries the downside of PPM dependence as well as the possibility that Sx may not be fully controlled due to lack of A waves (patient seems to be the type who is A wave sensitive)    For now, will proceed as follows:  DC Rythmol  Start Flecainide 100 bid DCCV 6/26  DCCV on Monday 8 days later-- Dr Franklin  Next step will be Multaq/Ranexa vs RFA LA   Next med trials would be Norpace, Sotalol, Q/V in that order  No orders of the defined types were placed in this encounter.    Return in about 4 weeks (around 7/14/2017), or if symptoms worsen or fail to improve.  Medications Discontinued During This Encounter   Medication Reason    propafenone (RYTHMOL SR) 225 MG Cp12 Discontinued by another clinician     New Prescriptions    Flecainide 100 bid

## 2017-06-26 NOTE — TRANSFER OF CARE
"Anesthesia Transfer of Care Note    Patient: Marcel Montalvo    Procedure(s) Performed: Procedure(s) (LRB):  CARDIOVERSION (N/A)    Patient location: Other: [facility]    Anesthesia Type: MAC    Transport from OR: Transported from OR on room air with adequate spontaneous ventilation    Post pain: adequate analgesia    Post assessment: tolerated procedure well and no apparent anesthetic complications    Post vital signs: stable    Level of consciousness: sedated    Nausea/Vomiting: no nausea/vomiting    Complications: none    Transfer of care protocol was followed      Last vitals:   Visit Vitals  BP (!) 149/70 (BP Location: Left arm, Patient Position: Lying, BP Method: Automatic)   Pulse 85   Temp 36.6 °C (97.8 °F) (Oral)   Resp 18   Ht 5' 2" (1.575 m)   Wt 57.2 kg (126 lb)   SpO2 95%   Breastfeeding? No   BMI 23.05 kg/m²     "

## 2017-06-26 NOTE — ANESTHESIA PREPROCEDURE EVALUATION
06/26/2017  Marcel Montalvo is a 77 y.o., female.    Anesthesia Evaluation    I have reviewed the Patient Summary Reports.    I have reviewed the Nursing Notes.   I have reviewed the Medications.     Review of Systems  Anesthesia Hx:  No problems with previous Anesthesia  Denies Family Hx of Anesthesia complications.   Denies Personal Hx of Anesthesia complications.   Social:  Former Smoker, Social Alcohol Use    Hematology/Oncology:  Hematology Normal   Oncology Normal     EENT/Dental:   glaucoma   Cardiovascular:   Hypertension Valvular problems/Murmurs, AI CAD  CABG/stent Dysrhythmias atrial fibrillation     hyperlipidemia ECG has been reviewed. ekg 6/2017:  Atrial fibrillation  Right axis deviation  Incomplete right bundle branch block  Septal infarct ,age undetermined  Abnormal ECG  No previous ECGs available    Echo 3/2017:  1 - Biatrial enlargement.     2 - Concentric hypertrophy.     3 - Normal left ventricular systolic function (EF 55-60%).     4 - Right ventricular enlargement with low normal to mildly depressed systolic function.     5 - Pulmonary hypertension. The estimated PA systolic pressure is 67 mmHg.     6 - Trivial mitral regurgitation.     7 - Trivial to mild tricuspid regurgitation.     8 - Intermediate central venous pressure   Pulmonary:   Denies COPD. Asthma  Denies Sleep Apnea.    Renal/:  Renal/ Normal     Hepatic/GI:   GERD Denies Liver Disease. Denies Hepatitis. IBS   Musculoskeletal:   Arthritis  Osteopenia   Neurological:   CVA Denies Seizures. Radiculopathy affecting upper extremity  Chronic neck pain   Endocrine:   Diabetes Denies Hypothyroidism. Denies Hyperthyroidism. sjogren's   Psych:   anxiety          Physical Exam  General:  Well nourished    Airway/Jaw/Neck:  Airway Findings: Mouth Opening: Normal Tongue: Normal  General Airway Assessment: Adult  Mallampati: II       Dental:  Dental Findings: In tact   Chest/Lungs:  Chest/Lungs Findings: Clear to auscultation, Normal Respiratory Rate     Heart/Vascular:  Heart Findings: Rate: Normal  Rhythm: Irregularly Irregular  Heart Murmur             Anesthesia Plan  Type of Anesthesia, risks & benefits discussed:  Anesthesia Type:  MAC  Patient's Preference:   Intra-op Monitoring Plan: standard ASA monitors  Intra-op Monitoring Plan Comments:   Post Op Pain Control Plan:   Post Op Pain Control Plan Comments:   Induction:   IV  Beta Blocker:  Patient is on a Beta-Blocker and has received one dose within the past 24 hours (No further documentation required).       Informed Consent: Patient understands risks and agrees with Anesthesia plan.  Questions answered. Anesthesia consent signed with patient.  ASA Score: 3     Day of Surgery Review of History & Physical: I have interviewed and examined the patient. I have reviewed the patient's H&P dated:  There are no significant changes.  H&P update referred to the surgeon.         Ready For Surgery From Anesthesia Perspective.

## 2017-06-26 NOTE — PLAN OF CARE
PT AAO. DENIES PAIN. SR ON MONITOR. VSS. IV REMOVED. PT DRESSED INDEPENDENTLY. INSTRUCTIONS GIVEN. PT WHEELED OUT. ACCOMPANIED BY

## 2017-06-26 NOTE — ANESTHESIA POSTPROCEDURE EVALUATION
"Anesthesia Post Evaluation    Patient: Marcel Montalvo    Procedure(s) Performed: Procedure(s) (LRB):  CARDIOVERSION (N/A)    Final Anesthesia Type: MAC  Patient location: [facility].  Patient participation: No - Unable to Participate, Sedation  Level of consciousness: sedated  Post-procedure vital signs: reviewed and stable  Pain management: adequate  Airway patency: patent  PONV status at discharge: No PONV  Anesthetic complications: no      Cardiovascular status: hemodynamically stable and blood pressure returned to baseline  Respiratory status: unassisted, room air and spontaneous ventilation  Hydration status: euvolemic  Follow-up not needed.        Visit Vitals  BP (!) 149/70 (BP Location: Left arm, Patient Position: Lying, BP Method: Automatic)   Pulse 85   Temp 36.6 °C (97.8 °F) (Oral)   Resp 18   Ht 5' 2" (1.575 m)   Wt 57.2 kg (126 lb)   SpO2 95%   Breastfeeding? No   BMI 23.05 kg/m²       Pain/Lidia Score: Presence of Pain: denies (6/26/2017 11:06 AM)      "

## 2017-06-26 NOTE — BRIEF OP NOTE
Surgeon/Physician: Beni Lopez MD   Pre Op Diagnosis: symptomatic afib.  Post OP Diagnosis:sinus bradycardia  Procedure Performed: DCCV  Procedure Description: The risks, benefits, and alternatives of the procedure expalined in detail with patient and family. The patient voices understanding and all questions have been addressed. The patient agrees to proceed as planned.   The patient was sedated by anesthesiology service.  Estimated Blood Loss: none.   Findings / Operative Note:   DCCV with 150 joules was employed and the afib rhythm was converted to sinus bradycardia at 55 bpm.    Ok to discharge to home once hemodynamically stable.  F/U with Dr. Wynn at cardiology clinic as scheduled.

## 2017-06-26 NOTE — ANESTHESIA RELEASE NOTE
"Anesthesia Release from PACU Note    Patient: Marcel Montalvo    Procedure(s) Performed: Procedure(s) (LRB):  CARDIOVERSION (N/A)    Anesthesia type: MAC    Post pain: Adequate analgesia    Post assessment: no apparent anesthetic complications, tolerated procedure well and no evidence of recall    Last Vitals:   Visit Vitals  BP (!) 149/70 (BP Location: Left arm, Patient Position: Lying, BP Method: Automatic)   Pulse 85   Temp 36.6 °C (97.8 °F) (Oral)   Resp 18   Ht 5' 2" (1.575 m)   Wt 57.2 kg (126 lb)   SpO2 95%   Breastfeeding? No   BMI 23.05 kg/m²       Post vital signs: stable    Level of consciousness: sedated    Nausea/Vomiting: no nausea/no vomiting    Complications: none    Airway Patency: patent    Respiratory: unassisted, spontaneous ventilation, room air    Cardiovascular: stable and blood pressure at baseline    Hydration: euvolemic  "

## 2017-06-26 NOTE — H&P (VIEW-ONLY)
"  Subjective:   Patient ID:  Marcel Montalvo is a 77 y.o. female     Chief complaint: AF    HPI  From my last note (4/21/17):  She had PVI 9/26/2016 (had had RFA of the CTI in the past).   Update  A month later:  "I feel great"  Details of the PVI:  1.  Successful transseptal catheterization.  2.  Successful electroanatomical mapping of the left atrium and pulmonary veins with appropriate MRI registration.  3.  Successful cryoablative isolation of all pulmonary veins (2 on the left and 1 common on the right).  4.  Normal HV intervals and AV reed Wenckebach point pre and post ablation.  5.  Elevated CVP.  6. The procedural endpoints were excellent   She is still on tikosyn.   She has had no issues at all since the PVI on 9/26.  ECG today shows NSR with normal intervals  Update 4/21/17:  Recently admitted with Acute Systolic CHF. Treated with IV lasix and Nebs-- she felt better but still had orthopnea and some sore throat. She also had acute Asthma exacerbation for which steroids added and she gradually started to improve. Cardiology continued her Tikosyn and an echo was done which showed preserved LVF after resuming her Tikosyn. She also had PAH and DD on her echo.    Her cough, sore throat and SOB improved with Steroids and Avelox and lasix.   Also had acute blood loss anemia from a subcutaneous, muscular bleed. H/H stable post transfusion of 2 units PRBCs. CT confirmed presence of abdominal wall hematoma. OAC  DCed. Abdominal binder placed per General Surgery.   Had ARF initially >> BUN/Creatinine and lactate levels improved with mild gentle hydration. IV antibiotics (Rocephin and Flagyl) initiated. Incentive spirometry encouraged with Mucinex continued.   Anticoagulation resumed on discharge.    She is here for follow up. She agreed to resume Xarelto understanding she has a risk of bleeding again. Patient declined changing to another med for this purpose.   She is now back in AF despite being on continued Tikosyn. " She feels very poorly.  I have reviewed the actual image of the ECG tracing obtained today and it shows AF with  bpm and o/w normal intervals     Update since then:  She had DCCV  After load with rythmol-- to be started two days after DC Tikosyn.This was done on 5/12 and she was back in AF on the next ECG on 6/2. She said that she required two attempts to be in NSR. She felt well for a couple days then again back in AF, lacking energy and getting easy RDOARTE-- not as bad as in the past but still feeling poorly and frustrated.   Current Outpatient Prescriptions   Medication Sig    albuterol (PROAIR HFA) 90 mcg/actuation inhaler Inhale 2 puffs into the lungs every 6 (six) hours as needed.    albuterol-ipratropium 2.5mg-0.5mg/3mL (DUO-NEB) 0.5 mg-3 mg(2.5 mg base)/3 mL nebulizer solution Take 3 mLs by nebulization every 6 (six) hours while awake. Rescue    aspirin (ECOTRIN) 81 MG EC tablet Take 1 tablet (81 mg total) by mouth once daily.    atorvastatin (LIPITOR) 40 MG tablet TAKE ONE TABLET BY MOUTH NIGHTLY    azelastine (ASTELIN) 137 mcg (0.1 %) nasal spray 1 spray by Nasal route 2 (two) times daily.    benzonatate (TESSALON) 100 MG capsule Take 1 capsule (100 mg total) by mouth 3 (three) times daily as needed for Cough.    budesonide-formoterol 160-4.5 mcg (SYMBICORT) 160-4.5 mcg/actuation HFAA Inhale 2 puffs into the lungs every 12 (twelve) hours. Wash out mouth after using    carboxymethylcellulose (REFRESH PLUS) 0.5 % Dpet Place 1 drop into both eyes 3 (three) times daily as needed.    diclofenac sodium (VOLTAREN) 1 % Gel Apply 2 g topically once daily.    escitalopram oxalate (LEXAPRO) 10 MG tablet TAKE ONE-HALF TO ONE TABLET BY MOUTH EVERY DAY    flecainide (TAMBOCOR) 100 MG Tab Take 100 mg by mouth every 12 (twelve) hours.    fluticasone (FLONASE) 50 mcg/actuation nasal spray 2 sprays by Each Nare route once daily.    furosemide (LASIX) 20 MG tablet Take 1 tablet (20 mg total) by mouth as  directed.    guaifenesin (MUCINEX) 600 mg 12 hr tablet Take 2 tablets (1,200 mg total) by mouth 2 (two) times daily. (Patient taking differently: Take 1,200 mg by mouth 2 (two) times daily. As needed for flares)    hydroxychloroquine (PLAQUENIL) 200 mg tablet TAKE ONE TABLET BY MOUTH ONCE DAILY    hyoscyamine (ANASPAZ) 0.125 mg TbDL Take 0.125 mg by mouth every 6 (six) hours as needed for Cramping.    nebivolol (BYSTOLIC) 5 MG Tab TAKE ONE TABLET BY MOUTH TWICE DAILY    nitroGLYCERIN (NITROSTAT) 0.4 MG SL tablet Place 1 tablet (0.4 mg total) under the tongue every 5 (five) minutes as needed for Chest pain.    pantoprazole (PROTONIX) 40 MG tablet Take 1 tablet (40 mg total) by mouth once daily.    phenobarb-hyoscy-atropine-scop (BELLADONNA-PHENOBARBITAL) 16.2-0.1037 -0.0194 mg/5 mL Elix Take 5 mLs by mouth daily as needed (abdominal pain).    potassium chloride (KLOR-CON) 10 MEQ TbSR Take 1 tablet (10 mEq total) by mouth once daily.    pramoxine 1 % Foam Place rectally 3 (three) times daily as needed.    PREMARIN vaginal cream PLACE 1 GRAM VAGINALLY TWICE A WEEK    RESTASIS 0.05 % ophthalmic emulsion INSTILL ONE DROP INTO EACH EYE TWICE DAILY    rivaroxaban (XARELTO) 20 mg Tab Take 1 tablet (20 mg total) by mouth daily with dinner or evening meal.    tramadol (ULTRAM) 50 mg tablet Take 1 tablet (50 mg total) by mouth 3 (three) times daily as needed.    triamcinolone acetonide 0.1% (KENALOG) 0.1 % ointment AAA bid prn    vitamin D 1000 units Tab Take 2,000 Units by mouth once daily.     Current Facility-Administered Medications   Medication    denosumab (PROLIA) injection 60 mg     Review of Systems   Constitution: Positive for malaise/fatigue. Negative for chills, decreased appetite, diaphoresis, fever, weakness, night sweats, weight gain and weight loss.   HENT: Negative.  Negative for headaches.    Eyes: Negative.  Negative for blurred vision.   Cardiovascular: Positive for dyspnea on exertion and  irregular heartbeat. Negative for chest pain, claudication, cyanosis, leg swelling, near-syncope, orthopnea, palpitations, paroxysmal nocturnal dyspnea and syncope.   Respiratory: Negative for cough, hemoptysis, shortness of breath, sleep disturbances due to breathing, snoring, sputum production and wheezing.    Endocrine: Negative.  Negative for heat intolerance.   Hematologic/Lymphatic: Negative for adenopathy and bleeding problem. Bruises/bleeds easily.   Skin: Negative.    Musculoskeletal: Negative.  Negative for muscle weakness and myalgias.   Gastrointestinal: Negative.  Negative for melena, nausea and vomiting.   Genitourinary: Negative.  Negative for nocturia.   Neurological: Negative.  Negative for excessive daytime sleepiness, dizziness and light-headedness.   Psychiatric/Behavioral: Negative.  Negative for depression, memory loss and substance abuse. The patient does not have insomnia and is not nervous/anxious.    Allergic/Immunologic: Negative.        Objective:   Physical Exam   Constitutional: She is oriented to person, place, and time. She appears well-developed and well-nourished.   HENT:   Head: Normocephalic and atraumatic.   Right Ear: External ear normal.   Left Ear: External ear normal.   Neck: Normal range of motion. Neck supple. No thyromegaly present.   Cardiovascular: Normal rate, normal heart sounds and intact distal pulses.  An irregularly irregular rhythm present. Exam reveals no gallop, no S3, no S4, no friction rub, no midsystolic click and no opening snap.    No murmur heard.  Pulses:       Carotid pulses are 2+ on the right side, and 2+ on the left side.       Radial pulses are 2+ on the right side, and 2+ on the left side.        Dorsalis pedis pulses are 2+ on the right side, and 2+ on the left side.        Posterior tibial pulses are 2+ on the right side, and 2+ on the left side.   Pulmonary/Chest: Effort normal and breath sounds normal.   Abdominal: Soft. She exhibits no  "distension. There is no tenderness.   Musculoskeletal:        Right ankle: She exhibits no swelling.        Left ankle: She exhibits no swelling.        Right lower leg: She exhibits no swelling.        Left lower leg: She exhibits no swelling.   Neurological: She is alert and oriented to person, place, and time. She has normal strength. No cranial nerve deficit. Gait normal.   Skin: Skin is warm. No rash noted. No cyanosis. Nails show no clubbing.   Psychiatric: She has a normal mood and affect. Her speech is normal and behavior is normal. Thought content normal. Cognition and memory are normal.   Nursing note and vitals reviewed.    BP (!) 150/68 (BP Location: Right arm, Patient Position: Sitting, BP Method: Manual)   Pulse 83   Ht 5' 2" (1.575 m)   Wt 57.4 kg (126 lb 8.7 oz)   SpO2 96%   BMI 23.15 kg/m²      Assessment:      1. Persistent atrial fibrillation    2. S/P CABG (coronary artery bypass graft)    3. Chronic diastolic heart failure    4. Coronary artery disease involving coronary bypass graft of native heart without angina pectoris    5. Essential hypertension    6. Typical atrial flutter    7. Sjogren's syndrome with keratoconjunctivitis sicca    8. Type 2 diabetes mellitus without complication, without long-term current use of insulin    9. Hyperlipidemia, unspecified hyperlipidemia type    10. Bilateral carotid artery stenosis    11. S/P carotid endarterectomy    12. Long-term (current) use of anticoagulants    13. S/P PTCA (percutaneous transluminal coronary angioplasty)    14. Nonrheumatic aortic valve insufficiency    15. Rectal bleeding    16. Hemorrhoids, unspecified hemorrhoid type    17. Acute blood loss anemia    18. Abdominal wall hematoma, sequela        Plan:   I had a very long talk with her and her  re: options  At this point, she has failed a PVI, has had a successful CTI RFA, has had side effects related to amio, has failed rythmol and Tikosyn post PVI.  Her rate is " relatively well controlled on Holter and ECGs (although, she could have inappropriate acceleration with exertion) and her RR variability, although present is not very dramatic. She thus, may be best treated with resumption of NSR  Her options include;  Switch to Flecainide (easy to do, usually more effective than rythmol. But still a IC drug.Has helpful parasympatholytic effects. She has no prior MI nor CHF although she has CAD sp CABG but also has no Angina)  Switch to Q/Verapamil or to Norpace-- will require admission-- this has the advantage of using a class of AA meds that has not been introduced previously. (Class IA meds with parasympatholytic effect).   Switch to Sotalol -- more traditional in pats with CAD but likely to be ineffective and requires admission  Switch to Multaq/Ranexa-- easy to do- outpatient. Effectiveness enhanced post PVI.  Repeat PVI -- would use RFA this time. May add roof and floor lines.  Rate control with Verapamil -- less likely to control her Sx  Rate and variability control with AVN RFA preceded by His-RV pacing. Would be more effective than rate control with meds but carries the downside of PPM dependence as well as the possibility that Sx may not be fully controlled due to lack of A waves (patient seems to be the type who is A wave sensitive)    For now, will proceed as follows:  DC Rythmol  Start Flecainide 100 bid DCCV 6/26  DCCV on Monday 8 days later-- Dr Franklin  Next step will be Multaq/Ranexa vs RFA LA   Next med trials would be Norpace, Sotalol, Q/V in that order  No orders of the defined types were placed in this encounter.    Return in about 4 weeks (around 7/14/2017), or if symptoms worsen or fail to improve.  Medications Discontinued During This Encounter   Medication Reason    propafenone (RYTHMOL SR) 225 MG Cp12 Discontinued by another clinician     New Prescriptions    Flecainide 100 bid

## 2017-06-29 NOTE — TELEPHONE ENCOUNTER
Patient is s/p Cardioversion on Flecainide 100 mg bid.  Repeat Ekg next Thursday and follow up with Dr. Rees on 7/28/17.

## 2017-07-12 NOTE — LETTER
July 12, 2017      Scotty Lewis MD  [address] [place] [address]           [facility] - Rheumatology  [address]  [place] [address]  Phone: [phone]  Fax: [phone]          Patient: Marcel Montalvo   MR Number: [id number]   YOB: 1940   Date of Visit: 7/12/2017       Dear Dr. Scotty Lewis:    Thank you for referring Marcel Montalvo to me for evaluation. Attached you will find relevant portions of my assessment and plan of care.    If you have questions, please do not hesitate to call me. I look forward to following Marcel Montalvo along with you.    Sincerely,    Lunaa Knight PA-C    Enclosure  CC:  No Recipients    If you would like to receive this communication electronically, please contact [email or web address] or [phone] to request more information on [facility] access.    For providers and/or their staff who would like to refer a patient to [facility], please contact us through our one-stop-shop provider referral line, [facility], at [phone].    If you feel you have received this communication in error or would no longer like to receive these types of communications, please e-mail [email or web address]

## 2017-07-12 NOTE — PROGRESS NOTES
Subjective:       Patient ID: Marcel Montalvo is a 77 y.o. female.    Chief Complaint: Osteoporosis    Mrs. Montalvo is here for follow up today for Sjogrens, Osteoporosis, and osteoarthritis.  She has a positive JACKELYN with negative sjogrens antibodies. She is currently taking HCQ 200mg daily as well as using restasis eye drops. Stopped evoxac due to overproduction of Saliva. This is stable, no new issues, no new joint issues, no rash, no weakness.  No issues with her eyes, see eye doctor yearly.    Regarding her Osteoporosis she is on Prolia q 6 mos.  She has been on prolia for about a year, previously on fosamax but had significant joint pain and swelling on this medication so was switched to Prolia. She tolerates the injection well.  No recent falls or fractures. New dexa today shows osteooporosis with improving bmd at the hip and the spine.  Taking 2,000units vit D daily.      She has been in the hospital since her last visit due to cardiac issues and heart failure.  She had acute kidney injury based on her labwork, today GFR still decreased.        Review of Systems   Constitutional: Negative for chills, fatigue and fever.        Dizzy spells   HENT: Negative for mouth sores, rhinorrhea and sore throat.         Dry mouth   Eyes: Negative for pain and redness.        Dry eyes     Respiratory: Negative for cough and shortness of breath.    Cardiovascular: Negative for chest pain.        Afib, on xarelto,   Multiple ablations   Gastrointestinal: Negative for abdominal pain, constipation, diarrhea, nausea and vomiting.   Endocrine: Negative.    Genitourinary: Negative.  Negative for dysuria and hematuria.   Musculoskeletal: Positive for arthralgias and back pain. Negative for joint swelling and myalgias.        Back hip pain   Skin: Negative for rash.   Neurological: Positive for dizziness. Negative for weakness, numbness and headaches.   Hematological:        Xarelto   Psychiatric/Behavioral: The patient is not  nervous/anxious.          Objective:     BP (!) 124/40   Pulse (!) 55   Wt 58.8 kg (129 lb 10.1 oz)   BMI 23.71 kg/m²      Physical Exam   Constitutional: She is oriented to person, place, and time and well-developed, well-nourished, and in no distress. No distress.   HENT:   Head: Normocephalic.   Eyes: EOM are normal. Pupils are equal, round, and reactive to light.   Neck: Normal range of motion. Neck supple. No thyromegaly present.   No LAD noted   Cardiovascular: Normal rate, regular rhythm and normal heart sounds.  Exam reveals no gallop and no friction rub.    No murmur heard.  Pulmonary/Chest: Breath sounds normal. She has no wheezes. She has no rales. She exhibits no tenderness.   Abdominal: Soft. There is no tenderness. There is no rebound.   Lymphadenopathy:     She has no cervical adenopathy.   Neurological: She is alert and oriented to person, place, and time. She displays normal reflexes. She exhibits normal muscle tone. Gait normal.   Skin: Skin is warm and dry. No rash noted. No erythema.     Psychiatric: Mood, memory and affect normal.   Musculoskeletal: Normal range of motion. She exhibits no edema or tenderness.   maki hands with some Oa changes to dips bilaterally.  Good motion to her maki wrists, mcps, pips.  Good rom to maki knees.      Steady gait, able to get in and out of chair without using hands         Results for orders placed or performed in visit on 07/12/17   Basic metabolic panel   Result Value Ref Range    Sodium 142 136 - 145 mmol/L    Potassium 4.3 3.5 - 5.1 mmol/L    Chloride 104 95 - 110 mmol/L    CO2 26 23 - 29 mmol/L    Glucose 143 (H) 70 - 110 mg/dL    BUN, Bld 22 8 - 23 mg/dL    Creatinine 1.4 0.5 - 1.4 mg/dL    Calcium 9.9 8.7 - 10.5 mg/dL    Anion Gap 12 8 - 16 mmol/L    eGFR if African American 42 (A) >60 mL/min/1.73 m^2    eGFR if non African American 36 (A) >60 mL/min/1.73 m^2     DEXA: 7.12.17: DF -1.5, neck mean -2.5, spine -0.9, improving bmd at the hip and  spine  Assessment:       1. Osteoporosis, postmenopausal    2. Sjogren's syndrome with other organ involvement    3. Medication monitoring encounter    4. Primary osteoarthritis of both knees          1. Osteoporosis:  On prolia q 6 mos, previously on fosamax but stopped due to side effects, due today for injection,  takes 2000units vit D daily; dexa 7.12.17 neck mean -2.5, improving bmd hip and spine  2. Sjogrens syndrome (+kandy low titer, neg ssa, neg ssb): stable on HCQ 200mg daily, stopped evoxac due to overproduction of saliva   3. Med monitoring: ok to get prolia, no toxicity from meds-decreased gfr, will need Ca recheck  4. Decreased kidney function: GFR36,   Plan:        OK for prolia today, cont q 6 mos  Chew 2 tums daily for the next 2 weeks, recheck Ca in 10 days due to kidney function  Cont vit D 2000units daily, dietary calcium   Need to get with pcp about kidney function  Continue hcq 200 mg daily, see eye doctor yearly       Return in 6 mos for prolia, reg 4

## 2017-07-12 NOTE — PATIENT INSTRUCTIONS
Follow up on kidney function     Chew 2 tums daily for the next 2 weeks  We will recheck calcium level in 10 days    No change to other meds  Drink lots of water

## 2017-07-12 NOTE — PROGRESS NOTES
Administered 1cc Prolia 60mg/cc to LLQ of abdomen. Pt. Tolerated well. No acute reaction noted to site. Pt. Instructed on S/S of reaction to report. Pt. Verbalized understanding.    Lot:[id number]  Exp:12/18  Manu:rhoda

## 2017-07-25 NOTE — TELEPHONE ENCOUNTER
----- Message from Sofy Wylie sent at 7/25/2017 11:22 AM CDT -----  Would like to speak to nurse about inhaler. Please call back at [phone]. thanks

## 2017-07-28 NOTE — PROGRESS NOTES
"  Subjective:   Patient ID:  Marcel Montalvo is a 77 y.o. female     Chief complaint:Atrial Fibrillation      HPI  Background as recorded in my last note (6/16.17):    She had PVI 9/26/2016 (had had RFA of the CTI in the past).   Update  A month later:  "I feel great"  Details of the PVI:  1.  Successful transseptal catheterization.  2.  Successful electroanatomical mapping of the left atrium and pulmonary veins with appropriate MRI registration.  3.  Successful cryoablative isolation of all pulmonary veins (2 on the left and 1 common on the right).  4.  Normal HV intervals and AV reed Wenckebach point pre and post ablation.  5.  Elevated CVP.  6. The procedural endpoints were excellent   She is still on tikosyn.   She has had no issues at all since the PVI on 9/26.  ECG today shows NSR with normal intervals  Update 4/21/17:  Recently admitted with Acute Systolic CHF. Treated with IV lasix and Nebs-- she felt better but still had orthopnea and some sore throat. She also had acute Asthma exacerbation for which steroids added and she gradually started to improve. Cardiology continued her Tikosyn and an echo was done which showed preserved LVF after resuming her Tikosyn. She also had PAH and DD on her echo.    Her cough, sore throat and SOB improved with Steroids and Avelox and lasix.   Also had acute blood loss anemia from a subcutaneous, muscular bleed. H/H stable post transfusion of 2 units PRBCs. CT confirmed presence of abdominal wall hematoma. OAC  DCed. Abdominal binder placed per General Surgery.   Had ARF initially >> BUN/Creatinine and lactate levels improved with mild gentle hydration. IV antibiotics (Rocephin and Flagyl) initiated. Incentive spirometry encouraged with Mucinex continued.   Anticoagulation resumed on discharge.    She is here for follow up. She agreed to resume Xarelto understanding she has a risk of bleeding again. Patient declined changing to another med for this purpose.   She is now back " in AF despite being on continued Tikosyn. She feels very poorly.  I have reviewed the actual image of the ECG tracing obtained today and it shows AF with  bpm and o/w normal intervals     Update 6/16/17:  She had DCCV  After load with rythmol-- to be started two days after DC Tikosyn.This was done on 5/12 and she was back in AF on the next ECG on 6/2. She said that she required two attempts to be in NSR. She felt well for a couple days then again back in AF, lacking energy and getting easy RODARTE-- not as bad as in the past but still feeling poorly and frustrated.     Update since then:  On our last visit we had the following A and P:  had a very long talk with her and her  re: options  At this point, she has failed a PVI, has had a successful CTI RFA, has had side effects related to amio, has failed rythmol and Tikosyn post PVI.  Her rate is relatively well controlled on Holter and ECGs (although, she could have inappropriate acceleration with exertion) and her RR variability, although present is not very dramatic. She thus, may be best treated with resumption of NSR  Her options include;  Switch to Flecainide (easy to do, usually more effective than rythmol. But still a IC drug.Has helpful parasympatholytic effects. She has no prior MI nor CHF although she has CAD sp CABG but also has no Angina)  Switch to Q/Verapamil or to Norpace-- will require admission-- this has the advantage of using a class of AA meds that has not been introduced previously. (Class IA meds with parasympatholytic effect).   Switch to Sotalol -- more traditional in pats with CAD but likely to be ineffective and requires admission  Switch to Multaq/Ranexa-- easy to do- outpatient. Effectiveness enhanced post PVI.  Repeat PVI -- would use RFA this time. May add roof and floor lines.  Rate control with Verapamil -- less likely to control her Sx  Rate and variability control with AVN RFA preceded by His-RV pacing. Would be more  effective than rate control with meds but carries the downside of PPM dependence as well as the possibility that Sx may not be fully controlled due to lack of A waves (patient seems to be the type who is A wave sensitive)     For now, will proceed as follows:  JAXON Rythmol  Start Flecainide 100 bid DCCV 6/26  DCCV on Monday 8 days later-- Dr Franklin  Next step will be Multaq/Ranexa vs RFA LA   Next med trials would be Norpace, Sotalol, Q/V in that order    Since then she has started on propafenone and had a DCCv 10 days later on 6/26/16. She has been feeling much better -- she has been able ot gop out shopping -- gets a bit RODARTE and has to slow down but overall, feels a lot better.  I have reviewed the actual image of the ECG tracing obtained today and it shows NSR with-- IACD 150 msec, FDAVB 265 msec, o/w normal intervals --   Current Outpatient Prescriptions   Medication Sig    albuterol (PROAIR HFA) 90 mcg/actuation inhaler Inhale 2 puffs into the lungs every 6 (six) hours as needed.    albuterol-ipratropium 2.5mg-0.5mg/3mL (DUO-NEB) 0.5 mg-3 mg(2.5 mg base)/3 mL nebulizer solution Take 3 mLs by nebulization every 6 (six) hours while awake. Rescue    aspirin (ECOTRIN) 81 MG EC tablet Take 1 tablet (81 mg total) by mouth once daily.    atorvastatin (LIPITOR) 40 MG tablet TAKE ONE TABLET BY MOUTH NIGHTLY    azelastine (ASTELIN) 137 mcg (0.1 %) nasal spray 1 spray by Nasal route 2 (two) times daily.    budesonide-formoterol 160-4.5 mcg (SYMBICORT) 160-4.5 mcg/actuation HFAA Inhale 2 puffs into the lungs every 12 (twelve) hours. Wash out mouth after using    carboxymethylcellulose (REFRESH PLUS) 0.5 % Dpet Place 1 drop into both eyes 3 (three) times daily as needed.    escitalopram oxalate (LEXAPRO) 10 MG tablet TAKE ONE-HALF TO ONE TABLET BY MOUTH EVERY DAY    flecainide (TAMBOCOR) 100 MG Tab Take 100 mg by mouth every 12 (twelve) hours.    fluticasone (FLONASE) 50 mcg/actuation nasal spray 2 sprays by  Each Nare route once daily.    furosemide (LASIX) 20 MG tablet Take 1 tablet (20 mg total) by mouth as directed.    hyoscyamine (ANASPAZ) 0.125 mg TbDL Take 0.125 mg by mouth every 6 (six) hours as needed for Cramping.    nebivolol (BYSTOLIC) 5 MG Tab TAKE ONE TABLET BY MOUTH TWICE DAILY    pantoprazole (PROTONIX) 40 MG tablet Take 1 tablet (40 mg total) by mouth once daily.    phenobarb-hyoscy-atropine-scop (BELLADONNA-PHENOBARBITAL) 16.2-0.1037 -0.0194 mg/5 mL Elix Take 5 mLs by mouth daily as needed (abdominal pain).    potassium chloride (KLOR-CON) 10 MEQ TbSR Take 1 tablet (10 mEq total) by mouth once daily.    PREMARIN vaginal cream PLACE 1 GRAM VAGINALLY TWICE A WEEK    RESTASIS 0.05 % ophthalmic emulsion INSTILL ONE DROP INTO EACH EYE TWICE DAILY    rivaroxaban (XARELTO) 20 mg Tab Take 1 tablet (20 mg total) by mouth daily with dinner or evening meal.    tramadol (ULTRAM) 50 mg tablet Take 1 tablet (50 mg total) by mouth 3 (three) times daily as needed.    vitamin D 1000 units Tab Take 2,000 Units by mouth once daily.    benzonatate (TESSALON) 100 MG capsule Take 1 capsule (100 mg total) by mouth 3 (three) times daily as needed for Cough.    diclofenac sodium (VOLTAREN) 1 % Gel Apply 2 g topically once daily.    guaifenesin (MUCINEX) 600 mg 12 hr tablet Take 2 tablets (1,200 mg total) by mouth 2 (two) times daily. (Patient taking differently: Take 1,200 mg by mouth 2 (two) times daily. As needed for flares)    hydroxychloroquine (PLAQUENIL) 200 mg tablet TAKE ONE TABLET BY MOUTH ONCE DAILY    nitroGLYCERIN (NITROSTAT) 0.4 MG SL tablet Place 1 tablet (0.4 mg total) under the tongue every 5 (five) minutes as needed for Chest pain.    pramoxine 1 % Foam Place rectally 3 (three) times daily as needed.    triamcinolone acetonide 0.1% (KENALOG) 0.1 % ointment AAA bid prn     Current Facility-Administered Medications   Medication    denosumab (PROLIA) injection 60 mg    denosumab (PROLIA)  injection 60 mg     Review of Systems   Constitution: Negative for decreased appetite, weakness, weight gain and weight loss.   HENT: Negative for headaches and nosebleeds.    Eyes: Negative for blurred vision and visual disturbance.   Cardiovascular: Positive for dyspnea on exertion. Negative for chest pain, claudication, cyanosis, irregular heartbeat, leg swelling, near-syncope, orthopnea, palpitations, paroxysmal nocturnal dyspnea and syncope.   Respiratory: Positive for shortness of breath. Negative for cough and wheezing.    Endocrine: Negative for heat intolerance.   Hematologic/Lymphatic: Bruises/bleeds easily.   Skin: Negative for rash.   Musculoskeletal: Negative for muscle weakness and myalgias.   Gastrointestinal: Negative for abdominal pain, anorexia, melena, nausea and vomiting.   Genitourinary: Negative for menorrhagia.   Neurological: Negative for excessive daytime sleepiness, dizziness, loss of balance, seizures and vertigo.   Psychiatric/Behavioral: Negative for altered mental status and depression. The patient is not nervous/anxious.    Allergic/Immunologic: Positive for environmental allergies.       Objective:   Physical Exam   Constitutional: She is oriented to person, place, and time. She appears well-developed and well-nourished.   HENT:   Head: Normocephalic and atraumatic.   Right Ear: External ear normal.   Left Ear: External ear normal.   Neck: Normal range of motion. Neck supple. Carotid bruit is present. No thyromegaly present.   Bernabe car bruits R harsher than L   Cardiovascular: Normal rate, regular rhythm and intact distal pulses.  Exam reveals no gallop, no S3, no S4, no friction rub, no midsystolic click and no opening snap.    Murmur heard.   Low-pitched systolic murmur is present with a grade of 1/6  at the upper left sternal border  Pulses:       Carotid pulses are 2+ on the right side, and 2+ on the left side.       Radial pulses are 2+ on the right side, and 2+ on the left side.  "       Dorsalis pedis pulses are 2+ on the right side, and 2+ on the left side.        Posterior tibial pulses are 2+ on the right side, and 2+ on the left side.   Pulmonary/Chest: Effort normal and breath sounds normal.   Abdominal: Soft. She exhibits no distension. There is no tenderness.   Musculoskeletal:        Right ankle: She exhibits no swelling.        Left ankle: She exhibits no swelling.        Right lower leg: She exhibits no swelling.        Left lower leg: She exhibits no swelling.   Neurological: She is alert and oriented to person, place, and time. She has normal strength. No cranial nerve deficit. Gait normal.   Skin: Skin is warm. No rash noted. No cyanosis. Nails show no clubbing.   Psychiatric: She has a normal mood and affect. Her speech is normal and behavior is normal. Thought content normal. Cognition and memory are normal.   Nursing note and vitals reviewed.    /68   Pulse 61   Ht 5' 3" (1.6 m)   Wt 58.5 kg (129 lb)   BMI 22.85 kg/m²      Assessment:      1. Chronic and persistent atrial fibrillation -- now in NSR post PVI and requiring additional AA Rx -- now on Propafenone   2. Typical atrial flutter -- sp successful RFA (CTI)-- now cured   3. Essential hypertension -- she brought BP lists -- all OK in the 120s to 130s   4. Coronary artery disease involving coronary bypass graft of native heart without angina pectoris    5. Chronic diastolic heart failure-- last BNP in March >>1100 -- will repeat -- should be back to near normal; now    6. Sjogren's syndrome with other organ involvement    7. Type 2 diabetes mellitus without complication, without long-term current use of insulin    8. Hyperlipidemia, unspecified hyperlipidemia type    9. Bilateral carotid artery stenosis    10. Persistent atrial fibrillation ==in NSR    11. S/P CABG (coronary artery bypass graft)    12. S/P carotid endarterectomy    13. Long-term (current) use of anticoagulants    14. S/P PTCA (percutaneous " transluminal coronary angioplasty)    15. Nonrheumatic aortic valve insufficiency        Plan:      Orders Placed This Encounter   Procedures    Brain natriuretic peptide     Standing Status:   Future     Standing Expiration Date:   9/26/2018     Return in about 6 months (around 1/28/2018).  There are no discontinued medications.  New Prescriptions    No medications on file     Modified Medications    No medications on file

## 2017-08-03 NOTE — TELEPHONE ENCOUNTER
----- Message from Virgilio Perez MD sent at 8/2/2017  7:55 PM CDT -----  Results reviewed. abnormalities as listed. Please see appended comments,create telephone encounter, call patient and inform him/her of results and action to take then document in encounter and close it.   In general there will be no need to route back to   me unless there is an issue that you need to discuss. Thanks    Sth' is really off  Please get an updated echo asap  Tx

## 2017-08-03 NOTE — TELEPHONE ENCOUNTER
Telephoned results BNP elevated -stated she has not been taking Lasix 20 daily, advised to start versus as needed.  Ask if she could take Protonix 40 mg twice a day ?  Advised would check to see if she should be taking Lasix daily or just consistently for a week or two with repeat labs

## 2017-08-04 NOTE — TELEPHONE ENCOUNTER
Telephoned patient and advised begin Lasix 20 mg daily and it is okay to take Protonix 40 mg bid  Patient to check and see if she needs new prescriptions.

## 2017-08-10 NOTE — PROGRESS NOTES
Subjective:      Patient ID: Marcel Montalvo is a 77 y.o. female.    Chief Complaint: Follow-up (3 month)    Patient presents today for of multiple issues.  Since I last saw her she has seen the cardiologist and the rhythm specialist Dr. ORTA.  Recently visited a BNP on her and her levels were around 2500.  They advised her to start doing the Lasix 20 mg daily again as she had stopped.  She reports that she took it for about 3 days and she felt like she was having a lot of flutter.  She has a known history of atrial flutter and atrial fibrillation currently on antiarrhythmics.  She's also noticed that her heart rate at times his been in the 40s.  She had one day where she felt really good but otherwise she's really just felt fatigued once again.  She asked to stop the Lasix for about 3 days and her heart fluttering seem to go away.  She went ahead and took in today once again she does not feel good today.  Heart rate today in the office is around 56 and even down to 52 bpm.  She is in an abnormal rhythm today.  It's very bradycardic with it times what appears to be some type of ventricular beats.  We did obtain an EKG today.  She is due to update a heart echo and this is ordered but not yet scheduled.     She also had a hospitalization for a very large abdominal wall hematoma.  At time she still has some discomfort on the right side.  She is once again slightly anemic but only by 1 unit.  She is currently on Xarelto.  She's not noticed any further bleeding at this time.    She also reports chronic sinus issues as well.  The only time it felt like her sinuses actually improved or when she was in the hospital he did EXTENSIVE amount antibiotics.  She has so many antibody ALLERGIES at this time however I'm hesitant to do anything without clear indication of a sinusitis.  She is not currently on singular however.    She does have Dr. Turpin and Dr. Valero but this her cardiologist.  She also recently met with the  rheumatologist for the [facility] and they wanted us to monitor her kidney function.  It appears she has some mild acute kidney injury with a creatinine of 1.5 this is most likely related to the increased use of Lasix.             Lab Results   Component Value Date    WBC 4.89 08/02/2017    HGB 11.2 (L) 08/02/2017    HCT 36.1 (L) 08/02/2017     08/02/2017    CHOL 192 05/02/2017    TRIG 164 (H) 05/02/2017    HDL 42 05/02/2017    ALT 30 08/02/2017    AST 32 08/02/2017     08/02/2017    K 4.0 08/02/2017     08/02/2017    CREATININE 1.5 (H) 08/02/2017    BUN 25 (H) 08/02/2017    CO2 27 08/02/2017    TSH 3.535 05/02/2017    INR 1.2 03/26/2017    HGBA1C 6.2 (H) 08/02/2017       Review of Systems   Constitutional: Positive for activity change and fatigue. Negative for appetite change and unexpected weight change.   HENT: Positive for congestion and voice change.    Respiratory: Negative for cough and shortness of breath.    Cardiovascular: Positive for chest pain and palpitations.   Gastrointestinal: Positive for abdominal pain. Negative for abdominal distention, anal bleeding, diarrhea and nausea.   Neurological: Positive for weakness. Negative for light-headedness and headaches.   Psychiatric/Behavioral: Negative for decreased concentration, dysphoric mood and sleep disturbance. The patient is nervous/anxious.      Objective:     Physical Exam   Constitutional: She appears well-developed and well-nourished.   HENT:   Head: Normocephalic and atraumatic.   Right Ear: Tympanic membrane normal.   Left Ear: Tympanic membrane normal.   Mouth/Throat: Oropharynx is clear and moist.   Eyes: Conjunctivae and EOM are normal.   Neck: Normal range of motion. Neck supple.   Cardiovascular: An irregular rhythm present. Bradycardia present.    Pulmonary/Chest: Effort normal and breath sounds normal.   Abdominal: Soft. Bowel sounds are normal. She exhibits no distension and no mass. There is tenderness in the right upper  quadrant and right lower quadrant. There is no rebound and no guarding.   Psychiatric: She has a normal mood and affect. Her behavior is normal.   Nursing note and vitals reviewed.    Assessment:     1. Chronic diastolic heart failure    2. Bradycardia    3. Periodic heart flutter    4. Stage 3 chronic kidney disease    5. DOMINGO (acute kidney injury)    6. Anemia, unspecified type    7. Abdominal wall hematoma, sequela      Plan:   Marcel was seen today for follow-up.    Diagnoses and all orders for this visit:    Chronic diastolic heart failure-recently seen by cardiology and was advised to do daily Lasix 20 mg however having palpitations and a lot of symptoms of fatigue and not feeling good on it.  This time when ahead and obtain EKG in office today which did show once again sinus bradycardia.  There was no lead reversal as suggested her the EKG.  She does have what appears to be some dropped beats along with a ventricular conduction at some point.  It is an irregular rhythm.  I'll advise the patient go ahead and get the echocardiogram SQ ordered and get into see her call etiology soon because of her ongoing symptoms.  -     EKG 12-lead    Bradycardia-noted C EKG  -     EKG 12-lead    Periodic heart flutter  Comments:  with use of lasix. goes away if stops. refer back to dr banerjee.   Orders:  -     EKG 12-lead    Stage 3 chronic kidney disease-worsened recently with the Lasix.    DOMINGO (acute kidney injury) worsened recently with the Lasix  Comments:  lately with use of lasix      Anemia, unspecified type-noted with a history of abdominal wall hematoma continued to monitor for now.  Still on Xarelto    Abdominal wall hematoma, sequela in the past however with mild discomfort today.  No evidence of any bruising no evidence of trauma lately.  Continue to monitor.      I will follow the patient closely in 6 weeks.  She's advised her to go ahead and see her cardiologist this week or next after she gets her  echo.      Return in about 6 weeks (around 9/21/2017) for f/u heart issues, allergies, kidneys.

## 2017-08-16 NOTE — PROGRESS NOTES
Subjective:       Patient ID: Marcel Montalvo is a 77 y.o. female.    Chief Complaint: She       Asthma    HPI     Hospitalizations for Atrial Fibrillation x 2 earliet this year    Dyspnea  Patient complains of shortness of breath. Symptoms occur after one flight stairs, with one block walking. Symptoms began 1 year ago, gradually improving since. Associated symptoms include  atrial fibrillation. She denies chest pain, located left chest. She does not have had recent travel. Weight has been stable. Symptoms are exacerbated by moderate activity. Symptoms are alleviated by rest.   Occasional paroxysmal nocturnal dyspnea        Past Medical History:   Diagnosis Date    *Atrial fibrillation     AF (atrial fibrillation)     Palpation: atrial fibrillation, on Coumadin and followed by cardiologist.    Anticoagulant long-term use     Anxiety     Aortic insufficiency 3/19/2014    Aortic regurgitation     Aortic regurgitation/tricuspid regurgitation per MARIO in April 2007.     Asthma     Carotid artery stenosis     CEA on the left by Dr. Maciel    Coronary artery disease     status post CABG X 3.     Diabetes mellitus     GERD (gastroesophageal reflux disease)     H. pylori infection     treated    Hyperlipidemia     intolerant to statins.    Hypertension 8/7/2013    IBS (irritable bowel syndrome)     Leg pain 3/19/2014    Long-term (current) use of anticoagulants 8/7/2013    Osteoarthritis     Osteoarthritis     Osteopenia     DEXA scan done on 06/20/12     Pancreatitis     resolved    S/P CABG (coronary artery bypass graft) 8/7/2013    S/P carotid endarterectomy 8/7/2013    S/P PTCA (percutaneous transluminal coronary angioplasty) 3/19/2014    Sjogren's syndrome     Stroke     Vitamin D deficiency disease      Past Surgical History:   Procedure Laterality Date    ABDOMINAL SURGERY      APPENDECTOMY      bilateral cataract surgery      CARPAL TUNNEL RELEASE      RT    CATARACT EXTRACTION       CEA      left    CHOLECYSTECTOMY      CORONARY ARTERY BYPASS GRAFT      2 stents    EYE SURGERY      HYSTERECTOMY      TONSILLECTOMY       Social History     Social History    Marital status:      Spouse name: N/A    Number of children: N/A    Years of education: N/A     Occupational History    Not on file.     Social History Main Topics    Smoking status: Former Smoker     Packs/day: 0.50     Years: 25.00     Quit date: 12/17/1988    Smokeless tobacco: Never Used    Alcohol use 0.0 oz/week      Comment: wine occasionally    Drug use: No    Sexual activity: Yes     Partners: Male     Other Topics Concern    Not on file     Social History Narrative    No narrative on file     Review of Systems   Constitutional: Positive for fatigue and weakness.   Respiratory: Positive for shortness of breath, dyspnea on extertion and Paroxysmal Nocturnal Dyspnea.    Cardiovascular: Positive for leg swelling.   Musculoskeletal: Positive for gait problem.   Gastrointestinal: Positive for nausea and vomiting.   Psychiatric/Behavioral: The patient is nervous/anxious.        Objective:      Physical Exam   Constitutional: She is oriented to person, place, and time. She appears well-developed and well-nourished.   HENT:   Head: Normocephalic and atraumatic.   Nose: Nose normal.   Mouth/Throat: Oropharyngeal exudate present.   Eyes: EOM are normal. Pupils are equal, round, and reactive to light.   Neck: Normal range of motion. No JVD present. No tracheal deviation present.   Cardiovascular: Normal rate.  An irregularly irregular rhythm present.   Murmur heard.   Systolic murmur is present with a grade of 2/6    Diastolic murmur is present with a grade of 1/6   Pulmonary/Chest: Accessory muscle usage present. Tachypnea noted. She is in respiratory distress. She has decreased breath sounds. She has wheezes. She has rhonchi. She has rales.   Abdominal: Soft. Bowel sounds are normal.   Musculoskeletal: She exhibits no  edema.   Lymphadenopathy:     She has no cervical adenopathy.   Neurological: She is alert and oriented to person, place, and time. She has normal reflexes.   Skin: Skin is warm. She is diaphoretic.   Psychiatric: She has a normal mood and affect.     Personal Diagnostic Review  Chest x-ray: cardiomegaly and pulmonary edema      No flowsheet data found.      Assessment:       1. Asthma with COPD    2. Pulmonary edema cardiac cause    3. Chronic atrial fibrillation    4. Chronic diastolic heart failure    5. Nonrheumatic aortic valve insufficiency        Outpatient Encounter Prescriptions as of 8/16/2017   Medication Sig Dispense Refill    albuterol (PROAIR HFA) 90 mcg/actuation inhaler Inhale 2 puffs into the lungs every 6 (six) hours as needed. 1 Inhaler 11    albuterol-ipratropium 2.5mg-0.5mg/3mL (DUO-NEB) 0.5 mg-3 mg(2.5 mg base)/3 mL nebulizer solution Take 3 mLs by nebulization every 6 (six) hours while awake. Rescue 120 vial 0    aspirin (ECOTRIN) 81 MG EC tablet Take 1 tablet (81 mg total) by mouth once daily. 30 tablet 6    atorvastatin (LIPITOR) 40 MG tablet TAKE ONE TABLET BY MOUTH NIGHTLY 90 tablet 6    azelastine (ASTELIN) 137 mcg (0.1 %) nasal spray 1 spray by Nasal route 2 (two) times daily.      benzonatate (TESSALON) 100 MG capsule Take 1 capsule (100 mg total) by mouth 3 (three) times daily as needed for Cough. 30 capsule 1    budesonide-formoterol 160-4.5 mcg (SYMBICORT) 160-4.5 mcg/actuation HFAA Inhale 2 puffs into the lungs every 12 (twelve) hours. Wash out mouth after using 1 Inhaler 12    carboxymethylcellulose (REFRESH PLUS) 0.5 % Dpet Place 1 drop into both eyes 3 (three) times daily as needed.      diclofenac sodium (VOLTAREN) 1 % Gel Apply 2 g topically once daily. 3 Tube 4    escitalopram oxalate (LEXAPRO) 10 MG tablet TAKE ONE-HALF TO ONE TABLET BY MOUTH EVERY DAY 90 tablet 3    flecainide (TAMBOCOR) 100 MG Tab Take 100 mg by mouth every 12 (twelve) hours.      fluticasone  (FLONASE) 50 mcg/actuation nasal spray 2 sprays by Each Nare route once daily. 1 Bottle 11    furosemide (LASIX) 20 MG tablet Take 1 tablet (20 mg total) by mouth once daily. As of 8/4/17 90 tablet 3    guaifenesin (MUCINEX) 600 mg 12 hr tablet Take 2 tablets (1,200 mg total) by mouth 2 (two) times daily. As needed for flares 60 tablet 11    hydroxychloroquine (PLAQUENIL) 200 mg tablet TAKE ONE TABLET BY MOUTH ONCE DAILY 90 tablet 1    hyoscyamine (ANASPAZ) 0.125 mg TbDL Take 0.125 mg by mouth every 6 (six) hours as needed for Cramping.      nebivolol (BYSTOLIC) 5 MG Tab TAKE ONE TABLET BY MOUTH TWICE DAILY 180 tablet 3    nitroGLYCERIN (NITROSTAT) 0.4 MG SL tablet Place 1 tablet (0.4 mg total) under the tongue every 5 (five) minutes as needed for Chest pain. 25 tablet 6    pantoprazole (PROTONIX) 40 MG tablet Take 1 tablet (40 mg total) by mouth 2 (two) times daily. As of 8/4/17 60 tablet 6    phenobarb-hyoscy-atropine-scop (BELLADONNA-PHENOBARBITAL) 16.2-0.1037 -0.0194 mg/5 mL Elix Take 5 mLs by mouth daily as needed (abdominal pain). 180 mL 1    potassium chloride (KLOR-CON) 10 MEQ TbSR Take 1 tablet (10 mEq total) by mouth once daily. 30 tablet 6    pramoxine 1 % Foam Place rectally 3 (three) times daily as needed. 15 g 1    PREMARIN vaginal cream PLACE 1 GRAM VAGINALLY TWICE A WEEK 30 g 3    RESTASIS 0.05 % ophthalmic emulsion INSTILL ONE DROP INTO EACH EYE TWICE DAILY 60 each 12    rivaroxaban (XARELTO) 20 mg Tab Take 1 tablet (20 mg total) by mouth daily with dinner or evening meal. 90 tablet 3    tramadol (ULTRAM) 50 mg tablet Take 1 tablet (50 mg total) by mouth 3 (three) times daily as needed. 90 tablet 3    triamcinolone acetonide 0.1% (KENALOG) 0.1 % ointment AAA bid prn 60 g 1    vitamin D 1000 units Tab Take 2,000 Units by mouth once daily.      [DISCONTINUED] albuterol (PROAIR HFA) 90 mcg/actuation inhaler Inhale 2 puffs into the lungs every 6 (six) hours as needed. 1 Inhaler 11     [DISCONTINUED] budesonide-formoterol 160-4.5 mcg (SYMBICORT) 160-4.5 mcg/actuation HFAA Inhale 2 puffs into the lungs every 12 (twelve) hours. Wash out mouth after using 1 Inhaler 12    [DISCONTINUED] guaifenesin (MUCINEX) 600 mg 12 hr tablet Take 2 tablets (1,200 mg total) by mouth 2 (two) times daily. (Patient taking differently: Take 1,200 mg by mouth 2 (two) times daily. As needed for flares) 60 tablet 11     Facility-Administered Encounter Medications as of 8/16/2017   Medication Dose Route Frequency Provider Last Rate Last Dose    denosumab (PROLIA) injection 60 mg  60 mg Subcutaneous Q6 Months Jaci Day NP   60 mg at 07/12/17 1026    denosumab (PROLIA) injection 60 mg  60 mg Subcutaneous Q6 Months Luana Knight PA-C         Orders Placed This Encounter   Procedures    Complete PFT with bronchodilator     Standing Status:   Future     Standing Expiration Date:   2/16/2019     Plan:       Requested Prescriptions     Signed Prescriptions Disp Refills    albuterol (PROAIR HFA) 90 mcg/actuation inhaler 1 Inhaler 11     Sig: Inhale 2 puffs into the lungs every 6 (six) hours as needed.    budesonide-formoterol 160-4.5 mcg (SYMBICORT) 160-4.5 mcg/actuation HFAA 1 Inhaler 12     Sig: Inhale 2 puffs into the lungs every 12 (twelve) hours. Wash out mouth after using    guaifenesin (MUCINEX) 600 mg 12 hr tablet 60 tablet 11     Sig: Take 2 tablets (1,200 mg total) by mouth 2 (two) times daily. As needed for flares     Asthma with COPD  -     albuterol (PROAIR HFA) 90 mcg/actuation inhaler; Inhale 2 puffs into the lungs every 6 (six) hours as needed.  Dispense: 1 Inhaler; Refill: 11  -     budesonide-formoterol 160-4.5 mcg (SYMBICORT) 160-4.5 mcg/actuation HFAA; Inhale 2 puffs into the lungs every 12 (twelve) hours. Wash out mouth after using  Dispense: 1 Inhaler; Refill: 12  -     guaifenesin (MUCINEX) 600 mg 12 hr tablet; Take 2 tablets (1,200 mg total) by mouth 2 (two) times daily. As needed for flares   Dispense: 60 tablet; Refill: 11  -     Complete PFT with bronchodilator; Future; Expected date: 08/16/2017    Pulmonary edema cardiac cause    Chronic atrial fibrillation    Chronic diastolic heart failure    Nonrheumatic aortic valve insufficiency    Continue diuretics  - need to use daily       Return in about 1 year (around 8/16/2018) for cxr and pft.    MEDICAL DECISION MAKING: Moderate to high complexity.  Overall, the multiple problems listed are of moderate to high severity that may impact quality of life and activities of daily living. Side effects of medications, treatment plan as well as options and alternatives reviewed and discussed with patient. There was counseling of patient concerning these issues.    Total time spent in face to face counseling and coordination of care - 40  minutes over 50% of time was used in discussion of prognosis, risks, benefits of treatment, instructions and compliance with regimen . Discussion with other physicians or health care providers (DME, NP, pharmacy, respiratory therapy) occurred.

## 2017-08-21 NOTE — TELEPHONE ENCOUNTER
----- Message from Virgilio Perez MD sent at 8/17/2017  5:40 PM CDT -----  Reviewed results. All OK. Please open telephone encounter, call patient and inform him/ her of results,then document in encounter.  Please do not route back to me.   Thanks.

## 2017-09-21 NOTE — PROGRESS NOTES
Subjective:      Patient ID: Marcel Montalvo is a 77 y.o. female.    Chief Complaint: Follow-up (heart issues, kidney, )    Patient presents today for of multiple issues.  Since I last saw her she has seen the cardiologist .  She's doing okay.  She still on Xarelto but is causing a significant amount of bruising.  Heart rate is around 58.  Still in A. fib and still on antiarrhythmics.  No problems with swelling at this time.  Echo most recently is been reviewed.  No new findings.      She is a little bit more depressed.  I believe it's more related to her chronic issues and her fatigue.  She's already on Lexapro but only taking 5 mg.  She would be willing to consider increasing this dose.    She also has hypertension which is currently controlled no problems at this time.    She has chronic issues with osteoarthritis and is immunocompromised currently due to her Sjogren's and medications.  She seems to be getting around fairly well but she is limits activity when she doesn't feel like she can do things.    She also had a hospitalization for a very large abdominal wall hematoma earlier this year.  She is having some slight fatigue so she's willing to do additional blood work because she still on the Xarelto and still bruising some she is not having much significant pain at all in the right side anymore.    She is having still some chronic kidney disease.  She's not been drinking as much water.  In the last 5 months she's gone from normal kidney function to about stage III.  She's willing to repeat again today.    She also had some elevated alk phosphatase levels.  We did ice his specific enzymes which shows that they're more from a liver source.  This may have been worsened due to her abdominal wall hematoma or potentially due to her medications.                Lab Results   Component Value Date    WBC 4.89 08/02/2017    HGB 11.2 (L) 08/02/2017    HCT 36.1 (L) 08/02/2017     08/02/2017    CHOL 192 05/02/2017     TRIG 164 (H) 05/02/2017    HDL 42 05/02/2017    ALT 30 08/02/2017    AST 32 08/02/2017     08/02/2017    K 4.0 08/02/2017     08/02/2017    CREATININE 1.5 (H) 08/02/2017    BUN 25 (H) 08/02/2017    CO2 27 08/02/2017    TSH 3.535 05/02/2017    INR 1.2 03/26/2017    HGBA1C 6.2 (H) 08/02/2017       Review of Systems   Constitutional: Positive for activity change and fatigue. Negative for appetite change.   Respiratory: Positive for shortness of breath. Negative for cough.    Cardiovascular: Negative for chest pain, palpitations and leg swelling.   Gastrointestinal: Negative for abdominal distention, abdominal pain, constipation and diarrhea.   Musculoskeletal: Positive for arthralgias.   Neurological: Negative for light-headedness and headaches.   Psychiatric/Behavioral: Positive for dysphoric mood. Negative for sleep disturbance. The patient is not nervous/anxious.      Objective:     Physical Exam   Constitutional: She appears well-developed and well-nourished.   HENT:   Head: Normocephalic and atraumatic.   Right Ear: Tympanic membrane normal.   Left Ear: Tympanic membrane normal.   Mouth/Throat: Oropharynx is clear and moist.   Eyes: Conjunctivae and EOM are normal.   Neck: Normal range of motion. Neck supple.   Cardiovascular: An irregular rhythm present. Bradycardia present.    Pulmonary/Chest: Effort normal and breath sounds normal.   Abdominal: Soft. Bowel sounds are normal. She exhibits no distension and no mass. There is no tenderness. There is no rebound and no guarding.   Psychiatric: She has a normal mood and affect. Her behavior is normal.   Nursing note and vitals reviewed.    Assessment:     1. Elevated alkaline phosphatase level    2. Chronic atrial fibrillation    3. On anticoagulant therapy    4. Type 2 diabetes mellitus without complication, without long-term current use of insulin    5. Immunocompromised    6. Osteoporosis, postmenopausal    7. Abdominal wall hematoma, subsequent  encounter    8. Chronic diastolic heart failure    9. Long-term (current) use of anticoagulants    10. Stage 3 chronic kidney disease    11. Coronary artery disease, angina presence unspecified, unspecified vessel or lesion type, unspecified whether native or transplanted heart    12. Gastroesophageal reflux disease, esophagitis presence not specified    13. PAF (paroxysmal atrial fibrillation)    14. Congestive heart failure, unspecified congestive heart failure chronicity, unspecified congestive heart failure type    15. Gastroesophageal reflux disease with esophagitis    16. Moderate episode of recurrent major depressive disorder      Plan:   Marcel was seen today for follow-up.    Diagnoses and all orders for this visit:    Elevated alkaline phosphatase level-noted to be from a liver source.  Repeat liver enzymes today.  -     CBC auto differential; Future  -     Comprehensive metabolic panel; Future    Chronic atrial fibrillation-stable on current medications followed by multiple specialist    On anticoagulant therapy-checking for anemia due to extensive amount of bruising  -     CBC auto differential; Future  -     Comprehensive metabolic panel; Future    Type 2 diabetes mellitus without complication, without long-term current use of insulin-stable with diet alone.  Labs reviewed  -     CBC auto differential; Future  -     Comprehensive metabolic panel; Future    Immunocompromised-due to current medications stable    Osteoporosis, postmenopausal-on Prolia    Abdominal wall hematoma, subsequent encounter-healing checking blood counts today    Chronic diastolic heart failure-followed by multiple specialists stable    Long-term (current) use of anticoagulants  -     CBC auto differential; Future  -     Comprehensive metabolic panel; Future    Stage 3 chronic kidney disease-new repeating kidney function today.  -     CBC auto differential; Future  -     Comprehensive metabolic panel; Future    Coronary artery  disease, angina presence unspecified, unspecified vessel or lesion type, unspecified whether native or transplanted heart-stable refilled medications  -     nitroGLYCERIN (NITROSTAT) 0.4 MG SL tablet; Place 1 tablet (0.4 mg total) under the tongue every 5 (five) minutes as needed for Chest pain.    Gastroesophageal reflux disease, esophagitis presence not specified-stable  Congestive heart failure, unspecified congestive heart failure chronicity, unspecified congestive heart failure type - stable, Continue with current medications and interventions. Labs reviewed.       Gastroesophageal reflux disease with esophagitis - stable, Continue with current medications and interventions. Labs reviewed.       Moderate episode of recurrent major depressive disorder  Comments:  bump up to full tablet 1 month 10mg and then decide if staying there or go back 5mg.     Time spent: 45 minutes in face to face discussion concerning diagnosis, prognosis, review of lab and test results, benefits of treatment as well as management of disease, counseling of patient and coordination of care between various health care providers . Greater than half the time spent was used for coordination of care and counseling of patient.           Return in about 4 months (around 1/21/2018) for chronic issues Dr Moreno.

## 2017-09-25 NOTE — TELEPHONE ENCOUNTER
----- Message from Alise Babb sent at 9/25/2017 10:29 AM CDT -----  Contact: pt   Pt was returning nurse call.   ..[phone] (home)

## 2017-09-25 NOTE — TELEPHONE ENCOUNTER
----- Message from Alise Babb sent at 9/25/2017 10:29 AM CDT -----  Contact: pt   Pt was returning nurse call.   ..[phone] (home)

## 2017-09-25 NOTE — PROGRESS NOTES
"SUBJECTIVE:   Marcel Montalvo is a 77 y.o. female   Corrected distance visual acuity was 20/20 in the right eye and 20/20 -2 in the left eye.   Chief Complaint   Patient presents with    plaquenil check     6 month MOCT and IOP check    Dry Eye     ou dryness blurs va        HPI:  HPI     plaquenil check    Additional comments: 6 month MOCT and IOP check           Dry Eye    Additional comments: ou dryness blurs va           Comments   1. Sjogren's on Plaquenil 2012  + Dry Mouth  + Asthma  Punctal Plugs  2. Borderline DM  3. AMD  4. PCIOL OS Toric 5-8-14  PCIOL OD Toric 6-4-14        Restasis BID OU  AT's PRN OU  Zaditor Daily OU       Last edited by Maura Roberts MA on 9/25/2017  9:16 AM. (History)        Assessment /Plan :  1. Sjogren's syndrome with other organ involvement    2. Encounter for eye exam due to high risk medication No evidence of plaquenil toxicity at this time. Importance of regular follow-up and need to call immediately if change in vision or color vision.    Return to clinic in "6 months  or as needed  With SD-MOCT, Dilation and color vision testing     3. Pseudophakia stable   4. Dry eyes, bilateral add Xiidra OU BID                 "

## 2017-09-25 NOTE — TELEPHONE ENCOUNTER
Patient reported in Cardiology Lobby with concerns and wanting to notify Antonio Fletcher and Cabrera of Dr. Moreno's findings and plan of care.  She has elevated Liver enzyme, complaining of throat tickle and cough, heart ryhthm is good. Scheduled to see Dr. Ruano on Saturday.  Scheduled for stat Flecainide level today, currently on Flecainide 100 mg bid since 6/16/17    Antonio Fletcher and Cabrera please advise further

## 2017-09-30 NOTE — LETTER
September 30, 2017      Alexandra Moreno MD  [address]  [address]  [place] [address]           [facility]  [address]  [place] [address]  Phone: [phone]  Fax: [phone]          Patient: Marcel Montalvo   MR Number: [id number]   YOB: 1940   Date of Visit: 9/30/2017       Dear Dr. Alexandra Moreno:    Thank you for referring Marcel Montalvo to me for evaluation. Attached you will find relevant portions of my assessment and plan of care.    If you have questions, please do not hesitate to call me. I look forward to following Marcel Montalov along with you.    Sincerely,    Bowen Ruano MD    Enclosure  CC:  No Recipients    If you would like to receive this communication electronically, please contact [email or web address] or [phone] to request more information on [facility] Link access.    For providers and/or their staff who would like to refer a patient to [facility], please contact us through our one-stop-shop provider referral line, [facility], at [phone].    If you feel you have received this communication in error or would no longer like to receive these types of communications, please e-mail [email or web address]

## 2017-09-30 NOTE — PATIENT INSTRUCTIONS
Elevated serum alkaline phosphatase level  Comments:  Differential diagnosis:  - Fatty Liver  - Autoimmune disorder  - Medication adverse reaction  Orders:  -     Anti-smooth muscle antibody; Future; Expected date: 09/30/2017  -     Hepatic function panel; Future; Expected date: 09/30/2017  -     Protime-INR; Future; Expected date: 09/30/2017  -     Hepatitis B surface antibody; Future; Expected date: 09/30/2017  -     Tissue transglutaminase, IgA; Future; Expected date: 09/30/2017  -     Hepatitis A antibody, IgG; Future; Expected date: 09/30/2017  -     Antimitochondrial antibody; Future; Expected date: 09/30/2017  -     Ceruloplasmin; Future; Expected date: 09/30/2017  -     Hepatitis C antibody; Future; Expected date: 09/30/2017  -     Hepatitis B surface antigen; Future; Expected date: 09/30/2017  -     Hepatitis B core antibody, total; Future; Expected date: 09/30/2017  -     US Abdomen Limited; Future; Expected date: 09/30/2017  -     Ferritin; Future; Expected date: 09/30/2017    Chronic atrial fibrillation  -     Protime-INR; Future; Expected date: 09/30/2017      Thanks for trusting us with your healthcare needs and using [facility]. If you want to ask us a question, you can do so by replying to this message or by calling [phone].    Sincerely,    Bowen Ruano M.D.      To rate your experience with Dr. Ruano please click on the link below:    [email or web address]          Nonalcoholic Fatty Liver Disease (NAFLD)  Nonalcoholic fatty liver disease (NAFLD) is a common disease of the liver. It occurs when you have too much fat in the liver. If NAFLD is severe, it can cause liver damage that seems like the damage caused by drinking too much alcohol. But NAFLD is not caused by drinking alcohol. This sheet tells you more about NAFLD and how it can be managed.    How the liver works   The liver is an organ in the upper right side of the belly (abdomen). It has  many important jobs. These include:  · Breaking down (metabolizing) proteins, carbohydrates, and fats  · Making a substance called bile that helps break down fats  · Storing and releasing sugar (glucose) into the blood to give the body energy  · Removing toxins from the blood  · Helping with blood clotting  Understanding NAFLD  A healthy liver may contain some fat. But if too much fat builds up in the liver, this causes NAFLD. NAFLD can be mild, causing fatty liver. Or it can be more severe and show inflammation, as well as the fat. This can cause non-alcoholic steatohepatitis (DESOUZA).  · Fatty liver. With fatty liver, the liver simply has more fat than normal. This extra fat usually does not harm the liver.  · DESOUZA. With DESOUZA, the fatty liver becomes inflamed over time. DESOUZA is serious because it can lead to scarring of the liver (fibrosis). Over time, the scarring may lead to cirrhosis of the liver. This can eventually cause liver failure or liver cancer.  Causes and risk factors of NAFLD  Doctors don't know what causes NAFLD. But certain things make the problem more likely to happen. These include:  · Obesity  · Prediabetes or diabetes  · High levels of fat found in the blood (cholesterol and triglycerides)  · Being exposed to certain medicines   Symptoms of NAFLD  Most people with NAFLD have no symptoms. If symptoms do occur, they can include:  · Tiredness  · Weakness  · Weight loss  · Loss of appetite  · Nausea and vomiting  · Belly pain and cramping  · Yellowing of the skin and eyes (jaundice), as well as dark urine, or light-colored stools  · Swelling in the belly or legs  Diagnosing NAFLD  Your healthcare provider may think you have NAFLD if routine blood tests show high levels of liver enzymes. This may mean you have a liver problem. You may need one or more imaging tests, such as an ultrasound, CT, or MRI. You may need more blood tests to look for other causes of liver disease. You may also need a liver  biopsy. During this test, a hollow needle is used to remove a tiny tissue sample from your liver. This tissue is then checked in a lab. This test can find signs of damage to liver tissue. It can also help figure out the cause of the damage and tell the difference between fatty liver and DESOUZA.  Treating NAFLD  Treatment for NAFLD varies for each person. The best early treatment is to treat any underlying conditions causing metabolic syndrome. This is the name for a group of conditions that includes:  · High blood pressure  · High levels of cholesterol and triglycerides  · Being overweight or obese  · Diabetes  Your healthcare provider will monitor your health and treat any symptoms or underlying health problems you have. Your provider will also work with you to control your risk factors. This will make liver damage less likely. In fact, treating those underlying conditions can often improve liver disease. You may need to take certain medicines, but no medicine will cure NAFLD. This is why treating the underlying conditions is most important. Your plan may include:  · Losing extra weight  · Getting regular exercise  · Controlling diabetes and high cholesterol or triglyceride levels  · Taking medicines and vitamins as prescribed by your provider  · Quitting smoking  · Not drinking alcohol  · Eating a healthy and balanced diet  Living with NAFLD  If NAFLD is caught early, it can be managed with treatment. Your healthcare provider will discuss further treatment choices with you as needed.  Be sure to ask your provider about recommended vaccines. These include vaccines for viruses that can cause liver disease.  Date Last Reviewed: 12/1/2016  © 9670-9952 [employer]. [address] [place] [address], [place] [address]. All rights reserved. This information is not intended as a substitute for professional medical care. Always follow your healthcare professional's instructions.

## 2017-09-30 NOTE — PROGRESS NOTES
[facility]  Gastroenterology Note    Patient referred at the request of:  Alexandra Moreno MD  [id number] [place] [address]    Subjective:       Patient ID: Marcel Montalvo is a 77 y.o. female.    Chief Complaint: Elevated Hepatic Enzymes    Liver Dysfunction: Patient complains of an abnormal elevated alkaline phos that has been associated with no obvious evidence for illness. The problem was first noted  several years ago. A positive history was noted for: fatty liver, and exposure to multiple medications. Patient denies blood transfusion, use of illicit drugs, unprotected  sex, family history  of chronic liver disease, en utero exposure to hepatotrophic virus, foreign travel and family history of genetic liver disease (hemochromatosis, CF, galactosemia), alpha 1 antitrypsin deficiency, autoimmune liver disease, CMV, congenital disorders, cystic fibrosis, EBV (Robi Barr Virus), glycogen or lipid storage disease, hemolytic anemia, hepatitis A, hepatitis B, hepatitis C, mitochondrial diseases,  jaundice, obesity, Hernan's disease, or exposure to carbamazepine, phenytoin, valproate, ethanol, street drugs.      Lab Results       Component                Value               Date                       ALT                      36                  2017                 AST                      37                  2017                 ALKPHOS                  219 (H)             2017                 BILITOT                  0.8                 2017                  Past Medical History:   Diagnosis Date    *Atrial fibrillation     AF (atrial fibrillation)     Palpation: atrial fibrillation, on Coumadin and followed by cardiologist.    Anticoagulant long-term use     Anxiety     Aortic insufficiency 3/19/2014    Aortic regurgitation     Aortic regurgitation/tricuspid regurgitation per MARIO in 2007.     Asthma     Carotid artery stenosis     CEA on the  left by Dr. Maciel    Coronary artery disease     status post CABG X 3.     Diabetes mellitus     GERD (gastroesophageal reflux disease)     H. pylori infection     treated    Hyperlipidemia     intolerant to statins.    Hypertension 8/7/2013    IBS (irritable bowel syndrome)     Leg pain 3/19/2014    Long-term (current) use of anticoagulants 8/7/2013    Osteoarthritis     Osteoarthritis     Osteopenia     DEXA scan done on 06/20/12     Pancreatitis     resolved    S/P CABG (coronary artery bypass graft) 8/7/2013    S/P carotid endarterectomy 8/7/2013    S/P PTCA (percutaneous transluminal coronary angioplasty) 3/19/2014    Sjogren's syndrome     Stroke     Vitamin D deficiency disease      Past Surgical History:   Procedure Laterality Date    ABDOMINAL SURGERY      APPENDECTOMY      bilateral cataract surgery      CARPAL TUNNEL RELEASE      RT    CATARACT EXTRACTION      CEA      left    CHOLECYSTECTOMY      CORONARY ARTERY BYPASS GRAFT      2 stents    EYE SURGERY      HYSTERECTOMY      TONSILLECTOMY       Current Outpatient Prescriptions on File Prior to Visit   Medication Sig Dispense Refill    albuterol (PROAIR HFA) 90 mcg/actuation inhaler Inhale 2 puffs into the lungs every 6 (six) hours as needed. 1 Inhaler 11    albuterol-ipratropium 2.5mg-0.5mg/3mL (DUO-NEB) 0.5 mg-3 mg(2.5 mg base)/3 mL nebulizer solution Take 3 mLs by nebulization every 6 (six) hours while awake. Rescue 120 vial 0    aspirin (ECOTRIN) 81 MG EC tablet Take 1 tablet (81 mg total) by mouth once daily. 30 tablet 6    atorvastatin (LIPITOR) 40 MG tablet TAKE ONE TABLET BY MOUTH NIGHTLY 90 tablet 6    azelastine (ASTELIN) 137 mcg (0.1 %) nasal spray 1 spray by Nasal route 2 (two) times daily.      benzonatate (TESSALON) 100 MG capsule Take 1 capsule (100 mg total) by mouth 3 (three) times daily as needed for Cough. 30 capsule 1    budesonide-formoterol 160-4.5 mcg (SYMBICORT) 160-4.5 mcg/actuation HFAA  Inhale 2 puffs into the lungs every 12 (twelve) hours. Wash out mouth after using 1 Inhaler 12    carboxymethylcellulose (REFRESH PLUS) 0.5 % Dpet Place 1 drop into both eyes 3 (three) times daily as needed.      diclofenac sodium (VOLTAREN) 1 % Gel Apply 2 g topically once daily. 3 Tube 4    escitalopram oxalate (LEXAPRO) 10 MG tablet TAKE ONE-HALF TO ONE TABLET BY MOUTH EVERY DAY 90 tablet 3    flecainide (TAMBOCOR) 100 MG Tab Take 100 mg by mouth every 12 (twelve) hours.      fluticasone (FLONASE) 50 mcg/actuation nasal spray 2 sprays by Each Nare route once daily. 1 Bottle 11    furosemide (LASIX) 20 MG tablet Take 1 tablet (20 mg total) by mouth once daily. As of 8/4/17 90 tablet 3    guaifenesin (MUCINEX) 600 mg 12 hr tablet Take 2 tablets (1,200 mg total) by mouth 2 (two) times daily. As needed for flares 60 tablet 11    hydroxychloroquine (PLAQUENIL) 200 mg tablet TAKE ONE TABLET BY MOUTH ONCE DAILY 90 tablet 1    hyoscyamine (ANASPAZ) 0.125 mg TbDL Take 0.125 mg by mouth every 6 (six) hours as needed for Cramping.      lifitegrast (XIIDRA) 5 % Dpet Place 1 drop into both eyes 2 (two) times daily. 60 each 12    nebivolol (BYSTOLIC) 5 MG Tab TAKE ONE TABLET BY MOUTH TWICE DAILY 180 tablet 3    nitroGLYCERIN (NITROSTAT) 0.4 MG SL tablet Place 1 tablet (0.4 mg total) under the tongue every 5 (five) minutes as needed for Chest pain. 25 tablet 6    pantoprazole (PROTONIX) 40 MG tablet Take 1 tablet (40 mg total) by mouth 2 (two) times daily. As of 8/4/17 60 tablet 6    phenobarb-hyoscy-atropine-scop (BELLADONNA-PHENOBARBITAL) 16.2-0.1037 -0.0194 mg/5 mL Elix Take 5 mLs by mouth daily as needed (abdominal pain). 180 mL 1    potassium chloride (KLOR-CON) 10 MEQ TbSR Take 1 tablet (10 mEq total) by mouth once daily. 30 tablet 6    pramoxine 1 % Foam Place rectally 3 (three) times daily as needed. 15 g 1    PREMARIN vaginal cream PLACE 1 GRAM VAGINALLY TWICE A WEEK 30 g 3    RESTASIS 0.05 %  ophthalmic emulsion INSTILL ONE DROP INTO EACH EYE TWICE DAILY 60 each 12    rivaroxaban (XARELTO) 20 mg Tab Take 1 tablet (20 mg total) by mouth daily with dinner or evening meal. 90 tablet 3    tramadol (ULTRAM) 50 mg tablet Take 1 tablet (50 mg total) by mouth 3 (three) times daily as needed. 90 tablet 3    triamcinolone acetonide 0.1% (KENALOG) 0.1 % ointment AAA bid prn 60 g 1    vitamin D 1000 units Tab Take 2,000 Units by mouth once daily.       Current Facility-Administered Medications on File Prior to Visit   Medication Dose Route Frequency Provider Last Rate Last Dose    denosumab (PROLIA) injection 60 mg  60 mg Subcutaneous Q6 Months Jaci Day NP   60 mg at 07/12/17 1026    denosumab (PROLIA) injection 60 mg  60 mg Subcutaneous Q6 Months Luana Knight PA-C         Review of patient's allergies indicates:   Allergen Reactions    Codeine Nausea And Vomiting    Lisinopril      Other reaction(s): cough    Pacerone [amiodarone] Nausea And Vomiting    Sulfa (sulfonamide antibiotics) Nausea And Vomiting    Celecoxib Palpitations    Ciprofloxacin Hives, Itching and Rash    Colesevelam Rash and Nausea And Vomiting    Doxycycline Rash    Statins-hmg-coa reductase inhibitors Rash     Myalgia(can take atorvastatin ok -no rhabdo)per nurse josé antonio and patient  / stomach upset     Social History     Social History    Marital status:      Spouse name: N/A    Number of children: N/A    Years of education: N/A     Occupational History    Not on file.     Social History Main Topics    Smoking status: Former Smoker     Packs/day: 0.50     Years: 25.00     Quit date: 12/17/1988    Smokeless tobacco: Never Used    Alcohol use 0.0 oz/week      Comment: wine occasionally    Drug use: No    Sexual activity: Yes     Partners: Male     Other Topics Concern    Not on file     Social History Narrative    No narrative on file     Family History   Problem Relation Age of Onset    Alzheimer's  disease Mother     Hyperlipidemia Mother     Cancer Father      lung    Heart disease Maternal Uncle     Cancer Brother      stomach    Cancer Paternal Grandmother     Cancer Paternal Grandfather      stomach       Review of Systems   Constitutional: Negative for activity change, chills, diaphoresis, fatigue and fever.   HENT: Negative for ear pain, facial swelling, nosebleeds, rhinorrhea, sneezing, sore throat and trouble swallowing.    Eyes: Negative for photophobia, pain and visual disturbance.   Respiratory: Negative for apnea, cough, chest tightness, shortness of breath and wheezing.    Gastrointestinal: Negative for abdominal pain, blood in stool, constipation, nausea, rectal pain and vomiting.   Genitourinary: Negative for decreased urine volume, difficulty urinating, dysuria, flank pain and hematuria.   Musculoskeletal: Negative for arthralgias, back pain, gait problem, neck pain and neck stiffness.   Skin: Negative for pallor and rash.   Neurological: Negative for seizures, syncope, speech difficulty, weakness and headaches.   Hematological: Negative for adenopathy. Does not bruise/bleed easily.   Psychiatric/Behavioral: Negative for behavioral problems, confusion and hallucinations.       Objective:      Physical Exam   Constitutional: She is oriented to person, place, and time. She appears well-developed and well-nourished.   HENT:   Head: Normocephalic and atraumatic.   Eyes: EOM are normal. Pupils are equal, round, and reactive to light.   Neck: Normal range of motion. Neck supple. No thyromegaly present.   Cardiovascular: Normal rate, regular rhythm, normal heart sounds and intact distal pulses.    No murmur heard.  Pulmonary/Chest: Effort normal and breath sounds normal. No respiratory distress. She has no wheezes. She has no rales.   Abdominal: Soft. Bowel sounds are normal. She exhibits no distension and no mass. There is hepatomegaly. There is tenderness in the right upper quadrant. No  hernia.   Musculoskeletal: Normal range of motion. She exhibits no edema or tenderness.   Lymphadenopathy:     She has no cervical adenopathy.   Neurological: She is alert and oriented to person, place, and time. She has normal reflexes. No cranial nerve deficit.   Skin: Skin is warm and dry. No erythema.   Psychiatric: She has a normal mood and affect. Her behavior is normal.       Lab Summary Latest Ref Rng & Units 7/20/2017 8/2/2017 9/21/2017   Hemoglobin 12.0 - 16.0 g/dL (None) 11.2(L) 11.6(L)   Hematocrit 37.0 - 48.5 % (None) 36.1(L) 36.9(L)   White count 3.90 - 12.70 K/uL (None) 4.89 6.48   Platelet count 150 - 350 K/uL (None) 179 178   Sodium 136 - 145 mmol/L 141 142 142   Potassium 3.5 - 5.1 mmol/L 4.0 4.0 4.4   Calcium 8.7 - 10.5 mg/dL 9.8 9.9 9.9   Phosphorus - (None) (None) (None)   Creatinine 0.5 - 1.4 mg/dL 1.2 1.5(H) 1.2   AST 10 - 40 U/L (None) 32 37   Alk Phos 55 - 135 U/L (None) 160(H) 219(H)   Bilirubin 0.1 - 1.0 mg/dL (None) 1.0 0.8   Glucose 70 - 110 mg/dL 129(H) 93 92   Cholesterol - (None) (None) (None)   HDL cholesterol - (None) (None) (None)   Triglycerides - (None) (None) (None)   LDL calc - (None) (None) (None)   Total protein 6.0 - 8.4 g/dL (None) 6.9 7.3   Albumin 3.5 - 5.2 g/dL (None) 4.2 4.0   A1C - (None) (None) (None)   PSA Screen - (None) (None) (None)   Some recent data might be hidden       Assessment:       1. Elevated serum alkaline phosphatase level    2. Chronic atrial fibrillation        Plan:       Elevated serum alkaline phosphatase level  Comments:  Differential diagnosis:  - Fatty Liver  - Autoimmune disorder  - Medication adverse reaction  Orders:  -     Anti-smooth muscle antibody; Future; Expected date: 09/30/2017  -     Hepatic function panel; Future; Expected date: 09/30/2017  -     Protime-INR; Future; Expected date: 09/30/2017  -     Hepatitis B surface antibody; Future; Expected date: 09/30/2017  -     Tissue transglutaminase, IgA; Future; Expected date:  09/30/2017  -     Hepatitis A antibody, IgG; Future; Expected date: 09/30/2017  -     Antimitochondrial antibody; Future; Expected date: 09/30/2017  -     Ceruloplasmin; Future; Expected date: 09/30/2017  -     Hepatitis C antibody; Future; Expected date: 09/30/2017  -     Hepatitis B surface antigen; Future; Expected date: 09/30/2017  -     Hepatitis B core antibody, total; Future; Expected date: 09/30/2017  -     US Abdomen Limited; Future; Expected date: 09/30/2017  -     Ferritin; Future; Expected date: 09/30/2017    Chronic atrial fibrillation  -     Protime-INR; Future; Expected date: 09/30/2017      Depending on the above results, I will make a decision whether a liver biopsy is needed.       Thanks for letting us participate in the care of your patient.    Bowen Ruano M.D.

## 2017-10-02 NOTE — TELEPHONE ENCOUNTER
Will notify patient and assure that Flecainide does not cause hepatic injury.  Also check with lab for results of Flec level drawn 9/25/17

## 2017-10-09 NOTE — TELEPHONE ENCOUNTER
----- Message from Kassy Bacon sent at 10/9/2017  9:24 AM CDT -----  Contact: patient  Returning your call. please call patient ASAP @ [phone]. Thanks, matt

## 2017-10-09 NOTE — TELEPHONE ENCOUNTER
"Called pt, she is sick, stating leaving tomorrow morning for a trip and wanted something called in. Explained to her that we would have to see her. She declined coming in on tomorrow. Offered her to come in as early as 7:00 am even though appointment time stated later. She stated no that she could not come at all tomorrow that she is leaving. Offered UC and she declined stating that she is "not fooling with them". She stated that when they get back if she is still not feeling well she then will call us back.   "

## 2017-10-09 NOTE — TELEPHONE ENCOUNTER
----- Message from Dena Hillman sent at 10/9/2017  7:47 AM CDT -----  Contact: self [phone]  States that she needs to speak to nurse regarding some sinus problems she is having. Please call back at [phone]//thank you acc

## 2017-11-10 NOTE — PROGRESS NOTES
Subjective:   Patient ID:  Marcel Montalvo is a 77 y.o. female who presents for follow up of Chest Pain (Patient presents to clinic today for 6 month follow up.); Atrial Fibrillation; Palpitations; Shortness of Breath; Atrial Flutter; and Carotid Artery Disease      HPI  A 78 yo nfemale with h/o cda s/p cabg afib flutter s/p ablation htn hlp   is here for f/u cad  has issues with nose and gum bleed. Has bee havingh exertional shortness of breath and chest pain she is getting tired and has to rest by mid day. No leg swelling orthopnea pnd. She si complaint with meds. She is in regular rhythm no tia or claudication.   Past Medical History:   Diagnosis Date    *Atrial fibrillation     AF (atrial fibrillation)     Palpation: atrial fibrillation, on Coumadin and followed by cardiologist.    Anticoagulant long-term use     Anxiety     Aortic insufficiency 3/19/2014    Aortic regurgitation     Aortic regurgitation/tricuspid regurgitation per MARIO in April 2007.     Asthma     Carotid artery stenosis     CEA on the left by Dr. Maciel    Coronary artery disease     status post CABG X 3.     Diabetes mellitus     GERD (gastroesophageal reflux disease)     H. pylori infection     treated    Hyperlipidemia     intolerant to statins.    Hypertension 8/7/2013    IBS (irritable bowel syndrome)     Leg pain 3/19/2014    Long-term (current) use of anticoagulants 8/7/2013    Osteoarthritis     Osteoarthritis     Osteopenia     DEXA scan done on 06/20/12     Pancreatitis     resolved    S/P CABG (coronary artery bypass graft) 8/7/2013    S/P carotid endarterectomy 8/7/2013    S/P PTCA (percutaneous transluminal coronary angioplasty) 3/19/2014    Sjogren's syndrome     Stroke     Vitamin D deficiency disease        Past Surgical History:   Procedure Laterality Date    ABDOMINAL SURGERY      APPENDECTOMY      bilateral cataract surgery      CARPAL TUNNEL RELEASE      RT    CATARACT EXTRACTION      CEA       left    CHOLECYSTECTOMY      CORONARY ARTERY BYPASS GRAFT      2 stents    EYE SURGERY      HYSTERECTOMY      TONSILLECTOMY         Social History   Substance Use Topics    Smoking status: Former Smoker     Packs/day: 0.50     Years: 25.00     Quit date: 12/17/1988    Smokeless tobacco: Never Used    Alcohol use 0.0 oz/week      Comment: wine occasionally       Family History   Problem Relation Age of Onset    Alzheimer's disease Mother     Hyperlipidemia Mother     Cancer Father      lung    Heart disease Maternal Uncle     Cancer Brother      stomach    Cancer Paternal Grandmother     Cancer Paternal Grandfather      stomach       Current Outpatient Prescriptions   Medication Sig    albuterol (PROAIR HFA) 90 mcg/actuation inhaler Inhale 2 puffs into the lungs every 6 (six) hours as needed.    aspirin (ECOTRIN) 81 MG EC tablet Take 1 tablet (81 mg total) by mouth once daily.    atorvastatin (LIPITOR) 40 MG tablet TAKE ONE TABLET BY MOUTH NIGHTLY    azelastine (ASTELIN) 137 mcg (0.1 %) nasal spray 1 spray by Nasal route 2 (two) times daily.    benzonatate (TESSALON) 100 MG capsule Take 1 capsule (100 mg total) by mouth 3 (three) times daily as needed for Cough.    biotin 1 mg tablet Take 1,000 mcg by mouth 3 (three) times daily.    budesonide-formoterol 160-4.5 mcg (SYMBICORT) 160-4.5 mcg/actuation HFAA Inhale 2 puffs into the lungs every 12 (twelve) hours. Wash out mouth after using    carboxymethylcellulose (REFRESH PLUS) 0.5 % Dpet Place 1 drop into both eyes 3 (three) times daily as needed.    diclofenac sodium (VOLTAREN) 1 % Gel Apply 2 g topically once daily.    escitalopram oxalate (LEXAPRO) 10 MG tablet TAKE ONE-HALF TO ONE TABLET BY MOUTH EVERY DAY    fexofenadine (ALLEGRA) 180 MG tablet Take 180 mg by mouth once daily.    flecainide (TAMBOCOR) 100 MG Tab Take 100 mg by mouth every 12 (twelve) hours.    fluticasone (FLONASE) 50 mcg/actuation nasal spray 2 sprays by Each Nare  route once daily.    furosemide (LASIX) 20 MG tablet Take 1 tablet (20 mg total) by mouth once daily. As of 8/4/17    guaifenesin (MUCINEX) 600 mg 12 hr tablet Take 2 tablets (1,200 mg total) by mouth 2 (two) times daily. As needed for flares    hydroxychloroquine (PLAQUENIL) 200 mg tablet TAKE ONE TABLET BY MOUTH ONCE DAILY    hyoscyamine (ANASPAZ) 0.125 mg TbDL Take 0.125 mg by mouth every 6 (six) hours as needed for Cramping.    lifitegrast (XIIDRA) 5 % Dpet Place 1 drop into both eyes 2 (two) times daily.    nebivolol (BYSTOLIC) 5 MG Tab TAKE ONE TABLET BY MOUTH TWICE DAILY    nitroGLYCERIN (NITROSTAT) 0.4 MG SL tablet Place 1 tablet (0.4 mg total) under the tongue every 5 (five) minutes as needed for Chest pain.    pantoprazole (PROTONIX) 40 MG tablet Take 1 tablet (40 mg total) by mouth 2 (two) times daily. As of 8/4/17    phenobarb-hyoscy-atropine-scop (BELLADONNA-PHENOBARBITAL) 16.2-0.1037 -0.0194 mg/5 mL Elix Take 5 mLs by mouth daily as needed (abdominal pain).    potassium chloride (KLOR-CON) 10 MEQ TbSR Take 1 tablet (10 mEq total) by mouth once daily.    pramoxine 1 % Foam Place rectally 3 (three) times daily as needed.    PREMARIN vaginal cream PLACE 1 GRAM VAGINALLY TWICE A WEEK    RESTASIS 0.05 % ophthalmic emulsion INSTILL ONE DROP INTO EACH EYE TWICE DAILY    rivaroxaban (XARELTO) 20 mg Tab Take 1 tablet (20 mg total) by mouth daily with dinner or evening meal.    tramadol (ULTRAM) 50 mg tablet Take 1 tablet (50 mg total) by mouth 3 (three) times daily as needed.    triamcinolone acetonide 0.1% (KENALOG) 0.1 % ointment AAA bid prn    vitamin D 1000 units Tab Take 2,000 Units by mouth once daily.    albuterol-ipratropium 2.5mg-0.5mg/3mL (DUO-NEB) 0.5 mg-3 mg(2.5 mg base)/3 mL nebulizer solution Take 3 mLs by nebulization every 6 (six) hours while awake. Rescue     Current Facility-Administered Medications   Medication    denosumab (PROLIA) injection 60 mg    denosumab (PROLIA)  injection 60 mg     Current Outpatient Prescriptions on File Prior to Visit   Medication Sig    albuterol (PROAIR HFA) 90 mcg/actuation inhaler Inhale 2 puffs into the lungs every 6 (six) hours as needed.    aspirin (ECOTRIN) 81 MG EC tablet Take 1 tablet (81 mg total) by mouth once daily.    atorvastatin (LIPITOR) 40 MG tablet TAKE ONE TABLET BY MOUTH NIGHTLY    azelastine (ASTELIN) 137 mcg (0.1 %) nasal spray 1 spray by Nasal route 2 (two) times daily.    benzonatate (TESSALON) 100 MG capsule Take 1 capsule (100 mg total) by mouth 3 (three) times daily as needed for Cough.    budesonide-formoterol 160-4.5 mcg (SYMBICORT) 160-4.5 mcg/actuation HFAA Inhale 2 puffs into the lungs every 12 (twelve) hours. Wash out mouth after using    carboxymethylcellulose (REFRESH PLUS) 0.5 % Dpet Place 1 drop into both eyes 3 (three) times daily as needed.    diclofenac sodium (VOLTAREN) 1 % Gel Apply 2 g topically once daily.    escitalopram oxalate (LEXAPRO) 10 MG tablet TAKE ONE-HALF TO ONE TABLET BY MOUTH EVERY DAY    flecainide (TAMBOCOR) 100 MG Tab Take 100 mg by mouth every 12 (twelve) hours.    fluticasone (FLONASE) 50 mcg/actuation nasal spray 2 sprays by Each Nare route once daily.    furosemide (LASIX) 20 MG tablet Take 1 tablet (20 mg total) by mouth once daily. As of 8/4/17    guaifenesin (MUCINEX) 600 mg 12 hr tablet Take 2 tablets (1,200 mg total) by mouth 2 (two) times daily. As needed for flares    hydroxychloroquine (PLAQUENIL) 200 mg tablet TAKE ONE TABLET BY MOUTH ONCE DAILY    hyoscyamine (ANASPAZ) 0.125 mg TbDL Take 0.125 mg by mouth every 6 (six) hours as needed for Cramping.    lifitegrast (XIIDRA) 5 % Dpet Place 1 drop into both eyes 2 (two) times daily.    nebivolol (BYSTOLIC) 5 MG Tab TAKE ONE TABLET BY MOUTH TWICE DAILY    nitroGLYCERIN (NITROSTAT) 0.4 MG SL tablet Place 1 tablet (0.4 mg total) under the tongue every 5 (five) minutes as needed for Chest pain.    pantoprazole (PROTONIX)  40 MG tablet Take 1 tablet (40 mg total) by mouth 2 (two) times daily. As of 8/4/17    phenobarb-hyoscy-atropine-scop (BELLADONNA-PHENOBARBITAL) 16.2-0.1037 -0.0194 mg/5 mL Elix Take 5 mLs by mouth daily as needed (abdominal pain).    potassium chloride (KLOR-CON) 10 MEQ TbSR Take 1 tablet (10 mEq total) by mouth once daily.    pramoxine 1 % Foam Place rectally 3 (three) times daily as needed.    PREMARIN vaginal cream PLACE 1 GRAM VAGINALLY TWICE A WEEK    RESTASIS 0.05 % ophthalmic emulsion INSTILL ONE DROP INTO EACH EYE TWICE DAILY    rivaroxaban (XARELTO) 20 mg Tab Take 1 tablet (20 mg total) by mouth daily with dinner or evening meal.    tramadol (ULTRAM) 50 mg tablet Take 1 tablet (50 mg total) by mouth 3 (three) times daily as needed.    triamcinolone acetonide 0.1% (KENALOG) 0.1 % ointment AAA bid prn    vitamin D 1000 units Tab Take 2,000 Units by mouth once daily.    albuterol-ipratropium 2.5mg-0.5mg/3mL (DUO-NEB) 0.5 mg-3 mg(2.5 mg base)/3 mL nebulizer solution Take 3 mLs by nebulization every 6 (six) hours while awake. Rescue     Current Facility-Administered Medications on File Prior to Visit   Medication    denosumab (PROLIA) injection 60 mg    denosumab (PROLIA) injection 60 mg       Review of Systems   Constitution: Negative for diaphoresis, weakness, malaise/fatigue and weight gain.   HENT: Positive for congestion. Negative for hoarse voice.    Eyes: Negative for double vision and visual disturbance.   Cardiovascular: Positive for chest pain and dyspnea on exertion. Negative for claudication, cyanosis, irregular heartbeat, leg swelling, near-syncope, orthopnea, palpitations, paroxysmal nocturnal dyspnea and syncope.   Respiratory: Positive for shortness of breath and wheezing. Negative for cough, hemoptysis and snoring.    Hematologic/Lymphatic: Negative for bleeding problem. Bruises/bleeds easily.   Skin: Negative for color change and poor wound healing.   Musculoskeletal: Positive for  "arthritis, back pain, joint pain and stiffness. Negative for muscle cramps, muscle weakness and myalgias.   Gastrointestinal: Negative for bloating, abdominal pain, change in bowel habit, diarrhea, heartburn, hematemesis, hematochezia, melena and nausea.   Neurological: Negative for excessive daytime sleepiness, dizziness, headaches, light-headedness, loss of balance and numbness.   Psychiatric/Behavioral: Negative for memory loss. The patient does not have insomnia.    Allergic/Immunologic: Negative for hives.       Objective:   Physical Exam  Vitals:    11/10/17 1111   BP: (!) 162/50   Pulse: 60   Weight: 58 kg (127 lb 13.9 oz)   Height: 5' 2" (1.575 m)   Constitutional: She is oriented to person, place, and time. She appears well-developed and well-nourished. She does not appear ill. No distress.   HENT:   Head: Normocephalic and atraumatic.   Eyes: EOM are normal. Pupils are equal, round, and reactive to light. Right eye exhibits no discharge. Left eye exhibits no discharge. No scleral icterus.   Neck: Neck supple. Normal carotid pulses, no hepatojugular reflux and no JVD present. Carotid bruit is not present. No tracheal deviation present. No thyromegaly present.   Scar cea well healed.   Cardiovascular: Normal rate, irregularily irregular rhythm, intact distal pulses and normal pulses. Exam reveals no gallop and no friction rub.   Murmur heard.   High-pitched blowing decrescendo early diastolic murmur is present with a grade of 1/6 at the upper right sternal border radiating to the apex   Pulses:   Carotid pulses are on the right side with bruit, and on the left side with bruit.   Femoral pulses are on the right side with bruit, and on the left side with bruit.  Abdominal bruits noted.   Pulmonary/Chest: Effort normal and breath sounds normal. No respiratory distress. She has no wheezes. She has no rhonchi. She has no rales. She exhibits no tenderness.   Scar cabg well healed.   Abdominal: Soft. Normal " appearance, normal aorta and bowel sounds are normal. She exhibits no distension, no abdominal bruit, no ascites and no pulsatile midline mass. There is no hepatomegaly. There is no tenderness.   Musculoskeletal: She exhibits no edema.   Right shoulder: She exhibits no deformity.   Neurological: She is alert and oriented to person, place, and time. She has normal strength. No cranial nerve deficit. Coordination normal.   Skin: Skin is warm and dry. No rash noted. She is not diaphoretic. No cyanosis or erythema. Nails show no clubbing.   Psychiatric: She has a normal mood and affect. Her speech is normal and behavior is normal  Lab Results   Component Value Date    CHOL 192 05/02/2017    CHOL 105 (L) 04/03/2017    CHOL 105 (L) 04/03/2017     Lab Results   Component Value Date    HDL 42 05/02/2017    HDL 41 04/03/2017    HDL 41 04/03/2017     Lab Results   Component Value Date    LDLCALC 117.2 05/02/2017    LDLCALC 49.6 (L) 04/03/2017    LDLCALC 49.6 (L) 04/03/2017     Lab Results   Component Value Date    TRIG 164 (H) 05/02/2017    TRIG 72 04/03/2017    TRIG 72 04/03/2017     Lab Results   Component Value Date    CHOLHDL 21.9 05/02/2017    CHOLHDL 39.0 04/03/2017    CHOLHDL 39.0 04/03/2017       Chemistry        Component Value Date/Time     09/21/2017 1120    K 4.4 09/21/2017 1120     09/21/2017 1120    CO2 25 09/21/2017 1120    BUN 23 09/21/2017 1120    CREATININE 1.2 09/21/2017 1120    GLU 92 09/21/2017 1120        Component Value Date/Time    CALCIUM 9.9 09/21/2017 1120    ALKPHOS 211 (H) 10/03/2017 1050    AST 37 10/03/2017 1050    ALT 34 10/03/2017 1050    BILITOT 0.8 10/03/2017 1050    ESTGFRAFRICA 50.0 (A) 09/21/2017 1120    EGFRNONAA 43.4 (A) 09/21/2017 1120          Lab Results   Component Value Date    TSH 3.535 05/02/2017     Lab Results   Component Value Date    INR 1.8 (H) 10/03/2017    INR 1.2 03/26/2017    INR 1.3 (H) 03/25/2017     Lab Results   Component Value Date    WBC 6.48 09/21/2017     HGB 11.6 (L) 09/21/2017    HCT 36.9 (L) 09/21/2017    MCV 95 09/21/2017     09/21/2017     BMP  Sodium   Date Value Ref Range Status   09/21/2017 142 136 - 145 mmol/L Final     Potassium   Date Value Ref Range Status   09/21/2017 4.4 3.5 - 5.1 mmol/L Final     Chloride   Date Value Ref Range Status   09/21/2017 106 95 - 110 mmol/L Final     CO2   Date Value Ref Range Status   09/21/2017 25 23 - 29 mmol/L Final     BUN, Bld   Date Value Ref Range Status   09/21/2017 23 8 - 23 mg/dL Final     Creatinine   Date Value Ref Range Status   09/21/2017 1.2 0.5 - 1.4 mg/dL Final     Calcium   Date Value Ref Range Status   09/21/2017 9.9 8.7 - 10.5 mg/dL Final     Anion Gap   Date Value Ref Range Status   09/21/2017 11 8 - 16 mmol/L Final     eGFR if    Date Value Ref Range Status   09/21/2017 50.0 (A) >60 mL/min/1.73 m^2 Final     eGFR if non    Date Value Ref Range Status   09/21/2017 43.4 (A) >60 mL/min/1.73 m^2 Final     Comment:     Calculation used to obtain the estimated glomerular filtration  rate (eGFR) is the CKD-EPI equation. Since race is unknown   in our information system, the eGFR values for   -American and Non--American patients are given   for each creatinine result.       CrCl cannot be calculated (Patient's most recent lab result is older than the maximum 7 days allowed.).    Assessment:     1. Coronary artery disease involving coronary bypass graft of native heart without angina pectoris    2. Essential hypertension    3. S/P CABG (coronary artery bypass graft)    4. S/P carotid endarterectomy    5. Hyperlipidemia, unspecified hyperlipidemia type    6. Type 2 diabetes mellitus without complication, without long-term current use of insulin    7. Bilateral carotid artery stenosis    8. Persistent atrial fibrillation    9. Sjogren's syndrome with other organ involvement    10. Atrial fibrillation, unspecified type    11. Chronic diastolic heart failure     12. S/P PTCA (percutaneous transluminal coronary angioplasty)    13. Nonrheumatic aortic valve insufficiency      Although she has multiple reasons to have shortness of breath I think she deserves a functional eval to r/o ischemia.  Plan:   Continue current therapy  Cardiac low salt diet.  Risk factor modification and excercise program.  F/u in 6 months with lipid cmp   lexiscan

## 2017-12-20 NOTE — TELEPHONE ENCOUNTER
Patient complains of increased SOB with exertion, she states that she was instructed to come in to and find out if she qualify for  Oxygen., she states that her cardiac work up was normal

## 2017-12-20 NOTE — TELEPHONE ENCOUNTER
----- Message from Mercy Roberts sent at 12/20/2017 10:40 AM CST -----  Contact: Pt  Pt request call back to see how to get Oxygen ..[phone] (oyyx)

## 2017-12-21 PROBLEM — R09.02 HYPOXEMIA REQUIRING SUPPLEMENTAL OXYGEN: Status: ACTIVE | Noted: 2017-01-01

## 2017-12-21 PROBLEM — Z99.81 HYPOXEMIA REQUIRING SUPPLEMENTAL OXYGEN: Status: ACTIVE | Noted: 2017-01-01

## 2017-12-21 NOTE — ASSESSMENT & PLAN NOTE
12/21/2017 pul stress with desat to 81%   Begin NC 2 lm with exertion  Over night oximetry evaluate supplemental nocturnal oxygen need

## 2017-12-21 NOTE — PROCEDURES
"[facility]  Six Minute Walk     SUMMARY     Ordering Provider: MD Jose Angel   Interpreting Provider: MD Alexis  Performing nurse/tech/RT: Betty Lauren RRT  Diagnosis:  (Asthma with COPD)  Height: 5' 2" (157.5 cm)  Weight: 60.4 kg (133 lb 2.5 oz)  BMI (Calculated): 24.4   Patient Race:             Phase Oxygen Assessment Supplemental O2 Heart   Rate Blood Pressure Bill Dyspnea Scale Rating   Resting 94 % Room Air 57 bpm 162/70 1   Exercise        Minute        1 96 % Room Air 58 bpm     2 93 % Room Air 60 bpm     3 92 % Room Air 63 bpm     4 93 % Room Air 68 bpm     5 90 % Room Air 68 bpm     6  81 % Room Air (placed on 2lpm nc) 63 bpm 150/65 5-6   Recovery        Minute        1 91 % 2 L/M 65 bpm     2 95 % 2 L/M 64 bpm     3 97 % 2 L/M 60 bpm     4 98 % 2 L/M 59 bpm 189/79 3     Six Minute Walk Summary  6MWT Status: completed with stops  Patient Reported: Leg pain     Interpretation:  Did the patient stop or pause?: Yes  How many times did the patient stop or pause?: 2  Stop Time 1: 182  Restart Time 1: 241  Pause Time 1: 59 seconds  Stop Time 2: 518  Restart Time 2: 348  Pause Time 2: 30 seconds                    Total Time Walked (Calculated): 271 seconds  Final Partial Lap Distance (feet): 50 feet  Total Distance Meters (Calculated): 198.12 meters  Predicted Distance Meters (Calculated): 410.17 meters  Percentage of Predicted (Calculated): 48.3  Peak VO2 (Calculated): 9.92  Mets: 2.83  Has The Patient Had a Previous Six Minute Walk Test?: Yes  Comments:  (patient became nauseated and vomited)    Previous 6MWT Results  Has The Patient Had a Previous Six Minute Walk Test?: Yes  Date of Previous Test: 05/03/16  Total Time Walked: 360 seconds  Total Distance (meters): 426.72  Predicted Distance (meters): 416.7 meters  Percentage of Predicted: 102  Percent Change (Calculated): 0.54        PHYSICIAN INTERPRETATION:      Six minute walk distance is 198.12 / 410.17 meters  (48.3 % " predicted) with somewhat heavy dyspnea. Peak VO2 during walking was 9.92 Ml/min/kg [ 2.83 METS]. During exercise, there was significant desaturation while breathing room air to 81%.  Oxygen saturation improved to 98% with oxygen supplementation at 2  L /min. Blood pressure decreased significantly and Heart rate remained stable with walking. Hypertension was present prior to exercise.  This may represent an abnormal cardiovascular response to exercise. The patient reported non-pulmonary symptoms during exercise - leg pain. Significant exercise impairment is likely due to cardiovascular causes.  The patient did complete the study, walking 271 seconds of the 360 second test. The patient may benefit from using supplemental oxygen during exertion. Since the previous study in 05/03/16, exercise capacity is significantly worse.  Based upon age and body mass index, exercise capacity is less than predicted.

## 2017-12-21 NOTE — PROGRESS NOTES
"[facility]  Six Minute Walk     SUMMARY     Ordering Provider: MD Jose Angel      Performing nurse/tech/RT: Betty Lauren RRT  Diagnosis:  (Asthma with COPD)  Height: 5' 2" (157.5 cm)  Weight: 60.4 kg (133 lb 2.5 oz)  BMI (Calculated): 24.4   Patient Race:             Phase Oxygen Assessment Supplemental O2 Heart   Rate Blood Pressure Bill Dyspnea Scale Rating   Resting 94 % Room Air 57 bpm 162/70 1   Exercise        Minute        1 96 % Room Air 58 bpm     2 93 % Room Air 60 bpm     3 92 % Room Air 63 bpm     4 93 % Room Air 68 bpm     5 90 % Room Air 68 bpm     6  81 % Room Air (placed on 2lpm nc) 63 bpm 150/65 5-6   Recovery        Minute        1 91 % 2 L/M 65 bpm     2 95 % 2 L/M 64 bpm     3 97 % 2 L/M 60 bpm     4 98 % 2 L/M 59 bpm 189/79 3     Six Minute Walk Summary  6MWT Status: completed with stops  Patient Reported: Leg pain     Interpretation:  Did the patient stop or pause?: Yes  How many times did the patient stop or pause?: 2  Stop Time 1: 182  Restart Time 1: 241  Pause Time 1: 59 seconds  Stop Time 2: 518  Restart Time 2: 348  Pause Time 2: 30 seconds                    Total Time Walked (Calculated): 271 seconds  Final Partial Lap Distance (feet): 50 feet  Total Distance Meters (Calculated): 198.12 meters  Predicted Distance Meters (Calculated): 410.17 meters  Percentage of Predicted (Calculated): 48.3  Peak VO2 (Calculated): 9.92  Mets: 2.83  Has The Patient Had a Previous Six Minute Walk Test?: Yes  Comments:  (patient became nauseated and vomited)    Previous 6MWT Results  Has The Patient Had a Previous Six Minute Walk Test?: Yes  Date of Previous Test: 05/03/16  Total Time Walked: 360 seconds  Total Distance (meters): 426.72  Predicted Distance (meters): 416.7 meters  Percentage of Predicted: 102  Percent Change (Calculated): 0.54    "

## 2017-12-21 NOTE — PROGRESS NOTES
Oxygen Evaluation    1) Patient's O2 Sat on room air while at rest: 94        If at rest Sat is 89% or above, continue.    2) Patient's O2 Sat on room air while exercisin%         If exercise Sat is 88% or below, continue.    3) Patient's O2 Sat while exercising on O2: 96%  at 3 LPM         (Must show improvement from #2 for patients to qualify)    If exercise Sat on O2 shows improvement from #2, this patient qualifies for portable Oxygen.  If not, the patient does not qualify.

## 2017-12-21 NOTE — TELEPHONE ENCOUNTER
FYI:    Six minute walk test results:     Six minute walk distance is 198.12 / 410.17 meters  (48.3 % predicted) with somewhat heavy dyspnea. Peak VO2 during walking was 9.92 Ml/min/kg [ 2.83 METS]. During exercise, there was significant desaturation while breathing room air to 81%.  Oxygen saturation improved to 98% with oxygen supplementation at 2  L /min. Blood pressure decreased significantly and Heart rate remained stable with walking. Hypertension was present prior to exercise. This may represent an abnormal cardiovascular response to exercise. The patient reported non-pulmonary symptoms during exercise - leg pain. Significant exercise impairment is likely due to cardiovascular causes.The patient did complete the study, walking 271 seconds of the 360 second test. The patient may benefit from using supplemental oxygen during exertion. Since the previous study in 05/03/16, exercise capacity is significantly worse. Based upon age and body mass index, exercise capacity is less than predicted.

## 2017-12-21 NOTE — PROGRESS NOTES
Chief Complaint   Patient presents with    Shortness of Breath     assess for need oxgen     Subjective:      HPI:  Patient reports with complaint of shortness of breath long standing, with prior pul stress normal.  She feels her shortness of breath with exertion has been present over past 2 years.  Patient has a trip planned in 2 months, [place] and [place] in [place].  She wanted reevaluation to determine if she needed oxygen before she leaves for trip, since her shortness of breath keeps her from keeping up with the group she travels with.  Presents for review of pul stress done today that reveals desaturation requiring NC 2 lm for exertion, qualifying sO2 81%.     Asthma  She feels her her asthma is well controlled on current medication regimen symbicort 2 puffs twice a day. She takes albuterol nebs twice a day. No wheezing when taken, she takes as maintenance.    ACT score:19 indicating well controlled asthma    Family/Medical/Surgical/Social History  is allergic to codeine; lisinopril; pacerone [amiodarone]; sulfa (sulfonamide antibiotics); celecoxib; ciprofloxacin; colesevelam; doxycycline; and statins-hmg-coa reductase inhibitors.  family history includes Alzheimer's disease in her mother; Cancer in her brother, father, paternal grandfather, and paternal grandmother; Heart disease in her maternal uncle; Hyperlipidemia in her mother.  has a current medication list which includes the following prescription(s): albuterol, albuterol, albuterol-ipratropium 2.5mg-0.5mg/3ml, aspirin, atorvastatin, azelastine, benzonatate, biotin, budesonide-formoterol 160-4.5 mcg, carboxymethylcellulose, diclofenac sodium, escitalopram oxalate, fexofenadine, flecainide, fluticasone, furosemide, guaifenesin, hydroxychloroquine, hyoscyamine, lifitegrast, nebivolol, nitroglycerin, pantoprazole, phenobarb-hyoscy-atropine-scop, potassium chloride, pramoxine, premarin, restasis, rivaroxaban, tramadol, triamcinolone acetonide 0.1%, and  "vitamin d, and the following Facility-Administered Medications: denosumab and denosumab.   has a past surgical history that includes Hysterectomy; Cholecystectomy; Carpal tunnel release; CEA; Coronary artery bypass graft; Abdominal surgery; Tonsillectomy; Appendectomy; Eye surgery; bilateral cataract surgery; and Cataract extraction.   reports that she quit smoking about 29 years ago. She has a 12.50 pack-year smoking history. She has never used smokeless tobacco. She reports that she drinks alcohol. She reports that she does not use drugs.    Review of Systems  Review of Systems   Constitutional: Negative for fever, chills, weight loss, weight gain, activity change, appetite change, fatigue and night sweats.   HENT: Negative for postnasal drip, rhinorrhea, sinus pressure, voice change and congestion.    Eyes: Negative for redness and itching.   Respiratory: Positive for shortness of breath (with exertion chronic over past 2 years). Negative for snoring, cough, sputum production, chest tightness, wheezing, orthopnea, asthma nighttime symptoms, dyspnea on extertion, use of rescue inhaler and somnolence.    Cardiovascular: Negative.  Negative for chest pain, palpitations and leg swelling.   Genitourinary: Negative for difficulty urinating and hematuria.   Endocrine: Negative for cold intolerance and heat intolerance.    Musculoskeletal: Negative for arthralgias, gait problem, joint swelling and myalgias.   Skin: Negative.    Gastrointestinal: Negative for nausea, vomiting, abdominal pain and acid reflux.   Neurological: Negative for dizziness, weakness, light-headedness and headaches.   Hematological: Negative for adenopathy. No excessive bruising.   All other systems reviewed and are negative.       Objective:     BP (!) 150/68 (BP Location: Right arm)   Pulse (!) 56   Resp 18   Ht 5' 2.4" (1.585 m)   Wt 60.3 kg (133 lb)   SpO2 98%   BMI 24.02 kg/m²   Physical Exam   Constitutional: She is oriented to person, " "place, and time. She appears well-developed and well-nourished. She is active and cooperative.  Non-toxic appearance. She does not appear ill. No distress.   HENT:   Head: Normocephalic.   Right Ear: External ear normal.   Left Ear: External ear normal.   Nose: Nose normal.   Mouth/Throat: Oropharynx is clear and moist. No oropharyngeal exudate.   Eyes: Conjunctivae are normal.   Neck: Normal range of motion. Neck supple.   Cardiovascular: Normal rate, regular rhythm and intact distal pulses.    Murmur heard.  Pulmonary/Chest: Effort normal and breath sounds normal. No stridor. She has no wheezes. She has no rales.   Abdominal: Soft.   Musculoskeletal: Normal range of motion. She exhibits edema (1+ bilateral, on Lasix daily).   Lymphadenopathy:     She has no cervical adenopathy.   Neurological: She is alert and oriented to person, place, and time.   Skin: Skin is warm and dry.   Psychiatric: She has a normal mood and affect. Her behavior is normal. Judgment and thought content normal.   Vitals reviewed.    Results reviewed   Pulmonary stress/six minute walk done 12/21/2017: Desaturations requiring supplemental oxygen NC 2 lm with exertion  Performing nurse/tech/RT: Betty Lauren RRT  Diagnosis:  (Asthma with COPD)  Height: 5' 2" (157.5 cm)  Weight: 60.4 kg (133 lb 2.5 oz)  BMI (Calculated): 24.4   Patient Race:     Phase Oxygen Assessment Supplemental O2 Heart   Rate Blood Pressure Bill Dyspnea Scale Rating   Resting 94 % Room Air 57 bpm 162/70 1   Exercise        Minute        1 96 % Room Air 58 bpm     2 93 % Room Air 60 bpm     3 92 % Room Air 63 bpm     4 93 % Room Air 68 bpm     5 90 % Room Air 68 bpm     6  81 % Room Air (placed on 2lpm nc) 63 bpm 150/65 5-6   Recovery        Minute        1 91 % 2 L/M 65 bpm     2 95 % 2 L/M 64 bpm     3 97 % 2 L/M 60 bpm     4 98 % 2 L/M 59 bpm 189/79 3     Assessment:     1. Hypoxemia requiring supplemental oxygen    2. Shortness of breath on exertion    3. " "Chronic diastolic heart failure    4. Asthma with COPD    5. Persistent atrial fibrillation        Orders Placed This Encounter   Procedures    OXYGEN FOR HOME USE     Please provide small portable with conserving device or battery operated unit.     Order Specific Question:   Liter Flow     Answer:   2     Order Specific Question:   Duration     Answer:   With activity     Order Specific Question:   Qualifying SpO2:     Answer:   81%     Comments:   pul stress 12/21/2017     Order Specific Question:   Testing done at:     Answer:   Exercise/Activity     Order Specific Question:   Route     Answer:   nasal cannula     Order Specific Question:   Portable mode:     Answer:   pulse dose acceptable     Order Specific Question:   Device     Answer:   home concentrator with portable unit     Order Specific Question:   Length of need (in months):     Answer:   99 mos     Order Specific Question:   Patient condition with qualifying saturation     Answer:   COPD     Order Specific Question:   Height:     Answer:   5' 2.4" (1.585 m)     Order Specific Question:   Weight:     Answer:   60.3 kg (133 lb)     Order Specific Question:   Does patient have medical equipment at home?     Answer:   nebulizer     Order Specific Question:   Alternative treatment measures have been tried or considered and deemed clinically ineffective.     Answer:   Yes     Order Specific Question:   Vendor:     Answer:   Other (use comments)     Order Specific Question:   Expected Date of Delivery:     Answer:   12/21/2017    Stress test, pulmonary     Standing Status:   Future     Number of Occurrences:   1     Standing Expiration Date:   12/21/2018    PULSE OXIMETRY OVERNIGHT     Order Specific Question:   Reason for Test?     Answer:   Other     Order Specific Question:   Symptoms:     Answer:   Other     Order Specific Question:   Oxygen Settings:     Answer:   room air     Order Specific Question:   BiPAP Settings:     Answer:   na     Order " Specific Question:   CPAP Settings:     Answer:   na     Order Specific Question:   Room Air:     Answer:   Yes     Diagnostic Testing Reviewed  All relevant imaging and labs reviewed.  Pulmonary stress/six minute walk done 12/21/2017: Desaturations requiring supplemental oxygen, NC 2lm qualifying sO2 81%     Plan:     Problem List Items Addressed This Visit     Asthma with COPD     Controlled on symbicort, albuterol nebs or inhaler         Relevant Orders    PULSE OXIMETRY OVERNIGHT    Chronic diastolic heart failure (Chronic)     Followed by cardiology, on Lasix          Relevant Orders    PULSE OXIMETRY OVERNIGHT    OXYGEN FOR HOME USE    Hypoxemia requiring supplemental oxygen - Primary     12/21/2017 pul stress with desat to 81%   Begin NC 2 lm with exertion  Over night oximetry evaluate supplemental nocturnal oxygen need         Relevant Orders    PULSE OXIMETRY OVERNIGHT    OXYGEN FOR HOME USE    Persistent atrial fibrillation     Followed by cardiology           Other Visit Diagnoses     Shortness of breath on exertion        Relevant Orders    PULSE OXIMETRY OVERNIGHT    OXYGEN FOR HOME USE    Stress test, pulmonary (Completed)        Return in about 8 months (around 8/16/2018) for asthma/copd follow up w/review cxr/cpft w/Dr. Walter.

## 2017-12-22 NOTE — PROCEDURES
[facility]  [address] * [place] [address]  Telephone: [phone]  Test date: 17 Start: 17 10:25:38 Marcel Montalvo  Doctor: Dr. Espinoza End: 17 19:47:02 [id number]  Oximetry: Summary Report  Comments: Room Air  Recording time: 09:21:24 Highest pulse: 66 Highest SpO2: 96%  Excluded samplin:03:32 Lowest pulse: 45 Lowest SpO2: 77%  Total valid samplin:17:52 Mean pulse: 55 Mean SpO2: 89.9%  1 S.D.: 3.7 1 S.D.: 2.1  Time with SpO2<90: 2:49:04, 30.3%  Time with SpO2<80: 0:01:44, 0.3%  Time with SpO2<70: 0:00:00, 0.0%  Time with SpO2<60: 0:00:00, 0.0%  Time with SpO2<88: 0:59:08, 10.6%  Time with SpO2 =>90: 6:28:48, 69.7%  Time with SpO2=>80 & <90: 2:47:20, 30.0%  Time with SpO2=>70 & <80: 0:01:44, 0.3%  Time with SpO2=>60 & <70: 0:00:00, 0.0%  The longest continuous time with saturation <=89 was 00:07:32, which started at  17 17:19:22.  A desaturation event was defined as a decrease of saturation by 4 or more.  No events were excluded due to artifact.  There were 14 desaturation events over 3 minutes duration.  There were 59 desaturation events of less than 3 minutes duration during which:  The mean high was 90.8%. The mean low was 84.3%.  The number of these events that were:  > 0 & <10 seconds: 0 > 0 seconds: 59  =>10 & <20 seconds: 0 =>10 seconds: 59  =>20 & <30 seconds: 6 =>20 seconds: 59  =>30 & <40 seconds: 16 =>30 seconds: 53  =>40 & <50 seconds: 13 =>40 seconds: 37  =>50 & <60 seconds: 2 =>50 seconds: 24  =>60 seconds: 22 =>60 seconds: 22  The mean length of desaturation events that were >=10 sec & <=3 mins was: 63.6 sec.  Desaturation event index (events >=10 sec per sampled hour): 6.3  Desaturation event index (events >= 0 sec per sampled hour): 6.3  © 6134-2888 [employer] Oximetry version Standard CF3821.  Oximeter: Resp[employer]ics 920M Plus memory, 4 second resolution.  [facility] [address] * [facility]  [place] [id number]      PHYSICIAN INTERPRETATION:    OVERNIGHT OXIMETRY REPORT:    Dictated by: Scotty Rojas M.D.  Test date: 17  Dictated on: 2017      Comment: This test was performed on Room Air     A desaturation event was defined as a decrease of saturation by 4 or more.    REPORT SUMMARY  Total recording time: 09:21:24  Excluded samplin:03:32  Total valid samplin:17:52  High pulse: 66 /min  Low pulse: 45 /min    Mean pulse: 55/min    High SpO2: 96%    Low SpO2: 77%    Mean SpO2  89.9 %  Cumulative time with oxygen saturation less than 88% (TC88): 00:59:08 (10.6%)      CLINICAL INTERPRETATION   There is  significant nocturnal oxygen desaturation and  Clinical correlation is advised    Medicare Criteria Comments:   Oximetry test results suggest the patient falls under Medicare Group 1 Criteria. ( Arterial oxygen saturation at or below 88% for at least 5 minutes taken during sleep)    Details about Medicare Group Criteria coverage can be found at http://www.cms.hhs.gov/manuals/downloads/

## 2017-12-22 NOTE — TELEPHONE ENCOUNTER
OVERNIGHT OXIMETRY REPORT:    Dictated by: Scotty Rojas M.D.  Test date: 17  Dictated on: 2017      Comment: This test was performed on Room Air     A desaturation event was defined as a decrease of saturation by 4 or more.    REPORT SUMMARY  Total recording time: 09:21:24  Excluded samplin:03:32  Total valid samplin:17:52  High pulse: 66 /min  Low pulse: 45 /min    Mean pulse: 55/min    High SpO2: 96%    Low SpO2: 77%    Mean SpO2  89.9 %  Cumulative time with oxygen saturation less than 88% (TC88): 00:59:08 (10.6%)      CLINICAL INTERPRETATION   There is  significant nocturnal oxygen desaturation and  Clinical correlation is advised    Medicare Criteria Comments:   Oximetry test results suggest the patient falls under Medicare Group 1 Criteria. ( Arterial oxygen saturation at or below 88% for at least 5 minutes taken during sleep)

## 2018-01-01 ENCOUNTER — OUTPATIENT CASE MANAGEMENT (OUTPATIENT)
Dept: ADMINISTRATIVE | Facility: OTHER | Age: 78
End: 2018-01-01

## 2018-01-01 ENCOUNTER — TELEPHONE (OUTPATIENT)
Dept: INTERNAL MEDICINE | Facility: CLINIC | Age: 78
End: 2018-01-01

## 2018-01-01 ENCOUNTER — TELEPHONE (OUTPATIENT)
Dept: CARDIOLOGY | Facility: CLINIC | Age: 78
End: 2018-01-01

## 2018-01-01 ENCOUNTER — LAB VISIT (OUTPATIENT)
Dept: LAB | Facility: HOSPITAL | Age: 78
End: 2018-01-01
Attending: PHYSICIAN ASSISTANT
Payer: MEDICARE

## 2018-01-01 ENCOUNTER — OFFICE VISIT (OUTPATIENT)
Dept: CARDIOLOGY | Facility: CLINIC | Age: 78
End: 2018-01-01
Payer: MEDICARE

## 2018-01-01 ENCOUNTER — OFFICE VISIT (OUTPATIENT)
Dept: PULMONOLOGY | Facility: CLINIC | Age: 78
End: 2018-01-01
Payer: MEDICARE

## 2018-01-01 ENCOUNTER — TELEPHONE (OUTPATIENT)
Dept: GASTROENTEROLOGY | Facility: CLINIC | Age: 78
End: 2018-01-01

## 2018-01-01 ENCOUNTER — PATIENT MESSAGE (OUTPATIENT)
Dept: INTERNAL MEDICINE | Facility: CLINIC | Age: 78
End: 2018-01-01

## 2018-01-01 ENCOUNTER — PATIENT OUTREACH (OUTPATIENT)
Dept: ADMINISTRATIVE | Facility: CLINIC | Age: 78
End: 2018-01-01

## 2018-01-01 ENCOUNTER — TELEPHONE (OUTPATIENT)
Dept: RHEUMATOLOGY | Facility: CLINIC | Age: 78
End: 2018-01-01

## 2018-01-01 ENCOUNTER — TELEPHONE (OUTPATIENT)
Dept: PHARMACY | Facility: HOSPITAL | Age: 78
End: 2018-01-01

## 2018-01-01 ENCOUNTER — OFFICE VISIT (OUTPATIENT)
Dept: INTERNAL MEDICINE | Facility: CLINIC | Age: 78
End: 2018-01-01
Payer: MEDICARE

## 2018-01-01 ENCOUNTER — TELEPHONE (OUTPATIENT)
Dept: PHARMACY | Facility: CLINIC | Age: 78
End: 2018-01-01

## 2018-01-01 ENCOUNTER — OFFICE VISIT (OUTPATIENT)
Dept: GASTROENTEROLOGY | Facility: CLINIC | Age: 78
End: 2018-01-01
Payer: MEDICARE

## 2018-01-01 ENCOUNTER — HOSPITAL ENCOUNTER (OUTPATIENT)
Facility: HOSPITAL | Age: 78
Discharge: HOME | End: 2018-03-20
Attending: INTERNAL MEDICINE | Admitting: INTERNAL MEDICINE
Payer: MEDICARE

## 2018-01-01 ENCOUNTER — TELEPHONE (OUTPATIENT)
Dept: PULMONOLOGY | Facility: CLINIC | Age: 78
End: 2018-01-01

## 2018-01-01 ENCOUNTER — OFFICE VISIT (OUTPATIENT)
Dept: OPHTHALMOLOGY | Facility: CLINIC | Age: 78
End: 2018-01-01
Payer: MEDICARE

## 2018-01-01 ENCOUNTER — PATIENT MESSAGE (OUTPATIENT)
Dept: GASTROENTEROLOGY | Facility: CLINIC | Age: 78
End: 2018-01-01

## 2018-01-01 ENCOUNTER — LAB VISIT (OUTPATIENT)
Dept: LAB | Facility: HOSPITAL | Age: 78
End: 2018-01-01
Attending: INTERNAL MEDICINE
Payer: MEDICARE

## 2018-01-01 ENCOUNTER — HOSPITAL ENCOUNTER (INPATIENT)
Facility: HOSPITAL | Age: 78
LOS: 2 days | Discharge: HOME | DRG: 372 | End: 2018-05-23
Attending: EMERGENCY MEDICINE | Admitting: INTERNAL MEDICINE
Payer: MEDICARE

## 2018-01-01 ENCOUNTER — TELEPHONE (OUTPATIENT)
Dept: PHYSICAL MEDICINE AND REHAB | Facility: CLINIC | Age: 78
End: 2018-01-01

## 2018-01-01 ENCOUNTER — OFFICE VISIT (OUTPATIENT)
Dept: TRANSPLANT | Facility: CLINIC | Age: 78
End: 2018-01-01
Payer: MEDICARE

## 2018-01-01 ENCOUNTER — OFFICE VISIT (OUTPATIENT)
Dept: PHYSICAL MEDICINE AND REHAB | Facility: CLINIC | Age: 78
End: 2018-01-01
Payer: MEDICARE

## 2018-01-01 ENCOUNTER — HOSPITAL ENCOUNTER (EMERGENCY)
Facility: HOSPITAL | Age: 78
End: 2018-06-14
Attending: EMERGENCY MEDICINE
Payer: MEDICARE

## 2018-01-01 ENCOUNTER — TELEPHONE (OUTPATIENT)
Dept: RADIOLOGY | Facility: HOSPITAL | Age: 78
End: 2018-01-01

## 2018-01-01 ENCOUNTER — LAB VISIT (OUTPATIENT)
Dept: LAB | Facility: HOSPITAL | Age: 78
End: 2018-01-01
Attending: FAMILY MEDICINE
Payer: MEDICARE

## 2018-01-01 ENCOUNTER — LAB VISIT (OUTPATIENT)
Dept: LAB | Facility: HOSPITAL | Age: 78
End: 2018-01-01
Payer: MEDICARE

## 2018-01-01 ENCOUNTER — HOSPITAL ENCOUNTER (OUTPATIENT)
Dept: RADIOLOGY | Facility: HOSPITAL | Age: 78
Discharge: HOME | End: 2018-01-16
Attending: PHYSICIAN ASSISTANT
Payer: MEDICARE

## 2018-01-01 ENCOUNTER — HOSPITAL ENCOUNTER (OUTPATIENT)
Dept: RADIOLOGY | Facility: HOSPITAL | Age: 78
Discharge: HOME | End: 2018-02-09
Attending: PHYSICIAN ASSISTANT
Payer: MEDICARE

## 2018-01-01 ENCOUNTER — HOSPITAL ENCOUNTER (OUTPATIENT)
Dept: RADIOLOGY | Facility: HOSPITAL | Age: 78
Discharge: HOME | End: 2018-01-29
Attending: PHYSICIAN ASSISTANT
Payer: MEDICARE

## 2018-01-01 ENCOUNTER — OFFICE VISIT (OUTPATIENT)
Dept: RHEUMATOLOGY | Facility: CLINIC | Age: 78
End: 2018-01-01
Payer: MEDICARE

## 2018-01-01 ENCOUNTER — PATIENT OUTREACH (OUTPATIENT)
Dept: ADMINISTRATIVE | Facility: HOSPITAL | Age: 78
End: 2018-01-01

## 2018-01-01 ENCOUNTER — HOSPITAL ENCOUNTER (OUTPATIENT)
Dept: RADIOLOGY | Facility: HOSPITAL | Age: 78
Discharge: HOME | End: 2018-03-14
Attending: INTERNAL MEDICINE
Payer: MEDICARE

## 2018-01-01 VITALS
SYSTOLIC BLOOD PRESSURE: 118 MMHG | HEART RATE: 60 BPM | WEIGHT: 130.06 LBS | BODY MASS INDEX: 23.93 KG/M2 | HEIGHT: 62 IN | DIASTOLIC BLOOD PRESSURE: 60 MMHG

## 2018-01-01 VITALS
TEMPERATURE: 98 F | WEIGHT: 123.44 LBS | DIASTOLIC BLOOD PRESSURE: 63 MMHG | HEIGHT: 62 IN | BODY MASS INDEX: 22.71 KG/M2 | RESPIRATION RATE: 16 BRPM | SYSTOLIC BLOOD PRESSURE: 151 MMHG | OXYGEN SATURATION: 98 % | HEART RATE: 62 BPM

## 2018-01-01 VITALS
OXYGEN SATURATION: 91 % | HEART RATE: 67 BPM | HEIGHT: 62 IN | RESPIRATION RATE: 17 BRPM | TEMPERATURE: 99 F | SYSTOLIC BLOOD PRESSURE: 116 MMHG | BODY MASS INDEX: 22.36 KG/M2 | DIASTOLIC BLOOD PRESSURE: 49 MMHG | WEIGHT: 121.5 LBS

## 2018-01-01 VITALS
SYSTOLIC BLOOD PRESSURE: 131 MMHG | HEIGHT: 62 IN | WEIGHT: 123 LBS | HEART RATE: 47 BPM | RESPIRATION RATE: 14 BRPM | BODY MASS INDEX: 22.63 KG/M2 | DIASTOLIC BLOOD PRESSURE: 52 MMHG

## 2018-01-01 VITALS
DIASTOLIC BLOOD PRESSURE: 63 MMHG | HEART RATE: 65 BPM | WEIGHT: 130.06 LBS | HEIGHT: 62 IN | BODY MASS INDEX: 23.93 KG/M2 | SYSTOLIC BLOOD PRESSURE: 152 MMHG

## 2018-01-01 VITALS
DIASTOLIC BLOOD PRESSURE: 66 MMHG | HEIGHT: 62 IN | BODY MASS INDEX: 22.52 KG/M2 | WEIGHT: 122.38 LBS | SYSTOLIC BLOOD PRESSURE: 116 MMHG | HEART RATE: 7 BPM

## 2018-01-01 VITALS
DIASTOLIC BLOOD PRESSURE: 56 MMHG | HEIGHT: 62 IN | BODY MASS INDEX: 23.21 KG/M2 | WEIGHT: 126.13 LBS | HEART RATE: 84 BPM | SYSTOLIC BLOOD PRESSURE: 118 MMHG

## 2018-01-01 VITALS
DIASTOLIC BLOOD PRESSURE: 62 MMHG | BODY MASS INDEX: 22.28 KG/M2 | SYSTOLIC BLOOD PRESSURE: 160 MMHG | HEART RATE: 66 BPM | TEMPERATURE: 97 F | HEIGHT: 62 IN | WEIGHT: 121.06 LBS

## 2018-01-01 VITALS
TEMPERATURE: 97 F | BODY MASS INDEX: 24.06 KG/M2 | SYSTOLIC BLOOD PRESSURE: 136 MMHG | HEIGHT: 62 IN | HEART RATE: 64 BPM | DIASTOLIC BLOOD PRESSURE: 86 MMHG | WEIGHT: 130.75 LBS

## 2018-01-01 VITALS
SYSTOLIC BLOOD PRESSURE: 105 MMHG | RESPIRATION RATE: 18 BRPM | TEMPERATURE: 98 F | WEIGHT: 126 LBS | DIASTOLIC BLOOD PRESSURE: 46 MMHG | HEART RATE: 77 BPM | BODY MASS INDEX: 23.19 KG/M2 | OXYGEN SATURATION: 93 % | HEIGHT: 62 IN

## 2018-01-01 VITALS
DIASTOLIC BLOOD PRESSURE: 60 MMHG | WEIGHT: 129.31 LBS | HEIGHT: 62 IN | SYSTOLIC BLOOD PRESSURE: 140 MMHG | BODY MASS INDEX: 23.79 KG/M2 | OXYGEN SATURATION: 94 % | HEART RATE: 58 BPM | RESPIRATION RATE: 18 BRPM

## 2018-01-01 VITALS
HEART RATE: 58 BPM | DIASTOLIC BLOOD PRESSURE: 66 MMHG | BODY MASS INDEX: 22.68 KG/M2 | WEIGHT: 123.25 LBS | HEIGHT: 62 IN | SYSTOLIC BLOOD PRESSURE: 128 MMHG

## 2018-01-01 VITALS
DIASTOLIC BLOOD PRESSURE: 60 MMHG | WEIGHT: 129.44 LBS | BODY MASS INDEX: 23.82 KG/M2 | HEART RATE: 58 BPM | HEIGHT: 62 IN | SYSTOLIC BLOOD PRESSURE: 110 MMHG

## 2018-01-01 VITALS
OXYGEN SATURATION: 95 % | BODY MASS INDEX: 22.31 KG/M2 | DIASTOLIC BLOOD PRESSURE: 62 MMHG | HEART RATE: 55 BPM | SYSTOLIC BLOOD PRESSURE: 140 MMHG | RESPIRATION RATE: 18 BRPM | WEIGHT: 121.25 LBS | HEIGHT: 62 IN

## 2018-01-01 VITALS
WEIGHT: 123 LBS | DIASTOLIC BLOOD PRESSURE: 58 MMHG | HEART RATE: 56 BPM | SYSTOLIC BLOOD PRESSURE: 152 MMHG | BODY MASS INDEX: 22.63 KG/M2 | HEIGHT: 62 IN

## 2018-01-01 VITALS
BODY MASS INDEX: 22.98 KG/M2 | HEART RATE: 63 BPM | WEIGHT: 125.69 LBS | SYSTOLIC BLOOD PRESSURE: 126 MMHG | DIASTOLIC BLOOD PRESSURE: 42 MMHG

## 2018-01-01 DIAGNOSIS — I27.20 PULMONARY HTN: Primary | ICD-10-CM

## 2018-01-01 DIAGNOSIS — K76.0 FATTY LIVER: Primary | ICD-10-CM

## 2018-01-01 DIAGNOSIS — R20.2 NUMBNESS AND TINGLING OF BOTH LOWER EXTREMITIES: ICD-10-CM

## 2018-01-01 DIAGNOSIS — I25.810 CORONARY ARTERY DISEASE INVOLVING CORONARY BYPASS GRAFT OF NATIVE HEART WITHOUT ANGINA PECTORIS: Primary | Chronic | ICD-10-CM

## 2018-01-01 DIAGNOSIS — N17.9 AKI (ACUTE KIDNEY INJURY): Primary | ICD-10-CM

## 2018-01-01 DIAGNOSIS — G47.34 NOCTURNAL HYPOXEMIA: ICD-10-CM

## 2018-01-01 DIAGNOSIS — M54.17 LUMBOSACRAL RADICULOPATHY AT L5: Primary | ICD-10-CM

## 2018-01-01 DIAGNOSIS — I10 ESSENTIAL HYPERTENSION: Chronic | ICD-10-CM

## 2018-01-01 DIAGNOSIS — R09.02 HYPOXEMIA REQUIRING SUPPLEMENTAL OXYGEN: ICD-10-CM

## 2018-01-01 DIAGNOSIS — E11.9 TYPE 2 DIABETES MELLITUS WITHOUT COMPLICATION, WITHOUT LONG-TERM CURRENT USE OF INSULIN: ICD-10-CM

## 2018-01-01 DIAGNOSIS — Z95.1 S/P CABG (CORONARY ARTERY BYPASS GRAFT): ICD-10-CM

## 2018-01-01 DIAGNOSIS — K65.2 SBP (SPONTANEOUS BACTERIAL PERITONITIS): ICD-10-CM

## 2018-01-01 DIAGNOSIS — R91.1 LUNG NODULE: ICD-10-CM

## 2018-01-01 DIAGNOSIS — I50.9 CONGESTIVE HEART FAILURE, UNSPECIFIED CONGESTIVE HEART FAILURE CHRONICITY, UNSPECIFIED CONGESTIVE HEART FAILURE TYPE: ICD-10-CM

## 2018-01-01 DIAGNOSIS — I48.3 TYPICAL ATRIAL FLUTTER: Chronic | ICD-10-CM

## 2018-01-01 DIAGNOSIS — R74.8 ELEVATED SERUM ALKALINE PHOSPHATASE LEVEL: ICD-10-CM

## 2018-01-01 DIAGNOSIS — I48.0 PAF (PAROXYSMAL ATRIAL FIBRILLATION): ICD-10-CM

## 2018-01-01 DIAGNOSIS — R60.0 BILATERAL LEG EDEMA: Primary | ICD-10-CM

## 2018-01-01 DIAGNOSIS — D64.9 ANEMIA, UNSPECIFIED TYPE: ICD-10-CM

## 2018-01-01 DIAGNOSIS — D62 ACUTE BLOOD LOSS ANEMIA: Primary | ICD-10-CM

## 2018-01-01 DIAGNOSIS — R91.1 LUNG NODULE SEEN ON IMAGING STUDY: ICD-10-CM

## 2018-01-01 DIAGNOSIS — Z79.01 LONG TERM CURRENT USE OF ANTICOAGULANT THERAPY: ICD-10-CM

## 2018-01-01 DIAGNOSIS — Z99.81 HYPOXEMIA REQUIRING SUPPLEMENTAL OXYGEN: ICD-10-CM

## 2018-01-01 DIAGNOSIS — I48.91 ATRIAL FIBRILLATION, UNSPECIFIED TYPE: ICD-10-CM

## 2018-01-01 DIAGNOSIS — M35.09 SJOGREN'S SYNDROME WITH OTHER ORGAN INVOLVEMENT: ICD-10-CM

## 2018-01-01 DIAGNOSIS — K21.9 GASTROESOPHAGEAL REFLUX DISEASE, ESOPHAGITIS PRESENCE NOT SPECIFIED: ICD-10-CM

## 2018-01-01 DIAGNOSIS — I50.32 CHRONIC DIASTOLIC HEART FAILURE: Chronic | ICD-10-CM

## 2018-01-01 DIAGNOSIS — M17.0 PRIMARY OSTEOARTHRITIS OF BOTH KNEES: Chronic | ICD-10-CM

## 2018-01-01 DIAGNOSIS — K75.81 NASH (NONALCOHOLIC STEATOHEPATITIS): Primary | ICD-10-CM

## 2018-01-01 DIAGNOSIS — R79.89 ELEVATED BRAIN NATRIURETIC PEPTIDE (BNP) LEVEL: ICD-10-CM

## 2018-01-01 DIAGNOSIS — K76.9 LIVER LESION: ICD-10-CM

## 2018-01-01 DIAGNOSIS — Z98.61 S/P PTCA (PERCUTANEOUS TRANSLUMINAL CORONARY ANGIOPLASTY): ICD-10-CM

## 2018-01-01 DIAGNOSIS — E78.5 HYPERLIPIDEMIA, UNSPECIFIED HYPERLIPIDEMIA TYPE: ICD-10-CM

## 2018-01-01 DIAGNOSIS — I65.23 BILATERAL CAROTID ARTERY STENOSIS: ICD-10-CM

## 2018-01-01 DIAGNOSIS — R09.02 HYPOXEMIA REQUIRING SUPPLEMENTAL OXYGEN: Primary | ICD-10-CM

## 2018-01-01 DIAGNOSIS — R14.0 ABDOMINAL DISTENSION: ICD-10-CM

## 2018-01-01 DIAGNOSIS — I27.21 PAH (PULMONARY ARTERY HYPERTENSION): ICD-10-CM

## 2018-01-01 DIAGNOSIS — G89.29 CHRONIC NECK PAIN: Chronic | ICD-10-CM

## 2018-01-01 DIAGNOSIS — I50.32 CHRONIC DIASTOLIC CONGESTIVE HEART FAILURE: Primary | ICD-10-CM

## 2018-01-01 DIAGNOSIS — M54.2 CHRONIC NECK PAIN: Chronic | ICD-10-CM

## 2018-01-01 DIAGNOSIS — I48.19 PERSISTENT ATRIAL FIBRILLATION: ICD-10-CM

## 2018-01-01 DIAGNOSIS — Z79.01 ON ANTICOAGULANT THERAPY: ICD-10-CM

## 2018-01-01 DIAGNOSIS — R93.89 ABNORMAL CT OF THE CHEST: ICD-10-CM

## 2018-01-01 DIAGNOSIS — R74.8 ABNORMAL LIVER ENZYMES: ICD-10-CM

## 2018-01-01 DIAGNOSIS — R18.8 OTHER ASCITES: Primary | ICD-10-CM

## 2018-01-01 DIAGNOSIS — I27.21 PAH (PULMONARY ARTERY HYPERTENSION): Primary | ICD-10-CM

## 2018-01-01 DIAGNOSIS — J44.89 ASTHMA WITH COPD: ICD-10-CM

## 2018-01-01 DIAGNOSIS — R14.0 ABDOMINAL DISTENTION: ICD-10-CM

## 2018-01-01 DIAGNOSIS — I27.20 PULMONARY HYPERTENSION: ICD-10-CM

## 2018-01-01 DIAGNOSIS — R18.8 OTHER ASCITES: ICD-10-CM

## 2018-01-01 DIAGNOSIS — R19.7 DIARRHEA, UNSPECIFIED TYPE: Primary | ICD-10-CM

## 2018-01-01 DIAGNOSIS — I35.1 NONRHEUMATIC AORTIC VALVE INSUFFICIENCY: ICD-10-CM

## 2018-01-01 DIAGNOSIS — R19.7 DIARRHEA, UNSPECIFIED TYPE: ICD-10-CM

## 2018-01-01 DIAGNOSIS — K64.9 HEMORRHOIDS, UNSPECIFIED HEMORRHOID TYPE: ICD-10-CM

## 2018-01-01 DIAGNOSIS — M79.89 LEG SWELLING: ICD-10-CM

## 2018-01-01 DIAGNOSIS — J44.89 ASTHMA WITH COPD (CHRONIC OBSTRUCTIVE PULMONARY DISEASE): Primary | ICD-10-CM

## 2018-01-01 DIAGNOSIS — I46.9 CARDIAC ARREST: Primary | ICD-10-CM

## 2018-01-01 DIAGNOSIS — Z79.899 ENCOUNTER FOR EYE EXAM DUE TO HIGH RISK MEDICATION: Primary | ICD-10-CM

## 2018-01-01 DIAGNOSIS — Z51.81 MEDICATION MONITORING ENCOUNTER: ICD-10-CM

## 2018-01-01 DIAGNOSIS — R60.0 BILATERAL LEG EDEMA: ICD-10-CM

## 2018-01-01 DIAGNOSIS — R60.0 LEG EDEMA: ICD-10-CM

## 2018-01-01 DIAGNOSIS — I48.20 CHRONIC ATRIAL FIBRILLATION: Chronic | ICD-10-CM

## 2018-01-01 DIAGNOSIS — Z98.890 S/P CAROTID ENDARTERECTOMY: ICD-10-CM

## 2018-01-01 DIAGNOSIS — D68.9 COAGULOPATHY: ICD-10-CM

## 2018-01-01 DIAGNOSIS — I48.20 CHRONIC ATRIAL FIBRILLATION: ICD-10-CM

## 2018-01-01 DIAGNOSIS — R20.0 NUMBNESS AND TINGLING OF BOTH LOWER EXTREMITIES: ICD-10-CM

## 2018-01-01 DIAGNOSIS — D64.9 ANEMIA, UNSPECIFIED TYPE: Primary | ICD-10-CM

## 2018-01-01 DIAGNOSIS — E86.0 DEHYDRATION: ICD-10-CM

## 2018-01-01 DIAGNOSIS — M54.10 RADICULOPATHY AFFECTING UPPER EXTREMITY: Primary | Chronic | ICD-10-CM

## 2018-01-01 DIAGNOSIS — N18.9 CHRONIC KIDNEY DISEASE, UNSPECIFIED CKD STAGE: ICD-10-CM

## 2018-01-01 DIAGNOSIS — J01.01 RECURRENT MAXILLARY SINUSITIS: ICD-10-CM

## 2018-01-01 DIAGNOSIS — E11.9 DIABETES MELLITUS TYPE 2 WITHOUT RETINOPATHY: ICD-10-CM

## 2018-01-01 DIAGNOSIS — I27.20 PULMONARY HTN: ICD-10-CM

## 2018-01-01 DIAGNOSIS — S30.1XXD ABDOMINAL WALL HEMATOMA, SUBSEQUENT ENCOUNTER: ICD-10-CM

## 2018-01-01 DIAGNOSIS — K62.5 RECTAL BLEEDING: ICD-10-CM

## 2018-01-01 DIAGNOSIS — J34.89 SINUS PAIN: ICD-10-CM

## 2018-01-01 DIAGNOSIS — I25.10 CORONARY ARTERY DISEASE INVOLVING NATIVE CORONARY ARTERY OF NATIVE HEART, ANGINA PRESENCE UNSPECIFIED: ICD-10-CM

## 2018-01-01 DIAGNOSIS — H40.003 GLAUCOMA SUSPECT OF BOTH EYES: ICD-10-CM

## 2018-01-01 DIAGNOSIS — N17.9 AKI (ACUTE KIDNEY INJURY): ICD-10-CM

## 2018-01-01 DIAGNOSIS — N18.30 STAGE 3 CHRONIC KIDNEY DISEASE: ICD-10-CM

## 2018-01-01 DIAGNOSIS — E87.1 HYPONATREMIA: ICD-10-CM

## 2018-01-01 DIAGNOSIS — J41.0 SIMPLE CHRONIC BRONCHITIS: Chronic | ICD-10-CM

## 2018-01-01 DIAGNOSIS — I50.9 CONGESTIVE HEART FAILURE, UNSPECIFIED CONGESTIVE HEART FAILURE CHRONICITY, UNSPECIFIED CONGESTIVE HEART FAILURE TYPE: Primary | ICD-10-CM

## 2018-01-01 DIAGNOSIS — Z96.1 PSEUDOPHAKIA: ICD-10-CM

## 2018-01-01 DIAGNOSIS — M19.041 PRIMARY OSTEOARTHRITIS OF BOTH HANDS: Chronic | ICD-10-CM

## 2018-01-01 DIAGNOSIS — M54.10 RADICULOPATHY AFFECTING UPPER EXTREMITY: Chronic | ICD-10-CM

## 2018-01-01 DIAGNOSIS — I25.10 ATHEROSCLEROTIC HEART DISEASE OF NATIVE CORONARY ARTERY WITHOUT ANGINA PECTORIS: ICD-10-CM

## 2018-01-01 DIAGNOSIS — D84.9 IMMUNOCOMPROMISED: ICD-10-CM

## 2018-01-01 DIAGNOSIS — I25.810 CORONARY ARTERY DISEASE INVOLVING CORONARY BYPASS GRAFT OF NATIVE HEART WITHOUT ANGINA PECTORIS: Chronic | ICD-10-CM

## 2018-01-01 DIAGNOSIS — M81.0 OSTEOPOROSIS, POSTMENOPAUSAL: ICD-10-CM

## 2018-01-01 DIAGNOSIS — M25.551 RIGHT HIP PAIN: ICD-10-CM

## 2018-01-01 DIAGNOSIS — R74.8 ELEVATED LIVER ENZYMES: Primary | ICD-10-CM

## 2018-01-01 DIAGNOSIS — R74.8 ELEVATED LIVER ENZYMES: ICD-10-CM

## 2018-01-01 DIAGNOSIS — M19.042 PRIMARY OSTEOARTHRITIS OF BOTH HANDS: Chronic | ICD-10-CM

## 2018-01-01 DIAGNOSIS — E11.9 TYPE 2 DIABETES MELLITUS WITHOUT COMPLICATION: ICD-10-CM

## 2018-01-01 DIAGNOSIS — R09.02 EXERCISE HYPOXEMIA: Primary | ICD-10-CM

## 2018-01-01 DIAGNOSIS — Z86.010 HISTORY OF COLONIC POLYPS: ICD-10-CM

## 2018-01-01 DIAGNOSIS — R79.89 ABNORMAL LFTS: ICD-10-CM

## 2018-01-01 DIAGNOSIS — E11.9 TYPE 2 DIABETES MELLITUS WITHOUT COMPLICATION, WITHOUT LONG-TERM CURRENT USE OF INSULIN: Primary | ICD-10-CM

## 2018-01-01 DIAGNOSIS — J44.89 ASTHMA WITH COPD: Primary | ICD-10-CM

## 2018-01-01 DIAGNOSIS — R06.02 SOB (SHORTNESS OF BREATH): ICD-10-CM

## 2018-01-01 DIAGNOSIS — M81.0 OSTEOPOROSIS, POSTMENOPAUSAL: Primary | ICD-10-CM

## 2018-01-01 DIAGNOSIS — Z99.81 HYPOXEMIA REQUIRING SUPPLEMENTAL OXYGEN: Primary | ICD-10-CM

## 2018-01-01 LAB
ABO + RH BLD: NORMAL
ALBUMIN FLD-MCNC: 1.9 G/DL
ALBUMIN SERPL BCP-MCNC: 2.6 G/DL
ALBUMIN SERPL BCP-MCNC: 2.7 G/DL
ALBUMIN SERPL BCP-MCNC: 2.8 G/DL
ALBUMIN SERPL BCP-MCNC: 2.9 G/DL
ALBUMIN SERPL BCP-MCNC: 3.1 G/DL
ALBUMIN SERPL BCP-MCNC: 3.2 G/DL
ALBUMIN SERPL BCP-MCNC: 3.3 G/DL
ALBUMIN SERPL BCP-MCNC: 3.7 G/DL
ALBUMIN SERPL BCP-MCNC: 3.9 G/DL
ALP SERPL-CCNC: 224 U/L
ALP SERPL-CCNC: 237 U/L
ALP SERPL-CCNC: 280 U/L
ALP SERPL-CCNC: 280 U/L
ALP SERPL-CCNC: 294 U/L
ALP SERPL-CCNC: 319 U/L
ALP SERPL-CCNC: 327 U/L
ALP SERPL-CCNC: 334 U/L
ALP SERPL-CCNC: 352 U/L
ALT SERPL W/O P-5'-P-CCNC: 26 U/L
ALT SERPL W/O P-5'-P-CCNC: 29 U/L
ALT SERPL W/O P-5'-P-CCNC: 34 U/L
ALT SERPL W/O P-5'-P-CCNC: 34 U/L
ALT SERPL W/O P-5'-P-CCNC: 35 U/L
ALT SERPL W/O P-5'-P-CCNC: 38 U/L
ALT SERPL W/O P-5'-P-CCNC: 41 U/L
ALT SERPL W/O P-5'-P-CCNC: 45 U/L
ALT SERPL W/O P-5'-P-CCNC: 46 U/L
ANION GAP SERPL CALC-SCNC: 10 MMOL/L
ANION GAP SERPL CALC-SCNC: 11 MMOL/L
ANION GAP SERPL CALC-SCNC: 12 MMOL/L
ANION GAP SERPL CALC-SCNC: 12 MMOL/L
ANION GAP SERPL CALC-SCNC: 6 MMOL/L
ANION GAP SERPL CALC-SCNC: 7 MMOL/L
ANION GAP SERPL CALC-SCNC: 8 MMOL/L
ANION GAP SERPL CALC-SCNC: 9 MMOL/L
ANION GAP SERPL CALC-SCNC: 9 MMOL/L
APPEARANCE FLD: NORMAL
APTT BLDCRRT: 28.5 SEC
APTT BLDCRRT: 36 SEC
AST SERPL-CCNC: 34 U/L
AST SERPL-CCNC: 39 U/L
AST SERPL-CCNC: 40 U/L
AST SERPL-CCNC: 41 U/L
AST SERPL-CCNC: 47 U/L
AST SERPL-CCNC: 47 U/L
AST SERPL-CCNC: 51 U/L
AST SERPL-CCNC: 56 U/L
AST SERPL-CCNC: 67 U/L
BACTERIA #/AREA URNS HPF: NORMAL /HPF
BASOPHILS # BLD AUTO: 0.03 K/UL
BASOPHILS # BLD AUTO: 0.04 K/UL
BASOPHILS # BLD AUTO: 0.06 K/UL
BASOPHILS # BLD AUTO: 0.06 K/UL
BASOPHILS # BLD AUTO: 0.07 K/UL
BASOPHILS # BLD AUTO: 0.1 K/UL
BASOPHILS # BLD AUTO: 0.14 K/UL
BASOPHILS NFR BLD: 0.3 %
BASOPHILS NFR BLD: 0.4 %
BASOPHILS NFR BLD: 0.7 %
BASOPHILS NFR BLD: 0.8 %
BASOPHILS NFR BLD: 1 %
BASOPHILS NFR BLD: 1 %
BASOPHILS NFR BLD: 1.3 %
BILIRUB SERPL-MCNC: 0.5 MG/DL
BILIRUB SERPL-MCNC: 0.6 MG/DL
BILIRUB SERPL-MCNC: 0.7 MG/DL
BILIRUB SERPL-MCNC: 0.7 MG/DL
BILIRUB SERPL-MCNC: 0.9 MG/DL
BILIRUB SERPL-MCNC: 1.2 MG/DL
BILIRUB UR QL STRIP: NEGATIVE
BLD GP AB SCN CELLS X3 SERPL QL: NORMAL
BNP SERPL-MCNC: 2112 PG/ML
BNP SERPL-MCNC: 3839 PG/ML
BNP SERPL-MCNC: 4582 PG/ML
BODY FLD TYPE: NORMAL
BUN SERPL-MCNC: 26 MG/DL
BUN SERPL-MCNC: 27 MG/DL
BUN SERPL-MCNC: 30 MG/DL
BUN SERPL-MCNC: 31 MG/DL
BUN SERPL-MCNC: 34 MG/DL
BUN SERPL-MCNC: 34 MG/DL
BUN SERPL-MCNC: 38 MG/DL
BUN SERPL-MCNC: 41 MG/DL
BUN SERPL-MCNC: 45 MG/DL
BUN SERPL-MCNC: 53 MG/DL
BUN SERPL-MCNC: 57 MG/DL
C DIFF GDH STL QL: POSITIVE
C DIFF TOX A+B STL QL IA: POSITIVE
CALCIUM SERPL-MCNC: 10 MG/DL
CALCIUM SERPL-MCNC: 10 MG/DL
CALCIUM SERPL-MCNC: 10.2 MG/DL
CALCIUM SERPL-MCNC: 8.8 MG/DL
CALCIUM SERPL-MCNC: 9.1 MG/DL
CALCIUM SERPL-MCNC: 9.3 MG/DL
CALCIUM SERPL-MCNC: 9.5 MG/DL
CALCIUM SERPL-MCNC: 9.6 MG/DL
CALCIUM SERPL-MCNC: 9.7 MG/DL
CHLORIDE SERPL-SCNC: 100 MMOL/L
CHLORIDE SERPL-SCNC: 101 MMOL/L
CHLORIDE SERPL-SCNC: 103 MMOL/L
CHLORIDE SERPL-SCNC: 105 MMOL/L
CHLORIDE SERPL-SCNC: 106 MMOL/L
CHLORIDE SERPL-SCNC: 106 MMOL/L
CHLORIDE SERPL-SCNC: 97 MMOL/L
CHLORIDE SERPL-SCNC: 97 MMOL/L
CHLORIDE SERPL-SCNC: 99 MMOL/L
CHOLEST SERPL-MCNC: 130 MG/DL
CHOLEST/HDLC SERPL: 4.2 {RATIO}
CLARITY UR: CLEAR
CO2 SERPL-SCNC: 18 MMOL/L
CO2 SERPL-SCNC: 23 MMOL/L
CO2 SERPL-SCNC: 23 MMOL/L
CO2 SERPL-SCNC: 25 MMOL/L
CO2 SERPL-SCNC: 26 MMOL/L
CO2 SERPL-SCNC: 27 MMOL/L
CO2 SERPL-SCNC: 27 MMOL/L
CO2 SERPL-SCNC: 28 MMOL/L
CO2 SERPL-SCNC: 28 MMOL/L
CO2 SERPL-SCNC: 29 MMOL/L
CO2 SERPL-SCNC: 32 MMOL/L
COLOR FLD: YELLOW
COLOR UR: YELLOW
CORONARY STENOSIS: ABNORMAL
CREAT SERPL-MCNC: 1.2 MG/DL
CREAT SERPL-MCNC: 1.3 MG/DL
CREAT SERPL-MCNC: 1.3 MG/DL
CREAT SERPL-MCNC: 1.4 MG/DL
CREAT SERPL-MCNC: 1.5 MG/DL
CREAT SERPL-MCNC: 1.5 MG/DL
CREAT SERPL-MCNC: 1.6 MG/DL
CREAT SERPL-MCNC: 1.7 MG/DL
CREAT SERPL-MCNC: 1.9 MG/DL
CREAT SERPL-MCNC: 2 MG/DL
CREAT SERPL-MCNC: 2.6 MG/DL
CREAT UR-MCNC: 88 MG/DL
CRP SERPL-MCNC: 3.7 MG/L
DIFFERENTIAL METHOD: ABNORMAL
EOSINOPHIL # BLD AUTO: 0.1 K/UL
EOSINOPHIL # BLD AUTO: 0.2 K/UL
EOSINOPHIL # BLD AUTO: 0.2 K/UL
EOSINOPHIL # BLD AUTO: 0.3 K/UL
EOSINOPHIL # BLD AUTO: 0.3 K/UL
EOSINOPHIL # BLD AUTO: 0.5 K/UL
EOSINOPHIL # BLD AUTO: 0.6 K/UL
EOSINOPHIL # BLD AUTO: 0.9 K/UL
EOSINOPHIL # BLD AUTO: 1.1 K/UL
EOSINOPHIL NFR BLD: 0.7 %
EOSINOPHIL NFR BLD: 1.5 %
EOSINOPHIL NFR BLD: 10 %
EOSINOPHIL NFR BLD: 2.2 %
EOSINOPHIL NFR BLD: 3.4 %
EOSINOPHIL NFR BLD: 5.3 %
EOSINOPHIL NFR BLD: 5.3 %
EOSINOPHIL NFR BLD: 6 %
EOSINOPHIL NFR BLD: 7.9 %
ERYTHROCYTE [DISTWIDTH] IN BLOOD BY AUTOMATED COUNT: 15.2 %
ERYTHROCYTE [DISTWIDTH] IN BLOOD BY AUTOMATED COUNT: 15.5 %
ERYTHROCYTE [DISTWIDTH] IN BLOOD BY AUTOMATED COUNT: 15.6 %
ERYTHROCYTE [DISTWIDTH] IN BLOOD BY AUTOMATED COUNT: 15.9 %
ERYTHROCYTE [DISTWIDTH] IN BLOOD BY AUTOMATED COUNT: 16.3 %
ERYTHROCYTE [DISTWIDTH] IN BLOOD BY AUTOMATED COUNT: 17.8 %
ERYTHROCYTE [DISTWIDTH] IN BLOOD BY AUTOMATED COUNT: 18.2 %
ERYTHROCYTE [SEDIMENTATION RATE] IN BLOOD BY WESTERGREN METHOD: 5 MM/HR
EST. GFR  (AFRICAN AMERICAN): 20 ML/MIN/1.73 M^2
EST. GFR  (AFRICAN AMERICAN): 26.8 ML/MIN/1.73 M^2
EST. GFR  (AFRICAN AMERICAN): 28.5 ML/MIN/1.73 M^2
EST. GFR  (AFRICAN AMERICAN): 33 ML/MIN/1.73 M^2
EST. GFR  (AFRICAN AMERICAN): 35 ML/MIN/1.73 M^2
EST. GFR  (AFRICAN AMERICAN): 38 ML/MIN/1.73 M^2
EST. GFR  (AFRICAN AMERICAN): 38 ML/MIN/1.73 M^2
EST. GFR  (AFRICAN AMERICAN): 41.5 ML/MIN/1.73 M^2
EST. GFR  (AFRICAN AMERICAN): 45 ML/MIN/1.73 M^2
EST. GFR  (AFRICAN AMERICAN): 45.1 ML/MIN/1.73 M^2
EST. GFR  (AFRICAN AMERICAN): 50 ML/MIN/1.73 M^2
EST. GFR  (NON AFRICAN AMERICAN): 17 ML/MIN/1.73 M^2
EST. GFR  (NON AFRICAN AMERICAN): 23.2 ML/MIN/1.73 M^2
EST. GFR  (NON AFRICAN AMERICAN): 24.7 ML/MIN/1.73 M^2
EST. GFR  (NON AFRICAN AMERICAN): 28 ML/MIN/1.73 M^2
EST. GFR  (NON AFRICAN AMERICAN): 30 ML/MIN/1.73 M^2
EST. GFR  (NON AFRICAN AMERICAN): 33 ML/MIN/1.73 M^2
EST. GFR  (NON AFRICAN AMERICAN): 33 ML/MIN/1.73 M^2
EST. GFR  (NON AFRICAN AMERICAN): 36 ML/MIN/1.73 M^2
EST. GFR  (NON AFRICAN AMERICAN): 39 ML/MIN/1.73 M^2
EST. GFR  (NON AFRICAN AMERICAN): 39.1 ML/MIN/1.73 M^2
EST. GFR  (NON AFRICAN AMERICAN): 43 ML/MIN/1.73 M^2
ESTIMATED AVG GLUCOSE: 143 MG/DL
ESTIMATED AVG GLUCOSE: 154 MG/DL
GLUCOSE SERPL-MCNC: 120 MG/DL
GLUCOSE SERPL-MCNC: 121 MG/DL
GLUCOSE SERPL-MCNC: 124 MG/DL (ref 70–110)
GLUCOSE SERPL-MCNC: 131 MG/DL
GLUCOSE SERPL-MCNC: 134 MG/DL
GLUCOSE SERPL-MCNC: 139 MG/DL
GLUCOSE SERPL-MCNC: 142 MG/DL
GLUCOSE SERPL-MCNC: 147 MG/DL
GLUCOSE SERPL-MCNC: 157 MG/DL
GLUCOSE SERPL-MCNC: 160 MG/DL
GLUCOSE SERPL-MCNC: 161 MG/DL
GLUCOSE SERPL-MCNC: 211 MG/DL
GLUCOSE UR QL STRIP: NEGATIVE
GRAM STN SPEC: NORMAL
GRAM STN SPEC: NORMAL
HBA1C MFR BLD HPLC: 6.6 %
HBA1C MFR BLD HPLC: 7 %
HCT VFR BLD AUTO: 25.7 %
HCT VFR BLD AUTO: 26.8 %
HCT VFR BLD AUTO: 26.9 %
HCT VFR BLD AUTO: 26.9 %
HCT VFR BLD AUTO: 27.1 %
HCT VFR BLD AUTO: 27.6 %
HCT VFR BLD AUTO: 27.9 %
HCT VFR BLD AUTO: 28 %
HCT VFR BLD AUTO: 28.6 %
HCT VFR BLD AUTO: 29 %
HCT VFR BLD AUTO: 29.3 %
HCT VFR BLD AUTO: 29.3 %
HCT VFR BLD AUTO: 29.6 %
HCT VFR BLD AUTO: 30.2 %
HCT VFR BLD AUTO: 31.7 %
HCT VFR BLD AUTO: 32.8 %
HCT VFR BLD AUTO: 36.1 %
HDLC SERPL-MCNC: 31 MG/DL
HDLC SERPL: 23.8 %
HGB BLD-MCNC: 10.3 G/DL
HGB BLD-MCNC: 11.2 G/DL
HGB BLD-MCNC: 7.8 G/DL
HGB BLD-MCNC: 8 G/DL
HGB BLD-MCNC: 8.4 G/DL
HGB BLD-MCNC: 8.4 G/DL
HGB BLD-MCNC: 8.5 G/DL
HGB BLD-MCNC: 8.6 G/DL
HGB BLD-MCNC: 8.7 G/DL
HGB BLD-MCNC: 8.7 G/DL
HGB BLD-MCNC: 8.8 G/DL
HGB BLD-MCNC: 9.2 G/DL
HGB BLD-MCNC: 9.5 G/DL
HGB BLD-MCNC: 9.6 G/DL
HGB UR QL STRIP: ABNORMAL
IMM GRANULOCYTES # BLD AUTO: 0.03 K/UL
IMM GRANULOCYTES # BLD AUTO: 0.06 K/UL
IMM GRANULOCYTES NFR BLD AUTO: 0.4 %
IMM GRANULOCYTES NFR BLD AUTO: 0.5 %
INR PPP: 1.2
INR PPP: 1.7
KETONES UR QL STRIP: NEGATIVE
LDLC SERPL CALC-MCNC: 79.6 MG/DL
LEUKOCYTE ESTERASE UR QL STRIP: NEGATIVE
LYMPHOCYTES # BLD AUTO: 0.8 K/UL
LYMPHOCYTES # BLD AUTO: 0.9 K/UL
LYMPHOCYTES # BLD AUTO: 0.9 K/UL
LYMPHOCYTES # BLD AUTO: 1 K/UL
LYMPHOCYTES # BLD AUTO: 1 K/UL
LYMPHOCYTES # BLD AUTO: 1.1 K/UL
LYMPHOCYTES # BLD AUTO: 1.1 K/UL
LYMPHOCYTES # BLD AUTO: 1.2 K/UL
LYMPHOCYTES # BLD AUTO: 1.5 K/UL
LYMPHOCYTES NFR BLD: 12 %
LYMPHOCYTES NFR BLD: 12.2 %
LYMPHOCYTES NFR BLD: 12.2 %
LYMPHOCYTES NFR BLD: 12.3 %
LYMPHOCYTES NFR BLD: 13.2 %
LYMPHOCYTES NFR BLD: 16 %
LYMPHOCYTES NFR BLD: 7.2 %
LYMPHOCYTES NFR BLD: 9.7 %
LYMPHOCYTES NFR BLD: 9.9 %
LYMPHOCYTES NFR FLD MANUAL: 25 %
MCH RBC QN AUTO: 27.4 PG
MCH RBC QN AUTO: 27.5 PG
MCH RBC QN AUTO: 27.6 PG
MCH RBC QN AUTO: 27.8 PG
MCH RBC QN AUTO: 28 PG
MCH RBC QN AUTO: 28.3 PG
MCH RBC QN AUTO: 30.1 PG
MCHC RBC AUTO-ENTMCNC: 29.1 G/DL
MCHC RBC AUTO-ENTMCNC: 30 G/DL
MCHC RBC AUTO-ENTMCNC: 30.8 G/DL
MCHC RBC AUTO-ENTMCNC: 31 G/DL
MCHC RBC AUTO-ENTMCNC: 31.1 G/DL
MCHC RBC AUTO-ENTMCNC: 31.2 G/DL
MCHC RBC AUTO-ENTMCNC: 31.4 G/DL
MCHC RBC AUTO-ENTMCNC: 31.4 G/DL
MCHC RBC AUTO-ENTMCNC: 31.8 G/DL
MCV RBC AUTO: 87 FL
MCV RBC AUTO: 88 FL
MCV RBC AUTO: 89 FL
MCV RBC AUTO: 94 FL
MCV RBC AUTO: 95 FL
MCV RBC AUTO: 97 FL
MESOTHL CELL NFR FLD MANUAL: 2 %
MICROALBUMIN UR DL<=1MG/L-MCNC: 166 UG/ML
MICROALBUMIN/CREATININE RATIO: 188.6 UG/MG
MICROSCOPIC COMMENT: NORMAL
MONOCYTES # BLD AUTO: 0.7 K/UL
MONOCYTES # BLD AUTO: 0.9 K/UL
MONOCYTES # BLD AUTO: 1 K/UL
MONOCYTES # BLD AUTO: 1.1 K/UL
MONOCYTES NFR BLD: 10.7 %
MONOCYTES NFR BLD: 11.3 %
MONOCYTES NFR BLD: 12.4 %
MONOCYTES NFR BLD: 12.6 %
MONOCYTES NFR BLD: 8.5 %
MONOCYTES NFR BLD: 9.3 %
MONOCYTES NFR BLD: 9.6 %
MONOCYTES NFR BLD: 9.7 %
MONOCYTES NFR BLD: 9.9 %
MONOS+MACROS NFR FLD MANUAL: 66 %
NEUTROPHILS # BLD AUTO: 4 K/UL
NEUTROPHILS # BLD AUTO: 5.5 K/UL
NEUTROPHILS # BLD AUTO: 6.1 K/UL
NEUTROPHILS # BLD AUTO: 6.3 K/UL
NEUTROPHILS # BLD AUTO: 6.5 K/UL
NEUTROPHILS # BLD AUTO: 6.8 K/UL
NEUTROPHILS # BLD AUTO: 7.5 K/UL
NEUTROPHILS # BLD AUTO: 7.7 K/UL
NEUTROPHILS # BLD AUTO: 9.4 K/UL
NEUTROPHILS NFR BLD: 66.4 %
NEUTROPHILS NFR BLD: 69.2 %
NEUTROPHILS NFR BLD: 71.9 %
NEUTROPHILS NFR BLD: 72 %
NEUTROPHILS NFR BLD: 72.2 %
NEUTROPHILS NFR BLD: 73.4 %
NEUTROPHILS NFR BLD: 74.1 %
NEUTROPHILS NFR BLD: 74.2 %
NEUTROPHILS NFR BLD: 79.5 %
NEUTROPHILS NFR FLD MANUAL: 7 %
NITRITE UR QL STRIP: NEGATIVE
NONHDLC SERPL-MCNC: 99 MG/DL
NRBC BLD-RTO: 0 /100 WBC
NRBC BLD-RTO: 0 /100 WBC
PATH INTERP FLD-IMP: NORMAL
PERIPHERAL STENOSIS: ABNORMAL
PH UR STRIP: 6 [PH] (ref 5–8)
PLATELET # BLD AUTO: 252 K/UL
PLATELET # BLD AUTO: 258 K/UL
PLATELET # BLD AUTO: 262 K/UL
PLATELET # BLD AUTO: 329 K/UL
PLATELET # BLD AUTO: 361 K/UL
PLATELET # BLD AUTO: 378 K/UL
PLATELET # BLD AUTO: 382 K/UL
PLATELET # BLD AUTO: 399 K/UL
PLATELET # BLD AUTO: 449 K/UL
PMV BLD AUTO: 10 FL
PMV BLD AUTO: 10.3 FL
PMV BLD AUTO: 10.3 FL
PMV BLD AUTO: 10.5 FL
PMV BLD AUTO: 10.5 FL
PMV BLD AUTO: 10.6 FL
PMV BLD AUTO: 12.3 FL
PMV BLD AUTO: 12.6 FL
PMV BLD AUTO: 9.6 FL
POCT GLUCOSE: 110 MG/DL (ref 70–110)
POCT GLUCOSE: 115 MG/DL (ref 70–110)
POCT GLUCOSE: 116 MG/DL (ref 70–110)
POCT GLUCOSE: 124 MG/DL (ref 70–110)
POCT GLUCOSE: 134 MG/DL (ref 70–110)
POCT GLUCOSE: 135 MG/DL (ref 70–110)
POCT GLUCOSE: 146 MG/DL (ref 70–110)
POCT GLUCOSE: 165 MG/DL (ref 70–110)
POCT GLUCOSE: 166 MG/DL (ref 70–110)
POCT GLUCOSE: 171 MG/DL (ref 70–110)
POCT GLUCOSE: 174 MG/DL (ref 70–110)
POCT GLUCOSE: 179 MG/DL (ref 70–110)
POCT GLUCOSE: 189 MG/DL (ref 70–110)
POCT GLUCOSE: 196 MG/DL (ref 70–110)
POCT GLUCOSE: 215 MG/DL (ref 70–110)
POTASSIUM SERPL-SCNC: 4.2 MMOL/L
POTASSIUM SERPL-SCNC: 4.3 MMOL/L
POTASSIUM SERPL-SCNC: 4.5 MMOL/L
POTASSIUM SERPL-SCNC: 4.7 MMOL/L
POTASSIUM SERPL-SCNC: 4.7 MMOL/L
POTASSIUM SERPL-SCNC: 4.8 MMOL/L
POTASSIUM SERPL-SCNC: 4.9 MMOL/L
POTASSIUM SERPL-SCNC: 5 MMOL/L
POTASSIUM SERPL-SCNC: 5.3 MMOL/L
POTASSIUM SERPL-SCNC: 5.4 MMOL/L
POTASSIUM SERPL-SCNC: 5.5 MMOL/L
POTASSIUM SERPL-SCNC: 5.5 MMOL/L
PROT FLD-MCNC: 3.3 G/DL
PROT SERPL-MCNC: 6.1 G/DL
PROT SERPL-MCNC: 6.4 G/DL
PROT SERPL-MCNC: 6.4 G/DL
PROT SERPL-MCNC: 7 G/DL
PROT SERPL-MCNC: 7.1 G/DL
PROT SERPL-MCNC: 7.1 G/DL
PROT SERPL-MCNC: 7.3 G/DL
PROT SERPL-MCNC: 7.3 G/DL
PROT SERPL-MCNC: 7.4 G/DL
PROT UR QL STRIP: NEGATIVE
PROTHROMBIN TIME: 12.4 SEC
PROTHROMBIN TIME: 16.6 SEC
RBC # BLD AUTO: 2.81 M/UL
RBC # BLD AUTO: 2.91 M/UL
RBC # BLD AUTO: 3.06 M/UL
RBC # BLD AUTO: 3.09 M/UL
RBC # BLD AUTO: 3.12 M/UL
RBC # BLD AUTO: 3.36 M/UL
RBC # BLD AUTO: 3.36 M/UL
RBC # BLD AUTO: 3.43 M/UL
RBC # BLD AUTO: 3.72 M/UL
RBC #/AREA URNS HPF: 3 /HPF (ref 0–4)
SODIUM SERPL-SCNC: 130 MMOL/L
SODIUM SERPL-SCNC: 134 MMOL/L
SODIUM SERPL-SCNC: 135 MMOL/L
SODIUM SERPL-SCNC: 136 MMOL/L
SODIUM SERPL-SCNC: 137 MMOL/L
SODIUM SERPL-SCNC: 137 MMOL/L
SODIUM SERPL-SCNC: 139 MMOL/L
SODIUM SERPL-SCNC: 139 MMOL/L
SODIUM SERPL-SCNC: 141 MMOL/L
SP GR UR STRIP: 1.02 (ref 1–1.03)
SPECIMEN SOURCE: NORMAL
SPECIMEN SOURCE: NORMAL
TRIGL SERPL-MCNC: 97 MG/DL
URN SPEC COLLECT METH UR: ABNORMAL
UROBILINOGEN UR STRIP-ACNC: NEGATIVE EU/DL
WBC # BLD AUTO: 10.47 K/UL
WBC # BLD AUTO: 11.2 K/UL
WBC # BLD AUTO: 11.85 K/UL
WBC # BLD AUTO: 6 K/UL
WBC # BLD AUTO: 7.45 K/UL
WBC # BLD AUTO: 8.47 K/UL
WBC # BLD AUTO: 8.58 K/UL
WBC # BLD AUTO: 8.99 K/UL
WBC # BLD AUTO: 9.16 K/UL
WBC # FLD: 638 /CU MM
WBC #/AREA URNS HPF: 0 /HPF (ref 0–5)

## 2018-01-01 PROCEDURE — 0W9G3ZZ DRAINAGE OF PERITONEAL CAVITY, PERCUTANEOUS APPROACH: ICD-10-PCS | Performed by: RADIOLOGY

## 2018-01-01 PROCEDURE — 25000003 PHARM REV CODE 250: Performed by: INTERNAL MEDICINE

## 2018-01-01 PROCEDURE — 99213 OFFICE O/P EST LOW 20 MIN: CPT | Mod: PBBFAC,PO | Performed by: INTERNAL MEDICINE

## 2018-01-01 PROCEDURE — 99999 PR PBB SHADOW E&M-EST. PATIENT-LVL III: CPT | Mod: PBBFAC,,, | Performed by: INTERNAL MEDICINE

## 2018-01-01 PROCEDURE — 71046 X-RAY EXAM CHEST 2 VIEWS: CPT | Mod: 26,,, | Performed by: RADIOLOGY

## 2018-01-01 PROCEDURE — 85025 COMPLETE CBC W/AUTO DIFF WBC: CPT

## 2018-01-01 PROCEDURE — A9552 F18 FDG: HCPCS | Mod: PO

## 2018-01-01 PROCEDURE — 99232 SBSQ HOSP IP/OBS MODERATE 35: CPT | Mod: ,,, | Performed by: PHYSICIAN ASSISTANT

## 2018-01-01 PROCEDURE — 25000003 PHARM REV CODE 250: Performed by: NURSE PRACTITIONER

## 2018-01-01 PROCEDURE — 36415 COLL VENOUS BLD VENIPUNCTURE: CPT

## 2018-01-01 PROCEDURE — 85014 HEMATOCRIT: CPT

## 2018-01-01 PROCEDURE — 82962 GLUCOSE BLOOD TEST: CPT

## 2018-01-01 PROCEDURE — 63600175 PHARM REV CODE 636 W HCPCS: Performed by: EMERGENCY MEDICINE

## 2018-01-01 PROCEDURE — 85018 HEMOGLOBIN: CPT | Mod: 91

## 2018-01-01 PROCEDURE — 99900035 HC TECH TIME PER 15 MIN (STAT)

## 2018-01-01 PROCEDURE — 1159F MED LIST DOCD IN RCRD: CPT | Mod: ,,, | Performed by: PHYSICIAN ASSISTANT

## 2018-01-01 PROCEDURE — 85651 RBC SED RATE NONAUTOMATED: CPT | Mod: PO

## 2018-01-01 PROCEDURE — 99214 OFFICE O/P EST MOD 30 MIN: CPT | Mod: S$PBB,,, | Performed by: INTERNAL MEDICINE

## 2018-01-01 PROCEDURE — 96361 HYDRATE IV INFUSION ADD-ON: CPT

## 2018-01-01 PROCEDURE — 99215 OFFICE O/P EST HI 40 MIN: CPT | Mod: PBBFAC | Performed by: INTERNAL MEDICINE

## 2018-01-01 PROCEDURE — 80053 COMPREHEN METABOLIC PANEL: CPT

## 2018-01-01 PROCEDURE — 99999 PR PBB SHADOW E&M-EST. PATIENT-LVL V: CPT | Mod: PBBFAC,,, | Performed by: PHYSICIAN ASSISTANT

## 2018-01-01 PROCEDURE — 78815 PET IMAGE W/CT SKULL-THIGH: CPT | Mod: 26,PI,, | Performed by: RADIOLOGY

## 2018-01-01 PROCEDURE — 94761 N-INVAS EAR/PLS OXIMETRY MLT: CPT

## 2018-01-01 PROCEDURE — 36415 COLL VENOUS BLD VENIPUNCTURE: CPT | Mod: PO

## 2018-01-01 PROCEDURE — 85014 HEMATOCRIT: CPT | Mod: 91

## 2018-01-01 PROCEDURE — 85730 THROMBOPLASTIN TIME PARTIAL: CPT

## 2018-01-01 PROCEDURE — 84132 ASSAY OF SERUM POTASSIUM: CPT

## 2018-01-01 PROCEDURE — 99999 PR PBB SHADOW E&M-EST. PATIENT-LVL III: CPT | Mod: PBBFAC,,, | Performed by: FAMILY MEDICINE

## 2018-01-01 PROCEDURE — 96365 THER/PROPH/DIAG IV INF INIT: CPT

## 2018-01-01 PROCEDURE — 71046 X-RAY EXAM CHEST 2 VIEWS: CPT | Mod: TC,FY,PO

## 2018-01-01 PROCEDURE — 80061 LIPID PANEL: CPT

## 2018-01-01 PROCEDURE — 25000003 PHARM REV CODE 250

## 2018-01-01 PROCEDURE — 93010 ELECTROCARDIOGRAM REPORT: CPT | Mod: S$PBB,,, | Performed by: INTERNAL MEDICINE

## 2018-01-01 PROCEDURE — 63600175 PHARM REV CODE 636 W HCPCS

## 2018-01-01 PROCEDURE — 25000242 PHARM REV CODE 250 ALT 637 W/ HCPCS: Performed by: INTERNAL MEDICINE

## 2018-01-01 PROCEDURE — 80048 BASIC METABOLIC PNL TOTAL CA: CPT

## 2018-01-01 PROCEDURE — 99214 OFFICE O/P EST MOD 30 MIN: CPT | Mod: S$PBB,,, | Performed by: FAMILY MEDICINE

## 2018-01-01 PROCEDURE — 96375 TX/PRO/DX INJ NEW DRUG ADDON: CPT

## 2018-01-01 PROCEDURE — 96372 THER/PROPH/DIAG INJ SC/IM: CPT | Mod: PBBFAC,PO

## 2018-01-01 PROCEDURE — G0378 HOSPITAL OBSERVATION PER HR: HCPCS

## 2018-01-01 PROCEDURE — 21400001 HC TELEMETRY ROOM

## 2018-01-01 PROCEDURE — 85610 PROTHROMBIN TIME: CPT

## 2018-01-01 PROCEDURE — 87449 NOS EACH ORGANISM AG IA: CPT

## 2018-01-01 PROCEDURE — 95885 MUSC TST DONE W/NERV TST LIM: CPT | Mod: 26,S$PBB,, | Performed by: PHYSICAL MEDICINE & REHABILITATION

## 2018-01-01 PROCEDURE — 92014 COMPRE OPH EXAM EST PT 1/>: CPT | Mod: S$PBB,,, | Performed by: OPHTHALMOLOGY

## 2018-01-01 PROCEDURE — 99204 OFFICE O/P NEW MOD 45 MIN: CPT | Mod: 25,S$PBB,, | Performed by: PHYSICAL MEDICINE & REHABILITATION

## 2018-01-01 PROCEDURE — 99212 OFFICE O/P EST SF 10 MIN: CPT | Mod: PBBFAC,PO,25 | Performed by: OPHTHALMOLOGY

## 2018-01-01 PROCEDURE — 93010 ELECTROCARDIOGRAM REPORT: CPT | Mod: ,,, | Performed by: INTERNAL MEDICINE

## 2018-01-01 PROCEDURE — 71270 CT THORAX DX C-/C+: CPT | Mod: TC,PO

## 2018-01-01 PROCEDURE — 74178 CT ABD&PLV WO CNTR FLWD CNTR: CPT | Mod: 26,,, | Performed by: RADIOLOGY

## 2018-01-01 PROCEDURE — 85018 HEMOGLOBIN: CPT

## 2018-01-01 PROCEDURE — 99285 EMERGENCY DEPT VISIT HI MDM: CPT | Mod: 25

## 2018-01-01 PROCEDURE — 76705 ECHO EXAM OF ABDOMEN: CPT | Mod: TC

## 2018-01-01 PROCEDURE — 99213 OFFICE O/P EST LOW 20 MIN: CPT | Mod: PBBFAC,PO | Performed by: FAMILY MEDICINE

## 2018-01-01 PROCEDURE — 92950 HEART/LUNG RESUSCITATION CPR: CPT

## 2018-01-01 PROCEDURE — 99215 OFFICE O/P EST HI 40 MIN: CPT | Mod: S$PBB,,, | Performed by: INTERNAL MEDICINE

## 2018-01-01 PROCEDURE — 89051 BODY FLUID CELL COUNT: CPT

## 2018-01-01 PROCEDURE — 83036 HEMOGLOBIN GLYCOSYLATED A1C: CPT

## 2018-01-01 PROCEDURE — 99214 OFFICE O/P EST MOD 30 MIN: CPT | Mod: S$PBB,,, | Performed by: PHYSICIAN ASSISTANT

## 2018-01-01 PROCEDURE — 25000003 PHARM REV CODE 250: Performed by: EMERGENCY MEDICINE

## 2018-01-01 PROCEDURE — 99215 OFFICE O/P EST HI 40 MIN: CPT | Mod: PBBFAC,27 | Performed by: PHYSICIAN ASSISTANT

## 2018-01-01 PROCEDURE — C9113 INJ PANTOPRAZOLE SODIUM, VIA: HCPCS | Performed by: EMERGENCY MEDICINE

## 2018-01-01 PROCEDURE — 81000 URINALYSIS NONAUTO W/SCOPE: CPT

## 2018-01-01 PROCEDURE — 87205 SMEAR GRAM STAIN: CPT

## 2018-01-01 PROCEDURE — 99152 MOD SED SAME PHYS/QHP 5/>YRS: CPT | Mod: ,,, | Performed by: INTERNAL MEDICINE

## 2018-01-01 PROCEDURE — 96361 HYDRATE IV INFUSION ADD-ON: CPT | Mod: 59

## 2018-01-01 PROCEDURE — 95885 MUSC TST DONE W/NERV TST LIM: CPT | Mod: PBBFAC,PO | Performed by: PHYSICAL MEDICINE & REHABILITATION

## 2018-01-01 PROCEDURE — 83880 ASSAY OF NATRIURETIC PEPTIDE: CPT

## 2018-01-01 PROCEDURE — 63600175 PHARM REV CODE 636 W HCPCS: Performed by: INTERNAL MEDICINE

## 2018-01-01 PROCEDURE — 94640 AIRWAY INHALATION TREATMENT: CPT

## 2018-01-01 PROCEDURE — 84157 ASSAY OF PROTEIN OTHER: CPT

## 2018-01-01 PROCEDURE — 99152 MOD SED SAME PHYS/QHP 5/>YRS: CPT

## 2018-01-01 PROCEDURE — 99222 1ST HOSP IP/OBS MODERATE 55: CPT | Mod: ,,, | Performed by: INTERNAL MEDICINE

## 2018-01-01 PROCEDURE — 99213 OFFICE O/P EST LOW 20 MIN: CPT | Mod: S$PBB,,, | Performed by: PHYSICIAN ASSISTANT

## 2018-01-01 PROCEDURE — 99213 OFFICE O/P EST LOW 20 MIN: CPT | Mod: PBBFAC,PO,25 | Performed by: INTERNAL MEDICINE

## 2018-01-01 PROCEDURE — 99215 OFFICE O/P EST HI 40 MIN: CPT | Mod: S$PBB,,, | Performed by: PHYSICIAN ASSISTANT

## 2018-01-01 PROCEDURE — 99232 SBSQ HOSP IP/OBS MODERATE 35: CPT | Mod: ,,, | Performed by: INTERNAL MEDICINE

## 2018-01-01 PROCEDURE — 1159F MED LIST DOCD IN RCRD: CPT | Mod: ,,, | Performed by: INTERNAL MEDICINE

## 2018-01-01 PROCEDURE — 82043 UR ALBUMIN QUANTITATIVE: CPT

## 2018-01-01 PROCEDURE — 96374 THER/PROPH/DIAG INJ IV PUSH: CPT | Mod: 59

## 2018-01-01 PROCEDURE — 99496 TRANSJ CARE MGMT HIGH F2F 7D: CPT | Mod: S$PBB,,, | Performed by: FAMILY MEDICINE

## 2018-01-01 PROCEDURE — 99999 PR PBB SHADOW E&M-EST. PATIENT-LVL II: CPT | Mod: PBBFAC,,, | Performed by: OPHTHALMOLOGY

## 2018-01-01 PROCEDURE — 31500 INSERT EMERGENCY AIRWAY: CPT

## 2018-01-01 PROCEDURE — 27000221 HC OXYGEN, UP TO 24 HOURS

## 2018-01-01 PROCEDURE — 99213 OFFICE O/P EST LOW 20 MIN: CPT | Mod: PBBFAC,27,PO | Performed by: FAMILY MEDICINE

## 2018-01-01 PROCEDURE — 95909 NRV CNDJ TST 5-6 STUDIES: CPT | Mod: 26,S$PBB,, | Performed by: PHYSICAL MEDICINE & REHABILITATION

## 2018-01-01 PROCEDURE — 99215 OFFICE O/P EST HI 40 MIN: CPT | Mod: PBBFAC | Performed by: PHYSICIAN ASSISTANT

## 2018-01-01 PROCEDURE — 63600175 PHARM REV CODE 636 W HCPCS: Performed by: NURSE PRACTITIONER

## 2018-01-01 PROCEDURE — 99217 PR OBSERVATION CARE DISCHARGE: CPT | Mod: ,,, | Performed by: INTERNAL MEDICINE

## 2018-01-01 PROCEDURE — 71270 CT THORAX DX C-/C+: CPT | Mod: 26,,, | Performed by: RADIOLOGY

## 2018-01-01 PROCEDURE — 99215 OFFICE O/P EST HI 40 MIN: CPT | Mod: PBBFAC,PO | Performed by: PHYSICIAN ASSISTANT

## 2018-01-01 PROCEDURE — 95909 NRV CNDJ TST 5-6 STUDIES: CPT | Mod: PBBFAC,PO | Performed by: PHYSICAL MEDICINE & REHABILITATION

## 2018-01-01 PROCEDURE — 82042 OTHER SOURCE ALBUMIN QUAN EA: CPT

## 2018-01-01 PROCEDURE — 74178 CT ABD&PLV WO CNTR FLWD CNTR: CPT | Mod: TC,PO

## 2018-01-01 PROCEDURE — 93005 ELECTROCARDIOGRAM TRACING: CPT | Mod: 59

## 2018-01-01 PROCEDURE — 93005 ELECTROCARDIOGRAM TRACING: CPT | Mod: PBBFAC,PO | Performed by: INTERNAL MEDICINE

## 2018-01-01 PROCEDURE — 78815 PET IMAGE W/CT SKULL-THIGH: CPT | Mod: TC,PO

## 2018-01-01 PROCEDURE — 93461 R&L HRT ART/VENTRICLE ANGIO: CPT | Mod: 26,,, | Performed by: INTERNAL MEDICINE

## 2018-01-01 PROCEDURE — 80053 COMPREHEN METABOLIC PANEL: CPT | Mod: PO

## 2018-01-01 PROCEDURE — 85025 COMPLETE CBC W/AUTO DIFF WBC: CPT | Mod: PO

## 2018-01-01 PROCEDURE — 25500020 PHARM REV CODE 255: Mod: PO | Performed by: PHYSICIAN ASSISTANT

## 2018-01-01 PROCEDURE — 1125F AMNT PAIN NOTED PAIN PRSNT: CPT | Mod: ,,, | Performed by: PHYSICIAN ASSISTANT

## 2018-01-01 PROCEDURE — 99291 CRITICAL CARE FIRST HOUR: CPT | Mod: 25

## 2018-01-01 PROCEDURE — C1894 INTRO/SHEATH, NON-LASER: HCPCS

## 2018-01-01 PROCEDURE — 99999 PR PBB SHADOW E&M-EST. PATIENT-LVL V: CPT | Mod: PBBFAC,,, | Performed by: INTERNAL MEDICINE

## 2018-01-01 PROCEDURE — 86850 RBC ANTIBODY SCREEN: CPT

## 2018-01-01 PROCEDURE — 86140 C-REACTIVE PROTEIN: CPT

## 2018-01-01 PROCEDURE — 99213 OFFICE O/P EST LOW 20 MIN: CPT | Mod: PBBFAC,PO | Performed by: PHYSICAL MEDICINE & REHABILITATION

## 2018-01-01 PROCEDURE — 92134 CPTRZ OPH DX IMG PST SGM RTA: CPT | Mod: PBBFAC,PO | Performed by: OPHTHALMOLOGY

## 2018-01-01 PROCEDURE — 99496 TRANSJ CARE MGMT HIGH F2F 7D: CPT | Mod: PBBFAC,PO | Performed by: FAMILY MEDICINE

## 2018-01-01 PROCEDURE — 93005 ELECTROCARDIOGRAM TRACING: CPT

## 2018-01-01 PROCEDURE — 99213 OFFICE O/P EST LOW 20 MIN: CPT | Mod: PBBFAC,27,PO,25 | Performed by: INTERNAL MEDICINE

## 2018-01-01 PROCEDURE — 99999 PR PBB SHADOW E&M-EST. PATIENT-LVL III: CPT | Mod: PBBFAC,,, | Performed by: PHYSICAL MEDICINE & REHABILITATION

## 2018-01-01 PROCEDURE — 99215 OFFICE O/P EST HI 40 MIN: CPT | Mod: ,,, | Performed by: INTERNAL MEDICINE

## 2018-01-01 RX ORDER — DOCUSATE SODIUM 100 MG/1
100 CAPSULE, LIQUID FILLED ORAL DAILY
Status: DISCONTINUED | OUTPATIENT
Start: 2018-01-01 | End: 2018-01-01 | Stop reason: HOSPADM

## 2018-01-01 RX ORDER — IBUPROFEN 200 MG
16 TABLET ORAL
Status: DISCONTINUED | OUTPATIENT
Start: 2018-01-01 | End: 2018-01-01 | Stop reason: HOSPADM

## 2018-01-01 RX ORDER — ATORVASTATIN CALCIUM 40 MG/1
TABLET, FILM COATED ORAL
Qty: 90 TABLET | Refills: 6 | Status: SHIPPED | OUTPATIENT
Start: 2018-01-01

## 2018-01-01 RX ORDER — AMOXICILLIN AND CLAVULANATE POTASSIUM 875; 125 MG/1; MG/1
1 TABLET, FILM COATED ORAL 2 TIMES DAILY
Qty: 14 TABLET | Refills: 0 | Status: SHIPPED | OUTPATIENT
Start: 2018-01-01 | End: 2018-01-01

## 2018-01-01 RX ORDER — FLECAINIDE ACETATE 50 MG/1
100 TABLET ORAL EVERY 12 HOURS
Status: DISCONTINUED | OUTPATIENT
Start: 2018-01-01 | End: 2018-01-01 | Stop reason: HOSPADM

## 2018-01-01 RX ORDER — FLUTICASONE PROPIONATE 50 MCG
2 SPRAY, SUSPENSION (ML) NASAL DAILY
Status: DISCONTINUED | OUTPATIENT
Start: 2018-01-01 | End: 2018-01-01 | Stop reason: HOSPADM

## 2018-01-01 RX ORDER — CHOLECALCIFEROL (VITAMIN D3) 25 MCG
2000 TABLET ORAL DAILY
Status: DISCONTINUED | OUTPATIENT
Start: 2018-01-01 | End: 2018-01-01 | Stop reason: HOSPADM

## 2018-01-01 RX ORDER — ALBUTEROL SULFATE 90 UG/1
2 AEROSOL, METERED RESPIRATORY (INHALATION) EVERY 6 HOURS PRN
Qty: 1 INHALER | Refills: 11 | Status: SHIPPED | OUTPATIENT
Start: 2018-01-01

## 2018-01-01 RX ORDER — CYCLOSPORINE 0.5 MG/ML
EMULSION OPHTHALMIC
Qty: 60 EACH | Refills: 12 | Status: SHIPPED | OUTPATIENT
Start: 2018-01-01

## 2018-01-01 RX ORDER — AZITHROMYCIN 250 MG/1
TABLET, FILM COATED ORAL
Qty: 6 TABLET | Refills: 0 | Status: SHIPPED | OUTPATIENT
Start: 2018-01-01 | End: 2018-01-01

## 2018-01-01 RX ORDER — SODIUM CHLORIDE 9 MG/ML
INJECTION, SOLUTION INTRAVENOUS
Status: COMPLETED | OUTPATIENT
Start: 2018-01-01 | End: 2018-01-01

## 2018-01-01 RX ORDER — PANTOPRAZOLE SODIUM 40 MG/1
40 TABLET, DELAYED RELEASE ORAL DAILY
Status: DISCONTINUED | OUTPATIENT
Start: 2018-01-01 | End: 2018-01-01 | Stop reason: HOSPADM

## 2018-01-01 RX ORDER — PHENOBARBITAL WITH BELLADONNA ALKALOIDS 16.2; .1037; .0194; .0065 MG/5ML; MG/5ML; MG/5ML; MG/5ML
5 ELIXIR ORAL DAILY PRN
Status: DISCONTINUED | OUTPATIENT
Start: 2018-01-01 | End: 2018-01-01 | Stop reason: RX

## 2018-01-01 RX ORDER — CARBOXYMETHYLCELLULOSE SODIUM 5 MG/ML
1 SOLUTION/ DROPS OPHTHALMIC 3 TIMES DAILY PRN
Status: DISCONTINUED | OUTPATIENT
Start: 2018-01-01 | End: 2018-01-01 | Stop reason: CLARIF

## 2018-01-01 RX ORDER — AMBRISENTAN 5 MG/1
5 TABLET, FILM COATED ORAL DAILY
Status: DISCONTINUED | OUTPATIENT
Start: 2018-01-01 | End: 2018-01-01

## 2018-01-01 RX ORDER — ACETAMINOPHEN 325 MG/1
650 TABLET ORAL EVERY 6 HOURS PRN
Status: DISCONTINUED | OUTPATIENT
Start: 2018-01-01 | End: 2018-01-01 | Stop reason: HOSPADM

## 2018-01-01 RX ORDER — SODIUM BICARBONATE 1 MEQ/ML
SYRINGE (ML) INTRAVENOUS CODE/TRAUMA/SEDATION MEDICATION
Status: COMPLETED | OUTPATIENT
Start: 2018-01-01 | End: 2018-01-01

## 2018-01-01 RX ORDER — NITROGLYCERIN 40 MG/1
1 PATCH TRANSDERMAL DAILY
Qty: 30 PATCH | Refills: 11 | Status: SHIPPED | OUTPATIENT
Start: 2018-01-01 | End: 2019-03-14

## 2018-01-01 RX ORDER — POTASSIUM CHLORIDE 750 MG/1
10 TABLET, EXTENDED RELEASE ORAL DAILY
Status: DISCONTINUED | OUTPATIENT
Start: 2018-01-01 | End: 2018-01-01 | Stop reason: HOSPADM

## 2018-01-01 RX ORDER — HYDROCORTISONE ACETATE 25 MG/1
25 SUPPOSITORY RECTAL 2 TIMES DAILY
Status: DISCONTINUED | OUTPATIENT
Start: 2018-01-01 | End: 2018-01-01 | Stop reason: HOSPADM

## 2018-01-01 RX ORDER — DOCUSATE SODIUM 100 MG/1
100 CAPSULE, LIQUID FILLED ORAL DAILY
Refills: 0 | COMMUNITY
Start: 2018-01-01

## 2018-01-01 RX ORDER — FUROSEMIDE 20 MG/1
20 TABLET ORAL 2 TIMES DAILY
Qty: 180 TABLET | Refills: 3 | Status: SHIPPED | OUTPATIENT
Start: 2018-01-01

## 2018-01-01 RX ORDER — SODIUM CHLORIDE 9 MG/ML
INJECTION, SOLUTION INTRAVENOUS CONTINUOUS
Status: DISCONTINUED | OUTPATIENT
Start: 2018-01-01 | End: 2018-01-01

## 2018-01-01 RX ORDER — ONDANSETRON 2 MG/ML
4 INJECTION INTRAMUSCULAR; INTRAVENOUS EVERY 8 HOURS PRN
Status: DISCONTINUED | OUTPATIENT
Start: 2018-01-01 | End: 2018-01-01 | Stop reason: HOSPADM

## 2018-01-01 RX ORDER — ALBUTEROL SULFATE 2.5 MG/.5ML
2.5 SOLUTION RESPIRATORY (INHALATION) EVERY 4 HOURS
Status: DISCONTINUED | OUTPATIENT
Start: 2018-01-01 | End: 2018-01-01 | Stop reason: HOSPADM

## 2018-01-01 RX ORDER — BENZONATATE 100 MG/1
100 CAPSULE ORAL 3 TIMES DAILY PRN
Qty: 30 CAPSULE | Refills: 1 | Status: SHIPPED | OUTPATIENT
Start: 2018-01-01 | End: 2018-01-01 | Stop reason: ALTCHOICE

## 2018-01-01 RX ORDER — NEBIVOLOL 5 MG/1
5 TABLET ORAL 2 TIMES DAILY
Status: DISCONTINUED | OUTPATIENT
Start: 2018-01-01 | End: 2018-01-01 | Stop reason: HOSPADM

## 2018-01-01 RX ORDER — AMBRISENTAN 5 MG/1
5 TABLET, FILM COATED ORAL DAILY
Qty: 30 TABLET | Refills: 11 | Status: SHIPPED | OUTPATIENT
Start: 2018-01-01 | End: 2018-01-01

## 2018-01-01 RX ORDER — NITROGLYCERIN 40 MG/1
1 PATCH TRANSDERMAL DAILY
Status: DISCONTINUED | OUTPATIENT
Start: 2018-01-01 | End: 2018-01-01 | Stop reason: HOSPADM

## 2018-01-01 RX ORDER — ARFORMOTEROL TARTRATE 15 UG/2ML
15 SOLUTION RESPIRATORY (INHALATION) 2 TIMES DAILY
Status: DISCONTINUED | OUTPATIENT
Start: 2018-01-01 | End: 2018-01-01 | Stop reason: HOSPADM

## 2018-01-01 RX ORDER — NITROGLYCERIN 0.4 MG/1
0.4 TABLET SUBLINGUAL EVERY 5 MIN PRN
Status: DISCONTINUED | OUTPATIENT
Start: 2018-01-01 | End: 2018-01-01 | Stop reason: HOSPADM

## 2018-01-01 RX ORDER — PANTOPRAZOLE SODIUM 40 MG/1
40 TABLET, DELAYED RELEASE ORAL 2 TIMES DAILY
Status: DISCONTINUED | OUTPATIENT
Start: 2018-01-01 | End: 2018-01-01 | Stop reason: HOSPADM

## 2018-01-01 RX ORDER — HYOSCYAMINE SULFATE 0.12 MG/1
0.12 TABLET SUBLINGUAL EVERY 6 HOURS PRN
Status: DISCONTINUED | OUTPATIENT
Start: 2018-01-01 | End: 2018-01-01 | Stop reason: HOSPADM

## 2018-01-01 RX ORDER — ALBUTEROL SULFATE 2.5 MG/.5ML
2.5 SOLUTION RESPIRATORY (INHALATION) EVERY 6 HOURS PRN
Status: DISCONTINUED | OUTPATIENT
Start: 2018-01-01 | End: 2018-01-01 | Stop reason: HOSPADM

## 2018-01-01 RX ORDER — FUROSEMIDE 20 MG/1
20 TABLET ORAL 2 TIMES DAILY
Status: DISCONTINUED | OUTPATIENT
Start: 2018-01-01 | End: 2018-01-01 | Stop reason: HOSPADM

## 2018-01-01 RX ORDER — BUDESONIDE 0.5 MG/2ML
0.5 INHALANT ORAL 2 TIMES DAILY
Status: DISCONTINUED | OUTPATIENT
Start: 2018-01-01 | End: 2018-01-01 | Stop reason: HOSPADM

## 2018-01-01 RX ORDER — NAPROXEN SODIUM 220 MG/1
81 TABLET, FILM COATED ORAL ONCE
Status: COMPLETED | OUTPATIENT
Start: 2018-01-01 | End: 2018-01-01

## 2018-01-01 RX ORDER — ALBUTEROL SULFATE 0.83 MG/ML
2.5 SOLUTION RESPIRATORY (INHALATION) EVERY 6 HOURS PRN
Qty: 360 EACH | Refills: 11 | Status: SHIPPED | OUTPATIENT
Start: 2018-01-01

## 2018-01-01 RX ORDER — HYDROXYCHLOROQUINE SULFATE 200 MG/1
200 TABLET, FILM COATED ORAL DAILY
Status: DISCONTINUED | OUTPATIENT
Start: 2018-01-01 | End: 2018-01-01 | Stop reason: HOSPADM

## 2018-01-01 RX ORDER — FEXOFENADINE HCL AND PSEUDOEPHEDRINE HCI 180; 240 MG/1; MG/1
1 TABLET, EXTENDED RELEASE ORAL DAILY
COMMUNITY
End: 2018-01-01 | Stop reason: ALTCHOICE

## 2018-01-01 RX ORDER — NITROGLYCERIN 40 MG/1
1 PATCH TRANSDERMAL DAILY
Status: DISCONTINUED | OUTPATIENT
Start: 2018-01-01 | End: 2018-01-01

## 2018-01-01 RX ORDER — HYDROXYCHLOROQUINE SULFATE 200 MG/1
200 TABLET, FILM COATED ORAL DAILY
COMMUNITY
End: 2018-01-01

## 2018-01-01 RX ORDER — IBUPROFEN 200 MG
24 TABLET ORAL
Status: DISCONTINUED | OUTPATIENT
Start: 2018-01-01 | End: 2018-01-01 | Stop reason: HOSPADM

## 2018-01-01 RX ORDER — GUAIFENESIN 600 MG/1
600 TABLET, EXTENDED RELEASE ORAL 2 TIMES DAILY
Status: DISCONTINUED | OUTPATIENT
Start: 2018-01-01 | End: 2018-01-01 | Stop reason: HOSPADM

## 2018-01-01 RX ORDER — TRAMADOL HYDROCHLORIDE 50 MG/1
50 TABLET ORAL 3 TIMES DAILY PRN
Status: DISCONTINUED | OUTPATIENT
Start: 2018-01-01 | End: 2018-01-01 | Stop reason: HOSPADM

## 2018-01-01 RX ORDER — METRONIDAZOLE 500 MG/1
500 TABLET ORAL 3 TIMES DAILY
Qty: 42 TABLET | Refills: 0 | Status: SHIPPED | OUTPATIENT
Start: 2018-01-01 | End: 2018-06-25

## 2018-01-01 RX ORDER — HYDROCORTISONE ACETATE 25 MG/1
25 SUPPOSITORY RECTAL 2 TIMES DAILY
Qty: 60 SUPPOSITORY | Refills: 0 | Status: SHIPPED | OUTPATIENT
Start: 2018-01-01 | End: 2018-01-01

## 2018-01-01 RX ORDER — SODIUM CHLORIDE 9 MG/ML
INJECTION, SOLUTION INTRAVENOUS CONTINUOUS
Status: ACTIVE | OUTPATIENT
Start: 2018-01-01 | End: 2018-01-01

## 2018-01-01 RX ORDER — ATORVASTATIN CALCIUM 40 MG/1
40 TABLET, FILM COATED ORAL NIGHTLY
Status: DISCONTINUED | OUTPATIENT
Start: 2018-01-01 | End: 2018-01-01 | Stop reason: HOSPADM

## 2018-01-01 RX ORDER — OMEPRAZOLE 20 MG/1
20 CAPSULE, DELAYED RELEASE ORAL DAILY
Qty: 90 CAPSULE | Refills: 3 | Status: SHIPPED | OUTPATIENT
Start: 2018-01-01

## 2018-01-01 RX ORDER — BUDESONIDE AND FORMOTEROL FUMARATE DIHYDRATE 160; 4.5 UG/1; UG/1
2 AEROSOL RESPIRATORY (INHALATION) EVERY 12 HOURS
Status: DISCONTINUED | OUTPATIENT
Start: 2018-01-01 | End: 2018-01-01 | Stop reason: CLARIF

## 2018-01-01 RX ORDER — DIAZEPAM 5 MG/1
5 TABLET ORAL
Status: DISCONTINUED | OUTPATIENT
Start: 2018-01-01 | End: 2018-01-01 | Stop reason: HOSPADM

## 2018-01-01 RX ORDER — ACETAMINOPHEN 325 MG/1
650 TABLET ORAL ONCE
Status: COMPLETED | OUTPATIENT
Start: 2018-01-01 | End: 2018-01-01

## 2018-01-01 RX ORDER — ALBUTEROL SULFATE 90 UG/1
2 AEROSOL, METERED RESPIRATORY (INHALATION) EVERY 6 HOURS PRN
Status: DISCONTINUED | OUTPATIENT
Start: 2018-01-01 | End: 2018-01-01 | Stop reason: CLARIF

## 2018-01-01 RX ORDER — AZELASTINE 1 MG/ML
1 SPRAY, METERED NASAL 2 TIMES DAILY
Status: DISCONTINUED | OUTPATIENT
Start: 2018-01-01 | End: 2018-01-01 | Stop reason: HOSPADM

## 2018-01-01 RX ORDER — ASPIRIN 81 MG/1
81 TABLET ORAL DAILY
Status: DISCONTINUED | OUTPATIENT
Start: 2018-01-01 | End: 2018-01-01 | Stop reason: HOSPADM

## 2018-01-01 RX ORDER — GLUCAGON 1 MG
1 KIT INJECTION
Status: DISCONTINUED | OUTPATIENT
Start: 2018-01-01 | End: 2018-01-01 | Stop reason: HOSPADM

## 2018-01-01 RX ORDER — DIPHENHYDRAMINE HCL 50 MG
50 CAPSULE ORAL ONCE
Status: COMPLETED | OUTPATIENT
Start: 2018-01-01 | End: 2018-01-01

## 2018-01-01 RX ORDER — SODIUM CHLORIDE 9 MG/ML
500 INJECTION, SOLUTION INTRAVENOUS
Status: COMPLETED | OUTPATIENT
Start: 2018-01-01 | End: 2018-01-01

## 2018-01-01 RX ORDER — BENZONATATE 100 MG/1
100 CAPSULE ORAL 3 TIMES DAILY PRN
Status: DISCONTINUED | OUTPATIENT
Start: 2018-01-01 | End: 2018-01-01 | Stop reason: HOSPADM

## 2018-01-01 RX ORDER — AMBRISENTAN 5 MG/1
5 TABLET, FILM COATED ORAL DAILY
COMMUNITY

## 2018-01-01 RX ORDER — TRIAMCINOLONE ACETONIDE 1 MG/G
OINTMENT TOPICAL 2 TIMES DAILY
Status: DISCONTINUED | OUTPATIENT
Start: 2018-01-01 | End: 2018-01-01 | Stop reason: HOSPADM

## 2018-01-01 RX ORDER — INSULIN ASPART 100 [IU]/ML
0-5 INJECTION, SOLUTION INTRAVENOUS; SUBCUTANEOUS
Status: DISCONTINUED | OUTPATIENT
Start: 2018-01-01 | End: 2018-01-01 | Stop reason: HOSPADM

## 2018-01-01 RX ORDER — ESCITALOPRAM OXALATE 10 MG/1
10 TABLET ORAL DAILY
Status: DISCONTINUED | OUTPATIENT
Start: 2018-01-01 | End: 2018-01-01 | Stop reason: HOSPADM

## 2018-01-01 RX ORDER — NEBIVOLOL 5 MG/1
5 TABLET ORAL DAILY
Status: DISCONTINUED | OUTPATIENT
Start: 2018-01-01 | End: 2018-01-01 | Stop reason: HOSPADM

## 2018-01-01 RX ORDER — DICLOFENAC SODIUM 10 MG/G
4 GEL TOPICAL 2 TIMES DAILY
Qty: 3 TUBE | Refills: 4 | Status: SHIPPED | OUTPATIENT
Start: 2018-01-01

## 2018-01-01 RX ORDER — BUDESONIDE AND FORMOTEROL FUMARATE DIHYDRATE 160; 4.5 UG/1; UG/1
2 AEROSOL RESPIRATORY (INHALATION) EVERY 12 HOURS
Qty: 1 INHALER | Refills: 11 | Status: SHIPPED | OUTPATIENT
Start: 2018-01-01 | End: 2019-02-06

## 2018-01-01 RX ORDER — EPINEPHRINE 0.1 MG/ML
INJECTION INTRAVENOUS CODE/TRAUMA/SEDATION MEDICATION
Status: COMPLETED | OUTPATIENT
Start: 2018-01-01 | End: 2018-01-01

## 2018-01-01 RX ORDER — FUROSEMIDE 20 MG/1
20 TABLET ORAL DAILY
Status: DISCONTINUED | OUTPATIENT
Start: 2018-01-01 | End: 2018-01-01 | Stop reason: HOSPADM

## 2018-01-01 RX ADMIN — ASPIRIN 81 MG: 81 TABLET, COATED ORAL at 08:05

## 2018-01-01 RX ADMIN — ALBUTEROL SULFATE 2.5 MG: 2.5 SOLUTION RESPIRATORY (INHALATION) at 08:03

## 2018-01-01 RX ADMIN — ESCITALOPRAM OXALATE 10 MG: 10 TABLET, FILM COATED ORAL at 08:03

## 2018-01-01 RX ADMIN — FUROSEMIDE 20 MG: 20 TABLET ORAL at 05:05

## 2018-01-01 RX ADMIN — EPINEPHRINE 1 MG: 0.1 INJECTION, SOLUTION ENDOTRACHEAL; INTRACARDIAC; INTRAVENOUS at 04:06

## 2018-01-01 RX ADMIN — NITROGLYCERIN 1 PATCH: 0.2 PATCH TRANSDERMAL at 08:05

## 2018-01-01 RX ADMIN — PANTOPRAZOLE SODIUM 40 MG: 40 TABLET, DELAYED RELEASE ORAL at 09:03

## 2018-01-01 RX ADMIN — ACETAMINOPHEN 650 MG: 325 TABLET, FILM COATED ORAL at 08:05

## 2018-01-01 RX ADMIN — SODIUM CHLORIDE 500 ML: 0.9 INJECTION, SOLUTION INTRAVENOUS at 08:05

## 2018-01-01 RX ADMIN — PANTOPRAZOLE SODIUM 40 MG: 40 INJECTION, POWDER, FOR SOLUTION INTRAVENOUS at 08:05

## 2018-01-01 RX ADMIN — DOCUSATE SODIUM 100 MG: 100 CAPSULE, LIQUID FILLED ORAL at 08:05

## 2018-01-01 RX ADMIN — CEFTRIAXONE SODIUM 2 G: 2 INJECTION, POWDER, FOR SOLUTION INTRAMUSCULAR; INTRAVENOUS at 08:05

## 2018-01-01 RX ADMIN — NITROGLYCERIN 1 PATCH: 0.2 PATCH TRANSDERMAL at 08:03

## 2018-01-01 RX ADMIN — AZELASTINE HYDROCHLORIDE 137 MCG: 137 SPRAY, METERED NASAL at 09:03

## 2018-01-01 RX ADMIN — FLECAINIDE ACETATE 100 MG: 50 TABLET ORAL at 08:05

## 2018-01-01 RX ADMIN — INSULIN ASPART 1 UNITS: 100 INJECTION, SOLUTION INTRAVENOUS; SUBCUTANEOUS at 10:05

## 2018-01-01 RX ADMIN — IOHEXOL 30 ML: 350 INJECTION, SOLUTION INTRAVENOUS at 07:01

## 2018-01-01 RX ADMIN — SODIUM CHLORIDE: 9 INJECTION, SOLUTION INTRAVENOUS at 08:03

## 2018-01-01 RX ADMIN — BUDESONIDE 0.5 MG: 0.5 SUSPENSION RESPIRATORY (INHALATION) at 08:03

## 2018-01-01 RX ADMIN — HYPROMELLOSE 2910 1 DROP: 5 SOLUTION OPHTHALMIC at 09:03

## 2018-01-01 RX ADMIN — GUAIFENESIN 600 MG: 600 TABLET, EXTENDED RELEASE ORAL at 09:03

## 2018-01-01 RX ADMIN — NEBIVOLOL HYDROCHLORIDE 5 MG: 5 TABLET ORAL at 08:05

## 2018-01-01 RX ADMIN — ACETAMINOPHEN 650 MG: 325 TABLET, FILM COATED ORAL at 03:05

## 2018-01-01 RX ADMIN — POTASSIUM CHLORIDE 10 MEQ: 750 TABLET, EXTENDED RELEASE ORAL at 08:03

## 2018-01-01 RX ADMIN — CEFTRIAXONE SODIUM 2 G: 2 INJECTION, POWDER, FOR SOLUTION INTRAMUSCULAR; INTRAVENOUS at 09:05

## 2018-01-01 RX ADMIN — NAPROXEN SODIUM 81 MG: 220 TABLET, FILM COATED ORAL at 08:03

## 2018-01-01 RX ADMIN — ARFORMOTEROL TARTRATE 15 MCG: 15 SOLUTION RESPIRATORY (INHALATION) at 08:03

## 2018-01-01 RX ADMIN — FUROSEMIDE 20 MG: 20 TABLET ORAL at 08:03

## 2018-01-01 RX ADMIN — ATORVASTATIN CALCIUM 40 MG: 40 TABLET, FILM COATED ORAL at 09:05

## 2018-01-01 RX ADMIN — FLECAINIDE ACETATE 100 MG: 50 TABLET ORAL at 08:03

## 2018-01-01 RX ADMIN — SODIUM CHLORIDE: 0.9 INJECTION, SOLUTION INTRAVENOUS at 05:03

## 2018-01-01 RX ADMIN — NEBIVOLOL HYDROCHLORIDE 5 MG: 5 TABLET ORAL at 08:03

## 2018-01-01 RX ADMIN — FUROSEMIDE 20 MG: 20 TABLET ORAL at 08:05

## 2018-01-01 RX ADMIN — HYPROMELLOSE 2910 1 DROP: 5 SOLUTION OPHTHALMIC at 08:03

## 2018-01-01 RX ADMIN — SODIUM CHLORIDE: 0.9 INJECTION, SOLUTION INTRAVENOUS at 08:05

## 2018-01-01 RX ADMIN — ACETAMINOPHEN 650 MG: 325 TABLET, FILM COATED ORAL at 12:05

## 2018-01-01 RX ADMIN — PANTOPRAZOLE SODIUM 40 MG: 40 TABLET, DELAYED RELEASE ORAL at 08:05

## 2018-01-01 RX ADMIN — ATORVASTATIN CALCIUM 40 MG: 40 TABLET, FILM COATED ORAL at 09:03

## 2018-01-01 RX ADMIN — ACETAMINOPHEN 650 MG: 325 TABLET, FILM COATED ORAL at 02:05

## 2018-01-01 RX ADMIN — PANTOPRAZOLE SODIUM 40 MG: 40 TABLET, DELAYED RELEASE ORAL at 08:03

## 2018-01-01 RX ADMIN — Medication 50 MG: at 08:03

## 2018-01-01 RX ADMIN — SODIUM BICARBONATE 50 MEQ: 84 INJECTION, SOLUTION INTRAVENOUS at 04:06

## 2018-01-01 RX ADMIN — SODIUM CHLORIDE 1000 ML: 0.9 INJECTION, SOLUTION INTRAVENOUS at 03:06

## 2018-01-01 RX ADMIN — SODIUM CHLORIDE 500 ML/HR: 0.9 INJECTION, SOLUTION INTRAVENOUS at 04:06

## 2018-01-01 RX ADMIN — HYDROCORTISONE ACETATE 25 MG: 25 SUPPOSITORY RECTAL at 10:05

## 2018-01-01 RX ADMIN — ACETAMINOPHEN 650 MG: 325 TABLET, FILM COATED ORAL at 09:05

## 2018-01-01 RX ADMIN — TRIAMCINOLONE ACETONIDE: 1 OINTMENT TOPICAL at 09:03

## 2018-01-01 RX ADMIN — ATORVASTATIN CALCIUM 40 MG: 40 TABLET, FILM COATED ORAL at 08:05

## 2018-01-01 RX ADMIN — FLECAINIDE ACETATE 100 MG: 50 TABLET ORAL at 09:05

## 2018-01-01 RX ADMIN — HYDROCORTISONE ACETATE 25 MG: 25 SUPPOSITORY RECTAL at 09:05

## 2018-01-01 RX ADMIN — IOHEXOL 75 ML: 350 INJECTION, SOLUTION INTRAVENOUS at 09:01

## 2018-01-01 RX ADMIN — GUAIFENESIN 600 MG: 600 TABLET, EXTENDED RELEASE ORAL at 08:03

## 2018-01-01 RX ADMIN — AMBRISENTAN 5 MG: 5 TABLET, FILM COATED ORAL at 11:05

## 2018-01-01 RX ADMIN — DIAZEPAM 5 MG: 5 TABLET ORAL at 08:03

## 2018-01-01 RX ADMIN — ALBUTEROL SULFATE 2.5 MG: 2.5 SOLUTION RESPIRATORY (INHALATION) at 11:03

## 2018-01-01 RX ADMIN — RIVAROXABAN 20 MG: 20 TABLET, FILM COATED ORAL at 05:05

## 2018-01-01 RX ADMIN — DENOSUMAB 60 MG: 60 INJECTION SUBCUTANEOUS at 10:01

## 2018-01-01 RX ADMIN — SODIUM CHLORIDE: 0.9 INJECTION, SOLUTION INTRAVENOUS at 01:05

## 2018-01-01 RX ADMIN — VITAMIN D, TAB 1000IU (100/BT) 2000 UNITS: 25 TAB at 08:03

## 2018-01-01 RX ADMIN — HYDROCORTISONE ACETATE 25 MG: 25 SUPPOSITORY RECTAL at 08:05

## 2018-01-01 RX ADMIN — ASPIRIN 81 MG: 81 TABLET, COATED ORAL at 08:03

## 2018-01-01 RX ADMIN — FLUTICASONE PROPIONATE 100 MCG: 50 SPRAY, METERED NASAL at 08:03

## 2018-01-01 RX ADMIN — ALBUTEROL SULFATE 2.5 MG: 2.5 SOLUTION RESPIRATORY (INHALATION) at 04:03

## 2018-01-01 RX ADMIN — AZELASTINE HYDROCHLORIDE 137 MCG: 137 SPRAY, METERED NASAL at 08:03

## 2018-01-01 RX ADMIN — TRIAMCINOLONE ACETONIDE: 1 OINTMENT TOPICAL at 08:03

## 2018-01-12 NOTE — PROGRESS NOTES
Subjective:       Patient ID: Marcel Montalvo is a 77 y.o. female.    Chief Complaint: Osteoarthritis    Mrs. Montalvo is here for follow up today for Sjogrens, Osteoporosis, and osteoarthritis.  She has a positive JACKELYN 1:160 homogenous with negative sjogrens antibodies. She was taking HCQ 200mg daily as well as using restasis eye drops. Stopped evoxac due to overproduction of Saliva. She recently stopped plaquenil, has been having lots of issues mentioned below, she feels like her stomach feels better off the plaquenil.    Regarding her Osteoporosis she is on Prolia q 6 mos.  She has been on prolia for about a year, previously on fosamax but had significant joint pain and swelling on this medication so was switched to Prolia. She tolerates the injection well.  No recent falls or fractures. New dexa last year shows osteooporosis with improving bmd at the hip and the spine.  Taking 2,000units vit D daily.  She does have CKD, stable.    She has multiple new complaints today.  C/o swelling in her legs/feet, feeling of skin tightness mainly around her stomach/abdomen (feels like her stomach is hard) and legs. She does have dry eyes.  She complains of tingling in her legs.   She has no issues of hand swelling or finger tightness.  She does mention she is having worsening issues with tremors.  She has seen cardiology who says they can't find anything, she is on lasix for swelling.  Denies fevers, rashes, no weight change, no worsening gerd, no joint swelling, no weakness.  These symptoms have been worsening over the last several months, she said no one knows what is going on with her.        Review of Systems   Constitutional: Negative for chills, fatigue and fever.        Dizzy spells   HENT: Negative for mouth sores, rhinorrhea and sore throat.         Dry mouth   Eyes: Negative for pain and redness.        Dry eyes     Respiratory: Negative for cough and shortness of breath.    Cardiovascular: Positive for leg swelling.  "Negative for chest pain.        Afib, on xarelto,   Multiple ablations   Gastrointestinal: Positive for abdominal distention. Negative for abdominal pain, constipation, diarrhea, nausea and vomiting.        H/o large abd hematoma 3/2017, hospitalized for this, unknown cause   Endocrine: Negative.    Genitourinary: Negative.  Negative for dysuria and hematuria.   Musculoskeletal: Positive for arthralgias, back pain and neck pain. Negative for joint swelling and myalgias.        Back hip pain   Skin: Negative for rash.   Neurological: Positive for dizziness, tremors and numbness. Negative for weakness and headaches.   Hematological:        Xarelto   Psychiatric/Behavioral: The patient is not nervous/anxious.          Objective:     BP (!) 152/63   Pulse 65   Ht 5' 2" (1.575 m)   Wt 59 kg (130 lb 1.1 oz)   BMI 23.79 kg/m²      Physical Exam   Constitutional: She is oriented to person, place, and time and well-developed, well-nourished, and in no distress. No distress.   HENT:   Head: Normocephalic.   Eyes: EOM are normal. Pupils are equal, round, and reactive to light.   Neck: Normal range of motion. Neck supple. No thyromegaly present.   No LAD noted   Cardiovascular: Normal heart sounds.  Exam reveals no gallop and no friction rub.    No murmur heard.  afib   Pulmonary/Chest: Breath sounds normal. She has no wheezes. She has no rales. She exhibits no tenderness.   Abdominal: Soft. There is no tenderness. There is no rebound.   Lymphadenopathy:     She has no cervical adenopathy.   Neurological: She is alert and oriented to person, place, and time. She displays normal reflexes. She exhibits normal muscle tone. Gait normal.   Skin: Skin is warm and dry. No rash noted. No erythema.     Psychiatric: Mood, memory and affect normal.   Musculoskeletal: Normal range of motion. She exhibits no edema or tenderness.   maki hands with some Oa changes to dips bilaterally.  Good motion to her maki wrists, mcps, pips.  Good rom " to maki knees.  No skin tightening to her hands, no sclerodactyly, no telangiectasia no calcinosis, no synovitis to any joints    maki lower ext swelling    Steady gait, able to get in and out of chair without using hands         Results for orders placed or performed in visit on 01/15/18   CBC auto differential   Result Value Ref Range    WBC 6.00 3.90 - 12.70 K/uL    RBC 3.72 (L) 4.00 - 5.40 M/uL    Hemoglobin 11.2 (L) 12.0 - 16.0 g/dL    Hematocrit 36.1 (L) 37.0 - 48.5 %    MCV 97 82 - 98 fL    MCH 30.1 27.0 - 31.0 pg    MCHC 31.0 (L) 32.0 - 36.0 g/dL    RDW 15.2 (H) 11.5 - 14.5 %    Platelets 262 150 - 350 K/uL    MPV 10.5 9.2 - 12.9 fL    Gran # 4.0 1.8 - 7.7 K/uL    Lymph # 1.0 1.0 - 4.8 K/uL    Mono # 0.7 0.3 - 1.0 K/uL    Eos # 0.3 0.0 - 0.5 K/uL    Baso # 0.06 0.00 - 0.20 K/uL    Gran% 66.4 38.0 - 73.0 %    Lymph% 16.0 (L) 18.0 - 48.0 %    Mono% 11.3 4.0 - 15.0 %    Eosinophil% 5.3 0.0 - 8.0 %    Basophil% 1.0 0.0 - 1.9 %    Differential Method Automated    Comprehensive metabolic panel   Result Value Ref Range    Sodium 141 136 - 145 mmol/L    Potassium 4.3 3.5 - 5.1 mmol/L    Chloride 106 95 - 110 mmol/L    CO2 27 23 - 29 mmol/L    Glucose 142 (H) 70 - 110 mg/dL    BUN, Bld 27 (H) 8 - 23 mg/dL    Creatinine 1.2 0.5 - 1.4 mg/dL    Calcium 9.5 8.7 - 10.5 mg/dL    Total Protein 7.3 6.0 - 8.4 g/dL    Albumin 3.9 3.5 - 5.2 g/dL    Total Bilirubin 0.7 0.1 - 1.0 mg/dL    Alkaline Phosphatase 237 (H) 55 - 135 U/L    AST 34 10 - 40 U/L    ALT 26 10 - 44 U/L    Anion Gap 8 8 - 16 mmol/L    eGFR if African American 50 (A) >60 mL/min/1.73 m^2    eGFR if non African American 43 (A) >60 mL/min/1.73 m^2   Sedimentation rate, manual   Result Value Ref Range    Sed Rate 5 0 - 20 mm/Hr   Results for TALITA RAKANROSENDA JAIN (MRN [id number]) as of 1/15/2018 10:12   Ref. Range 11/17/2014 15:21 11/17/2014 15:21 10/3/2017 10:50   JACKELYN HEP-2 Titer Unknown Positive 1:160 Ho...     Anti-SSA Antibody Latest Ref Range: 0.00 - 19.99 EU 6.85 6.85     Anti-SSA Interpretation Latest Ref Range: Negative  Negative Negative    Anti-SSB Antibody Latest Ref Range: 0.00 - 19.99 EU 12.74 12.74    Anti-SSB Interpretation Latest Ref Range: Negative  Negative Negative    ds DNA Ab Latest Ref Range: Negative 1:10   Negative 1:10    Anti Sm Antibody Latest Ref Range: 0.00 - 19.99 EU  11.75    Anti-Sm Interpretation Latest Ref Range: Negative   Negative    Anti Sm/RNP Antibody Latest Ref Range: 0.00 - 19.99 EU  8.73    Anti-Sm/RNP Interpretation Latest Ref Range: Negative   Negative    Smooth Muscle Ab Latest Ref Range: Negative    Negative 1:40   Anti-Mitochon Ab IFA Latest Ref Range: Negative    Negative 1:40     DEXA: 7.12.17: DF -1.5, neck mean -2.5, spine -0.9, improving bmd at the hip and spine  Assessment:       1. Osteoporosis, postmenopausal    2. Sjogren's syndrome with other organ involvement    3. Medication monitoring encounter    4. Abdominal distention    5. Leg swelling    6. Numbness and tingling of both lower extremities        Impression:    1. Osteoporosis:  On prolia q 6 mos, previously on fosamax but stopped due to side effects, due today for injection,  takes 2000units vit D daily; dexa 7.12.17 neck mean -2.5, improving bmd hip and spine    2. Sjogrens syndrome (+kandy low titer, neg ssa, neg ssb): stopped plaquenil on her own, stopped evoxac due to overproduction of saliva; mult new symptoms abd swelling, leg swelling, numbness/tingling legs: sjogrens can be associated with lymphoma, large fiber neuropathy, gammopathy- unsure if new symptoms are related but will work up    3. Med monitoring: ok to get prolia, no toxicity from meds-decreased gfr, stopped plaquenil    4. CKD: stable    5. Leg swelling/abd distention/tingling? Unsure what is going on here, saw cardiology work up neg; concern for lymphoma/malignancy with h/o sjogrens, rule out large fiber neuropathy   Plan:     Case presented to RICARDO Shin and Plan done in collaboration     OK for prolia  today, cont q 6 mos  Chew 2 tums daily for the next 2 weeks, recheck Ca in 10 days due to kidney function (she will be out of town for a month however, no issues with hypocalcemia in the past post prolia)  Cont vit D 2000units daily, dietary calcium   Ok to stay off plaquenil for now  Regarding her new symptoms, will check esr/crp today when resulted,   Obtain EMG rule out large fiber neuropathy  CT chest/abd/pelvis with and without contrast rule out malignancy/lymphoma  Spep with immunofixation eval for gammopathy associated with sjogrens    Will review above when resulted and determine if sooner follow up is needed    Return in 6 mos for prolia, reg 4   Will see sooner if needed

## 2018-01-15 NOTE — PATIENT INSTRUCTIONS
Chew 2 tums daily for the next 2 weeks,    Waiting on inflammatory markers, dr. Moreno should be able to see    I will talk to dr. GUERRERO, see if he thinks anything rheumatological,    Ok to stay off plaquenil for now

## 2018-01-15 NOTE — PROGRESS NOTES
Subjective:      Patient ID: Marcel Montalvo is a 77 y.o. female.    Chief Complaint: Follow-up (4 months )    Disclaimer:  This note is prepared using voice recognition software and as such is likely to have errors and has not been proof read. Please contact me for questions.     Marcel Montalvo is a 77 y.o. female who presents today for multiple issues.  Patient was just seen this morning by rheumatology.  Her biggest concern is that she's been having bilateral leg swelling.  Worsening by the end of the day.  She has multiple comorbidities including cardiac, rheumatoid, pulmonary issues that are all complicating her picture.  She does report however if she uses Lasix twice daily the leg swelling seems to go down however she's been told only do this a few times because she has decreased kidney function.  Currently she only takes Lasix 20 mg daily.  This morning when she saw Ms. Luana Knight some of the blood work was still pending.  The sedimentation rate is now currently back noted be at 5.  CRP is still pending.  Based on the review of Ms. Knight is noted she spoke with Dr. GUERRERO and they would like to go ahead and proceed forward with CT chest abdomen and pelvis to rule out any type of malignancy or lymphoma that could be contributing to the swelling as well as doing EMG studies and SPEP.  We'll help her to get scheduled the CT scans at least at this point because she's planning on leaving [facility] on Thursday to go to [place] for a month and a half.    The patient also reports that she's chronically had sinus issues her entire life but most recently having more pain in the maxillary region.  Only time this improved before which when she was in the hospital on on multiple IV antibiotics.  She does have multiple intolerances and ALLERGIES listed for antibiotics.  She believes she can tolerate a Z-Kameron currently.  She is currently using Mucinex on a regular basis.    She would also like a refill on her Tessalon Perles as this does  seem to help with cough.    Since also saw her as well as she did do walk testing and noted to be hypoxic.  Now since doing the 2 L of oxygen with exertion dramatically helped her endurance.  She reports her she never got the results back from the sleep study.  Based on my reading and the interpretation she did have one hour where she had oxygen saturations under 88% which qualifies her also for nighttime oxygen.  The patient reports she usually sleeps well at night but she wakes up very tired in the morning.  I believe this would help also.  She's believe she has enough of the concentration unit and omit small unit to help with this and she plans on taking it to [place] with them.    She also believes that ever since the large abdominal hematoma that she's had that her lower half is just not been right since.  Mainly we've been observing her blood counts as well to make sure it's not worsened and she slightly more anemic this morning but still within her goal ranges.  She's not having exquisite amount of pain at this time and her abdomen area.  She does have regular bowel movements with the use of MiraLAX.        Lab Results   Component Value Date    WBC 6.00 01/15/2018    HGB 11.2 (L) 01/15/2018    HCT 36.1 (L) 01/15/2018     01/15/2018    CHOL 192 05/02/2017    TRIG 164 (H) 05/02/2017    HDL 42 05/02/2017    ALT 26 01/15/2018    AST 34 01/15/2018     01/15/2018    K 4.3 01/15/2018     01/15/2018    CREATININE 1.2 01/15/2018    BUN 27 (H) 01/15/2018    CO2 27 01/15/2018    TSH 3.535 05/02/2017    INR 1.8 (H) 10/03/2017    HGBA1C 6.2 (H) 08/02/2017       Review of Systems   Constitutional: Positive for activity change, appetite change and fatigue. Negative for unexpected weight change.   HENT: Positive for congestion, facial swelling, sinus pain and sinus pressure. Negative for ear pain, trouble swallowing and voice change.    Respiratory: Positive for cough and shortness of breath.   "  Cardiovascular: Positive for leg swelling. Negative for chest pain.   Gastrointestinal: Positive for abdominal distention. Negative for abdominal pain, constipation, diarrhea and nausea.   Neurological: Negative for weakness and headaches.   Psychiatric/Behavioral: Negative for sleep disturbance. The patient is nervous/anxious.      Objective:     Vitals:    01/15/18 1256   BP: 136/86   Pulse: 64   Temp: 97.1 °F (36.2 °C)   TempSrc: Tympanic   Weight: 59.3 kg (130 lb 11.7 oz)   Height: 5' 2" (1.575 m)     Physical Exam   Constitutional: She appears well-developed and well-nourished.   HENT:   Head: Normocephalic and atraumatic.   Right Ear: Tympanic membrane normal.   Left Ear: Tympanic membrane normal.   Nose: Mucosal edema and rhinorrhea present. Right sinus exhibits maxillary sinus tenderness. Right sinus exhibits no frontal sinus tenderness. Left sinus exhibits maxillary sinus tenderness. Left sinus exhibits no frontal sinus tenderness.   Mouth/Throat: Oropharynx is clear and moist.   Eyes: Conjunctivae and EOM are normal.   Neck: Normal range of motion. Neck supple. Carotid bruit is not present.   Cardiovascular: Normal rate, regular rhythm and normal heart sounds.    +2 pitting edema from the mid tibias downward   Pulmonary/Chest: Effort normal and breath sounds normal.   Abdominal: Soft. Bowel sounds are normal. She exhibits distension. She exhibits no mass. There is no tenderness. There is no rebound and no guarding.   Psychiatric: She has a normal mood and affect. Her behavior is normal.   Nursing note and vitals reviewed.    Assessment:     1. Bilateral leg edema    2. Sinus pain    3. Recurrent maxillary sinusitis    4. Hypoxemia requiring supplemental oxygen    5. Abdominal wall hematoma, subsequent encounter      Plan:   Marcel was seen today for follow-up.    Diagnoses and all orders for this visit:    Bilateral leg edema-not improved.  Reviewed note from rheumatology this morning.  We'll help her to " schedule the CT scans.  Comments:  undergoing workup with Rheum with ct chest/abd/pelvis, spep, emg. increase lasix to bid x 3 days, then back to daily.  Follow-up as requested by rheumatology once imaging studies and workups completed    Sinus pain-recurrent we'll start a Z-Gaby continue with Mucinex    Recurrent maxillary sinusitis  Comments:  restart zpak. cont with mucinex.   Orders:  -     benzonatate (TESSALON) 100 MG capsule; Take 1 capsule (100 mg total) by mouth 3 (three) times daily as needed for Cough.    Hypoxemia requiring supplemental oxygen-reviewed prior records from sleep study.  Recommend that she go ahead and also use 2 L of oxygen at night due to her desaturations for more than 1 hour under 88%.    Abdominal wall hematoma, subsequent encounter-non-worsening pain at this time but now with bilateral leg swelling.  Agree with proceeding forward with CT scan of the chest abdomen and pelvis.    Other orders  -     azithromycin (Z-GABY) 250 MG tablet; Take 2 tablets by mouth on day 1; Take 1 tablet by mouth on days 2-5            Follow-up in about 2 months (around 3/15/2018) for chronic issues Dr Mroeno.

## 2018-01-15 NOTE — PROGRESS NOTES
Administered 1cc Prolia 60mg/cc to RLQ of abdomen. Pt. Tolerated well. No acute reaction noted to site. Pt. Instructed on S/S of reaction to report. Pt. Verbalized understanding. Pt waited 15 minutes.      Lot: [id number]  Exp:04/20  Manu:rhoda

## 2018-01-15 NOTE — TELEPHONE ENCOUNTER
----- Message from Jose Huff sent at 1/15/2018  3:04 PM CST -----  Contact: self [phone]  Would like to reschedule EMG appointment on 01/31.  Please call back at [phone].  Md Stella

## 2018-01-15 NOTE — LETTER
January 15, 2018      Scotty Lewis MD  [address] [facility] [place] [place] [address]           [facility] - Rheumatology  [address]  [place] [address]  Phone: [phone]  Fax: [phone]          Patient: Marcel Montalvo   MR Number: [id number]   YOB: 1940   Date of Visit: 1/15/2018       Dear Dr. Scotty Lewis:    Thank you for referring Marcel Montalvo to me for evaluation. Attached you will find relevant portions of my assessment and plan of care.    If you have questions, please do not hesitate to call me. I look forward to following Marcel Montalvo along with you.    Sincerely,    Luana Knight PA-C    Enclosure  CC:  No Recipients    If you would like to receive this communication electronically, please contact [email or web address] or [phone] to request more information on [employer] access.    For providers and/or their staff who would like to refer a patient to [facility], please contact us through our one-stop-shop provider referral line, [facility], at [phone].    If you feel you have received this communication in error or would no longer like to receive these types of communications, please e-mail [email or web address]

## 2018-01-18 PROBLEM — R91.1 LUNG NODULE SEEN ON IMAGING STUDY: Status: ACTIVE | Noted: 2018-01-01

## 2018-01-18 NOTE — TELEPHONE ENCOUNTER
----- Message from Kinjal Fletcher MD sent at 1/18/2018  6:00 AM CST -----  Will get her an appointment and eval. My nurse Hortencia will take care of it .Thank u  ----- Message -----  From: Alexandra Moreno MD  Sent: 1/17/2018  10:24 PM  To: Kinjal Fletcher MD, Artem Nunn MD, #    Dr Nunn, Dr Espinoza, Dr Ruano, Dr Fletcher, and Dr. GUERRERO,  Ms. Luana Knight got a CT chest/abd/Pelvis on our mutal patient for  Concern for malignancy and abdominal swelling/bilateral leg swelling. There are several findings noted and I would like your input as to how to proceed with this patient. Below is the impression and my concerns. Thanks.   Alexandra Moreno MD    1)Subsolid nodule measuring 6 mm in the right lower lobe.  needs repeat CT chest In 3 months. Would forward to her pulmonary specialist. - dr nunn appears to be her pulmonary specialist but also has seen recently Farheen Baron and Dr Espinoza. Can find out from her whom she prefers to monitor this area.     2) ascites? This is new for her. She has hx of a large abdominal hematoma last year in 2017, but not ascites. Will need to see GI next. Lasix should help with the fluid though. May need to have this fluid tapped or drained to investigate further if inflammatory vs infection vs malignancy. Has seen Dr Ruano in past. Suggestions since he is now more admin?     3)inflammatory lung issues? multiple pulmonary groundglass opacities predominantly within the right lung Is seeing rheum and pulm.     4) cardiomegaly, right sided pleural effusion, - sees Dr Fletcher for cards and Dr Mike.

## 2018-01-18 NOTE — TELEPHONE ENCOUNTER
Patient will be leaving today to go out of town till March 10th.  Patient is scheduled to see Dr. Fletcher for 3/14.

## 2018-01-18 NOTE — TELEPHONE ENCOUNTER
I just looked at the CT chest/abd/pelvis ordered by Luana Knight in Rheum  . Noted few findings.   1) 6mm pulm nodule, Subsolid nodule measuring 6 mm in the right lower lobe.  needs repeat CT chest In 3 months. Would forward to her pulmonary specialist. - dr nunn appears to be her pulmonary specialist but also has seen recently Farheen Baron and Dr Espinoza. Can find out from her whom she prefers to monitor this area.     2) ascites? This is new for her. She has hx of hematoma, but not ascites. Will need to see GI next. Lasix should help with the fluid though. May need to have this fluid tapped or drained to investigate further if inflammatory vs infection vs malignancy. Has seen Dr Ruano in past.     3)inflammatory lung issues? multiple pulmonary groundglass opacities predominantly within the right lung Is seeing rheum.     4) cardiomegaly, right sided pleural effusion, - sees Dr Fletcher for cards.     I would like to get input from all her specialists. will forward to them to comment and set up followup/evaluation. I sent in[employer] Accesset message to Dr ruano, Dr Espinoza, Dr Nunn, Dr Fletcher to comment.     Alexandra Moreno MD

## 2018-01-23 NOTE — TELEPHONE ENCOUNTER
Called pt and scheduled appt for PET scan on 03/21/18@10:30 at [facility]. Pt verbalized understanding.

## 2018-01-24 NOTE — TELEPHONE ENCOUNTER
----- Message from Brent Kiran sent at 1/24/2018  2:52 PM CST -----  Contact: pt  She's calling in regards to her scheduled appt, [phone] (home)

## 2018-01-30 NOTE — TELEPHONE ENCOUNTER
----- Message from Yesika Beyer LPN sent at 1/30/2018  3:12 PM CST -----      ----- Message -----  From: RADHIKA Mandujano  Sent: 1/30/2018   2:49 PM  To: Yesika Beyer LPN    With as many complications, she needs to be seen by Dr. Omar mejia    ----- Message -----  From: Yesika Beyer LPN  Sent: 1/19/2018   7:58 AM  To: RADHIKA Mandujano    Do I need to make an appt for this lady?  ----- Message -----  From: Bowen Ruano MD  Sent: 1/18/2018   7:28 PM  To: Kinjal Fletcher MD, Artem Nunn MD, #    I saw Mrs. Montalvo in September and diagnosed her with well compensated fatty liver disease without signs or stigmata of chronic liver disease leading to portal hypertension. 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   She would need ascites analysis and maybe a liver biopsy and/or right heart cath. Her issues are complicated by the fact that she is on Xarelto, amongst other things, so she will need Int. Rad to do a transjugular liver biopsy. Please refer to our [facility].     Thanks    ----- Message -----  From: Alexandra Moreno MD  Sent: 1/17/2018  10:24 PM  To: Kinjal Fletcher MD, Artem Nunn MD, #    Dr Nunn, Dr Espinoza, Dr Ruano, Dr Fletcher, and Dr. GUERRERO,  MsVon Luana Knight got a CT chest/abd/Pelvis on our mutal patient for  Concern for malignancy and abdominal swelling/bilateral leg swelling. There are several findings noted and I would like your input as to how to proceed with this patient. Below is the impression and my concerns. Thanks.   Alexandra Moreno MD    1)Subsolid nodule measuring 6 mm in the right lower lobe.  needs repeat CT chest In 3 months. Would forward to her pulmonary specialist. - dr nunn appears to be her pulmonary specialist but also has seen recently Farheen Baron and Dr Espinoza. Can find out from her whom she prefers to monitor this area.     2) ascites? This is new for her. She has hx of  a large abdominal hematoma last year in 2017, but not ascites. Will need to see GI next. Lasix should help with the fluid though. May need to have this fluid tapped or drained to investigate further if inflammatory vs infection vs malignancy. Has seen Dr Ruano in past. Suggestions since he is now more admin?     3)inflammatory lung issues? multiple pulmonary groundglass opacities predominantly within the right lung Is seeing rheum and pulm.     4) cardiomegaly, right sided pleural effusion, - sees Dr Fletcher for cards and Dr Mike.

## 2018-01-30 NOTE — TELEPHONE ENCOUNTER
Left message on answering machine to return a call to Dr. Nelson's office at [facility],appointment made and mailed letter, [place].

## 2018-01-31 NOTE — TELEPHONE ENCOUNTER
----- Message from Leslye Blacno sent at 1/31/2018  8:49 AM CST -----  Please call pt back at [phone] in regards to getting an appt with you. Pt would like an appt for stomach fluid built up and also for a hernia.

## 2018-01-31 NOTE — TELEPHONE ENCOUNTER
----- Message from Adele Donnelly sent at 1/31/2018  8:34 AM CST -----  Contact: Patient  Patient called to test results.  She can be contacted at [phone].    Thanks,  Adele

## 2018-02-01 NOTE — TELEPHONE ENCOUNTER
"Received phone call from patient asking if Dr. Fletcher had reviewed her recent CT/PET and spoke with Dr. Moreno about her heart and "somekind of defect"  Asking if she needs to come in sooner to discuss further. Appointment 3/14/18      Dr. Fletcher- please advise  "

## 2018-02-02 NOTE — TELEPHONE ENCOUNTER
Spoke with Von Katia and advised of plan increase Lasix 20 mg twice a day with Potassium 10 meq twice a day for 7 days.  Watch salt intake and will contact her Pharmacy with increase in Potassium  Advised patient would get lab work in a week and check with her sooner if needed

## 2018-02-06 PROBLEM — G47.34 NOCTURNAL HYPOXEMIA: Status: ACTIVE | Noted: 2018-01-01

## 2018-02-06 PROBLEM — I27.21 PAH (PULMONARY ARTERY HYPERTENSION): Status: ACTIVE | Noted: 2018-01-01

## 2018-02-06 NOTE — PROGRESS NOTES
Subjective:       Patient ID: Marcel Montalvo is a 77 y.o. female.    Chief Complaint: She       Asthma with COPD    HPI     Dyspnea  Patient complains of shortness of breath. Symptoms occur while getting dressed, with one block walking. Symptoms began 1 year ago, gradually worsening since. Associated symptoms include  difficulty breathing, drainage from nose, dyspnea on exertion, dyspnea when laying down, frequent throat clearing, morning cough, post nasal drip, shortness of breath and sputum production.Occasional chest pain/pressure. Hx of infection/ pneumonia 10 months ago. She denies hemoptysis from lungs and unresolving pneumonia. She does not have had recent travel. Weight has decreased 10 pounds over last few months from diuretics. Symptoms are exacerbated by any exercise. Symptoms are alleviated by rest.   Uses oxygen at night  Hospitalizations for Atrial Fibrillation x 3 in past year      Congestive Heart Failure:  Patient presents for re-evaluation of congestive heart failure. Patient's current complaints are chest pressure/discomfort, dyspnea, fatigue, irregular heart beat, lower extremity edema and ascities. She denies chest pain. She states she is compliant most of the time with her medications. She states she is compliant most of the time with her diet.    Lung Nodule  She presents for evaluation and treatment of a lung nodule. The patient reports that the imaging was performed to evaluate symptoms of dyspnea on exertion which have been present for 1 year and are gradually worsening. Symptoms are exacerbated by exercise and walking and relieved by rest. The patient denies other associated symptoms. She has a history of 12.5 pack years. The patient has no known exposure to tuberculosis. The patient does not have a history of cancer.   PET scan was negative    Past Medical History:   Diagnosis Date    *Atrial fibrillation     AF (atrial fibrillation)     Palpation: atrial fibrillation, on Coumadin and  followed by cardiologist.    Anticoagulant long-term use     Anxiety     Aortic insufficiency 3/19/2014    Aortic regurgitation     Aortic regurgitation/tricuspid regurgitation per MARIO in April 2007.     Asthma     Carotid artery stenosis     CEA on the left by Dr. Maciel    Coronary artery disease     status post CABG X 3.     Diabetes mellitus     GERD (gastroesophageal reflux disease)     H. pylori infection     treated    Hyperlipidemia     intolerant to statins.    Hypertension 8/7/2013    IBS (irritable bowel syndrome)     Leg pain 3/19/2014    Long-term (current) use of anticoagulants 8/7/2013    Osteoarthritis     Osteoarthritis     Osteopenia     DEXA scan done on 06/20/12     Pancreatitis     resolved    S/P CABG (coronary artery bypass graft) 8/7/2013    S/P carotid endarterectomy 8/7/2013    S/P PTCA (percutaneous transluminal coronary angioplasty) 3/19/2014    Sjogren's syndrome     Stroke     Vitamin D deficiency disease      Past Surgical History:   Procedure Laterality Date    ABDOMINAL SURGERY      APPENDECTOMY      bilateral cataract surgery      CARPAL TUNNEL RELEASE      RT    CATARACT EXTRACTION      CEA      left    CHOLECYSTECTOMY      CORONARY ARTERY BYPASS GRAFT      2 stents    EYE SURGERY      HYSTERECTOMY      TONSILLECTOMY       Social History     Social History    Marital status:      Spouse name: N/A    Number of children: N/A    Years of education: N/A     Occupational History    Not on file.     Social History Main Topics    Smoking status: Former Smoker     Packs/day: 0.50     Years: 25.00     Quit date: 12/17/1988    Smokeless tobacco: Never Used    Alcohol use 0.0 oz/week      Comment: wine occasionally    Drug use: No    Sexual activity: Yes     Partners: Male     Other Topics Concern    Not on file     Social History Narrative    No narrative on file     Review of Systems   Constitutional: Positive for weight loss and  fatigue.   HENT: Negative.    Respiratory: Positive for chest tightness, shortness of breath, wheezing, orthopnea, previous hospitalization due to pulmonary problems, asthma nighttime symptoms, dyspnea on extertion and Paroxysmal Nocturnal Dyspnea.    Cardiovascular: Positive for palpitations and leg swelling.   Genitourinary: Negative.    Endocrine: endocrine negative   Musculoskeletal: Positive for arthralgias.   Skin: Negative.    Gastrointestinal: Positive for abdominal distention.   Neurological: Positive for weakness.   Psychiatric/Behavioral: Positive for sleep disturbance.       Objective:      Physical Exam   Constitutional: She is oriented to person, place, and time. She appears well-developed and well-nourished.   HENT:   Head: Normocephalic and atraumatic.   Eyes: Conjunctivae are normal. Pupils are equal, round, and reactive to light.   Neck: Neck supple. No JVD present. No tracheal deviation present. No thyromegaly present.   Cardiovascular: Normal rate.  An irregularly irregular rhythm present. PMI is displaced.    Murmur heard.   Systolic murmur is present with a grade of 2/6   Pulmonary/Chest: Effort normal. No respiratory distress. She has decreased breath sounds. She has no wheezes. She has rales in the right lower field and the left lower field. She exhibits no tenderness.   Abdominal: Soft. Bowel sounds are normal. She exhibits distension.   Musculoskeletal: Normal range of motion. She exhibits edema.   Lymphadenopathy:     She has no cervical adenopathy.   Neurological: She is alert and oriented to person, place, and time.   Skin: Skin is warm and dry.   Nursing note and vitals reviewed.    Personal Diagnostic Review  Chest x-ray: cardiomegaly and bilateral pleural effusion  PET: negative  No flowsheet data found.     Overnight O2 sat : Abnormal    Time with SpO2<88: 0:59:08, 10.6%    ECHOCARDIOGRAM  CONCLUSIONS     1 - Concentric remodeling.     2 - No wall motion abnormalities.     3 - Normal  left ventricular systolic function (EF 55-60%).     4 - Restrictive LV filling pattern, indicating markedly elevated LAP (grade 3 diastolic dysfunction).     5 - Right ventricular enlargement with low normal to mildly depressed systolic function.     6 - Pulmonary hypertension. The estimated PA systolic pressure is 50 mmHg.     7 - Mild aortic regurgitation.     8 - Mild mitral regurgitation.     9 - Moderate tricuspid regurgitation.     10 - Intermediate central venous pressure.             This document has been electronically    SIGNED BY: Kinjal Fletcher MD On: 08/16/2017 19:35      Specimen Collected: 08/16/17 15:15 Last Resulted: 08/16/17 19:39             Assessment:       1. Hypoxemia requiring supplemental oxygen    2. Asthma with COPD    3. Lung nodule seen on imaging study    4. Chronic diastolic heart failure    5. Abnormal CT of the chest    6. Lung nodule    7. PAH (pulmonary artery hypertension)    8. Nocturnal hypoxemia        Outpatient Encounter Prescriptions as of 2/6/2018   Medication Sig Dispense Refill    albuterol (PROAIR HFA) 90 mcg/actuation inhaler Inhale 2 puffs into the lungs every 6 (six) hours as needed. 1 Inhaler 11    albuterol (PROVENTIL) 2.5 mg /3 mL (0.083 %) nebulizer solution Take 3 mLs (2.5 mg total) by nebulization every 6 (six) hours as needed for Wheezing or Shortness of Breath. Rescue 360 each 11    aspirin (ECOTRIN) 81 MG EC tablet Take 1 tablet (81 mg total) by mouth once daily. 30 tablet 6    atorvastatin (LIPITOR) 40 MG tablet TAKE ONE TABLET BY MOUTH NIGHTLY 90 tablet 6    azelastine (ASTELIN) 137 mcg (0.1 %) nasal spray 1 spray by Nasal route 2 (two) times daily.      benzonatate (TESSALON) 100 MG capsule Take 1 capsule (100 mg total) by mouth 3 (three) times daily as needed for Cough. 30 capsule 1    biotin 1 mg tablet Take 1,000 mcg by mouth 3 (three) times daily.      budesonide-formoterol 160-4.5 mcg (SYMBICORT) 160-4.5 mcg/actuation HFAA Inhale 2 puffs into  the lungs every 12 (twelve) hours. Wash out mouth after using 1 Inhaler 11    carboxymethylcellulose (REFRESH PLUS) 0.5 % Dpet Place 1 drop into both eyes 3 (three) times daily as needed.      diclofenac sodium (VOLTAREN) 1 % Gel Apply 2 g topically once daily. 3 Tube 4    escitalopram oxalate (LEXAPRO) 10 MG tablet TAKE ONE-HALF TO ONE TABLET BY MOUTH EVERY DAY 90 tablet 3    flecainide (TAMBOCOR) 100 MG Tab TAKE ONE TABLET BY MOUTH TWICE DAILY STARTING SUNDAY. (DISCONTINUE RYTHMOL) 60 tablet 6    fluticasone (FLONASE) 50 mcg/actuation nasal spray 2 sprays by Each Nare route once daily. 1 Bottle 11    furosemide (LASIX) 20 MG tablet Take 1 tablet (20 mg total) by mouth once daily. As of 8/4/17 90 tablet 3    guaifenesin (MUCINEX) 600 mg 12 hr tablet Take 2 tablets (1,200 mg total) by mouth 2 (two) times daily. As needed for flares 60 tablet 11    hyoscyamine (ANASPAZ) 0.125 mg TbDL Take 0.125 mg by mouth every 6 (six) hours as needed for Cramping.      nebivolol (BYSTOLIC) 5 MG Tab TAKE ONE TABLET BY MOUTH TWICE DAILY 180 tablet 3    nitroGLYCERIN (NITROSTAT) 0.4 MG SL tablet Place 1 tablet (0.4 mg total) under the tongue every 5 (five) minutes as needed for Chest pain. 25 tablet 6    pantoprazole (PROTONIX) 40 MG tablet Take 1 tablet (40 mg total) by mouth 2 (two) times daily. As of 8/4/17 60 tablet 6    phenobarb-hyoscy-atropine-scop (BELLADONNA-PHENOBARBITAL) 16.2-0.1037 -0.0194 mg/5 mL Elix Take 5 mLs by mouth daily as needed (abdominal pain). 180 mL 1    potassium chloride (KLOR-CON) 10 MEQ TbSR Take 1 tablet (10 mEq total) by mouth once daily. 90 tablet 3    pramoxine 1 % Foam Place rectally 3 (three) times daily as needed. 15 g 1    PREMARIN vaginal cream PLACE 1 GRAM VAGINALLY TWICE A WEEK 30 g 3    RESTASIS 0.05 % ophthalmic emulsion INSTILL ONE DROP INTO EACH EYE TWICE DAILY 60 each 12    rivaroxaban (XARELTO) 20 mg Tab Take 1 tablet (20 mg total) by mouth daily with dinner or evening  "meal. 90 tablet 3    tramadol (ULTRAM) 50 mg tablet Take 1 tablet (50 mg total) by mouth 3 (three) times daily as needed. 90 tablet 3    triamcinolone acetonide 0.1% (KENALOG) 0.1 % ointment AAA bid prn 60 g 1    vitamin D 1000 units Tab Take 2,000 Units by mouth once daily.      [DISCONTINUED] albuterol (PROAIR HFA) 90 mcg/actuation inhaler Inhale 2 puffs into the lungs every 6 (six) hours as needed. 1 Inhaler 11    [DISCONTINUED] albuterol (PROVENTIL) 2.5 mg /3 mL (0.083 %) nebulizer solution USE ONE VIAL IN NEBULIZER EVERY 4 HOURS AS NEEDED FOR SHORTNESS OF BREATH 300 each 12    [DISCONTINUED] albuterol-ipratropium 2.5mg-0.5mg/3mL (DUO-NEB) 0.5 mg-3 mg(2.5 mg base)/3 mL nebulizer solution Take 3 mLs by nebulization every 6 (six) hours while awake. Rescue 120 vial 0    [DISCONTINUED] budesonide-formoterol 160-4.5 mcg (SYMBICORT) 160-4.5 mcg/actuation HFAA Inhale 2 puffs into the lungs every 12 (twelve) hours. Wash out mouth after using 1 Inhaler 12     Facility-Administered Encounter Medications as of 2/6/2018   Medication Dose Route Frequency Provider Last Rate Last Dose    denosumab (PROLIA) injection 60 mg  60 mg Subcutaneous Q6 Months Jaci Day NP   60 mg at 07/12/17 1026    [DISCONTINUED] denosumab (PROLIA) injection 60 mg  60 mg Subcutaneous Q6 Months Luana Knight PA-C   60 mg at 01/15/18 1012     Orders Placed This Encounter   Procedures    NEBULIZER FOR HOME USE     [facility] for CPAP/Oxygen/Nebulizer supplies.  Customer Service: [phone]  Call: [phone]  Fax: [phone]  Billing Inquiries: [phone] or [phone]       Order Specific Question:   Height:     Answer:   5' 2" (1.575 m)     Order Specific Question:   Weight:     Answer:   58.7 kg (129 lb 4.8 oz)     Order Specific Question:   Length of need (1-99 months):     Answer:   99     Order Specific Question:   Vendor:     Answer:   Other (use comments)     Order Specific Question:   Expected Date of Delivery: " "    Answer:   2/6/2018    NEBULIZER KIT (SUPPLIES) FOR HOME USE     Order Specific Question:   Height:     Answer:   5' 2" (1.575 m)     Order Specific Question:   Weight:     Answer:   58.7 kg (129 lb 4.8 oz)     Order Specific Question:   Length of need (1-99 months):     Answer:   99     Order Specific Question:   Mask or Mouthpiece?     Answer:   Mouthpiece     Order Specific Question:   Vendor:     Answer:   Other (use comments)     Order Specific Question:   Expected Date of Delivery:     Answer:   2/6/2018    CT Chest Without Contrast     Standing Status:   Future     Standing Expiration Date:   2/6/2019     Order Specific Question:   May the Radiologist modify the order per protocol to meet the clinical needs of the patient?     Answer:   Yes    Complete PFT with bronchodilator     Standing Status:   Future     Standing Expiration Date:   8/6/2019     Plan:       Requested Prescriptions     Signed Prescriptions Disp Refills    budesonide-formoterol 160-4.5 mcg (SYMBICORT) 160-4.5 mcg/actuation HFAA 1 Inhaler 11     Sig: Inhale 2 puffs into the lungs every 12 (twelve) hours. Wash out mouth after using    albuterol (PROVENTIL) 2.5 mg /3 mL (0.083 %) nebulizer solution 360 each 11     Sig: Take 3 mLs (2.5 mg total) by nebulization every 6 (six) hours as needed for Wheezing or Shortness of Breath. Rescue    albuterol (PROAIR HFA) 90 mcg/actuation inhaler 1 Inhaler 11     Sig: Inhale 2 puffs into the lungs every 6 (six) hours as needed.     Hypoxemia requiring supplemental oxygen    Asthma with COPD  -     budesonide-formoterol 160-4.5 mcg (SYMBICORT) 160-4.5 mcg/actuation HFAA; Inhale 2 puffs into the lungs every 12 (twelve) hours. Wash out mouth after using  Dispense: 1 Inhaler; Refill: 11  -     albuterol (PROVENTIL) 2.5 mg /3 mL (0.083 %) nebulizer solution; Take 3 mLs (2.5 mg total) by nebulization every 6 (six) hours as needed for Wheezing or Shortness of Breath. Rescue  Dispense: 360 each; Refill: " 11  -     albuterol (PROAIR HFA) 90 mcg/actuation inhaler; Inhale 2 puffs into the lungs every 6 (six) hours as needed.  Dispense: 1 Inhaler; Refill: 11  -     Complete PFT with bronchodilator; Future; Expected date: 02/06/2018  -     NEBULIZER FOR HOME USE  -     NEBULIZER KIT (SUPPLIES) FOR HOME USE    Lung nodule seen on imaging study    Chronic diastolic heart failure  -     Complete PFT with bronchodilator; Future; Expected date: 02/06/2018    Abnormal CT of the chest    Lung nodule  -     CT Chest Without Contrast; Future; Expected date: 02/06/2018    PAH (pulmonary artery hypertension)    Nocturnal hypoxemia           Follow-up in about 6 months (around 8/6/2018) for CT of chest and pft.    MEDICAL DECISION MAKING: Moderate to high complexity.  Overall, the multiple problems listed are of moderate to high severity that may impact quality of life and activities of daily living. Side effects of medications, treatment plan as well as options and alternatives reviewed and discussed with patient. There was counseling of patient concerning these issues.    Total time spent in face to face counseling and coordination of care - 50  minutes over 50% of time was used in discussion of prognosis, risks, benefits of treatment, instructions and compliance with regimen . Discussion with other physicians or health care providers (DME, NP, pharmacy, respiratory therapy) occurred.

## 2018-02-06 NOTE — PATIENT INSTRUCTIONS
Paracentesis  Your healthcare provider recommends that you have paracentesis. This is a procedure to remove extra fluid from your belly (abdomen). A needle is used to drain the fluid. A small sample of fluid may be taken and tested for problems. If the fluid buildup is causing discomfort or pain, all of the fluid may be drained. To do this, a tube is attached to the needle. The fluid is drained into a container that sits outside of the body. If symptoms are severe, paracentesis may be done as an emergency procedure. Otherwise, it will be scheduled ahead of time. Read on to learn more about paracentesis and how it works.     Understanding ascites  Many of the bodys organs, including the liver and intestines, are inside the belly (abdomen). The organs are covered in a thin membrane called the peritoneum. The peritoneum has 2 layers. It makes a fluid that allows the layers to glide smoothly past each other. If this fluid builds up in the belly, the condition is called ascites. Ascites causes pain and discomfort. It can also make it hard to breathe. Fluid can build up for a number of reasons. These include chronic liver disease (cirrhosis), heart or kidney failure, and cancer. Your provider can tell you more about the cause of your ascites.  How paracentesis works  The goal of paracentesis may be to help diagnose the cause of the excess fluid. Or, the goal may be to drain excess fluid from the abdomen. In some cases, fluid returns and the procedure needs to be repeated.  Before the procedure  · Tell your provider about any medicines you are taking. This includes all prescription medicines, over-the-counter medicines, street drugs, herbs, vitamins, and other supplements.  · Tell your provider about any allergies you have.  · Before the procedure begins, youll be asked to empty your bladder. This helps prevent injury to the bladder during the procedure. If needed, a thin tube (Escamilla catheter) may be placed into your  bladder to drain urine during the procedure. This tube is removed after the procedure.  · An IV (intravenous) line may be put into a vein in your arm or hand. This line supplies fluids and medicines.  During the procedure  · You are awake during the procedure.  · An imaging method called ultrasound may be used to guide the procedure. It shows live images of the inside of your belly on a video screen. This helps the provider find the site of the excess fluid inside your belly and decide where to insert the needle.  · A numbing medicine (local anesthesia) is injected into your belly where the needle will be inserted.  · Once the skin is numb, the provider carefully inserts the needle into the belly. This causes the needle to fill with fluid.  · The needle may be removed with only a small sample of fluid. This sample is sent to a lab for testing. Getting a sample takes about 10 to 15 minutes.  · Or, a tube may be attached to the needle so that more of the excess fluid can be drained. The tube may be taped or stitched into place. This keeps it from pulling the needle out of your belly.  · How long it takes to drain all of the fluid varies for each person. In most cases, it takes about 30 minutes. Your provider will let you know if the procedure is expected to take longer than usual.  · Once all of the fluid is drained, the needle and tube are removed.  · Pressure is put on the puncture site to stop any fluid leakage or bleeding.  · A small bandage is placed over the puncture site. Albumin may be given during or after the procedure to prevent low blood pressure or kidney problems.  After the procedure  You may be taken to a recovery room to rest after the procedure. If you are in pain, you will be given medicine as needed. You will likely be sent home 1 to 2 hours after the procedure is done. When you leave the hospital, have an adult family member or friend drive you home. If you are staying in the hospital, you will  return to your hospital room.  Risks and possible complications of paracentesis  This procedure is considered safe. But like all procedures, it carries some risks. These include the following:  · Bleeding  · Infection  · Injury to structures in the belly  · Fast drop in blood pressure   Recovering at home  · If needed, your provider can prescribe or recommend pain medicines for you to take at home. Take these exactly as directed. If you stopped taking other medicines before the procedure, ask your provider when you can start them again.  · You may remove the bandage 24 hours after the procedure.  · Take it easy for 24 hours after the procedure. Avoid physical activity until your provider says its OK.  Follow-up care  Make a follow-up appointment with your provider as directed. During your follow-up visit, your provider will check your healing. Let your provider know how you are feeling. You can also discuss the cause of your ascites and if any more treatment is needed.   When to seek medical care  Call your healthcare provider if you notice any of the following after the procedure:  · A fever of 100.4°F (38.0°C) or higher  · Trouble breathing  · Pain that does not go away even after taking pain medicine  · Belly pain not caused by having the skin punctured  · Bleeding from the puncture site  · More than a small amount of fluid leakage from the puncture site  · Swollen belly  · Signs of infection at the puncture site. These include increased pain, redness, or swelling, as well as warmth or bad-smelling drainage.  · Blood in your urine  · Dizziness, lightheadedness, or fainting   Date Last Reviewed: 7/1/2016 © 2000-2017 [employer]. [address] [place] [address], [place] [address]. All rights reserved. This information is not intended as a substitute for professional medical care. Always follow your healthcare professional's instructions.

## 2018-02-06 NOTE — PROGRESS NOTES
Subjective:       Patient ID: Marcel Montalvo is a 77 y.o. female.    Chief Complaint: Other (abd distention/review pet scan)    HPI   The patient presents to the GI clinic for initial evaluation. The patient has new onset ascites noted by her PCP. The patient said she first noticed swelling around March or April of 2017, but it has been worse lately. She reports occasional nausea but no vomiting, change in appetite, heartburn or dysphagia. She admits to a belly ache which isn't further characterized. She was taking Lasix daily but Cardiology recently increased it to two a day because of LE edema. She has no known liver disease or family history of liver disease. She has a history of pulmonary hypertension and CHF.   She reports a change in bowel habits. When she voids, she feels like she has to urinate, but denies constipation or diarrhea. She denies hematochezia or melena. Her last colonoscopy was in 2014. A three year repeat was recommended due to a history of polyps.     Review of Systems   Constitutional: Negative for fever.   HENT: Negative for hearing loss.    Eyes: Negative for visual disturbance.   Respiratory: Positive for shortness of breath. Negative for cough.    Cardiovascular: Negative for chest pain.   Gastrointestinal:        As per HPI.   Genitourinary: Negative for dysuria, frequency and hematuria.   Musculoskeletal: Positive for arthralgias and back pain.   Skin: Negative for rash.   Neurological: Positive for numbness and headaches. Negative for seizures and syncope.   Hematological: Bruises/bleeds easily.   Psychiatric/Behavioral: The patient is not nervous/anxious.        Objective:      Physical Exam   Constitutional: She is oriented to person, place, and time. She appears well-developed and well-nourished.   HENT:   Head: Normocephalic and atraumatic.   Eyes: EOM are normal.   Neck: Normal range of motion. Neck supple. Carotid bruit is not present.   Cardiovascular: Normal rate and regular  rhythm.    No murmur heard.  Pulmonary/Chest: Effort normal and breath sounds normal. No respiratory distress. She has no wheezes.   Abdominal: Soft. Normal appearance and bowel sounds are normal. She exhibits distension. She exhibits no mass. There is no tenderness.   Musculoskeletal: She exhibits no edema.   Neurological: She is alert and oriented to person, place, and time. No cranial nerve deficit.   Skin: Skin is warm and dry. No rash noted.   Psychiatric: Her behavior is normal.       Assessment:       1. Other ascites    2. Elevated serum alkaline phosphatase level    3. Pulmonary hypertension    4. Chronic atrial fibrillation        Plan:       The patient has new onset ascites. There is no known liver disease. This could be secondary to her CHF and Pulm HTN. Recommend diagnostic paracentesis for further evaluation.     Orders Placed This Encounter   Procedures    US Paracentesis inc Imaging    Protime-INR    Albumin    APTT     Follow-up if symptoms worsen or fail to improve.    Thank you for the opportunity to participate in the care of this patient. This consult was designated to me by my supervising physician. A report will be sent to the referring provider.   Emeka Alejo PA-C.

## 2018-02-14 NOTE — LETTER
February 14, 2018    Marcel Montalvo  [address] [facility] [address]       [facility]  [address] [place] [address] Drive  [facility] [address]  Phone: [phone]  Fax: [phone] Dear Ms. Montalvo:    I have faxed your nebulizer and supplies to  [address] [address], Tele:[phone], Tele:[phone], FAX: [phone]    If they do not have it bring the signed prescriptions in this letter to them.    If you have any questions or concerns, please don't hesitate to call.    Sincerely,        Artem Walter MD

## 2018-03-05 NOTE — PROGRESS NOTES
Thank you for the referral. The following patient has been assigned to Amina Torres RN with Outpatient Complex Care Management for high risk screening.    Reason for referral:     Radiculopathy affecting upper extremity  Glaucoma suspect of both eyes  Simple chronic bronchitis  Asthma with COPD  Hypoxemia requiring supplemental oxygen  Coronary artery disease involving coronary bypass graft of native heart without angina pectoris  Essential hypertension  Typical atrial flutter  Hyperlipidemia, unspecified hyperlipidemia type  S/P CABG (coronary artery bypass graft)  Immunocompromised  Type 2 diabetes mellitus without complication, without long-term current use of insulin  On anticoagulant therapy  Abnormal liver enzymes  Primary osteoarthritis of both hands  Primary osteoarthritis of both knees  Chronic neck pain  Nocturnal hypoxemia    Please contact [facility] at [phone] with any questions.    Thank you,    Nisreen Gee, [facility]  Outpatient Complex Care Mgmt  Ext [phone]

## 2018-03-07 NOTE — PROGRESS NOTES
Subjective:     Marcel Montalvo is here for evaluation of Fatty Liver      HPI  Marcel Montalvo is here for evaluation of fatty liver. This was seen on CT scan. She does have a h/o DM and dyslipidemia. No significant h/o obesity or EtOH. She denies a dx of liver disease. Her LFTs have been normal the last few months, but they were abnormal during an acute hospital admission a year ago.    She does have CHF and pulm HTN    Patient is new to me but has been seen by other GI providers.    She is still on Xarelto and has difficult to manage afib with failed ablations.    Past concern for ascites but no fluid to tap for diagnostic paracentesis.        CT 1/2018  #1 Subsolid nodule measuring 6 mm in the right lower lobe. A repeat chest CT can be performed in 3 months per Lucila criteria.  #2 multiple pulmonary groundglass opacities predominantly within the right lung. This is favored inflammatory in etiology.  #3 cardiomegaly, right sided pleural effusion, moderate volume abdominopelvic ascites, postsurgical changes, and additional findings as above.    Review of Systems   Constitutional: Positive for activity change and fatigue.   HENT: Negative.    Eyes: Negative.    Respiratory: Negative.    Cardiovascular: Positive for leg swelling.   Gastrointestinal: Negative.    Genitourinary: Negative.    Musculoskeletal: Negative.    Skin: Negative.    Neurological: Negative.    Psychiatric/Behavioral: Negative.        Objective:     Physical Exam   Constitutional: She is oriented to person, place, and time. She appears well-developed and well-nourished. No distress.   HENT:   Head: Normocephalic and atraumatic.   Mouth/Throat: Oropharynx is clear and moist. No oropharyngeal exudate.   Eyes: Conjunctivae are normal. Pupils are equal, round, and reactive to light. Right eye exhibits no discharge. Left eye exhibits no discharge. No scleral icterus.   Pulmonary/Chest: Effort normal and breath sounds normal. No respiratory  distress. She has no wheezes.   Abdominal: Soft. She exhibits no distension. There is no tenderness.   Musculoskeletal: She exhibits edema (2+ BLE).   Neurological: She is alert and oriented to person, place, and time.   Psychiatric: She has a normal mood and affect. Her behavior is normal.   Vitals reviewed.      MELD-Na score: 11 at 3/27/2017  4:33 AM  MELD score: 11 at 3/27/2017  4:33 AM  Calculated from:  Serum Creatinine: 1.3 mg/dL at 3/27/2017  4:33 AM  Serum Sodium: 140 mmol/L (Rounded to 137) at 3/27/2017  4:33 AM  Total Bilirubin: 0.7 mg/dL (Rounded to 1) at 3/27/2017  4:33 AM  INR(ratio): 1.2 at 3/26/2017  5:26 AM  Age: 76 years    WBC   Date Value Ref Range Status   02/09/2018 6.90 3.90 - 12.70 K/uL Final     Hemoglobin   Date Value Ref Range Status   02/09/2018 10.8 (L) 12.0 - 16.0 g/dL Final     POC Hematocrit   Date Value Ref Range Status   09/26/2016 29 (L) 36 - 54 %PCV Final     Hematocrit   Date Value Ref Range Status   02/09/2018 34.8 (L) 37.0 - 48.5 % Final     Platelets   Date Value Ref Range Status   02/09/2018 247 150 - 350 K/uL Final     BUN, Bld   Date Value Ref Range Status   02/12/2018 26 (H) 8 - 23 mg/dL Final     Creatinine   Date Value Ref Range Status   02/12/2018 1.4 0.5 - 1.4 mg/dL Final     Glucose   Date Value Ref Range Status   02/12/2018 161 (H) 70 - 110 mg/dL Final     Calcium   Date Value Ref Range Status   02/12/2018 10.2 8.7 - 10.5 mg/dL Final     Sodium   Date Value Ref Range Status   02/12/2018 139 136 - 145 mmol/L Final     Potassium   Date Value Ref Range Status   02/12/2018 5.0 3.5 - 5.1 mmol/L Final     Chloride   Date Value Ref Range Status   02/12/2018 99 95 - 110 mmol/L Final     Magnesium   Date Value Ref Range Status   03/28/2017 1.7 1.6 - 2.6 mg/dL Final     AST   Date Value Ref Range Status   02/12/2018 40 10 - 40 U/L Final     ALT   Date Value Ref Range Status   02/12/2018 29 10 - 44 U/L Final     Alkaline Phosphatase   Date Value Ref Range Status   02/12/2018  280 (H) 55 - 135 U/L Final     Total Bilirubin   Date Value Ref Range Status   02/12/2018 0.9 0.1 - 1.0 mg/dL Final     Comment:     For infants and newborns, interpretation of results should be based  on gestational age, weight and in agreement with clinical  observations.  Premature Infant recommended reference ranges:  Up to 24 hours.............<8.0 mg/dL  Up to 48 hours............<12.0 mg/dL  3-5 days..................<15.0 mg/dL  6-29 days.................<15.0 mg/dL       Albumin   Date Value Ref Range Status   02/12/2018 3.7 3.5 - 5.2 g/dL Final     INR   Date Value Ref Range Status   02/09/2018 1.3 (H) 0.8 - 1.2 Final     Comment:     Coumadin Therapy:  2.0 - 3.0 for INR for all indicators except mechanical heart valves  and antiphospholipid syndromes which should use 2.5 - 3.5.           Assessment/Plan:     1. Fatty liver    2. Abnormal LFTs    3. History of colonic polyps    4. Coagulopathy    5. Liver lesion    6. Bilateral leg edema      Marcel Montalvo is a 77 y.o. female withFatty Liver    Fatty liver/Abnormal LFTs- uncertain is patient has any significant liver disease; some risk factors for NAFLD/DESOUZA and some labs would suggest advanced fibrosis but overall I suspect her lab abnormalities are related to her cardiac issues and congestion.  -Extensive discussion with patient that biopsy would be the next step for diagnosis but I do not recommend this given her co-morbid conditions and bx unlikely to  at this time  -Will monitor labs and US  -     Comprehensive metabolic panel; Future; Expected date: 03/07/2018  -     CBC auto differential; Future; Expected date: 03/07/2018  -     US Abdomen Limited; Future; Expected date: 03/07/2018    History of colonic polyps-due for colonoscopy but concern patient is high risk for procedure  -Discussed above and will hold for now    Coagulopathy  -Possibly from congestion  -     Protime-INR; Future; Expected date: 03/07/2018    Liver  lesion  -Will f/u w/ US in 6 months given possibility of a cyst    LE edema  -Advised discussion with cards about diuretics management    RTC in 6 months with preclinic labs and imaging    Kelle Nelson MD

## 2018-03-08 NOTE — TELEPHONE ENCOUNTER
Returned call to pt. Says she was given Rx for home oxygen sytem and CPAP. Says her cardiologist has found that her issue is more Cardiac than Pulmonary. She's not using the oxygen, and does not want to keep it at the expense of her insurance. She does use CPAP. She's had equipment since she got it in December. Wants to know what she should do. Please advise.

## 2018-03-08 NOTE — TELEPHONE ENCOUNTER
----- Message from Ligia Truong sent at 3/8/2018 10:57 AM CST -----  Contact: pt  Calling about a machine she has questions about and concerns and please advise. [phone] (home)

## 2018-03-08 NOTE — PROGRESS NOTES
"3/8/18 - Phone contact made with pt today for completion of Initial Screen for [facility]. She reports several health issues recently that she has been seeing several providers for. Her primary concern at this time is that she gets "out of breath" so easily. She reports this occurs whether she is using O2 or not, so she usually only uses it at hs. She has purchased a portable tank and brings it with her whenever she leaves home. She lives with her  who assists her several times a day with activities she is no longer able to do, like getting things done around the house. She states she can get out of bed and get dressed by herself and that is about it. He assists her with everything else as needed. She does not use an assistive device with mobility and has not had a fall in the past 12 months. She takes all meds as ordered and reports several have high copays, especially some of her respiratory meds. Advised about PAP and she requested a referral. She denies financial difficulty with copays for provider care at this time as she and her  have a supplemental policy that helps with these copays. Advised CM will refer her to PAP and will follow up on 3/16/18 for completion of Nursing Assessment for [facility]. Amina Chance RN  "

## 2018-03-09 NOTE — TELEPHONE ENCOUNTER
Patient has purchased her own portable oxygen device and does not want to rent from [employer] company any longer.    Patient does not want portable oxygen  Wants to continue nocturnal oxygen with [employer]    [place], [address] [place]. [address], Tele:[phone], Tele:[phone], FAX: [phone]

## 2018-03-09 NOTE — TELEPHONE ENCOUNTER
Spoke with patient about referral for medication.  [facility] for [facility] medications is closed right now.  I told the patient I will call her as soon as they open back up. Also mailing out letter for Xarelto and Bystolic so that when the patient goes into the donut hole she can send the paperwork back to me.

## 2018-03-14 NOTE — PROGRESS NOTES
Subjective:   Patient ID:  Marcel Montalvo is a 77 y.o. female who presents for follow up of No chief complaint on file.      HPI  A 78 yo female with h/o sjogren cad s/p cabg pulmonary htn  afib s/p ablation is here for f/u cad. She is feeling tired weak has decreased exercise tolerance she is very short of breath although she is using o2 therapy. Has some leg swelling her diuretics ha s helped her symptoms.  She is noty taking lasix anymore. Except daily has still using lasix daily. Uses o2 at nite has no orthopnea pnd. She ahs lost weight. Has no syncope near syncope. She has  Chest pain that is  not consistent radiates to the back.  And across the chest  Past Medical History:   Diagnosis Date    *Atrial fibrillation     AF (atrial fibrillation)     Palpation: atrial fibrillation, on Coumadin and followed by cardiologist.    Anticoagulant long-term use     Anxiety     Aortic insufficiency 3/19/2014    Aortic regurgitation     Aortic regurgitation/tricuspid regurgitation per MARIO in April 2007.     Asthma     Carotid artery stenosis     CEA on the left by Dr. Maciel    Coronary artery disease     status post CABG X 3.     Diabetes mellitus     GERD (gastroesophageal reflux disease)     H. pylori infection     treated    Hyperlipidemia     intolerant to statins.    Hypertension 8/7/2013    IBS (irritable bowel syndrome)     Leg pain 3/19/2014    Long-term (current) use of anticoagulants 8/7/2013    Osteoarthritis     Osteoarthritis     Osteopenia     DEXA scan done on 06/20/12     Pancreatitis     resolved    S/P CABG (coronary artery bypass graft) 8/7/2013    S/P carotid endarterectomy 8/7/2013    S/P PTCA (percutaneous transluminal coronary angioplasty) 3/19/2014    Sjogren's syndrome     Stroke     Vitamin D deficiency disease        Past Surgical History:   Procedure Laterality Date    ABDOMINAL SURGERY      APPENDECTOMY      bilateral cataract surgery      CARPAL TUNNEL RELEASE       RT    CATARACT EXTRACTION      CEA      left    CHOLECYSTECTOMY      CORONARY ARTERY BYPASS GRAFT      2 stents    EYE SURGERY      HYSTERECTOMY      TONSILLECTOMY         Social History   Substance Use Topics    Smoking status: Former Smoker     Packs/day: 0.50     Years: 25.00     Quit date: 12/17/1988    Smokeless tobacco: Never Used    Alcohol use 0.0 oz/week      Comment: wine occasionally       Family History   Problem Relation Age of Onset    Alzheimer's disease Mother     Hyperlipidemia Mother     Cancer Father      lung    Heart disease Maternal Uncle     Cancer Brother      stomach    Cancer Paternal Grandmother     Cancer Paternal Grandfather      stomach       Current Outpatient Prescriptions   Medication Sig    albuterol (PROAIR HFA) 90 mcg/actuation inhaler Inhale 2 puffs into the lungs every 6 (six) hours as needed.    albuterol (PROVENTIL) 2.5 mg /3 mL (0.083 %) nebulizer solution Take 3 mLs (2.5 mg total) by nebulization every 6 (six) hours as needed for Wheezing or Shortness of Breath. Rescue    aspirin (ECOTRIN) 81 MG EC tablet Take 1 tablet (81 mg total) by mouth once daily.    atorvastatin (LIPITOR) 40 MG tablet TAKE ONE TABLET BY MOUTH NIGHTLY    azelastine (ASTELIN) 137 mcg (0.1 %) nasal spray 1 spray by Nasal route 2 (two) times daily.    benzonatate (TESSALON) 100 MG capsule Take 1 capsule (100 mg total) by mouth 3 (three) times daily as needed for Cough.    biotin 1 mg tablet Take 1,000 mcg by mouth 3 (three) times daily.    budesonide-formoterol 160-4.5 mcg (SYMBICORT) 160-4.5 mcg/actuation HFAA Inhale 2 puffs into the lungs every 12 (twelve) hours. Wash out mouth after using    carboxymethylcellulose (REFRESH PLUS) 0.5 % Dpet Place 1 drop into both eyes 3 (three) times daily as needed.    diclofenac sodium (VOLTAREN) 1 % Gel Apply 2 g topically once daily.    escitalopram oxalate (LEXAPRO) 10 MG tablet TAKE ONE-HALF TO ONE TABLET BY MOUTH EVERY DAY     flecainide (TAMBOCOR) 100 MG Tab TAKE ONE TABLET BY MOUTH TWICE DAILY STARTING SUNDAY. (DISCONTINUE RYTHMOL)    fluticasone (FLONASE) 50 mcg/actuation nasal spray 2 sprays by Each Nare route once daily.    furosemide (LASIX) 20 MG tablet Take 1 tablet (20 mg total) by mouth once daily. As of 8/4/17    guaifenesin (MUCINEX) 600 mg 12 hr tablet Take 2 tablets (1,200 mg total) by mouth 2 (two) times daily. As needed for flares    hyoscyamine (ANASPAZ) 0.125 mg TbDL Take 0.125 mg by mouth every 6 (six) hours as needed for Cramping.    nebivolol (BYSTOLIC) 5 MG Tab TAKE ONE TABLET BY MOUTH TWICE DAILY    nitroGLYCERIN (NITROSTAT) 0.4 MG SL tablet Place 1 tablet (0.4 mg total) under the tongue every 5 (five) minutes as needed for Chest pain.    pantoprazole (PROTONIX) 40 MG tablet Take 1 tablet (40 mg total) by mouth 2 (two) times daily. As of 8/4/17    phenobarb-hyoscy-atropine-scop (BELLADONNA-PHENOBARBITAL) 16.2-0.1037 -0.0194 mg/5 mL Elix Take 5 mLs by mouth daily as needed (abdominal pain).    potassium chloride (KLOR-CON) 10 MEQ TbSR Take 1 tablet (10 mEq total) by mouth once daily.    pramoxine 1 % Foam Place rectally 3 (three) times daily as needed.    PREMARIN vaginal cream PLACE 1 GRAM VAGINALLY TWICE A WEEK    RESTASIS 0.05 % ophthalmic emulsion INSTILL ONE DROP INTO EACH EYE TWICE DAILY    rivaroxaban (XARELTO) 20 mg Tab Take 1 tablet (20 mg total) by mouth daily with dinner or evening meal.    tramadol (ULTRAM) 50 mg tablet Take 1 tablet (50 mg total) by mouth 3 (three) times daily as needed.    triamcinolone acetonide 0.1% (KENALOG) 0.1 % ointment AAA bid prn    vitamin D 1000 units Tab Take 2,000 Units by mouth once daily.    fexofenadine-pseudoephedrine (ALLEGRA-D 24) 180-240 mg per 24 hr tablet Take 1 tablet by mouth once daily.    hydroxychloroquine (PLAQUENIL) 200 mg tablet Take 200 mg by mouth once daily.     Current Facility-Administered Medications   Medication    denosumab  (PROLIA) injection 60 mg     Current Outpatient Prescriptions on File Prior to Visit   Medication Sig    albuterol (PROAIR HFA) 90 mcg/actuation inhaler Inhale 2 puffs into the lungs every 6 (six) hours as needed.    albuterol (PROVENTIL) 2.5 mg /3 mL (0.083 %) nebulizer solution Take 3 mLs (2.5 mg total) by nebulization every 6 (six) hours as needed for Wheezing or Shortness of Breath. Rescue    aspirin (ECOTRIN) 81 MG EC tablet Take 1 tablet (81 mg total) by mouth once daily.    atorvastatin (LIPITOR) 40 MG tablet TAKE ONE TABLET BY MOUTH NIGHTLY    azelastine (ASTELIN) 137 mcg (0.1 %) nasal spray 1 spray by Nasal route 2 (two) times daily.    benzonatate (TESSALON) 100 MG capsule Take 1 capsule (100 mg total) by mouth 3 (three) times daily as needed for Cough.    biotin 1 mg tablet Take 1,000 mcg by mouth 3 (three) times daily.    budesonide-formoterol 160-4.5 mcg (SYMBICORT) 160-4.5 mcg/actuation HFAA Inhale 2 puffs into the lungs every 12 (twelve) hours. Wash out mouth after using    carboxymethylcellulose (REFRESH PLUS) 0.5 % Dpet Place 1 drop into both eyes 3 (three) times daily as needed.    diclofenac sodium (VOLTAREN) 1 % Gel Apply 2 g topically once daily.    escitalopram oxalate (LEXAPRO) 10 MG tablet TAKE ONE-HALF TO ONE TABLET BY MOUTH EVERY DAY    flecainide (TAMBOCOR) 100 MG Tab TAKE ONE TABLET BY MOUTH TWICE DAILY STARTING SUNDAY. (DISCONTINUE RYTHMOL)    fluticasone (FLONASE) 50 mcg/actuation nasal spray 2 sprays by Each Nare route once daily.    furosemide (LASIX) 20 MG tablet Take 1 tablet (20 mg total) by mouth once daily. As of 8/4/17    guaifenesin (MUCINEX) 600 mg 12 hr tablet Take 2 tablets (1,200 mg total) by mouth 2 (two) times daily. As needed for flares    hyoscyamine (ANASPAZ) 0.125 mg TbDL Take 0.125 mg by mouth every 6 (six) hours as needed for Cramping.    nebivolol (BYSTOLIC) 5 MG Tab TAKE ONE TABLET BY MOUTH TWICE DAILY    nitroGLYCERIN (NITROSTAT) 0.4 MG SL  tablet Place 1 tablet (0.4 mg total) under the tongue every 5 (five) minutes as needed for Chest pain.    pantoprazole (PROTONIX) 40 MG tablet Take 1 tablet (40 mg total) by mouth 2 (two) times daily. As of 8/4/17    phenobarb-hyoscy-atropine-scop (BELLADONNA-PHENOBARBITAL) 16.2-0.1037 -0.0194 mg/5 mL Elix Take 5 mLs by mouth daily as needed (abdominal pain).    potassium chloride (KLOR-CON) 10 MEQ TbSR Take 1 tablet (10 mEq total) by mouth once daily.    pramoxine 1 % Foam Place rectally 3 (three) times daily as needed.    PREMARIN vaginal cream PLACE 1 GRAM VAGINALLY TWICE A WEEK    RESTASIS 0.05 % ophthalmic emulsion INSTILL ONE DROP INTO EACH EYE TWICE DAILY    rivaroxaban (XARELTO) 20 mg Tab Take 1 tablet (20 mg total) by mouth daily with dinner or evening meal.    tramadol (ULTRAM) 50 mg tablet Take 1 tablet (50 mg total) by mouth 3 (three) times daily as needed.    triamcinolone acetonide 0.1% (KENALOG) 0.1 % ointment AAA bid prn    vitamin D 1000 units Tab Take 2,000 Units by mouth once daily.    fexofenadine-pseudoephedrine (ALLEGRA-D 24) 180-240 mg per 24 hr tablet Take 1 tablet by mouth once daily.    hydroxychloroquine (PLAQUENIL) 200 mg tablet Take 200 mg by mouth once daily.     Current Facility-Administered Medications on File Prior to Visit   Medication    denosumab (PROLIA) injection 60 mg     Review of patient's allergies indicates:   Allergen Reactions    Codeine Nausea And Vomiting    Lisinopril      Other reaction(s): cough    Pacerone [amiodarone] Nausea And Vomiting    Sulfa (sulfonamide antibiotics) Nausea And Vomiting    Celecoxib Palpitations    Ciprofloxacin Hives, Itching and Rash    Colesevelam Rash and Nausea And Vomiting    Doxycycline Rash    Statins-hmg-coa reductase inhibitors Rash     Myalgia(can take atorvastatin ok -no rhabdo)per nurse josé antonio and patient  / stomach upset       Review of Systems   Constitution: Positive for weakness, malaise/fatigue and weight  loss. Negative for diaphoresis and weight gain.   HENT: Negative for hoarse voice.    Eyes: Negative for double vision and visual disturbance.   Cardiovascular: Positive for chest pain, dyspnea on exertion and palpitations. Negative for claudication, cyanosis, irregular heartbeat, leg swelling, near-syncope, orthopnea, paroxysmal nocturnal dyspnea and syncope.   Respiratory: Positive for shortness of breath. Negative for cough, hemoptysis and snoring.    Hematologic/Lymphatic: Negative for bleeding problem. Does not bruise/bleed easily.   Skin: Negative for color change and poor wound healing.   Musculoskeletal: Negative for muscle cramps, muscle weakness and myalgias.   Gastrointestinal: Negative for bloating, abdominal pain, change in bowel habit, diarrhea, heartburn, hematemesis, hematochezia, melena and nausea.   Neurological: Negative for excessive daytime sleepiness, dizziness, headaches, light-headedness, loss of balance and numbness.   Psychiatric/Behavioral: Negative for memory loss. The patient does not have insomnia.    Allergic/Immunologic: Negative for hives.       Objective:   Physical Exam   Constitutional: She is oriented to person, place, and time. She appears well-developed and well-nourished. She does not appear ill. No distress.   HENT:   Head: Normocephalic and atraumatic.   Eyes: EOM are normal. Pupils are equal, round, and reactive to light. No scleral icterus.   Neck: Normal range of motion. Neck supple. JVD present. Normal carotid pulses and no hepatojugular reflux present. Carotid bruit is not present. No tracheal deviation present. No thyromegaly present.   Cardiovascular: Normal rate, regular rhythm, intact distal pulses and normal pulses.  Exam reveals no gallop and no friction rub.    Murmur heard.   Harsh midsystolic murmur is present with a grade of 2/6  at the upper right sternal border radiating to the neck   Medium-pitched midsystolic murmur of grade 2/6 is also present at the  "lower left sternal border.   Diastolic murmur is present with a grade of 1/6   Pulses:       Carotid pulses are 2+ on the right side, and 2+ on the left side.       Radial pulses are 2+ on the right side, and 2+ on the left side.        Femoral pulses are 2+ on the right side, and 2+ on the left side.       Popliteal pulses are 2+ on the right side, and 2+ on the left side.        Dorsalis pedis pulses are 2+ on the right side, and 2+ on the left side.        Posterior tibial pulses are 2+ on the right side, and 2+ on the left side.   Pulmonary/Chest: Effort normal and breath sounds normal. No respiratory distress. She has no wheezes. She has no rhonchi. She has no rales. She exhibits no tenderness.   Abdominal: Soft. Normal appearance, normal aorta and bowel sounds are normal. She exhibits distension. She exhibits no abdominal bruit, no ascites and no pulsatile midline mass. There is no hepatomegaly. There is no tenderness.   hepatomegaly   Musculoskeletal: She exhibits no edema.        Right shoulder: She exhibits no deformity.   Neurological: She is alert and oriented to person, place, and time. She has normal strength. No cranial nerve deficit. Coordination normal.   Skin: Skin is warm and dry. No rash noted. She is not diaphoretic. No cyanosis or erythema. Nails show no clubbing.   Bruises noted    Psychiatric: She has a normal mood and affect. Her speech is normal and behavior is normal.     Vitals:    03/14/18 1536   BP: (!) 152/58   Pulse: (!) 56   Weight: 55.8 kg (123 lb 0.3 oz)   Height: 5' 2" (1.575 m)     Lab Results   Component Value Date    CHOL 192 05/02/2017    CHOL 105 (L) 04/03/2017    CHOL 105 (L) 04/03/2017     Lab Results   Component Value Date    HDL 42 05/02/2017    HDL 41 04/03/2017    HDL 41 04/03/2017     Lab Results   Component Value Date    LDLCALC 117.2 05/02/2017    LDLCALC 49.6 (L) 04/03/2017    LDLCALC 49.6 (L) 04/03/2017     Lab Results   Component Value Date    TRIG 164 (H) " 05/02/2017    TRIG 72 04/03/2017    TRIG 72 04/03/2017     Lab Results   Component Value Date    CHOLHDL 21.9 05/02/2017    CHOLHDL 39.0 04/03/2017    CHOLHDL 39.0 04/03/2017       Chemistry        Component Value Date/Time     02/12/2018 0847    K 5.0 02/12/2018 0847    CL 99 02/12/2018 0847    CO2 32 (H) 02/12/2018 0847    BUN 26 (H) 02/12/2018 0847    CREATININE 1.4 02/12/2018 0847     (H) 02/12/2018 0847        Component Value Date/Time    CALCIUM 10.2 02/12/2018 0847    ALKPHOS 280 (H) 02/12/2018 0847    AST 40 02/12/2018 0847    ALT 29 02/12/2018 0847    BILITOT 0.9 02/12/2018 0847    ESTGFRAFRICA 41.5 (A) 02/12/2018 0847    EGFRNONAA 36.0 (A) 02/12/2018 0847          Lab Results   Component Value Date    TSH 3.535 05/02/2017     Lab Results   Component Value Date    INR 1.3 (H) 02/09/2018    INR 1.7 (H) 02/06/2018    INR 1.8 (H) 10/03/2017     Lab Results   Component Value Date    WBC 6.90 02/09/2018    HGB 10.8 (L) 02/09/2018    HCT 34.8 (L) 02/09/2018    MCV 96 02/09/2018     02/09/2018     BMP  Sodium   Date Value Ref Range Status   02/12/2018 139 136 - 145 mmol/L Final     Potassium   Date Value Ref Range Status   02/12/2018 5.0 3.5 - 5.1 mmol/L Final     Chloride   Date Value Ref Range Status   02/12/2018 99 95 - 110 mmol/L Final     CO2   Date Value Ref Range Status   02/12/2018 32 (H) 23 - 29 mmol/L Final     BUN, Bld   Date Value Ref Range Status   02/12/2018 26 (H) 8 - 23 mg/dL Final     Creatinine   Date Value Ref Range Status   02/12/2018 1.4 0.5 - 1.4 mg/dL Final     Calcium   Date Value Ref Range Status   02/12/2018 10.2 8.7 - 10.5 mg/dL Final     Anion Gap   Date Value Ref Range Status   02/12/2018 8 8 - 16 mmol/L Final     eGFR if    Date Value Ref Range Status   02/12/2018 41.5 (A) >60 mL/min/1.73 m^2 Final     eGFR if non    Date Value Ref Range Status   02/12/2018 36.0 (A) >60 mL/min/1.73 m^2 Final     Comment:     Calculation used to  obtain the estimated glomerular filtration  rate (eGFR) is the CKD-EPI equation.        CrCl cannot be calculated (Patient's most recent lab result is older than the maximum 7 days allowed.).    Assessment:     1. Coronary artery disease involving coronary bypass graft of native heart without angina pectoris    2. Essential hypertension    3. Typical atrial flutter    4. Chronic atrial fibrillation    5. Chronic diastolic heart failure    6. Atrial fibrillation, unspecified type    7. Sjogren's syndrome with other organ involvement    8. Type 2 diabetes mellitus without complication, without long-term current use of insulin    9. Hyperlipidemia, unspecified hyperlipidemia type    10. Bilateral carotid artery stenosis    11. Persistent atrial fibrillation    12. S/P CABG (coronary artery bypass graft)    13. S/P carotid endarterectomy    14. Long term current use of anticoagulant therapy    15. S/P PTCA (percutaneous transluminal coronary angioplasty)    16. Nonrheumatic aortic valve insufficiency    17. PAH (pulmonary artery hypertension)    18. Hypoxemia requiring supplemental oxygen    19. Abnormal liver enzymes      Very complex situation still very symptomatic very limited lifestyle is affected  She needs a rlhc to assess pulmonary htn and r/o cad progression she continues to have symptoms despite o2 therapy and multiple  Negative cardiolite. This makes thonk that she has anginal equuivalent class 3.needs to r/o anemia also.  Plan:   Add nitropach 0.2 mg /hr.  Stop xarelto 2 days before procedure   Cbc bmp pt ptt   R/lhc via femoral approach r/o constriction and r/o progression of cad.   I have explained the risks, benefits , and alternatives of the procedure in detail.the patient voices understanding and all questions have been answered.the patient agrees to proceed as planned.

## 2018-03-15 NOTE — TELEPHONE ENCOUNTER
----- Message from Kassy Bacon sent at 3/15/2018 11:19 AM CDT -----  Contact: patient  Calling concerning her appointment and about a catheterization. Please call patient ASAP @ [phone]. Thanks, matt

## 2018-03-15 NOTE — PROGRESS NOTES
3/15/18 - Attempted phone contact for completion of Nursing Assessment with no answer. Voice mail left requesting return call. Will attempt to contact at a later time. Amina Chance RN

## 2018-03-16 NOTE — TELEPHONE ENCOUNTER
----- Message from Kinjal Fletcher MD sent at 3/15/2018 11:06 PM CDT -----  Deepa ask her to hold lasix please

## 2018-03-16 NOTE — TELEPHONE ENCOUNTER
Telephoned patient to advise holding Lasix on Sunday along with Monday morning  Left voicemail message for return call

## 2018-03-19 PROBLEM — I27.20 PULMONARY HTN: Status: ACTIVE | Noted: 2018-01-01

## 2018-03-19 NOTE — PROGRESS NOTES
3/19/18 - Pt scheduled for phone contact today for completion of Nursing Assessment for OPCM, but observed on chart review she is currently inpatient at [facility] for a heart cath. CM will continue to monitor chart and will attempt to follow up upon d/c home. Amina Chance RN

## 2018-03-19 NOTE — PROGRESS NOTES
Sand bag in place to R groin site kasey WATKINS notified me that site has no complications at this time. Vitals remain stable

## 2018-03-19 NOTE — PROGRESS NOTES
"Pt transferred to floor to room 215 from [facility]. Tele monitor placed. SB. Vitals obtained. BP (!) 146/65   Pulse (!) 58   Temp 97.3 °F (36.3 °C)   Resp 18   Ht 5' 2" (1.575 m)   Wt 62.1 kg (136 lb 14.5 oz)   SpO2 99%   Breastfeeding? No   BMI 25.04 kg/m²   Right groin drg CDI with out complications at this time. Palpable pulses bilaterally. NS at 100. [place] in place and adequately draining. Bed rest until 2130.  at bedside.   "

## 2018-03-19 NOTE — NURSING TRANSFER
Nursing Transfer Note      3/19/2018     Transfer To: 215    Transfer via stretcher    Transfer with cardiac monitoring    Transported by Joseline Weiss    Medicines sent: none    Chart send with patient: Yes    Notified: spouse    Patient reassessed at: TRANSFERRED TO ROOM. TRANSFERRED TO BED.  TELEMETRY MONITER ON.  IV FLUIDS ON PUMP AT PRESCRIBED RATE.  PROCEDURAL ACCESS SITE WITHOUT BLEEDING OR HEMATOMA.  DRESSING DRY AND INTACT.  CARE HANDED OFF TOJUNE Jin 3/19/18 `2015....... (date, time)    Upon arrival to floor: cardiac monitor applied, patient oriented to room, call bell in reach and bed in lowest position

## 2018-03-19 NOTE — PROGRESS NOTES
Right groin check, actively bleeding. Pressure immediately applied. This nurse called for a sandbag and additional help from oc. CV notified. Dr Fletcher on his way up to check the sheath site.

## 2018-03-19 NOTE — INTERVAL H&P NOTE
The patient has been examined and the H&P has been reviewed:    I concur with the findings and no changes have occurred since H&P was written.    Anesthesia/Surgery risks, benefits and alternative options discussed and understood by patient/family.  I have explained the risks, benefits , and alternatives of the procedure in detail.the patient voices understanding and all questions have been answered.the patient agrees to proceed as planned.          Active Hospital Problems    Diagnosis  POA    Pulmonary HTN [I27.20]  Yes     Priority: High      Resolved Hospital Problems    Diagnosis Date Resolved POA   No resolved problems to display.

## 2018-03-19 NOTE — OP NOTE
INPATIENT Operative Note         SUMMARY     Surgery Date: 3/19/2018     Surgeon(s) and Role:     * Kinjal Fletcher MD - Primary    ASSISTANT:KEVIN MCKEON    Pre-op Diagnosis:  Coronary artery disease, angina presence unspecified, unspecified vessel or lesion type, unspecified whether native or transplanted heart [I25.10]  Pulmonary hypertension [I27.20]      Post-op Diagnosis:  SAME AS PREOP    Procedure(s) (LRB):  HEART CATH-BILATERAL (Bilateral)    COMPLICATION:NONE    Anesthesia: RN IV Sedation    Findings/Key Components:  PATENT DIAGONAL STENT   DIFFUSE LCX DISEASE/  PATENT LIMA TO LAD   RCA HEAVILY CALCIFIED WITH 40-50% MID STENOSIS UNCHANGED FROM PREVIOUS.\EF 55% WITH MITRAL REGURGITATION.  FILLING PRESSURES [id number] OF SEVERE PULMONARY HTN  AND RESTRICTIVE PATTERN WITH EQUALIZATION OF LVEDP AND RVEDP AND CORCONDANCE.  Estimated Blood Loss: < 50 ML.         SPECIMEN: NONE    Devices/Prostetics: None    PLAN:  DIURESIS DISCUSS TEHRAPY WITH PULMONARY .

## 2018-03-19 NOTE — NURSING
1550 rfa sheath pulled by annelise vargas RN.  Pressure held with thrombix. 1605 right venous sheath pulled.  1615 hemostasis achieved.  Site dressed. No hematoma.  Sand bag to site.

## 2018-03-19 NOTE — PROGRESS NOTES
Dr Fletcher and CHARLIE RN at bedside. Dr Fletcher indicated for pressure to be applied for 20 minutes. Joseline RN at this time applied fresh dressing and applied allotted pressure as verbally ordered. Pt still has palpable pulse to all RLE pulses

## 2018-03-19 NOTE — H&P (VIEW-ONLY)
Subjective:   Patient ID:  Marcel Montalvo is a 77 y.o. female who presents for follow up of No chief complaint on file.      HPI  A 76 yo female with h/o sjogren cad s/p cabg pulmonary htn  afib s/p ablation is here for f/u cad. She is feeling tired weak has decreased exercise tolerance she is very short of breath although she is using o2 therapy. Has some leg swelling her diuretics ha s helped her symptoms.  She is noty taking lasix anymore. Except daily has still using lasix daily. Uses o2 at nite has no orthopnea pnd. She ahs lost weight. Has no syncope near syncope. She has  Chest pain that is  not consistent radiates to the back.  And across the chest  Past Medical History:   Diagnosis Date    *Atrial fibrillation     AF (atrial fibrillation)     Palpation: atrial fibrillation, on Coumadin and followed by cardiologist.    Anticoagulant long-term use     Anxiety     Aortic insufficiency 3/19/2014    Aortic regurgitation     Aortic regurgitation/tricuspid regurgitation per MARIO in April 2007.     Asthma     Carotid artery stenosis     CEA on the left by Dr. Maciel    Coronary artery disease     status post CABG X 3.     Diabetes mellitus     GERD (gastroesophageal reflux disease)     H. pylori infection     treated    Hyperlipidemia     intolerant to statins.    Hypertension 8/7/2013    IBS (irritable bowel syndrome)     Leg pain 3/19/2014    Long-term (current) use of anticoagulants 8/7/2013    Osteoarthritis     Osteoarthritis     Osteopenia     DEXA scan done on 06/20/12     Pancreatitis     resolved    S/P CABG (coronary artery bypass graft) 8/7/2013    S/P carotid endarterectomy 8/7/2013    S/P PTCA (percutaneous transluminal coronary angioplasty) 3/19/2014    Sjogren's syndrome     Stroke     Vitamin D deficiency disease        Past Surgical History:   Procedure Laterality Date    ABDOMINAL SURGERY      APPENDECTOMY      bilateral cataract surgery      CARPAL TUNNEL RELEASE       RT    CATARACT EXTRACTION      CEA      left    CHOLECYSTECTOMY      CORONARY ARTERY BYPASS GRAFT      2 stents    EYE SURGERY      HYSTERECTOMY      TONSILLECTOMY         Social History   Substance Use Topics    Smoking status: Former Smoker     Packs/day: 0.50     Years: 25.00     Quit date: 12/17/1988    Smokeless tobacco: Never Used    Alcohol use 0.0 oz/week      Comment: wine occasionally       Family History   Problem Relation Age of Onset    Alzheimer's disease Mother     Hyperlipidemia Mother     Cancer Father      lung    Heart disease Maternal Uncle     Cancer Brother      stomach    Cancer Paternal Grandmother     Cancer Paternal Grandfather      stomach       Current Outpatient Prescriptions   Medication Sig    albuterol (PROAIR HFA) 90 mcg/actuation inhaler Inhale 2 puffs into the lungs every 6 (six) hours as needed.    albuterol (PROVENTIL) 2.5 mg /3 mL (0.083 %) nebulizer solution Take 3 mLs (2.5 mg total) by nebulization every 6 (six) hours as needed for Wheezing or Shortness of Breath. Rescue    aspirin (ECOTRIN) 81 MG EC tablet Take 1 tablet (81 mg total) by mouth once daily.    atorvastatin (LIPITOR) 40 MG tablet TAKE ONE TABLET BY MOUTH NIGHTLY    azelastine (ASTELIN) 137 mcg (0.1 %) nasal spray 1 spray by Nasal route 2 (two) times daily.    benzonatate (TESSALON) 100 MG capsule Take 1 capsule (100 mg total) by mouth 3 (three) times daily as needed for Cough.    biotin 1 mg tablet Take 1,000 mcg by mouth 3 (three) times daily.    budesonide-formoterol 160-4.5 mcg (SYMBICORT) 160-4.5 mcg/actuation HFAA Inhale 2 puffs into the lungs every 12 (twelve) hours. Wash out mouth after using    carboxymethylcellulose (REFRESH PLUS) 0.5 % Dpet Place 1 drop into both eyes 3 (three) times daily as needed.    diclofenac sodium (VOLTAREN) 1 % Gel Apply 2 g topically once daily.    escitalopram oxalate (LEXAPRO) 10 MG tablet TAKE ONE-HALF TO ONE TABLET BY MOUTH EVERY DAY     flecainide (TAMBOCOR) 100 MG Tab TAKE ONE TABLET BY MOUTH TWICE DAILY STARTING SUNDAY. (DISCONTINUE RYTHMOL)    fluticasone (FLONASE) 50 mcg/actuation nasal spray 2 sprays by Each Nare route once daily.    furosemide (LASIX) 20 MG tablet Take 1 tablet (20 mg total) by mouth once daily. As of 8/4/17    guaifenesin (MUCINEX) 600 mg 12 hr tablet Take 2 tablets (1,200 mg total) by mouth 2 (two) times daily. As needed for flares    hyoscyamine (ANASPAZ) 0.125 mg TbDL Take 0.125 mg by mouth every 6 (six) hours as needed for Cramping.    nebivolol (BYSTOLIC) 5 MG Tab TAKE ONE TABLET BY MOUTH TWICE DAILY    nitroGLYCERIN (NITROSTAT) 0.4 MG SL tablet Place 1 tablet (0.4 mg total) under the tongue every 5 (five) minutes as needed for Chest pain.    pantoprazole (PROTONIX) 40 MG tablet Take 1 tablet (40 mg total) by mouth 2 (two) times daily. As of 8/4/17    phenobarb-hyoscy-atropine-scop (BELLADONNA-PHENOBARBITAL) 16.2-0.1037 -0.0194 mg/5 mL Elix Take 5 mLs by mouth daily as needed (abdominal pain).    potassium chloride (KLOR-CON) 10 MEQ TbSR Take 1 tablet (10 mEq total) by mouth once daily.    pramoxine 1 % Foam Place rectally 3 (three) times daily as needed.    PREMARIN vaginal cream PLACE 1 GRAM VAGINALLY TWICE A WEEK    RESTASIS 0.05 % ophthalmic emulsion INSTILL ONE DROP INTO EACH EYE TWICE DAILY    rivaroxaban (XARELTO) 20 mg Tab Take 1 tablet (20 mg total) by mouth daily with dinner or evening meal.    tramadol (ULTRAM) 50 mg tablet Take 1 tablet (50 mg total) by mouth 3 (three) times daily as needed.    triamcinolone acetonide 0.1% (KENALOG) 0.1 % ointment AAA bid prn    vitamin D 1000 units Tab Take 2,000 Units by mouth once daily.    fexofenadine-pseudoephedrine (ALLEGRA-D 24) 180-240 mg per 24 hr tablet Take 1 tablet by mouth once daily.    hydroxychloroquine (PLAQUENIL) 200 mg tablet Take 200 mg by mouth once daily.     Current Facility-Administered Medications   Medication    denosumab  (PROLIA) injection 60 mg     Current Outpatient Prescriptions on File Prior to Visit   Medication Sig    albuterol (PROAIR HFA) 90 mcg/actuation inhaler Inhale 2 puffs into the lungs every 6 (six) hours as needed.    albuterol (PROVENTIL) 2.5 mg /3 mL (0.083 %) nebulizer solution Take 3 mLs (2.5 mg total) by nebulization every 6 (six) hours as needed for Wheezing or Shortness of Breath. Rescue    aspirin (ECOTRIN) 81 MG EC tablet Take 1 tablet (81 mg total) by mouth once daily.    atorvastatin (LIPITOR) 40 MG tablet TAKE ONE TABLET BY MOUTH NIGHTLY    azelastine (ASTELIN) 137 mcg (0.1 %) nasal spray 1 spray by Nasal route 2 (two) times daily.    benzonatate (TESSALON) 100 MG capsule Take 1 capsule (100 mg total) by mouth 3 (three) times daily as needed for Cough.    biotin 1 mg tablet Take 1,000 mcg by mouth 3 (three) times daily.    budesonide-formoterol 160-4.5 mcg (SYMBICORT) 160-4.5 mcg/actuation HFAA Inhale 2 puffs into the lungs every 12 (twelve) hours. Wash out mouth after using    carboxymethylcellulose (REFRESH PLUS) 0.5 % Dpet Place 1 drop into both eyes 3 (three) times daily as needed.    diclofenac sodium (VOLTAREN) 1 % Gel Apply 2 g topically once daily.    escitalopram oxalate (LEXAPRO) 10 MG tablet TAKE ONE-HALF TO ONE TABLET BY MOUTH EVERY DAY    flecainide (TAMBOCOR) 100 MG Tab TAKE ONE TABLET BY MOUTH TWICE DAILY STARTING SUNDAY. (DISCONTINUE RYTHMOL)    fluticasone (FLONASE) 50 mcg/actuation nasal spray 2 sprays by Each Nare route once daily.    furosemide (LASIX) 20 MG tablet Take 1 tablet (20 mg total) by mouth once daily. As of 8/4/17    guaifenesin (MUCINEX) 600 mg 12 hr tablet Take 2 tablets (1,200 mg total) by mouth 2 (two) times daily. As needed for flares    hyoscyamine (ANASPAZ) 0.125 mg TbDL Take 0.125 mg by mouth every 6 (six) hours as needed for Cramping.    nebivolol (BYSTOLIC) 5 MG Tab TAKE ONE TABLET BY MOUTH TWICE DAILY    nitroGLYCERIN (NITROSTAT) 0.4 MG SL  tablet Place 1 tablet (0.4 mg total) under the tongue every 5 (five) minutes as needed for Chest pain.    pantoprazole (PROTONIX) 40 MG tablet Take 1 tablet (40 mg total) by mouth 2 (two) times daily. As of 8/4/17    phenobarb-hyoscy-atropine-scop (BELLADONNA-PHENOBARBITAL) 16.2-0.1037 -0.0194 mg/5 mL Elix Take 5 mLs by mouth daily as needed (abdominal pain).    potassium chloride (KLOR-CON) 10 MEQ TbSR Take 1 tablet (10 mEq total) by mouth once daily.    pramoxine 1 % Foam Place rectally 3 (three) times daily as needed.    PREMARIN vaginal cream PLACE 1 GRAM VAGINALLY TWICE A WEEK    RESTASIS 0.05 % ophthalmic emulsion INSTILL ONE DROP INTO EACH EYE TWICE DAILY    rivaroxaban (XARELTO) 20 mg Tab Take 1 tablet (20 mg total) by mouth daily with dinner or evening meal.    tramadol (ULTRAM) 50 mg tablet Take 1 tablet (50 mg total) by mouth 3 (three) times daily as needed.    triamcinolone acetonide 0.1% (KENALOG) 0.1 % ointment AAA bid prn    vitamin D 1000 units Tab Take 2,000 Units by mouth once daily.    fexofenadine-pseudoephedrine (ALLEGRA-D 24) 180-240 mg per 24 hr tablet Take 1 tablet by mouth once daily.    hydroxychloroquine (PLAQUENIL) 200 mg tablet Take 200 mg by mouth once daily.     Current Facility-Administered Medications on File Prior to Visit   Medication    denosumab (PROLIA) injection 60 mg     Review of patient's allergies indicates:   Allergen Reactions    Codeine Nausea And Vomiting    Lisinopril      Other reaction(s): cough    Pacerone [amiodarone] Nausea And Vomiting    Sulfa (sulfonamide antibiotics) Nausea And Vomiting    Celecoxib Palpitations    Ciprofloxacin Hives, Itching and Rash    Colesevelam Rash and Nausea And Vomiting    Doxycycline Rash    Statins-hmg-coa reductase inhibitors Rash     Myalgia(can take atorvastatin ok -no rhabdo)per nurse josé antonio and patient  / stomach upset       Review of Systems   Constitution: Positive for weakness, malaise/fatigue and weight  loss. Negative for diaphoresis and weight gain.   HENT: Negative for hoarse voice.    Eyes: Negative for double vision and visual disturbance.   Cardiovascular: Positive for chest pain, dyspnea on exertion and palpitations. Negative for claudication, cyanosis, irregular heartbeat, leg swelling, near-syncope, orthopnea, paroxysmal nocturnal dyspnea and syncope.   Respiratory: Positive for shortness of breath. Negative for cough, hemoptysis and snoring.    Hematologic/Lymphatic: Negative for bleeding problem. Does not bruise/bleed easily.   Skin: Negative for color change and poor wound healing.   Musculoskeletal: Negative for muscle cramps, muscle weakness and myalgias.   Gastrointestinal: Negative for bloating, abdominal pain, change in bowel habit, diarrhea, heartburn, hematemesis, hematochezia, melena and nausea.   Neurological: Negative for excessive daytime sleepiness, dizziness, headaches, light-headedness, loss of balance and numbness.   Psychiatric/Behavioral: Negative for memory loss. The patient does not have insomnia.    Allergic/Immunologic: Negative for hives.       Objective:   Physical Exam   Constitutional: She is oriented to person, place, and time. She appears well-developed and well-nourished. She does not appear ill. No distress.   HENT:   Head: Normocephalic and atraumatic.   Eyes: EOM are normal. Pupils are equal, round, and reactive to light. No scleral icterus.   Neck: Normal range of motion. Neck supple. JVD present. Normal carotid pulses and no hepatojugular reflux present. Carotid bruit is not present. No tracheal deviation present. No thyromegaly present.   Cardiovascular: Normal rate, regular rhythm, intact distal pulses and normal pulses.  Exam reveals no gallop and no friction rub.    Murmur heard.   Harsh midsystolic murmur is present with a grade of 2/6  at the upper right sternal border radiating to the neck   Medium-pitched midsystolic murmur of grade 2/6 is also present at the  "lower left sternal border.   Diastolic murmur is present with a grade of 1/6   Pulses:       Carotid pulses are 2+ on the right side, and 2+ on the left side.       Radial pulses are 2+ on the right side, and 2+ on the left side.        Femoral pulses are 2+ on the right side, and 2+ on the left side.       Popliteal pulses are 2+ on the right side, and 2+ on the left side.        Dorsalis pedis pulses are 2+ on the right side, and 2+ on the left side.        Posterior tibial pulses are 2+ on the right side, and 2+ on the left side.   Pulmonary/Chest: Effort normal and breath sounds normal. No respiratory distress. She has no wheezes. She has no rhonchi. She has no rales. She exhibits no tenderness.   Abdominal: Soft. Normal appearance, normal aorta and bowel sounds are normal. She exhibits distension. She exhibits no abdominal bruit, no ascites and no pulsatile midline mass. There is no hepatomegaly. There is no tenderness.   hepatomegaly   Musculoskeletal: She exhibits no edema.        Right shoulder: She exhibits no deformity.   Neurological: She is alert and oriented to person, place, and time. She has normal strength. No cranial nerve deficit. Coordination normal.   Skin: Skin is warm and dry. No rash noted. She is not diaphoretic. No cyanosis or erythema. Nails show no clubbing.   Bruises noted    Psychiatric: She has a normal mood and affect. Her speech is normal and behavior is normal.     Vitals:    03/14/18 1536   BP: (!) 152/58   Pulse: (!) 56   Weight: 55.8 kg (123 lb 0.3 oz)   Height: 5' 2" (1.575 m)     Lab Results   Component Value Date    CHOL 192 05/02/2017    CHOL 105 (L) 04/03/2017    CHOL 105 (L) 04/03/2017     Lab Results   Component Value Date    HDL 42 05/02/2017    HDL 41 04/03/2017    HDL 41 04/03/2017     Lab Results   Component Value Date    LDLCALC 117.2 05/02/2017    LDLCALC 49.6 (L) 04/03/2017    LDLCALC 49.6 (L) 04/03/2017     Lab Results   Component Value Date    TRIG 164 (H) " 05/02/2017    TRIG 72 04/03/2017    TRIG 72 04/03/2017     Lab Results   Component Value Date    CHOLHDL 21.9 05/02/2017    CHOLHDL 39.0 04/03/2017    CHOLHDL 39.0 04/03/2017       Chemistry        Component Value Date/Time     02/12/2018 0847    K 5.0 02/12/2018 0847    CL 99 02/12/2018 0847    CO2 32 (H) 02/12/2018 0847    BUN 26 (H) 02/12/2018 0847    CREATININE 1.4 02/12/2018 0847     (H) 02/12/2018 0847        Component Value Date/Time    CALCIUM 10.2 02/12/2018 0847    ALKPHOS 280 (H) 02/12/2018 0847    AST 40 02/12/2018 0847    ALT 29 02/12/2018 0847    BILITOT 0.9 02/12/2018 0847    ESTGFRAFRICA 41.5 (A) 02/12/2018 0847    EGFRNONAA 36.0 (A) 02/12/2018 0847          Lab Results   Component Value Date    TSH 3.535 05/02/2017     Lab Results   Component Value Date    INR 1.3 (H) 02/09/2018    INR 1.7 (H) 02/06/2018    INR 1.8 (H) 10/03/2017     Lab Results   Component Value Date    WBC 6.90 02/09/2018    HGB 10.8 (L) 02/09/2018    HCT 34.8 (L) 02/09/2018    MCV 96 02/09/2018     02/09/2018     BMP  Sodium   Date Value Ref Range Status   02/12/2018 139 136 - 145 mmol/L Final     Potassium   Date Value Ref Range Status   02/12/2018 5.0 3.5 - 5.1 mmol/L Final     Chloride   Date Value Ref Range Status   02/12/2018 99 95 - 110 mmol/L Final     CO2   Date Value Ref Range Status   02/12/2018 32 (H) 23 - 29 mmol/L Final     BUN, Bld   Date Value Ref Range Status   02/12/2018 26 (H) 8 - 23 mg/dL Final     Creatinine   Date Value Ref Range Status   02/12/2018 1.4 0.5 - 1.4 mg/dL Final     Calcium   Date Value Ref Range Status   02/12/2018 10.2 8.7 - 10.5 mg/dL Final     Anion Gap   Date Value Ref Range Status   02/12/2018 8 8 - 16 mmol/L Final     eGFR if    Date Value Ref Range Status   02/12/2018 41.5 (A) >60 mL/min/1.73 m^2 Final     eGFR if non    Date Value Ref Range Status   02/12/2018 36.0 (A) >60 mL/min/1.73 m^2 Final     Comment:     Calculation used to  obtain the estimated glomerular filtration  rate (eGFR) is the CKD-EPI equation.        CrCl cannot be calculated (Patient's most recent lab result is older than the maximum 7 days allowed.).    Assessment:     1. Coronary artery disease involving coronary bypass graft of native heart without angina pectoris    2. Essential hypertension    3. Typical atrial flutter    4. Chronic atrial fibrillation    5. Chronic diastolic heart failure    6. Atrial fibrillation, unspecified type    7. Sjogren's syndrome with other organ involvement    8. Type 2 diabetes mellitus without complication, without long-term current use of insulin    9. Hyperlipidemia, unspecified hyperlipidemia type    10. Bilateral carotid artery stenosis    11. Persistent atrial fibrillation    12. S/P CABG (coronary artery bypass graft)    13. S/P carotid endarterectomy    14. Long term current use of anticoagulant therapy    15. S/P PTCA (percutaneous transluminal coronary angioplasty)    16. Nonrheumatic aortic valve insufficiency    17. PAH (pulmonary artery hypertension)    18. Hypoxemia requiring supplemental oxygen    19. Abnormal liver enzymes      Very complex situation still very symptomatic very limited lifestyle is affected  She needs a rlhc to assess pulmonary htn and r/o cad progression she continues to have symptoms despite o2 therapy and multiple  Negative cardiolite. This makes thonk that she has anginal equuivalent class 3.needs to r/o anemia also.  Plan:   Add nitropach 0.2 mg /hr.  Stop xarelto 2 days before procedure   Cbc bmp pt ptt   R/lhc via femoral approach r/o constriction and r/o progression of cad.   I have explained the risks, benefits , and alternatives of the procedure in detail.the patient voices understanding and all questions have been answered.the patient agrees to proceed as planned.

## 2018-03-20 NOTE — PLAN OF CARE
Problem: Patient Care Overview  Goal: Plan of Care Review  Outcome: Ongoing (interventions implemented as appropriate)  Pt remains free from falls/injury. Fall precautions in place. Pt tolerating cardiac diet. Meds given as scheduled. Dressing to right groin clean, dry, and intact. Pt able to verbalize wants/needs. Bed in low, locked position. Call light and personal items within reach of pt.  at bedside. VSS. Will continue to monitor.

## 2018-03-20 NOTE — TELEPHONE ENCOUNTER
----- Message from Maribel Beal PA-C sent at 3/20/2018  1:21 PM CDT -----  Needs f/u with me next week when I am in clinic with Dr. Fletcher    Thanks

## 2018-03-20 NOTE — NURSING
Discharge instructions given to pt and . Demonstrated learning by teach back method.Pt's right arm and left AC IV's removed. Pressure applied. Gauze and tape applied. Pt dressed and taken down to awaiting vehicle via wheelchair.

## 2018-03-20 NOTE — DISCHARGE SUMMARY
[facility]  Cardiology  Discharge Summary      Patient Name: Marcel Montalvo  MRN: [id number]  Admission Date: 3/19/2018  Hospital Length of Stay: 0 days  Discharge Date and Time:  03/20/2018 1:17 PM  Attending Physician: No att. providers found  Discharging Provider: Maribel Beal PA-C  Primary Care Physician: Alexandra Moreno MD    HPI: Ms. Montalvo is a 77 year old female patient with a PMHx of CAD and pulmonary HTN/MR who presented to [facility] on 3/19/18 for elective L/RHC for further evaluation of SOB. Cath subsequently revealed stable CAD and elevated filling pressures suggestive of severe pulmonary HTN and restrictive pattern, optimization of medical regimen recommended.      Procedure(s) (LRB):  HEART CATH-BILATERAL (Bilateral)     Indwelling Lines/Drains at time of discharge:  Lines/Drains/Airways          No matching active lines, drains, or airways          Hospital Course   Patient seen and examined today by myself and Dr. Fletcher and deemed suitable for discharge. No acute events overnight. Denies chest pain. SOB stable. Right groin C/D/I; no bleeding or erythema. Patient will be discharged home on her home medications. She was counseled about post-cath restrictions and will need to follow-up with PA next week.        Significant Diagnostic Studies: L/RHC    Pending Diagnostic Studies:     Procedure Component Value Units Date/Time    IR Heart Cath Images [[id number]] Resulted:  03/19/18 1349    Order Status:  Sent Lab Status:  In process Updated:  03/19/18 1506          Final Active Diagnoses:    Diagnosis Date Noted POA    PRINCIPAL PROBLEM:  Pulmonary HTN [I27.20] 03/19/2018 Yes      Problems Resolved During this Admission:    Diagnosis Date Noted Date Resolved POA       Discharged Condition: good    Follow Up:  Follow-up Information     Maribel Beal PA-C In 1 week.    Specialty:  Cardiology  Contact information:  [address] [place] [address] DR Aysha HOBBS [address] [phone]                  Patient Instructions:     Activity as tolerated     Other restrictions (specify):   Order Comments: No heavy bending or lifting x 5 days  Showers only x 5 days; no baths     Notify your health care provider if you experience any of the following:  temperature >100.4     Notify your health care provider if you experience any of the following:  persistent nausea and vomiting or diarrhea     Notify your health care provider if you experience any of the following:  severe uncontrolled pain     Notify your health care provider if you experience any of the following:  redness, tenderness, or signs of infection (pain, swelling, redness, odor or green/yellow discharge around incision site)     Notify your health care provider if you experience any of the following:  difficulty breathing or increased cough     Notify your health care provider if you experience any of the following:  severe persistent headache     Notify your health care provider if you experience any of the following:  worsening rash     Notify your health care provider if you experience any of the following:  persistent dizziness, light-headedness, or visual disturbances     Notify your health care provider if you experience any of the following:  increased confusion or weakness     Notify your health care provider if you experience any of the following:     Remove dressing in 24 hours       Medications:  Reconciled Home Medications:   Discharge Medication List as of 3/20/2018 11:23 AM      CONTINUE these medications which have NOT CHANGED    Details   albuterol (PROAIR HFA) 90 mcg/actuation inhaler Inhale 2 puffs into the lungs every 6 (six) hours as needed., Starting Tue 2/6/2018, Normal      albuterol (PROVENTIL) 2.5 mg /3 mL (0.083 %) nebulizer solution Take 3 mLs (2.5 mg total) by nebulization every 6 (six) hours as needed for Wheezing or Shortness of Breath. Rescue, Starting Tue 2/6/2018, Normal      aspirin (ECOTRIN) 81 MG EC tablet Take 1 tablet  (81 mg total) by mouth once daily., Starting 3/19/2014, Until Discontinued, Normal      atorvastatin (LIPITOR) 40 MG tablet TAKE ONE TABLET BY MOUTH NIGHTLY, Normal      azelastine (ASTELIN) 137 mcg (0.1 %) nasal spray 1 spray by Nasal route 2 (two) times daily., Until Discontinued, Historical Med      biotin 1 mg tablet Take 1,000 mcg by mouth 3 (three) times daily., Historical Med      budesonide-formoterol 160-4.5 mcg (SYMBICORT) 160-4.5 mcg/actuation HFAA Inhale 2 puffs into the lungs every 12 (twelve) hours. Wash out mouth after using, Starting Tue 2/6/2018, Until Wed 2/6/2019, Normal      carboxymethylcellulose (REFRESH PLUS) 0.5 % Dpet Place 1 drop into both eyes 3 (three) times daily as needed., Until Discontinued, Historical Med      diclofenac sodium (VOLTAREN) 1 % Gel Apply 2 g topically once daily., Starting 2/24/2015, Until Discontinued, Normal      escitalopram oxalate (LEXAPRO) 10 MG tablet TAKE ONE-HALF TO ONE TABLET BY MOUTH EVERY DAY, Normal      fexofenadine-pseudoephedrine (ALLEGRA-D 24) 180-240 mg per 24 hr tablet Take 1 tablet by mouth once daily., Historical Med      flecainide (TAMBOCOR) 100 MG Tab TAKE ONE TABLET BY MOUTH TWICE DAILY STARTING SUNDAY. (DISCONTINUE RYTHMOL), Normal      fluticasone (FLONASE) 50 mcg/actuation nasal spray 2 sprays by Each Nare route once daily., Starting 4/1/2016, Until Discontinued, Normal      furosemide (LASIX) 20 MG tablet Take 1 tablet (20 mg total) by mouth once daily. As of 8/4/17, Starting Fri 8/4/2017, Normal      guaifenesin (MUCINEX) 600 mg 12 hr tablet Take 2 tablets (1,200 mg total) by mouth 2 (two) times daily. As needed for flares, Starting Wed 8/16/2017, Normal      hyoscyamine (ANASPAZ) 0.125 mg TbDL Take 0.125 mg by mouth every 6 (six) hours as needed for Cramping., Until Discontinued, Historical Med      nebivolol (BYSTOLIC) 5 MG Tab TAKE ONE TABLET BY MOUTH TWICE DAILY, Normal      nitroGLYCERIN (NITROSTAT) 0.4 MG SL tablet Place 1 tablet  (0.4 mg total) under the tongue every 5 (five) minutes as needed for Chest pain., Starting Thu 9/21/2017, Normal      nitroGLYCERIN 0.2 mg/hr TD PT24 (NITRODUR) 0.2 mg/hr Place 1 patch onto the skin once daily., Starting Wed 3/14/2018, Until Thu 3/14/2019, Normal      pantoprazole (PROTONIX) 40 MG tablet Take 1 tablet (40 mg total) by mouth 2 (two) times daily. As of 8/4/17, Starting Fri 8/4/2017, Normal      potassium chloride (KLOR-CON) 10 MEQ TbSR Take 1 tablet (10 mEq total) by mouth once daily., Starting Tue 12/5/2017, Normal      pramoxine 1 % Foam Place rectally 3 (three) times daily as needed., Starting 7/21/2016, Until Discontinued, Normal      PREMARIN vaginal cream PLACE 1 GRAM VAGINALLY TWICE A WEEK, Normal      RESTASIS 0.05 % ophthalmic emulsion INSTILL ONE DROP INTO EACH EYE TWICE DAILY, Normal      rivaroxaban (XARELTO) 20 mg Tab Take 1 tablet (20 mg total) by mouth daily with dinner or evening meal., Starting Fri 10/20/2017, Normal      tramadol (ULTRAM) 50 mg tablet Take 1 tablet (50 mg total) by mouth 3 (three) times daily as needed., Starting 4/28/2015, Until Discontinued, Print      triamcinolone acetonide 0.1% (KENALOG) 0.1 % ointment AAA bid prn, Normal      vitamin D 1000 units Tab Take 2,000 Units by mouth once daily., Until Discontinued, Historical Med      benzonatate (TESSALON) 100 MG capsule Take 1 capsule (100 mg total) by mouth 3 (three) times daily as needed for Cough., Starting Mon 1/15/2018, Normal      hydroxychloroquine (PLAQUENIL) 200 mg tablet Take 200 mg by mouth once daily., Historical Med      phenobarb-hyoscy-atropine-scop (BELLADONNA-PHENOBARBITAL) 16.2-0.1037 -0.0194 mg/5 mL Elix Take 5 mLs by mouth daily as needed (abdominal pain)., Starting 5/9/2016, Until Discontinued, Normal             Time spent on the discharge of patient: 30 minutes    Maribel Beal PA-C  Cardiology  [facility]

## 2018-03-20 NOTE — PLAN OF CARE
Problem: Patient Care Overview  Goal: Plan of Care Review  Patient is SR on the monitor. Education and plan of care provided and explained. All alarms are on and audible, will continue to monitor.

## 2018-03-22 NOTE — LETTER
March 22, 2018    Marcel Montalvo  [address] [address]             [facility] [address] [place] [address] Dear Mrs Rod,      Thank you for your time in getting admitted to Outpatient [facility]. I have included some educational brochures in this mail out along with my business card.    Please review the enclosed literature and call me if you have any questions. I will call you soon to discuss this.    Thanks again for your time and cooperation,    Amina Chance RN  Outpatient Case Management  [phone]  Ext [phone]  [email or web address]

## 2018-03-22 NOTE — PATIENT INSTRUCTIONS
Controlling Your Asthma  You can do a lot to manage your asthma and improve your quality of life. You will need to work with your healthcare provider to develop a plan. But its up to you to put this plan into action.  Why you need to take control  You need to control the inflammation in your lungs. Take all medicine as directed, especially controller medicines, even if you feel that your asthma is under good control. You also need to relieve symptoms when you have them. These are long-term tasks. But the more you stay in control, the better youll feel. If you dont stay in control:  · Asthma symptoms may cause you to miss school, work, or activities that you enjoy.  · Asthma flare-ups can be dangerous, even deadly.  · Uncontrolled asthma makes it more likely that you will need emergency department and in-hospital care.  · Uncontrolled asthma may cause permanent damage to your lungs.    Peak flow monitoring helps measure how open your airways are.   Taking medicine helps you control your asthma and relieve symptoms when they occur.     Using an Asthma Action Plan will help you keep track of and respond to asthma symptoms.   Avoiding triggers--the things that inflame your airways--will help prevent symptoms and flare-ups.   Your action plan  Your healthcare provider will help you prepare, and when needed, update your personal Asthma Action Plan. Your plan tells you what to do based on your current symptoms. If you don't have an Asthma Action Plan, or if yours isn't up-to-date, make sure you talk with your healthcare provider.  Date Last Reviewed: 1/1/2017  © 9735-4461 [employer]. [address] [place] [address], [place] [address]. All rights reserved. This information is not intended as a substitute for professional medical care. Always follow your healthcare professional's instructions.        What is COPD?  COPD stands for chronic obstructive pulmonary disease. It means the airways in your lungs are  blocked (obstructed). Because of this, it is hard to breathe. You may have trouble with daily activities because of shortness of breath. Over time the shortness of breath usually worsens making it more and more difficult to take care of yourself and take part in activities. Chronic bronchitis and emphysema are two common types of COPD.  What happens in chronic bronchitis?    The cells in the airways make more mucus than normal. The mucus builds up, narrowing the airways. This means less air travels into and out of the lungs. The lining of the airways may also become inflamed (swollen) and causes the airways to narrow even more.        What happens in emphysema?    The small airways are damaged and lose their stretchiness. The airways collapse when you exhale, causing air to get trapped in the air sacs. This means that less oxygen enters the blood vessels and less oxygen is delivered to all of the cells of your body. This makes it hard to breathe.     Damage to cilia    Cilia are small hairs that line and protect the airways. Smoking damages the cilia. Damaged cilia cant sweep mucus and particles away. Some of the cilia are destroyed. This damage worsens COPD.  How did I get COPD?  Most people get COPD from smoking. Cigarette smoke damages lungs, which can develop into COPD over many years.  How COPD affects you  COPD makes you work harder to breathe. Air may get trapped in the lungs, which prevents your lungs from filling completely with fresh oxygen-filled air when you inhale (breathe in). It's harder to take deep breaths especially when you are active and start breathing faster. Over time, your lungs may become enlarged filling the lung with air that does not transfer oxygen into the blood. These problems cause you to have shortness of breath (also called dyspnea). Wheezing (hoarse, whistling breathing), chronic cough, and fatigue (feeling tired and worn out) are also common.   Date Last Reviewed: 5/1/2016  ©  2978-6675 [employer]. [address] [place] [address], [place] [address]. All rights reserved. This information is not intended as a substitute for professional medical care. Always follow your healthcare professional's instructions.        Heart Disease Education    The heart beats 60 to 100 times per minute, 24 hours a day. This equals almost 1000,000 times a day. It pumps blood with oxygen and nutrients to the tissues and organs of the body. But the heart is a muscle and needs its own supply of blood. Blood flow to the heart is supplied by the coronary arteries. Coronary artery disease (atherosclerosis) is a result of cholesterol, saturated fat, and calcium deposits (plaques) that build up inside the walls. This causes inflammation within the coronary arteries. These plaques narrow the artery and reduce blood flow to the heart muscle. The reduction in blood flow to the heart muscle decreases oxygen supply to the heart. If the narrowing is significant enough, the oxygen supply to one or more regions of the heart can be temporarily or permanently shut down. This can cause chest pain, and possibly death of heart tissue (heart attack).  Types of chest pain  Angina is the name for pain in the heart muscle. Angina is a warning sign of serious heart disease. When untreated it can lead to a heart attack, also known as acute myocardial infarction, or AMI. Angina occurs when there is not enough blood and oxygen flowing to the heart for the amount of work it is doing. This most often happens during physical exertion, when the heart is working hardest. It is usually relieved by rest or nitroglycerin. Angina may also occur after a large meal when extra blood is sent to the digestive organs and less goes to the heart. In the case of advanced or unstable heart disease, angina can occur at rest or awaken you from sleep. Angina usually lasts from a few minutes up to 20 minutes or more. When treated early, the effects of  angina can be reversed without permanent damage to the heart. Angina is a serious condition and needs to be evaluated by a medical professional immediately.  There are two types of angina -- stable and unstable:  · Stable angina usually occurs with a predictable level of activity. Being stable, its character, severity, and occurrence do not change much over time. It usually starts with activity, and resolves with rest or taking your medicine as instructed by your doctor. The symptoms usually do not last long.  · Unstable angina changes or gets worse over time. It is different from whatever you are used to. It may feel different or worse, begin without cause, occur with exercise or exertion, wake you up from sleep, and last longer. It may not respond in the same way as it does when you take your usual medicines for an attack. This type of angina can be a warning sign of an impending heart attack.     A heart attack is usually the result of a blood clot that suddenly forms in a coronary artery that has been narrowed with plaque. When this occurs, blood flow may be cut off to a part of the heart muscle, causing the cells to die. This weakens the pumping action of the heart, which affects the delivery of blood to all the other organs in the body including the brain. This damage is not reversible. However, early treatment can limit the amount of damage.  The pain you feel with angina and a heart attack may have a similar quality. However, it is usually different in intensity and duration. Here are some typical descriptions of a heart attack:  · It is most often experienced as a squeezing, crushing, pressure-like sensation in the center of the chest.  · It is sometimes described as something heavy sitting on my chest.  · It may feel more like a bad case of indigestion.  · The pain may spread from the chest to the arm, shoulder, throat or jaw.  · Sometimes the pain is not felt in the chest at all, but only in the arm,  "shoulder, throat or jaw.  · There may also be nausea, vomiting, dizziness or light-headedness, sweating and trouble breathing.  · Palpitations, or your heart beating rapidly  · A new, irregular heart beat  · Unexplained weakness  You may not be able to tell the difference between "bad" angina and a heart attack at home. Seek help if your symptoms are different than usual. Do not be in denial or just try to "tough it out."  Call 911  This is the fastest and safest way to get to the emergency department. The paramedics can also start treatment on the way to the hospital, saving valuable time for your heart.  · If the angina gets worse, if it continues, or if it stops and returns, call 911 immediately. Do not delay. You may be having a heart attack.  · After you call 911, take a second tablet or spray unless instructed otherwise. When repeating doses, sit down if possible, because it can make you feel lightheaded or dizzy. Wait another 5 minutes. If the angina still does not go away, take a third tablet or spray. Do not take more than 3 tablets or sprays within 15 minutes. Stay on the phone with 911 for further instruction.  · Your healthcare provider may give you slightly different instructions than those above. If so, follow them carefully.  Do not wait until symptoms become severe to call 911.  Other reasons to call 911 include:  · Trouble breathing  · Feeling lightheaded, faint, or dizzy  · Rapid heart beat  · Slower than usual heart rate compared to your normal  · Angina with weakness, dizziness, fainting, heavy sweating, nausea, or vomiting  · Extreme drowsiness, confusion  · Weakness of an arm or leg or one side of the face  · Difficulty with speech or vision  When to seek medical care  Remember, the signs and symptoms of a heart attack are not always like they are on TV. Sometimes they are not so obvious. You may only feel weak, or just not right. If it is not clear or if you have any doubt, call for " "advice.  · Seek help if there is a change in the type of pain, if it feels different, or if your symptoms are mild.  · Do not drive yourself. Have someone else drive you. If no one can drive, call 911.  · Do not delay. Fast diagnosis and treatment can prevent or limit the amount of heart damage during a heart attack.  · Do not go to your doctor's office or a clinic as they may not be able to provide all the testing and treatment required for this condition.  · If your doctor has given you medicine to take when symptoms occur, take them but don't delay getting help trying to locate medicines.  What happens in the emergency department  The emergency department is connected to your local emergency medical system (EMS) through 911. That's why during a cardiac emergency, calling 911 is the fastest way to get help. The goal of the emergency department is to rapidly screen, evaluate, and treat people.  Once you are there, an electrocardiogram (ECG or heart tracing) will be done. Blood samples may be taken to look for the presence of heart enzymes that leak from damaged heart cells and show if a heart attack is occurring. You will often be evaluated by a heart specialist (cardiologist) who decides the best course of action. In the case of severe angina or early heart attack, and depending on the circumstances, powerful "clot busting" medicines can be used to dissolve blood clots in the coronary artery. In other cases, you may be taken to a cardiac catheterization lab. Here, a tiny balloon-tipped catheter is advanced through blood vessels to the heart. There the balloon is inflated pushing open the blood vessel restoring blood flow.  Risk factors for heart disease  Risk factors for heart disease are a combination of genetic and lifestyle. Many risk factors work by either directly or indirectly damaging the blood vessels of the heart, or by increasing the risk of forming blood or cholesterol clots, which then clog up and " block the arteries.     Examples of physical lifestyle risk factors:  · Cigarette smoking  · High blood pressure  · High blood cholesterol  · Use of stimulant drugs such as cocaine, crack, and amphetamines  · Eating a high-fat, high-cholesterol meal  · Diabetes   · Obesity which increases risk for diabetes and high blood pressure  · Lack of regular physical activity     Examples of emotional lifestyle factors:  · Chronic high stress levels release stress hormones. These raise blood pressure and cholesterol level and makes blood clot more easily.  · Held-in anger, hostile or cynical attitude  · Social and emotional isolation, lack of intimacy  · Loss of relationship  · Depression  Other factors that increase the risk of heart attack that you cannot control :  · Age. The older you get beyond 40, the greater is your risk of significant coronary artery disease.  · Gender. More men than women get heart disease; but once past menopause, women who are not taking estrogen replacement have the same risk as men for a heart attack.  · Family history. If your mother, father, brother or sister has coronary artery disease, your risk of having it is higher than a person your age without this family history.  What can you do to decrease your risk  To reduce your risk of heart disease:  · Get regular checkups with your doctor.  · Take your medicines for blood pressure, cholesterol or diabetes as directed.  · Watch your diet. Eat a heart healthy diet choosing fresh foods, less salt, cholesterol, and fat  · Stop smoking. Get help if needed.  · Get regular exercise.  · Manage stress.  · Carry a list of medicines and doses in your wallet.  Date Last Reviewed: 12/30/2015  © 7912-3653 [employer]. [address] [place] [address], [place] [address]. All rights reserved. This information is not intended as a substitute for professional medical care. Always follow your healthcare professional's instructions.        Understanding  Atrial Fibrillation    An arrhythmia is any problem with the speed or pattern of the heartbeat. Atrial fibrillation (AFib) is a common type of arrhythmia. It causes fast, chaotic electrical signals in the atria. This leads to poor functioning of the heart. It also affects how much blood your heart can pump out to the body.  Afib may occur once in a while and go away on its own. Or it may continue for longer periods and need treatment.  AFib can lead to serious problems, such as stroke. Your healthcare provider will need to monitor and manage it.  What happens during atrial fibrillation?   The heart has an electrical system that sends signals to control the heartbeat. As signals move through the heart, they tell the hearts upper chambers (atria) and lower chambers (ventricles) when to squeeze (contract) and relax. This lets blood move through the heart and out to the body and lungs.  With AFib, the atria receive abnormal signals. This causes them to contract in a fast and irregular way, and out of sync with the ventricles. When this happens, the atria also have a harder time moving blood into the ventricles. Blood may then pool in the atria, which increases the risk for blood clots and stroke. The ventricles also may contract too quickly and irregularly. As a result, they may not pump blood to the body and lungs as well as they should. This can weaken the heart muscle over time and cause heart failure.  What causes atrial fibrillation?  AFib is more common in older adults. It has many possible causes including:  · Coronary artery disease  · Heart valve disease  · Heart attack  · Heart surgery  · High blood pressure  · Thyroid disease  · Diabetes  · Lung disease  · Sleep apnea  · Heavy alcohol use  In some cases of AFib, doctors do not know the cause.  What are the symptoms of atrial fibrillation?  AFib may or may not cause symptoms. If symptoms do occur, they may include:  · A fast, pounding, irregular  heartbeat  · Shortness of breath  · Tiredness  · Dizziness or fainting  · Chest pain  How is atrial fibrillation treated?  Treatments for AFib can include any of the options below.  · Medicines. You may be prescribed:  ¨ Heart rate medicines to help slow down the heartbeat  ¨ Heart rhythm medicines to help the heart beat more regularly  ¨ Anti-clotting medicines to help reduce the risk for blood clots and stroke  · Electrical cardioversion. Your healthcare provider uses special pads or paddles to send one or more brief electrical shocks to the heart. This can help reset the heartbeat to normal.  · Ablation. Long, thin tubes called catheters are threaded through a blood vessel to the heart. There, the catheters send out hot or cold energy to the areas causing the abnormal signals. This energy destroys the problem tissue or cells. This improves the chances that your heart will stay in normal rhythm without using medicines. If your heart rate and rhythm cant be controlled, you may need ablation and a pacemaker. These will help control the heart rate and regularity of the heartbeat.  · Surgery. During surgery, your healthcare provider may use different methods to create scar tissue in the areas of the heart causing the abnormal signals. The scar tissue disrupts the abnormal signals and may stop AFib from occurring.  What are the complications of atrial fibrillation?  These can include:  · Blood clots  · Stroke  · Heart failure. This problem occurs when the heart muscle weakens so much that it can no longer pump blood well.  When should I call my healthcare provider?  Call your healthcare provider right away if you have any of these:  · Symptoms that dont get better with treatment, or get worse  · New symptoms   Date Last Reviewed: 5/1/2016  © 8561-8806 [employer]. [address] [place] [address], [place] [address]. All rights reserved. This information is not intended as a substitute for professional medical  care. Always follow your healthcare professional's instructions.        What Is Atrial Flutter/Atrial Fibrillation?      The heart has its own electrical system. This system makes the signals that start each heartbeat. The heartbeat begins in 1 of the 2 upper chambers of the heart (atria). A problem can make the atria beat faster than normal. The atria may beat fast but still evenly. This problem is called atrial flutter. If the atria beat very fast and also unevenly, it is called atrial fibrillation (AFib).  Causes of Atrial Flutter and Atrial Fibrillation  Causes of these problems can include:  · Previous heart attack  · High blood pressure  · Thyroid problems  In many cases, the cause is unknown.  When the Atria Beat Too Fast  The atria may beat fast only once in a while. This is called a paroxysmal heart rhythm problem. If they beat fast all the time, it is a chronic problem.  Atrial Flutter  With atrial flutter, electrical signals travel around and around inside the atria. These circling signals make the atria beat too fast:  · Atrial flutter can cause symptoms similar to AFib. It can also lead to the even faster, uneven rhythms of AFib.  Atrial Fibrillation (AFib)  With AFib, cells in the atria send extra electrical signals. These extra signals make the atria beat very fast. They also beat unevenly:  · The atria beat so fast and unevenly that they may quiver instead of chandler. If the atria dont contract, they dont move enough blood into the 2 lower chambers of the heart (ventricles). This can cause you to feel dizzy or weak.  · Blood that doesnt keep moving can pool and form clots in the atria. These clots can move into other parts of the body and cause serious problems such as a stroke.   Symptoms of Atrial Flutter and AFib  These symptoms include the following:  · Palpitations (a fluttering, fast heartbeat)  · Weakness or tiredness  · Shortness of breath  · Chest pain or tightness  · Dizziness or  lightheadedness  · Fainting spells   Date Last Reviewed: 2/26/2014  © 3920-9306 [employer]. [address] [place] [address], [place] [address]. All rights reserved. This information is not intended as a substitute for professional medical care. Always follow your healthcare professional's instructions.

## 2018-03-22 NOTE — PROGRESS NOTES
3/22/18 - Attempted phone contact for completion of Nursing Assessment with no answer. Voice mail left requesting return call. Will attempt to contact at a later time. Amina Chance RN  3/22/18 - Rec'd return call from pt and Nursing Assessment completed for O[facility].   S - She was hospitalized for heart cath since CM's last contact. She advised she is feeling good today and is glad to know what is wrong as that helps her understand how to manage her health better. She continues to use O2 only at hs. She has been vacuuming this AM and her O2 sat was 97% after she finished. She is following a low Na, lean protein, low carb diet and has lost from 139 to 122 over the past several months by following this eating plan.   I - Discussed a fib, CAD and COPD and advised CM will mail educational literature about each. She reports she has read about each in the past and knows it is because she has all 3 that she has had ongoing health problems. She is trying to do everything she can to manage all 3.   P - Mail educational literature related to CAD, COPD and a fib and follow up in 2 weeks. Amina Chance RN

## 2018-03-26 NOTE — PROGRESS NOTES
SUBJECTIVE:   Marcel Montalvo is a 77 y.o. female   Corrected distance visual acuity was 20/20 in the right eye and 20/20 in the left eye.   Chief Complaint   Patient presents with    Sjogren's syndrome     chk        HPI:  HPI     Sjogren's syndrome    Additional comments: chk           Comments   Pt here for 6m MOCT plaquenil chk. No pain. Pt is experiencing some   discomfort due to allergies and dryness. She was prescribed Xiidra. She   says she only used it for about two weeks and found it didn't change   anything so she discontinued it. She says that she finds that by night   time, her vision is not as sharp as it is during the day.    1. Sjogren's on Plaquenil 2012 (Dr. Lewis) (quit 9/2017)  + Dry Mouth  + Asthma  Punctal Plugs  2. Borderline DM  3. AMD  4. PCIOL OS Toric 5-8-14  PCIOL OD Toric 6-4-14        Restasis BID OU  AT's PRN OU  Zaditor Daily OU       Last edited by Abdoulaye Swenson, Patient Care Assistant on 3/26/2018  9:14   AM. (History)        Assessment /Plan :  1. Encounter for eye exam due to high risk medication No evidence of plaquenil toxicity at this time. Importance of regular follow-up and need to call immediately if change in vision or color vision.    Pt off plaquenil for 6 months now   2. Diabetes mellitus type 2 without retinopathy No diabetic retinopathy at this time. Reviewed diabetic eye precautions including avoiding tobacco use,  Good glucose control, and importance of regular follow up.      3. Pseudophakia  -- Condition stable, no therapeutic change required. Monitoring routinely.     4. Sjogren's syndrome with other organ involvement  -- Condition stable from Ocular standpoint , no therapeutic change required. Monitoring routinely.           RTC 1 year

## 2018-03-28 NOTE — PROGRESS NOTES
Subjective:   Patient ID:  Marcel Montalvo is a 77 y.o. female who presents for follow up of Hospital Follow Up      HPI  A 76 yo female with pulmonary htn afib/ flutter cad s/p cabg s/p ptca is here fro f/u she had a rtlhc  For shortness of breath she has improved capacity with nitrtates . She is using o2 at nite. Has no bloating her breathing is improved no leg edema.she is not gaining weight she is watching salt intake. Her coronary angio showed she ahs adequate perfusion and has diastolic dysfunction restrictive pattern and has severe pulmonary htn. Her diuretics regimen has helped her shortness of breath.  Past Medical History:   Diagnosis Date    *Atrial fibrillation     AF (atrial fibrillation)     Palpation: atrial fibrillation, on Coumadin and followed by cardiologist.    Anticoagulant long-term use     Anxiety     Aortic insufficiency 3/19/2014    Aortic regurgitation     Aortic regurgitation/tricuspid regurgitation per MARIO in April 2007.     Asthma     Carotid artery stenosis     CEA on the left by Dr. Maciel    Coronary artery disease     status post CABG X 3.     Diabetes mellitus     GERD (gastroesophageal reflux disease)     H. pylori infection     treated    Hyperlipidemia     intolerant to statins.    Hypertension 8/7/2013    IBS (irritable bowel syndrome)     Leg pain 3/19/2014    Long-term (current) use of anticoagulants 8/7/2013    Osteoarthritis     Osteoarthritis     Osteopenia     DEXA scan done on 06/20/12     Pancreatitis     resolved    S/P CABG (coronary artery bypass graft) 8/7/2013    S/P carotid endarterectomy 8/7/2013    S/P PTCA (percutaneous transluminal coronary angioplasty) 3/19/2014    Sjogren's syndrome     Stroke     Vitamin D deficiency disease        Past Surgical History:   Procedure Laterality Date    ABDOMINAL SURGERY      APPENDECTOMY      bilateral cataract surgery      CARPAL TUNNEL RELEASE      RT    CATARACT EXTRACTION      CEA      left     CHOLECYSTECTOMY      CORONARY ARTERY BYPASS GRAFT      2 stents    EYE SURGERY      HYSTERECTOMY      TONSILLECTOMY         Social History   Substance Use Topics    Smoking status: Former Smoker     Packs/day: 0.50     Years: 25.00     Quit date: 12/17/1988    Smokeless tobacco: Never Used    Alcohol use 0.0 oz/week      Comment: wine occasionally       Family History   Problem Relation Age of Onset    Alzheimer's disease Mother     Hyperlipidemia Mother     Cancer Father      lung    Heart disease Maternal Uncle     Cancer Brother      stomach    Cancer Paternal Grandmother     Cancer Paternal Grandfather      stomach       Current Outpatient Prescriptions   Medication Sig    albuterol (PROAIR HFA) 90 mcg/actuation inhaler Inhale 2 puffs into the lungs every 6 (six) hours as needed.    albuterol (PROVENTIL) 2.5 mg /3 mL (0.083 %) nebulizer solution Take 3 mLs (2.5 mg total) by nebulization every 6 (six) hours as needed for Wheezing or Shortness of Breath. Rescue    aspirin (ECOTRIN) 81 MG EC tablet Take 1 tablet (81 mg total) by mouth once daily.    atorvastatin (LIPITOR) 40 MG tablet TAKE ONE TABLET BY MOUTH NIGHTLY    azelastine (ASTELIN) 137 mcg (0.1 %) nasal spray 1 spray by Nasal route 2 (two) times daily.    benzonatate (TESSALON) 100 MG capsule Take 1 capsule (100 mg total) by mouth 3 (three) times daily as needed for Cough.    biotin 1 mg tablet Take 1,000 mcg by mouth 3 (three) times daily.    budesonide-formoterol 160-4.5 mcg (SYMBICORT) 160-4.5 mcg/actuation HFAA Inhale 2 puffs into the lungs every 12 (twelve) hours. Wash out mouth after using    carboxymethylcellulose (REFRESH PLUS) 0.5 % Dpet Place 1 drop into both eyes 3 (three) times daily as needed.    diclofenac sodium (VOLTAREN) 1 % Gel Apply 2 g topically once daily.    escitalopram oxalate (LEXAPRO) 10 MG tablet TAKE ONE-HALF TO ONE TABLET BY MOUTH EVERY DAY    fexofenadine-pseudoephedrine (ALLEGRA-D 24) 180-240 mg  per 24 hr tablet Take 1 tablet by mouth once daily.    flecainide (TAMBOCOR) 100 MG Tab TAKE ONE TABLET BY MOUTH TWICE DAILY STARTING SUNDAY. (DISCONTINUE RYTHMOL)    fluticasone (FLONASE) 50 mcg/actuation nasal spray 2 sprays by Each Nare route once daily.    furosemide (LASIX) 20 MG tablet Take 1 tablet (20 mg total) by mouth once daily. As of 8/4/17    guaifenesin (MUCINEX) 600 mg 12 hr tablet Take 2 tablets (1,200 mg total) by mouth 2 (two) times daily. As needed for flares    hydroxychloroquine (PLAQUENIL) 200 mg tablet Take 200 mg by mouth once daily.    hyoscyamine (ANASPAZ) 0.125 mg TbDL Take 0.125 mg by mouth every 6 (six) hours as needed for Cramping.    nebivolol (BYSTOLIC) 5 MG Tab TAKE ONE TABLET BY MOUTH TWICE DAILY    nitroGLYCERIN (NITROSTAT) 0.4 MG SL tablet Place 1 tablet (0.4 mg total) under the tongue every 5 (five) minutes as needed for Chest pain.    nitroGLYCERIN 0.2 mg/hr TD PT24 (NITRODUR) 0.2 mg/hr Place 1 patch onto the skin once daily.    pantoprazole (PROTONIX) 40 MG tablet Take 1 tablet (40 mg total) by mouth 2 (two) times daily. As of 8/4/17    phenobarb-hyoscy-atropine-scop (BELLADONNA-PHENOBARBITAL) 16.2-0.1037 -0.0194 mg/5 mL Elix Take 5 mLs by mouth daily as needed (abdominal pain).    potassium chloride (KLOR-CON) 10 MEQ TbSR Take 1 tablet (10 mEq total) by mouth once daily.    pramoxine 1 % Foam Place rectally 3 (three) times daily as needed.    PREMARIN vaginal cream PLACE 1 GRAM VAGINALLY TWICE A WEEK    RESTASIS 0.05 % ophthalmic emulsion INSTILL ONE DROP INTO EACH EYE TWICE DAILY    rivaroxaban (XARELTO) 20 mg Tab Take 1 tablet (20 mg total) by mouth daily with dinner or evening meal.    tramadol (ULTRAM) 50 mg tablet Take 1 tablet (50 mg total) by mouth 3 (three) times daily as needed.    triamcinolone acetonide 0.1% (KENALOG) 0.1 % ointment AAA bid prn    vitamin D 1000 units Tab Take 2,000 Units by mouth once daily.     Current Facility-Administered  Medications   Medication    denosumab (PROLIA) injection 60 mg     Current Outpatient Prescriptions on File Prior to Visit   Medication Sig    albuterol (PROAIR HFA) 90 mcg/actuation inhaler Inhale 2 puffs into the lungs every 6 (six) hours as needed.    albuterol (PROVENTIL) 2.5 mg /3 mL (0.083 %) nebulizer solution Take 3 mLs (2.5 mg total) by nebulization every 6 (six) hours as needed for Wheezing or Shortness of Breath. Rescue    aspirin (ECOTRIN) 81 MG EC tablet Take 1 tablet (81 mg total) by mouth once daily.    atorvastatin (LIPITOR) 40 MG tablet TAKE ONE TABLET BY MOUTH NIGHTLY    azelastine (ASTELIN) 137 mcg (0.1 %) nasal spray 1 spray by Nasal route 2 (two) times daily.    benzonatate (TESSALON) 100 MG capsule Take 1 capsule (100 mg total) by mouth 3 (three) times daily as needed for Cough.    biotin 1 mg tablet Take 1,000 mcg by mouth 3 (three) times daily.    budesonide-formoterol 160-4.5 mcg (SYMBICORT) 160-4.5 mcg/actuation HFAA Inhale 2 puffs into the lungs every 12 (twelve) hours. Wash out mouth after using    carboxymethylcellulose (REFRESH PLUS) 0.5 % Dpet Place 1 drop into both eyes 3 (three) times daily as needed.    diclofenac sodium (VOLTAREN) 1 % Gel Apply 2 g topically once daily.    escitalopram oxalate (LEXAPRO) 10 MG tablet TAKE ONE-HALF TO ONE TABLET BY MOUTH EVERY DAY    fexofenadine-pseudoephedrine (ALLEGRA-D 24) 180-240 mg per 24 hr tablet Take 1 tablet by mouth once daily.    flecainide (TAMBOCOR) 100 MG Tab TAKE ONE TABLET BY MOUTH TWICE DAILY STARTING SUNDAY. (DISCONTINUE RYTHMOL)    fluticasone (FLONASE) 50 mcg/actuation nasal spray 2 sprays by Each Nare route once daily.    furosemide (LASIX) 20 MG tablet Take 1 tablet (20 mg total) by mouth once daily. As of 8/4/17    guaifenesin (MUCINEX) 600 mg 12 hr tablet Take 2 tablets (1,200 mg total) by mouth 2 (two) times daily. As needed for flares    hydroxychloroquine (PLAQUENIL) 200 mg tablet Take 200 mg by mouth  once daily.    hyoscyamine (ANASPAZ) 0.125 mg TbDL Take 0.125 mg by mouth every 6 (six) hours as needed for Cramping.    nebivolol (BYSTOLIC) 5 MG Tab TAKE ONE TABLET BY MOUTH TWICE DAILY    nitroGLYCERIN (NITROSTAT) 0.4 MG SL tablet Place 1 tablet (0.4 mg total) under the tongue every 5 (five) minutes as needed for Chest pain.    nitroGLYCERIN 0.2 mg/hr TD PT24 (NITRODUR) 0.2 mg/hr Place 1 patch onto the skin once daily.    pantoprazole (PROTONIX) 40 MG tablet Take 1 tablet (40 mg total) by mouth 2 (two) times daily. As of 8/4/17    phenobarb-hyoscy-atropine-scop (BELLADONNA-PHENOBARBITAL) 16.2-0.1037 -0.0194 mg/5 mL Elix Take 5 mLs by mouth daily as needed (abdominal pain).    potassium chloride (KLOR-CON) 10 MEQ TbSR Take 1 tablet (10 mEq total) by mouth once daily.    pramoxine 1 % Foam Place rectally 3 (three) times daily as needed.    PREMARIN vaginal cream PLACE 1 GRAM VAGINALLY TWICE A WEEK    RESTASIS 0.05 % ophthalmic emulsion INSTILL ONE DROP INTO EACH EYE TWICE DAILY    rivaroxaban (XARELTO) 20 mg Tab Take 1 tablet (20 mg total) by mouth daily with dinner or evening meal.    tramadol (ULTRAM) 50 mg tablet Take 1 tablet (50 mg total) by mouth 3 (three) times daily as needed.    triamcinolone acetonide 0.1% (KENALOG) 0.1 % ointment AAA bid prn    vitamin D 1000 units Tab Take 2,000 Units by mouth once daily.     Current Facility-Administered Medications on File Prior to Visit   Medication    denosumab (PROLIA) injection 60 mg     Review of patient's allergies indicates:   Allergen Reactions    Codeine Nausea And Vomiting    Lisinopril      Other reaction(s): cough    Pacerone [amiodarone] Nausea And Vomiting    Sulfa (sulfonamide antibiotics) Nausea And Vomiting    Celecoxib Palpitations    Ciprofloxacin Hives, Itching and Rash    Colesevelam Rash and Nausea And Vomiting    Doxycycline Rash    Statins-hmg-coa reductase inhibitors Rash     Myalgia(can take atorvastatin ok -no  "rhabdo)per nurse josé antonio and patient  / stomach upset         ROS  Constitution: Positive for weakness, malaise/fatigue and weight loss. Negative for diaphoresis and weight gain.   HENT: Negative for hoarse voice.    Eyes: Negative for double vision and visual disturbance.   Cardiovascular: Positive for chest pain, dyspnea on exertion and palpitations. Negative for claudication, cyanosis, irregular heartbeat, leg swelling, near-syncope, orthopnea, paroxysmal nocturnal dyspnea and syncope.   Respiratory: Positive for shortness of breath. Negative for cough, hemoptysis and snoring.    Hematologic/Lymphatic: Negative for bleeding problem. Does not bruise/bleed easily.   Skin: Negative for color change and poor wound healing.   Musculoskeletal: Negative for muscle cramps, muscle weakness and myalgias.   Gastrointestinal: Negative for bloating, abdominal pain, change in bowel habit, diarrhea, heartburn, hematemesis, hematochezia, melena and nausea.   Neurological: Negative for excessive daytime sleepiness, dizziness, headaches, light-headedness, loss of balance and numbness.   Psychiatric/Behavioral: Negative for memory loss. The patient does not have insomnia.    Allergic/Immunologic: Negative for hives.   Objective:   Physical Exam  Vitals:    03/28/18 1307   BP: 128/66   Pulse: (!) 58   Weight: 55.9 kg (123 lb 3.8 oz)   Height: 5' 2" (1.575 m)   Constitutional: She is oriented to person, place, and time. She appears well-developed and well-nourished. She does not appear ill. No distress.   HENT:   Head: Normocephalic and atraumatic.   Eyes: EOM are normal. Pupils are equal, round, and reactive to light. No scleral icterus.   Neck: Normal range of motion. Neck supple. JVD present. Normal carotid pulses and no hepatojugular reflux present. Carotid bruit is not present. No tracheal deviation present. No thyromegaly present.   Cardiovascular: Normal rate, regular rhythm, intact distal pulses and normal pulses.  Exam reveals " no gallop and no friction rub.    Murmur heard.   Harsh midsystolic murmur is present with a grade of 2/6  at the upper right sternal border radiating to the neck   Medium-pitched midsystolic murmur of grade 2/6 is also present at the lower left sternal border.   Diastolic murmur is present with a grade of 1/6   Pulses:       Carotid pulses are 2+ on the right side, and 2+ on the left side.       Radial pulses are 2+ on the right side, and 2+ on the left side.        Femoral pulses are 2+ on the right side, and 2+ on the left side.       Popliteal pulses are 2+ on the right side, and 2+ on the left side.        Dorsalis pedis pulses are 2+ on the right side, and 2+ on the left side.        Posterior tibial pulses are 2+ on the right side, and 2+ on the left side.   Pulmonary/Chest: Effort normal and breath sounds normal. No respiratory distress. She has no wheezes. She has no rhonchi. She has no rales. She exhibits no tenderness.   Abdominal: Soft. Normal appearance, normal aorta and bowel sounds are normal. She exhibits distension. She exhibits no abdominal bruit, no ascites and no pulsatile midline mass. There is no hepatomegaly. There is no tenderness.   Hepatomegaly/pulsatile liver.   Musculoskeletal: She exhibits trace  edema.   Neurological: She is alert and oriented to person, place, and time. She has normal strength. No cranial nerve deficit. Coordination normal.   Skin: Skin is warm and dry. No rash noted. She is not diaphoretic. No cyanosis or erythema. Nails show no clubbing.   Bruises noted    Psychiatric: She has a normal mood and affect. Her speech is normal and behavior is normal.      Lab Results   Component Value Date    CHOL 192 05/02/2017    CHOL 105 (L) 04/03/2017    CHOL 105 (L) 04/03/2017     Lab Results   Component Value Date    HDL 42 05/02/2017    HDL 41 04/03/2017    HDL 41 04/03/2017     Lab Results   Component Value Date    LDLCALC 117.2 05/02/2017    LDLCALC 49.6 (L) 04/03/2017     LDLCALC 49.6 (L) 04/03/2017     Lab Results   Component Value Date    TRIG 164 (H) 05/02/2017    TRIG 72 04/03/2017    TRIG 72 04/03/2017     Lab Results   Component Value Date    CHOLHDL 21.9 05/02/2017    CHOLHDL 39.0 04/03/2017    CHOLHDL 39.0 04/03/2017       Chemistry        Component Value Date/Time     03/14/2018 1731    K 4.5 03/14/2018 1731     03/14/2018 1731    CO2 30 (H) 03/14/2018 1731    BUN 22 03/14/2018 1731    CREATININE 1.8 (H) 03/14/2018 1731     03/14/2018 1731        Component Value Date/Time    CALCIUM 10.9 (H) 03/14/2018 1731    ALKPHOS 280 (H) 02/12/2018 0847    AST 40 02/12/2018 0847    ALT 29 02/12/2018 0847    BILITOT 0.9 02/12/2018 0847    ESTGFRAFRICA 30.6 (A) 03/14/2018 1731    EGFRNONAA 26.6 (A) 03/14/2018 1731          Lab Results   Component Value Date    TSH 3.535 05/02/2017     Lab Results   Component Value Date    INR 1.3 (H) 02/09/2018    INR 1.7 (H) 02/06/2018    INR 1.8 (H) 10/03/2017     Lab Results   Component Value Date    WBC 8.33 03/14/2018    HGB 10.6 (L) 03/14/2018    HCT 35.4 (L) 03/14/2018    MCV 98 03/14/2018     03/14/2018     BMP  Sodium   Date Value Ref Range Status   03/14/2018 141 136 - 145 mmol/L Final     Potassium   Date Value Ref Range Status   03/14/2018 4.5 3.5 - 5.1 mmol/L Final     Chloride   Date Value Ref Range Status   03/14/2018 100 95 - 110 mmol/L Final     CO2   Date Value Ref Range Status   03/14/2018 30 (H) 23 - 29 mmol/L Final     BUN, Bld   Date Value Ref Range Status   03/14/2018 22 8 - 23 mg/dL Final     Creatinine   Date Value Ref Range Status   03/14/2018 1.8 (H) 0.5 - 1.4 mg/dL Final     Calcium   Date Value Ref Range Status   03/14/2018 10.9 (H) 8.7 - 10.5 mg/dL Final     Anion Gap   Date Value Ref Range Status   03/14/2018 11 8 - 16 mmol/L Final     eGFR if    Date Value Ref Range Status   03/14/2018 30.6 (A) >60 mL/min/1.73 m^2 Final     eGFR if non    Date Value Ref Range Status    03/14/2018 26.6 (A) >60 mL/min/1.73 m^2 Final     Comment:     Calculation used to obtain the estimated glomerular filtration  rate (eGFR) is the CKD-EPI equation.        CrCl cannot be calculated (Patient's most recent lab result is older than the maximum 7 days allowed.).    Assessment:     1. Coronary artery disease involving coronary bypass graft of native heart without angina pectoris    2. Essential hypertension    3. Chronic atrial fibrillation    4. Radiculopathy affecting upper extremity    5. Chronic diastolic heart failure    6. Type 2 diabetes mellitus without complication, without long-term current use of insulin    7. S/P CABG (coronary artery bypass graft)    8. S/P carotid endarterectomy    9. S/P PTCA (percutaneous transluminal coronary angioplasty)    10. Nonrheumatic aortic valve insufficiency    11. Asthma with COPD    12. Nocturnal hypoxemia    13. PAH (pulmonary artery hypertension)      Has combination of diastolic dysfunction/restrictive physiology and severe pulmonary htn. Coronary ischemia is not an issue. Will get more input from pulmonary   Plan:   Continue same meds   F/u in 6 weeks.  F/u with pulmonary

## 2018-03-29 NOTE — TELEPHONE ENCOUNTER
----- Message from Jose Huff sent at 3/29/2018 11:27 AM CDT -----  Contact: self [phone]  Would like to reschedule EMG appointment, to the week of 04/23.  Please call back at [phone].  Md Stella

## 2018-03-29 NOTE — TELEPHONE ENCOUNTER
----- Message from Kinjal Fletcher MD sent at 3/28/2018  1:28 PM CDT -----  Hi DR FISHMAN  I need some help with her pulmonary htn and her shortness of breath. I did a heart cath on her  She has severe pulmonary htn as well as restrictive myopathy and diastolic dysfunction her cad is medically controlled. Anything u can help with pulmonary htn therapy to improve symptom,s also would be apprerciated.  Thank shira Layton

## 2018-04-05 NOTE — LETTER
April 5, 2018    Marcel JAIN Katia  [address] [id number]             [facility] [address] [place] [address] Dear Mrs Rod,    I wanted to send you one more educational brochure about Pulmonary HTN as I saw on your most recent visit with Dr Fletcher that he feels this is a significant issue for you.     I know from our previous conversations you are interested in being well informed on your health issues. This is the only literature I found in the library I have available, but you can do an internet search and find more.     I will call you on 4/12/18 to see if you have received this and have had a chance to review it and the other literature I mailed.     Thank you,     Amina Chance RN  Outpatient Case Management  [phone]  Ext [phone]  [email or web address]

## 2018-04-05 NOTE — PROGRESS NOTES
4/5/18 -  Phone contact made with pt today for follow up with OPCM. She advised she has guests visiting currently and requested that CM call her back next week. CM will put educational literature in the mail to her about pulm HTN related to observing documentation by Dr Fletcher from her most recent cardio follow up that she is significantly effected by this. CM will follow up next week per her request.  Amina Chance RN

## 2018-04-12 NOTE — PROGRESS NOTES
4/12/18 - 2nd Attempt to complete follow-up for Outpatient Care Management today with no answer. Voice mail left requesting a return call.  I will also mail a letter with my contact information requesting a return call. Amina Chance RN

## 2018-04-12 NOTE — LETTER
April 12, 2018    Marcel Montalvo  [address] [address]             [facility] [address] [place] [address] Dear Mrs Montalvo,      I work with [facility] Outpatient Case Management Department. I have been unsuccessful at reaching you recently since we spoke on 3/22/18. If you require any future assistance or if any new concerns or problems arise, please call me.     The Outpatient Case Management Department can be reached at [phone] from 8 AM to 4:30 PM Monday thru Friday.    [facility] also has a program called [facility]) with a nurse available 24/7 to answer questions or provide medical advice for any non-emergent symptoms you may have. That number is [phone].     Kindest Regards,    Amina Chance RN  Outpatient Case Management  [phone]

## 2018-04-19 NOTE — PROGRESS NOTES
4/19/18 - Attempted phone contact with pt with no answer, then she called me back.   S - She is on vacation in [place] and reports she is feeling good. She has reviewed all literature mailed by CM and has no questions. She keeps well informed about the diseases she is diagnosed with so she can most effectively manage them.   I - Upcoming appts reviewed and advised CM will mail an appt letter as she is not sure about those scheduled on 4/25/18. Advised CM will follow up in 2 weeks after pending appts to answer any questions pt may have or review any new meds.   P - Follow up on 5/3/18. D/C if no new problems or concerns. Amina Chance RN

## 2018-04-25 NOTE — PROGRESS NOTES
Subjective: Follow up for Pulmonary Hypertension        Patient ID: Marcel Montalvo is a 77 y.o. female.    Chief Complaint:   She   presents for evaluation and treatment of Pulmonary Hypertension and RODARTE  after being discharged from the hospital  1  month ago. Since discharge symptoms have unchanged course since that time. Patient denies chest pain. . Symptoms are aggravated by exercise. Symptoms improve with rest.  Respiratory: positive for dyspnea on exertion; Cardiovascular: positive for palpitations.  Patient currently is on oxygen at 2 L/min per nasal cannula.. - at night      MEDICAL RECORDS FROM THE HOSPITAL REVIEWED:  [facility]  Cardiology  Discharge Summary        Patient Name: Marcel Montalvo  MRN: [id number]  Admission Date: 3/19/2018  Hospital Length of Stay: 0 days  Discharge Date and Time:  03/20/2018 1:17 PM  Attending Physician: Natalia att. providers found  Discharging Provider: Maribel Beal PA-C  Primary Care Physician: Alexandra Moreno MD     HPI: Ms. Montalvo is a 77 year old female patient with a PMHx of CAD and pulmonary HTN/MR who presented to [facility] on 3/19/18 for elective L/RHC for further evaluation of SOB. Cath subsequently revealed stable CAD and elevated filling pressures suggestive of severe pulmonary HTN and restrictive pattern, optimization of medical regimen recommended.       Procedure(s) (LRB):  HEART CATH-BILATERAL (Bilateral)      Indwelling Lines/Drains at time of discharge:      Lines/Drains/Airways            No matching active lines, drains, or airways             Hospital Course   Patient seen and examined today by myself and Dr. Fletcher and deemed suitable for discharge. No acute events overnight. Denies chest pain. SOB stable. Right groin C/D/I; no bleeding or erythema. Patient will be discharged home on her home medications. She was counseled about post-cath restrictions and will need to follow-up with PA next week.         Significant Diagnostic Studies:  L/RHC    Asthma with copd    HPI  Past Medical History:   Diagnosis Date    *Atrial fibrillation     AF (atrial fibrillation)     Palpation: atrial fibrillation, on Coumadin and followed by cardiologist.    Anticoagulant long-term use     Anxiety     Aortic insufficiency 3/19/2014    Aortic regurgitation     Aortic regurgitation/tricuspid regurgitation per MARIO in April 2007.     Asthma     Carotid artery stenosis     CEA on the left by Dr. Maciel    Coronary artery disease     status post CABG X 3.     Diabetes mellitus     GERD (gastroesophageal reflux disease)     H. pylori infection     treated    Hyperlipidemia     intolerant to statins.    Hypertension 8/7/2013    IBS (irritable bowel syndrome)     Leg pain 3/19/2014    Long-term (current) use of anticoagulants 8/7/2013    Osteoarthritis     Osteoarthritis     Osteopenia     DEXA scan done on 06/20/12     Pancreatitis     resolved    S/P CABG (coronary artery bypass graft) 8/7/2013    S/P carotid endarterectomy 8/7/2013    S/P PTCA (percutaneous transluminal coronary angioplasty) 3/19/2014    Sjogren's syndrome     Stroke     Vitamin D deficiency disease      Past Surgical History:   Procedure Laterality Date    ABDOMINAL SURGERY      APPENDECTOMY      bilateral cataract surgery      CARPAL TUNNEL RELEASE      RT    CATARACT EXTRACTION      CEA      left    CHOLECYSTECTOMY      CORONARY ARTERY BYPASS GRAFT      2 stents    EYE SURGERY      HYSTERECTOMY      TONSILLECTOMY       Social History     Social History    Marital status:      Spouse name: N/A    Number of children: N/A    Years of education: N/A     Occupational History    Not on file.     Social History Main Topics    Smoking status: Former Smoker     Packs/day: 0.50     Years: 25.00     Quit date: 12/17/1988    Smokeless tobacco: Never Used    Alcohol use 0.0 oz/week      Comment: wine occasionally    Drug use: No    Sexual activity: Yes      Partners: Male     Other Topics Concern    Not on file     Social History Narrative    No narrative on file     Review of Systems   Constitutional: Negative for fever and fatigue.   HENT: Negative for postnasal drip and rhinorrhea.    Eyes: Negative for redness and itching.   Respiratory: Negative for cough, shortness of breath, wheezing, dyspnea on extertion and Paroxysmal Nocturnal Dyspnea.    Cardiovascular: Negative for chest pain.   Genitourinary: Negative for difficulty urinating and hematuria.   Endocrine: Negative for polyphagia, cold intolerance and heat intolerance.    Musculoskeletal: Negative for arthralgias.   Skin: Negative for rash.   Gastrointestinal: Negative for nausea, vomiting, abdominal pain and abdominal distention.   Neurological: Negative for dizziness and headaches.   Hematological: Negative for adenopathy. Does not bruise/bleed easily and no excessive bruising.   Psychiatric/Behavioral: The patient is not nervous/anxious.        Objective:      Physical Exam   Constitutional: She is oriented to person, place, and time. She appears well-developed and well-nourished.   HENT:   Head: Normocephalic and atraumatic.   Eyes: Conjunctivae are normal. Pupils are equal, round, and reactive to light.   Neck: Neck supple. No JVD present. No tracheal deviation present. No thyromegaly present.   Cardiovascular: Normal rate.  An irregularly irregular rhythm present.   Murmur heard.   Systolic murmur is present with a grade of 2/6   Pulmonary/Chest: Effort normal. No respiratory distress. She has decreased breath sounds in the right lower field and the left lower field. She has no wheezes. She has no rales. She exhibits no tenderness.   Abdominal: Soft. Bowel sounds are normal.   Musculoskeletal: Normal range of motion. She exhibits no edema.   Lymphadenopathy:     She has no cervical adenopathy.   Neurological: She is alert and oriented to person, place, and time.   Skin: Skin is warm and dry.   Nursing  note and vitals reviewed.    Personal Diagnostic Review  Pulmonary Hypertension on right heart cath    .[email or web address]  No flowsheet data found.        Assessment:       1. PAH (pulmonary artery hypertension)    2. SOB (shortness of breath)        Outpatient Encounter Prescriptions as of 4/25/2018   Medication Sig Dispense Refill    albuterol (PROAIR HFA) 90 mcg/actuation inhaler Inhale 2 puffs into the lungs every 6 (six) hours as needed. 1 Inhaler 11    albuterol (PROVENTIL) 2.5 mg /3 mL (0.083 %) nebulizer solution Take 3 mLs (2.5 mg total) by nebulization every 6 (six) hours as needed for Wheezing or Shortness of Breath. Rescue 360 each 11    ambrisentan (LETAIRIS) 5 MG Tab Take 1 tablet (5 mg total) by mouth once daily. 30 tablet 11    aspirin (ECOTRIN) 81 MG EC tablet Take 1 tablet (81 mg total) by mouth once daily. 30 tablet 6    atorvastatin (LIPITOR) 40 MG tablet TAKE ONE TABLET BY MOUTH NIGHTLY 90 tablet 6    azelastine (ASTELIN) 137 mcg (0.1 %) nasal spray 1 spray by Nasal route 2 (two) times daily.      biotin 1 mg tablet Take 1,000 mcg by mouth 3 (three) times daily.      budesonide-formoterol 160-4.5 mcg (SYMBICORT) 160-4.5 mcg/actuation HFAA Inhale 2 puffs into the lungs every 12 (twelve) hours. Wash out mouth after using 1 Inhaler 11    carboxymethylcellulose (REFRESH PLUS) 0.5 % Dpet Place 1 drop into both eyes 3 (three) times daily as needed.      diclofenac sodium (VOLTAREN) 1 % Gel Apply 2 g topically once daily. 3 Tube 4    escitalopram oxalate (LEXAPRO) 10 MG tablet TAKE ONE-HALF TO ONE TABLET BY MOUTH EVERY DAY 90 tablet 3    flecainide (TAMBOCOR) 100 MG Tab TAKE ONE TABLET BY MOUTH TWICE DAILY STARTING SUNDAY. (DISCONTINUE RYTHMOL) 60 tablet 6    fluticasone (FLONASE) 50 mcg/actuation nasal spray 2 sprays by Each Nare route once daily. 1 Bottle 11    furosemide (LASIX) 20 MG tablet Take 1 tablet (20 mg total) by mouth 2 (two) times daily. As of 8/4/17 180 tablet 3    guaifenesin  (MUCINEX) 600 mg 12 hr tablet Take 2 tablets (1,200 mg total) by mouth 2 (two) times daily. As needed for flares 60 tablet 11    hydroxychloroquine (PLAQUENIL) 200 mg tablet Take 200 mg by mouth once daily.      nebivolol (BYSTOLIC) 5 MG Tab TAKE ONE TABLET BY MOUTH TWICE DAILY 180 tablet 3    nitroGLYCERIN (NITROSTAT) 0.4 MG SL tablet Place 1 tablet (0.4 mg total) under the tongue every 5 (five) minutes as needed for Chest pain. 25 tablet 6    nitroGLYCERIN 0.2 mg/hr TD PT24 (NITRODUR) 0.2 mg/hr Place 1 patch onto the skin once daily. 30 patch 11    pantoprazole (PROTONIX) 40 MG tablet Take 1 tablet (40 mg total) by mouth 2 (two) times daily. As of 8/4/17 60 tablet 6    phenobarb-hyoscy-atropine-scop (BELLADONNA-PHENOBARBITAL) 16.2-0.1037 -0.0194 mg/5 mL Elix Take 5 mLs by mouth daily as needed (abdominal pain). 180 mL 1    potassium chloride (KLOR-CON) 10 MEQ TbSR Take 1 tablet (10 mEq total) by mouth once daily. 90 tablet 3    pramoxine 1 % Foam Place rectally 3 (three) times daily as needed. 15 g 1    PREMARIN vaginal cream PLACE 1 GRAM VAGINALLY TWICE A WEEK 30 g 3    RESTASIS 0.05 % ophthalmic emulsion INSTILL ONE DROP INTO EACH EYE TWICE DAILY 60 each 12    rivaroxaban (XARELTO) 20 mg Tab Take 1 tablet (20 mg total) by mouth daily with dinner or evening meal. 90 tablet 3    triamcinolone acetonide 0.1% (KENALOG) 0.1 % ointment AAA bid prn 60 g 1    vitamin D 1000 units Tab Take 2,000 Units by mouth once daily.      [DISCONTINUED] benzonatate (TESSALON) 100 MG capsule Take 1 capsule (100 mg total) by mouth 3 (three) times daily as needed for Cough. 30 capsule 1    [DISCONTINUED] fexofenadine-pseudoephedrine (ALLEGRA-D 24) 180-240 mg per 24 hr tablet Take 1 tablet by mouth once daily.      [DISCONTINUED] hyoscyamine (ANASPAZ) 0.125 mg TbDL Take 0.125 mg by mouth every 6 (six) hours as needed for Cramping.      [DISCONTINUED] tramadol (ULTRAM) 50 mg tablet Take 1 tablet (50 mg total) by mouth 3  (three) times daily as needed. 90 tablet 3     Facility-Administered Encounter Medications as of 4/25/2018   Medication Dose Route Frequency Provider Last Rate Last Dose    denosumab (PROLIA) injection 60 mg  60 mg Subcutaneous Q6 Months Jaci Day NP   60 mg at 07/12/17 1026     Orders Placed This Encounter   Procedures    Ambulatory Referral to Pharmacy Assistance     Referral Priority:   Routine     Referral Type:   Consultation     Referral Reason:   Specialty Services Required     Number of Visits Requested:   1    Stress test, pulmonary     Standing Status:   Future     Standing Expiration Date:   4/25/2019     Plan:       Requested Prescriptions     Signed Prescriptions Disp Refills    ambrisentan (LETAIRIS) 5 MG Tab 30 tablet 11     Sig: Take 1 tablet (5 mg total) by mouth once daily.     PAH (pulmonary artery hypertension)  -     ambrisentan (LETAIRIS) 5 MG Tab; Take 1 tablet (5 mg total) by mouth once daily.  Dispense: 30 tablet; Refill: 11  -     Ambulatory Referral to Pharmacy Assistance  -     Stress test, pulmonary; Future    SOB (shortness of breath)  -     Stress test, pulmonary; Future           Follow-up in about 4 weeks (around 5/23/2018) for 6 min walk.    Review of medical records from hospital. Medical records from hospital were reviewed during office visit - these included but were not limited to review of radiographic studies and /or radiologists reports, laboratory studies, discharge summaries, procedure notes, consultations and other transcribed notes.    MEDICAL DECISION MAKING: Moderate to high complexity.  Overall, the multiple problems listed are of moderate to high severity that may impact quality of life and activities of daily living. Side effects of medications, treatment plan as well as options and alternatives reviewed and discussed with patient. There was counseling of patient concerning these issues.    Total time spent in face to face counseling and coordination of  care - 40 minutes over 50% of time was used in discussion of prognosis, risks, benefits of treatment, instructions and compliance with regimen . Discussion with other physicians or health care providers (homehealth, durable medical equipment providers).

## 2018-04-25 NOTE — LETTER
April 25, 2018      Luana Knight PA-C  [address]  [facility] [address]           [facility] - Physiatry  [address] [place] [address]  Phone: [phone]  Fax: [phone]          Patient: Marcel Montalvo   MR Number: [id number]   YOB: 1940   Date of Visit: 4/25/2018       Dear Luana Knight:    Thank you for referring Marcel Montalvo to me for evaluation. Attached you will find relevant portions of my assessment and plan of care.    If you have questions, please do not hesitate to call me. I look forward to following Marcel Montalvo along with you.    Sincerely,    Kamila Kuhn MD    Enclosure  CC:  No Recipients    If you would like to receive this communication electronically, please contact [email or web address] or [phone] to request more information on [employer] access.    For providers and/or their staff who would like to refer a patient to [facility], please contact us through our one-stop-shop provider referral line, [facility], at [phone].    If you feel you have received this communication in error or would no longer like to receive these types of communications, please e-mail [email or web address]

## 2018-04-25 NOTE — TELEPHONE ENCOUNTER
LVM to notify the patient we received the prescription for Letairis, and it will require authorization from the insurance company. We will continue to follow up.

## 2018-04-25 NOTE — PATIENT INSTRUCTIONS
Exercises to Strengthen Your Lower Back  Strong lower back and abdominal muscles work together to support your spine. The exercises below will help strengthen the lower back. It is important that you begin exercising slowly and increase levels gradually.  Always begin any exercise program with stretching. If you feel pain while doing any of these exercises, stop and talk to your doctor about a more specific exercise program that better suits your condition.   Low back stretch  The point of stretching is to make you more flexible and increase your range of motion. Stretch only as much as you are able. Stretch slowly. Do not push your stretch to the limit. If at any point you feel pain while stretching, this is your (temporary) limit.  · Lie on your back with your knees bent and both feet on the ground.  · Slowly raise your left knee to your chest as you flatten your lower back against the floor. Hold for 5 seconds.  · Relax and repeat the exercise with your right knee.  · Do 10 of these exercises for each leg.  · Repeat hugging both knees to your chest at the same time.  Building lower back strength  Start your exercise routine with 10 to 30 minutes a day, 1 to 3 times a day.  Initial exercises  Lying on your back:  1. Ankle pumps: Move your foot up and down, towards your head, and then away. Repeat 10 times with each foot.  2. Heel slides: Slowly bend your knee, drawing the heel of your foot towards you. Then slide your heel/foot from you, straightening your knee. Do not lift your foot off the floor (this is not a leg lift).  3. Abdominal contraction: Bend your knees and put your hands on your stomach. Tighten your stomach muscles. Hold for 5 seconds, then relax. Repeat 10 times.  4. Straight leg raise: Bend one leg at the knee and keep the other leg straight. Tighten your stomach muscles. Slowly lift your straight leg 6 to 12 inches off the floor and hold for up to 5 seconds. Repeat 10 times on each  side.  Standin. Wall squats: Stand with your back against the wall. Move your feet about 12 inches away from the wall. Tighten your stomach muscles, and slowly bend your knees until they are at about a 45 degree angle. Do not go down too far. Hold about 5 seconds. Then slowly return to your starting position. Repeat 10 times.  2. Heel raises: Stand facing the wall. Slowly raise the heels of your feet up and down, while keeping your toes on the floor. If you have trouble balancing, you can touch the wall with your hands. Repeat 10 times.  More advanced exercises  When you feel comfortable enough, try these exercises.  1. Kneeling lumbar extension: Begin on your hands and knees. At the same time, raise and straighten your right arm and left leg until they are parallel to the ground. Hold for 2 seconds and come back slowly to a starting position. Repeat with left arm and right leg, alternating 10 times.  2. Prone lumbar extension: Lie face down, arms extended overhead, palms on the floor. At the same time, raise your right arm and left leg as high as comfortably possible. Hold for 10 seconds and slowly return to start. Repeat with left arm and right leg, alternating 10 times. Gradually build up to 20 times. (Advanced: Repeat this exercise raising both arms and both legs a few inches off the floor at the same time. Hold for 5 seconds and release.)  3. Pelvic tilt: Lie on the floor on your back with your knees bent at 90 degrees. Your feet should be flat on the floor. Inhale, exhale, then slowly contract your abdominal muscles bringing your navel toward your spine. Let your pelvis rock back until your lower back is flat on the floor. Hold for 10 seconds while breathing smoothly.  4. Abdominal crunch: Perform a pelvic tilt (above) flattening your lower back against the floor. Holding the tension in your abdominal muscles, take another breath and raise your shoulder blades off the ground (this is not a full sit-up).  Keep your head in line with your body (dont bend your neck forward). Hold for 2 seconds, then slowly lower.  Date Last Reviewed: 6/1/2016  © 2147-0052 [employer]. [address] [place] [address]. All rights reserved. This information is not intended as a substitute for professional medical care. Always follow your healthcare professional's instructions.        Possible Causes of Low Back or Leg Pain    The symptoms in your back or leg may be due to pressure on a nerve. This pressure may be caused by a damaged disk or by abnormal bone growth. Either way, you may feel pain, burning, tingling, or numbness. If you have pressure on a nerve that connects to the sciatic nerve, pain may shoot down your leg.    Pressure from the disk  Constant wear and tear can weaken a disk over time and cause back pain. The disk can then be damaged by a sudden movement or injury. If its soft center begins to bulge, the disk may press on a nerve. Or the outside of the disk may tear, and the soft center may squeeze through and pinch a nerve.    Pressure from bone  As a disk wears out, the vertebrae right above and below the disk begin to touch. This can put pressure on a nerve. Often, abnormal bone (called bone spurs) grows where the vertebrae rub against each other. This can cause the foramen or the spinal canal to narrow (called stenosis) and press against a nerve.  Date Last Reviewed: 10/4/2015  © 6613-1707 [employer]. [address] [place] [address]. All rights reserved. This information is not intended as a substitute for professional medical care. Always follow your healthcare professional's instructions.        Relieving Back Pain  Back pain is a common problem. You can strain back muscles by lifting too much weight or just by moving the wrong way. Back strain can be uncomfortable, even painful. And it can take weeks or months to improve. To help yourself feel better and prevent  future back strains, try these tips.  Important Note: Do not give aspirin to children or teens without first discussing it with your healthcare provider.      ? Ice    Ice reduces muscle pain and swelling. It helps most during the first 24 to 48 hours after an injury.  · Wrap an ice pack or a bag of frozen peas in a thin towel. (Never place ice directly on your skin.)  · Place the ice where your back hurts the most.  · Dont ice for more than 20 minutes at a time.  · You can use ice several times a day.  ? Medicines  Over-the-counter pain relievers can include acetaminophen and anti-inflammatory medicines, which includes aspirin or ibuprofen. They can help ease discomfort. Some also reduce swelling.  · Tell your healthcare provider about any medicines you are already taking.  · Take medicines only as directed.  ? Heat  After the first 48 hours, heat can relax sore muscles and improve blood flow.  · Try a warm bath or shower. Or use a heating pad set on low. To prevent a burn, keep a cloth between you and the heating pad.  · Dont use a heating pad for more than 15 minutes at a time. Never sleep on a heating pad.  Date Last Reviewed: 9/1/2015  © 3578-2367 [employer]. [address] [place] [address], [place] [address]. All rights reserved. This information is not intended as a substitute for professional medical care. Always follow your healthcare professional's instructions.        Back Safety: Poor Posture Hurts  An unhealthy spine often starts with bad habits. Poor movement patterns and posture problems are common causes of back pain. Disk, bone, nerve, and soft tissue problems can all be affected by poor posture. They can lead to pain, stiffness, and other symptoms.    Poor posture backfires  Poor posture can cause pain. Too much slouching puts increased pressure on the disks. An excessive lumbar curve can overload and inflame the vertebrae. As a result, the back muscles may tighten or spasm to splint and  protect the spine. This adds to the pain you feel.    Proper posture: The key to safe movement  Your spine bears your weight throughout the day. This is true whether youre sleeping, standing, or bending. Certain positions strain your spine more than others. But by maintaining proper posture in all positions, you can reduce the stress on your spine.       To improve your standing posture, follow these steps:  · Breathe deeply.  · Relax your shoulders, hips, and ankles. · Think of the ears, shoulders, hips, and ankles as a series of dots. Now, adjust your body to connect the dots in a straight line.  · Tuck your buttocks in just a bit if you need to.      Date Last Reviewed: 10/28/2015  © 8428-7139 [employer]. [address] [place] [address], [place] [address]. All rights reserved. This information is not intended as a substitute for professional medical care. Always follow your healthcare professional's instructions.        Caring for Your Back Throughout the Day  Take care of your back throughout the day. You will likely have fewer back problems if you do. Try to warm up before you move. Shift positions often. Also do your best to form healthy habits.    Warm up for the day  Do a few slow, catlike stretches before starting your day. This simple warmup can soften your disks, stretch your back muscles, and help prevent injuries.  Shift positions often  At work and at home, change positions often. This helps keep your body from getting stiff. Stand up or lean back while you sit. If you can, get up and move every 1/2 hour.  Form healthy habits  Here are some suggestions:   · Keep a healthy weight. When you weigh too much, your back is under excess strain. But losing just a few extra pounds can help a lot.  · Try not to overeat. Learn about serving sizes. The size of a serving depends on the food and the food group. Many foods list serving sizes on the labels.  · Handle minor aches with cold and heat. Apply cold  the first 24 to 48 hours. Use heat after that. Always place a thin cloth between your skin and the source of cold or heat.  · Take medicines as directed. This helps keep pain under control. Always read labels, and call your healthcare provider or pharmacist if you have any questions.  Walk each day  A daily walk keeps your back and thigh muscles stretched and strong. This gives your back better support. Be sure to walk with your spines three curves aligned, by keeping your head, hips, and toes connected by a vertical line.   Date Last Reviewed: 10/18/2015  © 0113-5312 [employer]. [address] [place] [address]. All rights reserved. This information is not intended as a substitute for professional medical care. Always follow your healthcare professional's instructions.        Back Safety: Basics of Good Posture  Good posture helps protect you from injury. It also increases your comfort. Aim for good posture throughout the day.    Check your posture  The human body works best when it is properly aligned. To improve your standing posture, follow these steps:  · Take a moment to close your eyes and feel your body. Then breathe deeply and relax your shoulders, hips, and knees.  · Now, from the very top of your head, lift up just a bit. Think of a line linking your ears, shoulders, hips, and ankles. Adjust your body to follow the line. You may need to relax your hips and tuck your buttocks under a bit.  · Next, take a look at yourself in a mirror. Is one ear, shoulder, or hip higher than the other? They should be level.  Check how you sit  When you sit properly, pressure on your back is reduced. Try these steps:  · Sit so that the curve of your lower back fits easily against the chair. Keep your gaze level.  · Support your feet. They should be flat on the floor or on a footrest. Your knees should be level with your hips.  · Adjust the chair height as needed. Sit so your forearms are level with  the work surface.  Proper posture helps  When your back is aligned, its more likely to stay safe throughout the day.  · Standing in place. Rest one foot on a stool or low box to ease pressure on your lower back. Switch feet often. If you can, adjust the height of your work surface so your neck and shoulders arent under strain.  · Driving. Sit close enough to the steering wheel to keep your knees slightly bent. For comfort, your knees should be level with your hips or just a bit lower. Sit as straight as you can. The curve of your lower back should be fully supported.  · Walking. Stand tall and walk with your head up. Let your arms swing while you walk. This helps relax muscles. Wear shoes that fit and support your feet. If you will be standing or walking for a long time, dont wear high heels.  · Sitting and sleeping. Choose your furniture with care. Make sure its not causing or increasing your back pain. Chairs should allow for comfortable, correct sitting posture. Use pillows for added support if needed. Your bed should support your backs natural curves without being too hard or too soft.  Date Last Reviewed: 10/18/2015  © 7009-8094 [employer]. [address] [place] [address], [place] [address]. All rights reserved. This information is not intended as a substitute for professional medical care. Always follow your healthcare professional's instructions.

## 2018-04-25 NOTE — PATIENT INSTRUCTIONS
Ambrisentan oral tablets  What is this medicine?  AMBRISENTAN (am erik SEN tan) is used to treat pulmonary hypertension. This medicine may help make exercise and breathing easier.  How should I use this medicine?  Take this medicine by mouth with a glass of water. Follow the directions on the prescription label. You can take this medicine with or without food. Do not cut, crush or chew this medicine. Take your medicine at regular intervals. Do not take it more often than directed. Do not stop taking except on your doctor's advice.  A special MedGuide will be given to you by the pharmacist with each prescription and refill. Be sure to read this information carefully each time.  Talk to your pediatrician regarding the use of this medicine in children. Special care may be needed.  What side effects may I notice from receiving this medicine?  Side effects that you should report to your doctor or health care professional as soon as possible:  · allergic reactions like skin rash, itching or hives, swelling of the face, lips, or tongue  · breathing problems  · dark urine  · fast, irregular heartbeat  · feeling faint or lightheaded, falls  · fever  · general ill feeling or flu-like symptoms  · light-colored stools  · loss of appetite, nausea  · right upper belly pain  · sudden weight gain  · swelling of the ankles or legs  · unusually weak or tired  · yellowing of eyes or skin  Side effects that usually do not require medical attention (report to your doctor or health care professional if they continue or are bothersome):  · congested or runny nose  · constipation  · flushing of the skin  · headache  · sore throat  · stomach upset  What may interact with this medicine?  · cyclosporine  · ketoconazole  · omeprazole  What if I miss a dose?  If you miss a dose, take it as soon as you can. If it is almost time for your next dose, take only that dose. Do not take double or extra doses.  Where should I keep my medicine?  Keep  out of the reach of children.  Store at room temperature between 15 and 30 degrees C (59 and 86 degrees F). Throw away any unused medicine after the expiration date.  What should I tell my health care provider before I take this medicine?  They need to know if you have any of these conditions:  · scarring or thickening of the lungs  · liver disease  · an unusual or allergic reaction to ambrisentan, other medicines, foods, dyes, or preservatives  · pregnant or trying to get pregnant  · breast-feeding  What should I watch for while using this medicine?  Visit your doctor for regular check ups. Tell your doctor or healthcare professional if your symptoms do not start to get better or if they get worse.  This medicine can cause birth defects. Do not get pregnant while taking this drug. Females with child-bearing potential will need to have a negative pregnancy test before starting this medicine and every month while taking this medicine. Use 2 reliable forms of birth control together while you are taking this medicine and for 1 month after you stop taking this medicine. If you think that you might be pregnant talk to your doctor right away.  Because of the risks of birth defects, this medicine is available only through a special program. Doctors, pharmacies, and patients must meet all of the conditions of the program. Your health care provider will help you get signed up with the program if you need this medicine.  NOTE:This sheet is a summary. It may not cover all possible information. If you have questions about this medicine, talk to your doctor, pharmacist, or health care provider. Copyright© 2017 [employer]

## 2018-04-25 NOTE — PROGRESS NOTES
[facility] [address] [place] [address]  Phone: [phone]          Full Name: josep oswald YOB: 1940  Patient ID: [id number]      Visit Date: 4/25/2018 09:58  Age: 77 Years 11 Months Old  Examining Physician: Kamila Kuhn M.D.  Referring Physician: antonieta  Reason for Referral: le numbness        Chief Complaint   Patient presents with    Weakness     bilateral leg    Tingling     right upper leg       HPI: This is a 77 y.o.  female being seen in clinic today for evaluation of  chronic  weakness in her legs and numbness/tingling in her right leg that worsened after hospitalization for ruptured hematoma in April. She has lost a lot of weight and is still recovering from all of her health issues.  Her leg is more constantly tingling, but with certain positions it can worsen.  Change of position and moving around provide some relief.  She admits to a history of chronic back and neck pain, but feels things have worsened with her recent health issues.     History obtained from patient    Past family, medical, social, and surgical history reviewed in chart    Review of Systems:     General- denies lethargy, weight change, fever, chills  Head/neck- denies swallowing difficulties  ENT- denies hearing changes  Cardiovascular-+/-chest pain  Pulmonary- +shortness of breath-h.o COPD  GI- denies constipation or bowel incontinence  - denies bladder incontinence  Skin- denies wounds or rashes  Musculoskeletal- + weakness, +pain  Neurologic- +numbness and tingling  Psychiatric- denies depressive or psychotic features, denies anxiety  Lymphatic-denies swelling  Endocrine- denies hypoglycemic symptoms/DM history  All other pertinent systems negative     Physical Examination:  General: Well developed, well nourished female, NAD  HEENT:NCAT EOMI bilaterally   Pulmonary:Normal respirations    Spinal Examination: CERVICAL  Active ROM is within normal limits.  Inspection: No deformity of  spinal alignment.    Spinal Examination: LUMBAR or THORACIC  Active ROM is within normal limits.  Inspection: No deformity of spinal alignment.    Palpation: No vertebral tenderness to percussion.  ttp at si joints    Bilateral Upper and Lower Extremities:  Pulses are 2+ at radial bilaterally.  Shoulder/Elbow/Wrist/Hand ROM   Hip/Knee/Ankle ROM wnl  Bilateral Extremities show normal capillary refill.  No signs of cyanosis, rubor, edema, skin changes, or dysvascular changes of appendages.  Nails appear intact.    Neurological Exam:  Cranial Nerves:  II-XII grossly intact    Manual Muscle Testing: (Motor 5=normal)  4/5 strength bilateral lower extremities    No focal atrophy is noted of either lower extremity.    Bilateral Reflexes:hypo at patellar  No clonus at knee or ankle.    Sensation: tested to light touch  - intact in legs, except dec at right lateral leg to below knee, left dec at lateral leg below knee   Gait: Narrow base and good arm swing.      Entire procedure explained to patient prior to proceeding.  Verbal consent obtained      SNC      Nerve / Sites Rec. Site Onset Lat Peak Lat Amp Segments Distance Velocity     ms ms µV  mm m/s   R Sural - Ankle (Calf)      Calf Ankle 2.9 3.8 11.1 Calf - Ankle 140 49   L Sural - Ankle (Calf)      Calf Ankle 3.0 3.9 4.5 Calf - Ankle 140 46       MNC      Nerve / Sites Muscle Latency Amplitude Duration Rel Amp Segments Distance Lat Diff Velocity     ms mV ms %  mm ms m/s   R Peroneal - EDB      Ankle EDB 4.8 2.4 7.1 100 Ankle - EDB 80        Fib head EDB 10.8 2.0 7.7 84.4 Fib head - Ankle 300 6.0 50      Pop fossa EDB 12.5 2.0 7.4 98.9 Pop fossa - Fib head 90 1.7 54         Pop fossa - Ankle  7.7    L Peroneal - EDB      Ankle EDB 7.7 0.4 10.9 100 Ankle - EDB 80        Fib head EDB 14.4 0.5 14.6 122 Fib head - Ankle 270 6.7 41      Pop fossa EDB 16.4 0.4 15.9 84.6 Pop fossa - Fib head 80 2.0 40         Pop fossa - Ankle  8.6    R Tibial - AH      Ankle AH 4.1 6.5 6.2  100 Ankle - AH 80        Pop fossa AH 12.3 3.8 7.6 58.7 Pop fossa - Ankle 330 8.2 40   L Tibial - AH      Ankle AH 4.8 7.7 4.9 100 Ankle - AH 80        Pop fossa AH 13.2 5.5 6.1 71.2 Pop fossa - Ankle 370 8.3 44       EMG         EMG Summary Table     Spontaneous MUAP Recruitment   Muscle IA Fib PSW Fasc Other Amp Dur Polys Pattern Effort   R. Tibialis anterior N None None None . N n 1+ sl red Max   R. Gastrocnemius (Medial head) N None None None . N n N N Max   L. Tibialis anterior N None None None . N n 1+ Reduced Max                              INTERPRETATION  -Bilateral sural sensory nerve  conduction study showed normal peak latency and amplitude  -Bilateral peroneal motor nerve conduction study showed prolonged latency on the left, dec amplitude on the left, and normal conduction velocity  -Bilateral tibial motor nerve conduction study showed normal latency, amplitude, and conduction velocity  -Needle EMG testing performed to above mentioned muscles. Pt didn't tolerate further needle testing    IMPRESSION  1. ABNORMAL study  2. There was electrodiagnostic evidence of a chronic radiculopathy of the L5 nerve roots    PLAN  1. Follow up with referring provider: Luana Knight  2. Consider focus on symptom control and lower ext/core strengthening. Formal PT referral if necessary  3. This study is good for one year. If symptoms worsen or do not improve, please re-consult.    Kamila Kuhn M.D.  Physical Medicine and Rehab

## 2018-04-27 NOTE — TELEPHONE ENCOUNTER
Documentation Only:    Faxed Prior Authorization form and supporting documentation for Letairis to insurance on 04/27/2018.

## 2018-04-30 NOTE — TELEPHONE ENCOUNTER
Documentation Only:    Prior Authorization for Letairis has been approved for 1 year.    Approval dates: 04.25.2018 - 04.24.2019     Case ID#: [id number]    Patient co-pay: $77.41    Researching possible co-pay assistance.    ITA

## 2018-05-02 NOTE — TELEPHONE ENCOUNTER
Patient returned call regarding [employer]. Explained there are currently no grants open but the  may be able to help. The drug is also limited distribution so [facility] will not be able to dispense the medication. She does not recall completing anything in the provider's office, so will complete the [employer] Enrollment Form and notify the office to complete the REMS asap. Pt verbalized understanding. Will continue to follow up.

## 2018-05-03 NOTE — TELEPHONE ENCOUNTER
----- Message from Adele Donnelly sent at 5/3/2018 10:23 AM CDT -----  Contact: Patient  Patient called and stated that her  is coming in on 6/20/18; she wants to know if she is due to come in and if she can be scheduled the same day. Also, he has labs on 6/13/18 so if the doctor wants labs she wants to get it done then as well.     She can be contacted at [phone].    Thanks,  Adele

## 2018-05-03 NOTE — TELEPHONE ENCOUNTER
Pt scheduled to see you on June 20th. She missed the March follow up due to getting released from the hospital that day. Does she need any lab work before she comes in and sees you?

## 2018-05-03 NOTE — PROGRESS NOTES
5/3/18 - 1st Attempt to follow up by Outpatient Case Management with no answer. Voice mail left requesting return call. CM will attempt to follow up again in the future. Amina Chance RN   5/3/18 - S - Rec'd return call from pt who reports she has not yet gotten new med recently prescribed by her pulm provider for treatment of pulm HTN. He referred her to PAP when med was prescribed and she reports she has been in touch with the specialty pharmacy at [facility] for completion of an application for assistance. She has not heard back from them and has not rec'd the med in the mail. She advised she will call the specialty pharmacy today to find out the status of whether it has been approved. She reports she is feeling OK today, but is anxious to get new med started to see if it will help improve her respiratory and cardiac status. She is noted to be mildly dyspneic with conversation. She relays that she has an appt with her cardio provider on 6/6/18 and would like to take it for at least a few weeks before that appt so he can see if it is helping.   I - Advised CM will follow up with her in one week to see if med has been approved. Encouraged her to call CM back if there is a need for further documentation or applications from providers as CM can assist with this.   P - Follow up in one week. D/C from [facility] if new med financial assistance has been approved. Amina Chance RN

## 2018-05-09 NOTE — TELEPHONE ENCOUNTER
----- Message from Ruthann Booker sent at 5/9/2018 11:35 AM CDT -----  Contact: jyothi arrington  Would like to consult with nurse regarding paperwork sent for patient. Please call back at [phone].        Thanks,  Ruthann Booker

## 2018-05-09 NOTE — TELEPHONE ENCOUNTER
Incomplete documentation for pt enrollment. Will fax new form to staff at [phone] for completion.

## 2018-05-10 NOTE — PROGRESS NOTES
5/10/18 - 1st Attempt to follow up by Outpatient Case Management with no answer. Voice mail left requesting return call. CM will attempt to follow up again in the future. Amina Chance RN

## 2018-05-16 NOTE — TELEPHONE ENCOUNTER
Contacted [facility]  to check status of patient's Letairis prescription assistance. States REMS form has not yet been faxed back so she cannot move forward with assistance until she has that. Will follow up with provider's office.

## 2018-05-17 NOTE — PROGRESS NOTES
5/17/18 - Phone contact made with pt today for follow up with [facility].  S - Pt reports she has not yet rec'd the new med, Letairis,  ordered by Dr Walter that she requires gene assistance to afford. Chart review reveals there is an [facility] pharmacy pt care advocate working with the [facility] rep to get a REMS form completed that is necessary before med can be provided to pt. Pt reports she is without resp symptoms of exacerbation, but is using her O2 around the clock at 2 LPM per NC. She does report very poor energy and endurance recently and is noted to be mildly dyspneic with conversation.    I - In basket message to PAP and Dr Walter staff requesting update on REMS application status. Advised pt to follow up with Dr Walter as scheduled on 5/23/18 and go to ED or Urgent Care prior to appt with onset of any symptoms causing concern. Advised CM will be in contact with her to advise of status of application, if/when any new information is obtained.   P - Follow up in one week. Will plan to d/c once pt has rec'd Letairis. Amina Chance RN  5/17/18 - S - Rec'd call from Dr Walter' nurse, Lam, that REMS form has been completed and faxed over to [facility] with fax confirmation. CM called pt to advise and she questioned whether she needs to reschedule her next appt with Dr Walter for 5/23/18 to a later time. She was under the impression he wants her to take Letairis for a few weeks before her next appt.   I - In basket message sent to Dr Walter staff asking for direction related to this.   P - Advise pt of provider response and assist with rescheduling appt if needed. Amina Chance RN

## 2018-05-17 NOTE — TELEPHONE ENCOUNTER
----- Message from Amina Chance RN sent at 5/17/2018  8:38 AM CDT -----  Contact: Amina Chance RN  Good morning,    I am working with Mrs Montalvo in Outpatient Case Management. I am trying to follow up on the status of her application for assistance with Letairis. Chart review reveals a new REMS form was sent to Dr Walter on 5/10/18 that is necessary for completion before the med can be provided to pt. Has it been completed and forwarded back to [facility]? Please advise of any updates.     Kindest Regards,     Amina Chance RN  Outpatient Case Management  [phone]  Ext [phone]  [email or web address]

## 2018-05-17 NOTE — TELEPHONE ENCOUNTER
----- Message from Amina Chance RN sent at 5/17/2018 11:10 AM CDT -----  Contact: Amina Walter,    Mrs Montalvo is scheduled to see you on 5/23/18, but was under the impression you wanted her to take Letairis for a few weeks prior to being seen. Does this visit need to be rescheduled for a later time since she has not yet gotten started on the med? Please advise. Thank you.     Kindest Regards,     Amina Chance RN  Outpatient Case Management  [phone]  Ext [phone]  [email or web address]

## 2018-05-17 NOTE — TELEPHONE ENCOUNTER
Called patient.  Encouraged to keep upcoming appt.  Obtained info regarding paperwork for medication.  Fax rec'd,  will forward to Jose Angel for signature and fax to [phone].

## 2018-05-17 NOTE — TELEPHONE ENCOUNTER
Contacted and informed that REM form was successfully faxed to [phone].    Verbalized understanding.

## 2018-05-19 PROBLEM — M54.50 LOW BACK PAIN: Status: ACTIVE | Noted: 2018-01-01

## 2018-05-19 PROBLEM — M25.551 RIGHT HIP PAIN: Status: ACTIVE | Noted: 2018-01-01

## 2018-05-19 NOTE — ED NOTES
Per pt both due meds were taken this am. Pharmacist aware and requested a note be sent through the MAR. Note completed

## 2018-05-19 NOTE — ED NOTES
Pt states she has had rectal bleeding for three days when she wipes and a shooting pain down her right buttock and leg

## 2018-05-19 NOTE — CONSULTS
CM met with patient.  She has home oxygen (portable and concentrator).  She understands that someone will have to bring her portable to her when she is stable for discharge.

## 2018-05-19 NOTE — ASSESSMENT & PLAN NOTE
X ray of lower and Rt hip   Patient has multiple allergies to pain medications  Tylenol   Continue to monitor for now

## 2018-05-19 NOTE — ED PROVIDER NOTES
"SCRIBE #1 NOTE: I, Kvng Kelsey, am scribing for, and in the presence of, Cameron Blanco Jr., MD. I have scribed the entire note.      History      Chief Complaint   Patient presents with    Rectal Bleeding     pt states, "I've been bleeding for 3 days, I thought it was hemorrhoids," with associated rectal pain; pt c/o blood noted in toilet any time after using restroom, pt states she takes xarelto       Review of patient's allergies indicates:   Allergen Reactions    Codeine Nausea And Vomiting    Lisinopril      Other reaction(s): cough    Pacerone [amiodarone] Nausea And Vomiting    Sulfa (sulfonamide antibiotics) Nausea And Vomiting    Celecoxib Palpitations    Ciprofloxacin Hives, Itching and Rash    Colesevelam Rash and Nausea And Vomiting    Doxycycline Rash    Statins-hmg-coa reductase inhibitors Rash     Myalgia(can take atorvastatin ok -no rhabdo)per nurse josé antonio and patient  / stomach upset        HPI   HPI    5/19/2018, 6:57 AM   History obtained from the patient      History of Present Illness: Marcel Montalvo is a 78 y.o. female patient with a hx of afib, anxiety, CAD, DM, GERD, HTN, CVA, who presents to the Emergency Department for an evaluation of rectal bleeding which onset suddenly x3 days ago. Pt reports she began to notice blood in the toilet after attempting bowel movements or urination with associated rectal pain. Pt reports the blood is bright red. Pt reports she initially attributed the sx to hemorrhoids. Pt is on xarelto but states she stopped taking it after onset of her sx. Pt reports her last colonoscopy was x4-5 years ago.Symptoms are intermittent and moderate in severity. Exacerbated by nothing and relieved by nothing. Associated sxs include rectal pain. Patient denies any fever, chills, abdominal pain, dysuria, hematuria, HA, weakness, N/V/D, and all other sxs at this time. Pt denies tx PTA. No further complaints or concerns at this time.         Arrival mode: Personal " vehicle    PCP: Alexandra Moreno MD       Past Medical History:  Past Medical History:   Diagnosis Date    *Atrial fibrillation     AF (atrial fibrillation)     Palpation: atrial fibrillation, on Coumadin and followed by cardiologist.    Anticoagulant long-term use     Anxiety     Aortic insufficiency 3/19/2014    Aortic regurgitation     Aortic regurgitation/tricuspid regurgitation per MARIO in April 2007.     Asthma     Carotid artery stenosis     CEA on the left by Dr. Maciel    Coronary artery disease     status post CABG X 3.     Diabetes mellitus     GERD (gastroesophageal reflux disease)     H. pylori infection     treated    Hyperlipidemia     intolerant to statins.    Hypertension 8/7/2013    IBS (irritable bowel syndrome)     Leg pain 3/19/2014    Long-term (current) use of anticoagulants 8/7/2013    Osteoarthritis     Osteoarthritis     Osteopenia     DEXA scan done on 06/20/12     Pancreatitis     resolved    S/P CABG (coronary artery bypass graft) 8/7/2013    S/P carotid endarterectomy 8/7/2013    S/P PTCA (percutaneous transluminal coronary angioplasty) 3/19/2014    Sjogren's syndrome     Stroke     Vitamin D deficiency disease        Past Surgical History:  Past Surgical History:   Procedure Laterality Date    ABDOMINAL SURGERY      APPENDECTOMY      bilateral cataract surgery      CARPAL TUNNEL RELEASE      RT    CATARACT EXTRACTION      CEA      left    CHOLECYSTECTOMY      CORONARY ARTERY BYPASS GRAFT      2 stents    EYE SURGERY      HYSTERECTOMY      TONSILLECTOMY           Family History:  Family History   Problem Relation Age of Onset    Alzheimer's disease Mother     Hyperlipidemia Mother     Cancer Father         lung    Heart disease Maternal Uncle     Cancer Brother         stomach    Cancer Paternal Grandmother     Cancer Paternal Grandfather         stomach       Social History:  Social History     Social History Main Topics    Smoking status:  Former Smoker     Packs/day: 0.50     Years: 25.00     Quit date: 12/17/1988    Smokeless tobacco: Never Used    Alcohol use 0.0 oz/week      Comment: wine occasionally    Drug use: No    Sexual activity: Yes     Partners: Male       ROS   Review of Systems   Constitutional: Negative for appetite change, chills and fever.   HENT: Negative for congestion, sore throat and trouble swallowing.    Respiratory: Negative for cough and shortness of breath.    Cardiovascular: Negative for chest pain and leg swelling.   Gastrointestinal: Positive for anal bleeding (Bright red) and rectal pain. Negative for abdominal pain, constipation, diarrhea, nausea and vomiting.   Genitourinary: Negative for dysuria, frequency, hematuria, urgency, vaginal bleeding and vaginal discharge.   Musculoskeletal: Negative for back pain and neck pain.   Skin: Negative for rash.   Neurological: Negative for dizziness, weakness, light-headedness and headaches.   Hematological: Does not bruise/bleed easily.     Physical Exam      Initial Vitals [05/19/18 0658]   BP Pulse Resp Temp SpO2   (!) 167/72 65 20 98 °F (36.7 °C) 95 %      MAP       103.67          Physical Exam  Nursing Notes and Vital Signs Reviewed.  Constitutional: Patient is in no acute distress. Well-developed and well-nourished.  Head: Atraumatic. Normocephalic.  Eyes: PERRL. EOM intact. Conjunctivae are not pale. No scleral icterus.  ENT: Mucous membranes are moist. Oropharynx is clear and symmetric.    Neck: Supple. Full ROM. No lymphadenopathy.  Cardiovascular: Regular rate. Regular rhythm. No murmurs, rubs, or gallops. Distal pulses are 2+ and symmetric.  Pulmonary/Chest: No respiratory distress. Clear to auscultation bilaterally. No wheezing or rales.  Abdominal: Soft and non-distended.  There is no tenderness.   Rectal:  Inflammation rectal vault.  No masses.  No hemorrhoids.  Normal sphincter tone.  Stool color is brown. Soft stool noted.   Musculoskeletal: Moves all  "extremities. No obvious deformities. No edema. No calf tenderness.  Skin: Warm and dry.  Neurological:  Alert, awake, and appropriate.  Normal speech.  No acute focal neurological deficits are appreciated.  Psychiatric: Normal affect. Good eye contact. Appropriate in content.    ED Course    Procedures  ED Vital Signs:  Vitals:    05/19/18 0658 05/19/18 0730 05/19/18 0739 05/19/18 0740   BP: (!) 167/72 (!) 162/71     Pulse: 65 64 63 64   Resp: 20 15 19 20   Temp: 98 °F (36.7 °C)      TempSrc: Oral      SpO2: 95% (!) 91% (!) 93% 97%   Weight: 53.5 kg (118 lb)      Height: 5' 2" (1.575 m)       05/19/18 0744 05/19/18 0830   BP:  (!) 185/77   Pulse: 85 64   Resp:  (!) 21   Temp:     TempSrc:     SpO2:  97%   Weight:     Height:         Abnormal Lab Results:  Labs Reviewed   CBC W/ AUTO DIFFERENTIAL - Abnormal; Notable for the following:        Result Value    RBC 3.43 (*)     Hemoglobin 9.6 (*)     Hematocrit 30.2 (*)     MCHC 31.8 (*)     RDW 15.5 (*)     Platelets 449 (*)     Lymph # 0.8 (*)     Eos # 1.1 (*)     Lymph% 7.2 (*)     Eosinophil% 10.0 (*)     All other components within normal limits   COMPREHENSIVE METABOLIC PANEL - Abnormal; Notable for the following:     Potassium 5.3 (*)     Glucose 131 (*)     BUN, Bld 34 (*)     Creatinine 1.6 (*)     Albumin 3.1 (*)     Alkaline Phosphatase 352 (*)     AST 56 (*)     ALT 45 (*)     eGFR if  35 (*)     eGFR if non  30 (*)     All other components within normal limits   URINALYSIS - Abnormal; Notable for the following:     Occult Blood UA 1+ (*)     All other components within normal limits   PROTIME-INR   APTT   OCCULT BLOOD X 1, STOOL   URINALYSIS MICROSCOPIC        All Lab Results:  Results for orders placed or performed during the hospital encounter of 05/19/18   CBC auto differential   Result Value Ref Range    WBC 10.47 3.90 - 12.70 K/uL    RBC 3.43 (L) 4.00 - 5.40 M/uL    Hemoglobin 9.6 (L) 12.0 - 16.0 g/dL    Hematocrit 30.2 " (L) 37.0 - 48.5 %    MCV 88 82 - 98 fL    MCH 28.0 27.0 - 31.0 pg    MCHC 31.8 (L) 32.0 - 36.0 g/dL    RDW 15.5 (H) 11.5 - 14.5 %    Platelets 449 (H) 150 - 350 K/uL    MPV 10.0 9.2 - 12.9 fL    Gran # (ANC) 7.5 1.8 - 7.7 K/uL    Lymph # 0.8 (L) 1.0 - 4.8 K/uL    Mono # 1.0 0.3 - 1.0 K/uL    Eos # 1.1 (H) 0.0 - 0.5 K/uL    Baso # 0.10 0.00 - 0.20 K/uL    Gran% 71.9 38.0 - 73.0 %    Lymph% 7.2 (L) 18.0 - 48.0 %    Mono% 9.9 4.0 - 15.0 %    Eosinophil% 10.0 (H) 0.0 - 8.0 %    Basophil% 1.0 0.0 - 1.9 %    Differential Method Automated    Comprehensive metabolic panel   Result Value Ref Range    Sodium 137 136 - 145 mmol/L    Potassium 5.3 (H) 3.5 - 5.1 mmol/L    Chloride 100 95 - 110 mmol/L    CO2 28 23 - 29 mmol/L    Glucose 131 (H) 70 - 110 mg/dL    BUN, Bld 34 (H) 8 - 23 mg/dL    Creatinine 1.6 (H) 0.5 - 1.4 mg/dL    Calcium 9.7 8.7 - 10.5 mg/dL    Total Protein 7.1 6.0 - 8.4 g/dL    Albumin 3.1 (L) 3.5 - 5.2 g/dL    Total Bilirubin 0.9 0.1 - 1.0 mg/dL    Alkaline Phosphatase 352 (H) 55 - 135 U/L    AST 56 (H) 10 - 40 U/L    ALT 45 (H) 10 - 44 U/L    Anion Gap 9 8 - 16 mmol/L    eGFR if African American 35 (A) >60 mL/min/1.73 m^2    eGFR if non African American 30 (A) >60 mL/min/1.73 m^2   Urinalysis   Result Value Ref Range    Specimen UA Urine, Clean Catch     Color, UA Yellow Yellow, Straw, Saira    Appearance, UA Clear Clear    pH, UA 6.0 5.0 - 8.0    Specific Gravity, UA 1.025 1.005 - 1.030    Protein, UA Negative Negative    Glucose, UA Negative Negative    Ketones, UA Negative Negative    Bilirubin (UA) Negative Negative    Occult Blood UA 1+ (A) Negative    Nitrite, UA Negative Negative    Urobilinogen, UA Negative <2.0 EU/dL    Leukocytes, UA Negative Negative   Protime-INR   Result Value Ref Range    Prothrombin Time 12.4 9.0 - 12.5 sec    INR 1.2 0.8 - 1.2   APTT   Result Value Ref Range    aPTT 28.5 21.0 - 32.0 sec         Imaging Results:  Imaging Results          CT Abdomen Pelvis  Without Contrast  (Final result)  Result time 05/19/18 08:50:47    Final result by Jayjay Roberts MD (05/19/18 08:50:47)                 Impression:      1. Moderate to large volume of ascites throughout the abdomen and pelvis.  2. Cardiomegaly.  3. No bowel obstruction.  All CT scans at this facility use dose modulation, iterative reconstruction and/or weight based dosing when appropriate to reduce radiation dose to as low as reasonably achievable.      Electronically signed by: Jayjay Roberts MD  Date:    05/19/2018  Time:    08:50             Narrative:    EXAMINATION:  CT ABDOMEN PELVIS WITHOUT CONTRAST    CLINICAL HISTORY:  Rectal bleeding 7 onset 3 days ago.    TECHNIQUE:  Axial CT images performed through the abdomen and pelvis without intravenous contrast. Multiplanar reformats were performed and interpreted.    COMPARISON:  CT abdomen and pelvis, 01/16/2018.    FINDINGS:  There is cardiomegaly.  There is centrilobular emphysema in the lung bases and bilateral basilar mild atelectasis.    No urolithiasis, hydronephrosis, or perinephric stranding.    There is no bowel obstruction.  No pneumoperitoneum.    There is a moderate to large volume of ascites in the abdomen and pelvis.    The urinary bladder is unremarkable. The patient has had a hysterectomy.  There is marked calcific atherosclerotic plaque throughout the abdominal aorta.    No significant osseous abnormality is identified.                                    The EKG was ordered, reviewed, and independently interpreted by the ED provider.  Interpretation time: 7:19  Rate: 63 BPM  Rhythm: Unusual P axis, possible ectopic atrial rhythm with undetermined rhythm irregularity  Interpretation: RBBB. Septal infarct. No STEMI.    The Emergency Provider reviewed the vital signs and test results, which are outlined above.    ED Discussion     9:04 AM: Discussed case with Krystal Collins NP (Hospital Medicine) who agrees with current care and management of pt and accepts  admission.   Admitting Service: Hospital medicine   Admitting Physician: Dr. Coffman  Admit to: Observation    9:10 AM: Re-evaluated pt. I have discussed test results, shared treatment plan, and the need for admission with patient and family at bedside. Pt and family express understanding at this time and agree with all information. All questions answered. Pt and family have no further questions or concerns at this time. Pt is ready for admit.        ED Medication(s):  Medications   pantoprazole 40 mg in dextrose 5 % 100 mL infusion (ready to mix system) (0 mg Intravenous Stopped 5/19/18 0853)   0.9%  NaCl infusion (500 mLs Intravenous New Bag 5/19/18 9343)       New Prescriptions    No medications on file             Medical Decision Making    Medical Decision Making:   Clinical Tests:   Lab Tests: Ordered and Reviewed  Radiological Study: Ordered and Reviewed  Medical Tests: Ordered and Reviewed           Scribe Attestation:   Scribe #1: I performed the above scribed service and the documentation accurately describes the services I performed. I attest to the accuracy of the note.    Attending:   Physician Attestation Statement for Scribe #1: I, Cameron Blanco Jr., MD, personally performed the services described in this documentation, as scribed by Kvng Kelsey, in my presence, and it is both accurate and complete.          Clinical Impression       ICD-10-CM ICD-9-CM   1. Rectal bleeding K62.5 569.3       Disposition:   Disposition: Admitted  Condition: Fair         Cameron Blanco Jr., MD  05/19/18 2920

## 2018-05-19 NOTE — ASSESSMENT & PLAN NOTE
Continue Flecainide and B blocker  Hold Xarelto and ASA due to bleeding   Cardilogy consult due to holding anticoagulation

## 2018-05-19 NOTE — ASSESSMENT & PLAN NOTE
Admit to Observation  Serial H & H   IV fluids   Consult GI   Occult stools   Type & Screen   Hold Xarelto and ASA   Protonix 40 mg IV daily

## 2018-05-19 NOTE — SUBJECTIVE & OBJECTIVE
Past Medical History:   Diagnosis Date    *Atrial fibrillation     AF (atrial fibrillation)     Palpation: atrial fibrillation, on Coumadin and followed by cardiologist.    Anticoagulant long-term use     Anxiety     Aortic insufficiency 3/19/2014    Aortic regurgitation     Aortic regurgitation/tricuspid regurgitation per MARIO in April 2007.     Asthma     Carotid artery stenosis     CEA on the left by Dr. Maciel    Coronary artery disease     status post CABG X 3.     Diabetes mellitus     GERD (gastroesophageal reflux disease)     H. pylori infection     treated    Hyperlipidemia     intolerant to statins.    Hypertension 8/7/2013    IBS (irritable bowel syndrome)     Leg pain 3/19/2014    Long-term (current) use of anticoagulants 8/7/2013    Osteoarthritis     Osteoarthritis     Osteopenia     DEXA scan done on 06/20/12     Pancreatitis     resolved    S/P CABG (coronary artery bypass graft) 8/7/2013    S/P carotid endarterectomy 8/7/2013    S/P PTCA (percutaneous transluminal coronary angioplasty) 3/19/2014    Sjogren's syndrome     Stroke     Vitamin D deficiency disease        Past Surgical History:   Procedure Laterality Date    ABDOMINAL SURGERY      APPENDECTOMY      bilateral cataract surgery      CARPAL TUNNEL RELEASE      RT    CATARACT EXTRACTION      CEA      left    CHOLECYSTECTOMY      CORONARY ARTERY BYPASS GRAFT      2 stents    EYE SURGERY      HYSTERECTOMY      TONSILLECTOMY         Review of patient's allergies indicates:   Allergen Reactions    Codeine Nausea And Vomiting    Lisinopril      Other reaction(s): cough    Pacerone [amiodarone] Nausea And Vomiting    Sulfa (sulfonamide antibiotics) Nausea And Vomiting    Celecoxib Palpitations    Ciprofloxacin Hives, Itching and Rash    Colesevelam Rash and Nausea And Vomiting    Doxycycline Rash    Statins-hmg-coa reductase inhibitors Rash     Myalgia(can take atorvastatin ok -no rhabdo)per nurse josé antonio  and patient  / stomach upset       Current Facility-Administered Medications on File Prior to Encounter   Medication    denosumab (PROLIA) injection 60 mg     Current Outpatient Prescriptions on File Prior to Encounter   Medication Sig    albuterol (PROAIR HFA) 90 mcg/actuation inhaler Inhale 2 puffs into the lungs every 6 (six) hours as needed.    albuterol (PROVENTIL) 2.5 mg /3 mL (0.083 %) nebulizer solution Take 3 mLs (2.5 mg total) by nebulization every 6 (six) hours as needed for Wheezing or Shortness of Breath. Rescue    aspirin (ECOTRIN) 81 MG EC tablet Take 1 tablet (81 mg total) by mouth once daily.    atorvastatin (LIPITOR) 40 MG tablet TAKE ONE TABLET BY MOUTH NIGHTLY    azelastine (ASTELIN) 137 mcg (0.1 %) nasal spray 1 spray by Nasal route 2 (two) times daily.    biotin 1 mg tablet Take 1,000 mcg by mouth 3 (three) times daily.    budesonide-formoterol 160-4.5 mcg (SYMBICORT) 160-4.5 mcg/actuation HFAA Inhale 2 puffs into the lungs every 12 (twelve) hours. Wash out mouth after using    carboxymethylcellulose (REFRESH PLUS) 0.5 % Dpet Place 1 drop into both eyes 3 (three) times daily as needed.    diclofenac sodium (VOLTAREN) 1 % Gel Apply 2 g topically once daily.    escitalopram oxalate (LEXAPRO) 10 MG tablet TAKE ONE-HALF TO ONE TABLET BY MOUTH EVERY DAY    flecainide (TAMBOCOR) 100 MG Tab TAKE ONE TABLET BY MOUTH TWICE DAILY STARTING SUNDAY. (DISCONTINUE RYTHMOL)    fluticasone (FLONASE) 50 mcg/actuation nasal spray 2 sprays by Each Nare route once daily.    furosemide (LASIX) 20 MG tablet Take 1 tablet (20 mg total) by mouth 2 (two) times daily. As of 8/4/17    nebivolol (BYSTOLIC) 5 MG Tab TAKE ONE TABLET BY MOUTH TWICE DAILY    nitroGLYCERIN 0.2 mg/hr TD PT24 (NITRODUR) 0.2 mg/hr Place 1 patch onto the skin once daily.    pantoprazole (PROTONIX) 40 MG tablet Take 1 tablet (40 mg total) by mouth 2 (two) times daily. As of 8/4/17    phenobarb-hyoscy-atropine-scop  (BELLADONNA-PHENOBARBITAL) 16.2-0.1037 -0.0194 mg/5 mL Elix Take 5 mLs by mouth daily as needed (abdominal pain).    potassium chloride (KLOR-CON) 10 MEQ TbSR Take 1 tablet (10 mEq total) by mouth once daily.    pramoxine 1 % Foam Place rectally 3 (three) times daily as needed.    RESTASIS 0.05 % ophthalmic emulsion INSTILL ONE DROP INTO EACH EYE TWICE DAILY    rivaroxaban (XARELTO) 20 mg Tab Take 1 tablet (20 mg total) by mouth daily with dinner or evening meal.    vitamin D 1000 units Tab Take 2,000 Units by mouth once daily.    ambrisentan (LETAIRIS) 5 MG Tab Take 1 tablet (5 mg total) by mouth once daily.    nitroGLYCERIN (NITROSTAT) 0.4 MG SL tablet Place 1 tablet (0.4 mg total) under the tongue every 5 (five) minutes as needed for Chest pain.    PREMARIN vaginal cream PLACE 1 GRAM VAGINALLY TWICE A WEEK    triamcinolone acetonide 0.1% (KENALOG) 0.1 % ointment AAA bid prn    [DISCONTINUED] guaifenesin (MUCINEX) 600 mg 12 hr tablet Take 2 tablets (1,200 mg total) by mouth 2 (two) times daily. As needed for flares    [DISCONTINUED] hydroxychloroquine (PLAQUENIL) 200 mg tablet Take 200 mg by mouth once daily.     Family History     Problem Relation (Age of Onset)    Alzheimer's disease Mother    Cancer Father, Brother, Paternal Grandmother, Paternal Grandfather    Heart disease Maternal Uncle    Hyperlipidemia Mother        Social History Main Topics    Smoking status: Former Smoker     Packs/day: 0.50     Years: 25.00     Quit date: 12/17/1988    Smokeless tobacco: Never Used    Alcohol use 0.0 oz/week      Comment: wine occasionally    Drug use: No    Sexual activity: Yes     Partners: Male     Review of Systems   Constitutional: Positive for fatigue. Negative for chills and fever.   HENT: Negative for congestion, rhinorrhea and sinus pressure.    Respiratory: Negative for apnea, cough, choking, chest tightness, shortness of breath, wheezing and stridor.    Cardiovascular: Negative for chest  pain, palpitations and leg swelling.   Gastrointestinal: Positive for anal bleeding and rectal pain. Negative for abdominal pain, diarrhea, nausea and vomiting.   Endocrine: Negative for cold intolerance and heat intolerance.   Genitourinary: Negative for difficulty urinating and hematuria.   Musculoskeletal: Positive for back pain (lower back, radiating right hip ). Negative for arthralgias and joint swelling.   Skin: Negative for color change, pallor and rash.   Neurological: Positive for weakness. Negative for seizures, numbness and headaches. Dizziness: mild with position changes.   Psychiatric/Behavioral: Negative for agitation. The patient is not nervous/anxious.      Objective:     Vital Signs (Most Recent):  Temp: 98 °F (36.7 °C) (05/19/18 0658)  Pulse: (!) 55 (05/19/18 1130)  Resp: (!) 23 (05/19/18 1130)  BP: (!) 152/63 (05/19/18 1130)  SpO2: 98 % (05/19/18 1130) Vital Signs (24h Range):  Temp:  [98 °F (36.7 °C)] 98 °F (36.7 °C)  Pulse:  [55-85] 55  Resp:  [15-24] 23  SpO2:  [91 %-99 %] 98 %  BP: (152-188)/(63-77) 152/63     Weight: 53.5 kg (118 lb)  Body mass index is 21.58 kg/m².    Physical Exam   Constitutional: She appears ill. No distress.   HENT:   Head: Normocephalic and atraumatic.   Eyes: Right eye exhibits no discharge. Left eye exhibits no discharge.   Cardiovascular: An irregular rhythm present. Exam reveals no gallop and no friction rub.    Murmur heard.  Pulmonary/Chest: No respiratory distress. She has no wheezes. She has no rales. She exhibits no tenderness.   Abdominal: She exhibits distension (acities). She exhibits no mass. There is no tenderness. There is no rebound and no guarding. No hernia.   Musculoskeletal: She exhibits no edema or deformity.   Neurological: She is alert.   Skin: Skin is warm and dry. She is not diaphoretic.   Nursing note and vitals reviewed.          Significant Labs: {Results:72526}    Significant Imaging: {Imaging Review:57476}

## 2018-05-19 NOTE — SUBJECTIVE & OBJECTIVE
Past Medical History:   Diagnosis Date    *Atrial fibrillation     AF (atrial fibrillation)     Palpation: atrial fibrillation, on Coumadin and followed by cardiologist.    Anticoagulant long-term use     Anxiety     Aortic insufficiency 3/19/2014    Aortic regurgitation     Aortic regurgitation/tricuspid regurgitation per MARIO in April 2007.     Asthma     Carotid artery stenosis     CEA on the left by Dr. Maciel    Coronary artery disease     status post CABG X 3.     Diabetes mellitus     GERD (gastroesophageal reflux disease)     H. pylori infection     treated    Hyperlipidemia     intolerant to statins.    Hypertension 8/7/2013    IBS (irritable bowel syndrome)     Leg pain 3/19/2014    Long-term (current) use of anticoagulants 8/7/2013    Osteoarthritis     Osteoarthritis     Osteopenia     DEXA scan done on 06/20/12     Pancreatitis     resolved    S/P CABG (coronary artery bypass graft) 8/7/2013    S/P carotid endarterectomy 8/7/2013    S/P PTCA (percutaneous transluminal coronary angioplasty) 3/19/2014    Sjogren's syndrome     Stroke     Vitamin D deficiency disease        Past Surgical History:   Procedure Laterality Date    ABDOMINAL SURGERY      APPENDECTOMY      bilateral cataract surgery      CARPAL TUNNEL RELEASE      RT    CATARACT EXTRACTION      CEA      left    CHOLECYSTECTOMY      CORONARY ARTERY BYPASS GRAFT      2 stents    EYE SURGERY      HYSTERECTOMY      TONSILLECTOMY         Review of patient's allergies indicates:   Allergen Reactions    Codeine Nausea And Vomiting    Lisinopril      Other reaction(s): cough    Pacerone [amiodarone] Nausea And Vomiting    Sulfa (sulfonamide antibiotics) Nausea And Vomiting    Celecoxib Palpitations    Ciprofloxacin Hives, Itching and Rash    Colesevelam Rash and Nausea And Vomiting    Doxycycline Rash    Statins-hmg-coa reductase inhibitors Rash     Myalgia(can take atorvastatin ok -no rhabdo)per nurse josé antonio  and patient  / stomach upset       Current Facility-Administered Medications on File Prior to Encounter   Medication    denosumab (PROLIA) injection 60 mg     Current Outpatient Prescriptions on File Prior to Encounter   Medication Sig    albuterol (PROAIR HFA) 90 mcg/actuation inhaler Inhale 2 puffs into the lungs every 6 (six) hours as needed.    albuterol (PROVENTIL) 2.5 mg /3 mL (0.083 %) nebulizer solution Take 3 mLs (2.5 mg total) by nebulization every 6 (six) hours as needed for Wheezing or Shortness of Breath. Rescue    aspirin (ECOTRIN) 81 MG EC tablet Take 1 tablet (81 mg total) by mouth once daily.    atorvastatin (LIPITOR) 40 MG tablet TAKE ONE TABLET BY MOUTH NIGHTLY    azelastine (ASTELIN) 137 mcg (0.1 %) nasal spray 1 spray by Nasal route 2 (two) times daily.    biotin 1 mg tablet Take 1,000 mcg by mouth 3 (three) times daily.    budesonide-formoterol 160-4.5 mcg (SYMBICORT) 160-4.5 mcg/actuation HFAA Inhale 2 puffs into the lungs every 12 (twelve) hours. Wash out mouth after using    carboxymethylcellulose (REFRESH PLUS) 0.5 % Dpet Place 1 drop into both eyes 3 (three) times daily as needed.    diclofenac sodium (VOLTAREN) 1 % Gel Apply 2 g topically once daily.    escitalopram oxalate (LEXAPRO) 10 MG tablet TAKE ONE-HALF TO ONE TABLET BY MOUTH EVERY DAY    flecainide (TAMBOCOR) 100 MG Tab TAKE ONE TABLET BY MOUTH TWICE DAILY STARTING SUNDAY. (DISCONTINUE RYTHMOL)    fluticasone (FLONASE) 50 mcg/actuation nasal spray 2 sprays by Each Nare route once daily.    furosemide (LASIX) 20 MG tablet Take 1 tablet (20 mg total) by mouth 2 (two) times daily. As of 8/4/17    nebivolol (BYSTOLIC) 5 MG Tab TAKE ONE TABLET BY MOUTH TWICE DAILY    nitroGLYCERIN 0.2 mg/hr TD PT24 (NITRODUR) 0.2 mg/hr Place 1 patch onto the skin once daily.    pantoprazole (PROTONIX) 40 MG tablet Take 1 tablet (40 mg total) by mouth 2 (two) times daily. As of 8/4/17    phenobarb-hyoscy-atropine-scop  (BELLADONNA-PHENOBARBITAL) 16.2-0.1037 -0.0194 mg/5 mL Elix Take 5 mLs by mouth daily as needed (abdominal pain).    potassium chloride (KLOR-CON) 10 MEQ TbSR Take 1 tablet (10 mEq total) by mouth once daily.    pramoxine 1 % Foam Place rectally 3 (three) times daily as needed.    RESTASIS 0.05 % ophthalmic emulsion INSTILL ONE DROP INTO EACH EYE TWICE DAILY    rivaroxaban (XARELTO) 20 mg Tab Take 1 tablet (20 mg total) by mouth daily with dinner or evening meal.    vitamin D 1000 units Tab Take 2,000 Units by mouth once daily.    ambrisentan (LETAIRIS) 5 MG Tab Take 1 tablet (5 mg total) by mouth once daily.    nitroGLYCERIN (NITROSTAT) 0.4 MG SL tablet Place 1 tablet (0.4 mg total) under the tongue every 5 (five) minutes as needed for Chest pain.    PREMARIN vaginal cream PLACE 1 GRAM VAGINALLY TWICE A WEEK    triamcinolone acetonide 0.1% (KENALOG) 0.1 % ointment AAA bid prn    [DISCONTINUED] guaifenesin (MUCINEX) 600 mg 12 hr tablet Take 2 tablets (1,200 mg total) by mouth 2 (two) times daily. As needed for flares    [DISCONTINUED] hydroxychloroquine (PLAQUENIL) 200 mg tablet Take 200 mg by mouth once daily.     Family History     Problem Relation (Age of Onset)    Alzheimer's disease Mother    Cancer Father, Brother, Paternal Grandmother, Paternal Grandfather    Heart disease Maternal Uncle    Hyperlipidemia Mother        Social History Main Topics    Smoking status: Former Smoker     Packs/day: 0.50     Years: 25.00     Quit date: 12/17/1988    Smokeless tobacco: Never Used    Alcohol use 0.0 oz/week      Comment: wine occasionally    Drug use: No    Sexual activity: Yes     Partners: Male     Review of Systems   Constitutional: Positive for fatigue. Negative for chills and fever.   HENT: Negative for congestion, rhinorrhea and sinus pressure.    Respiratory: Negative for apnea, cough, choking, chest tightness, shortness of breath, wheezing and stridor.    Cardiovascular: Negative for chest  pain, palpitations and leg swelling.   Gastrointestinal: Positive for abdominal distention, anal bleeding and rectal pain. Negative for abdominal pain, diarrhea, nausea and vomiting.   Endocrine: Negative for cold intolerance and heat intolerance.   Genitourinary: Negative for difficulty urinating and hematuria.   Musculoskeletal: Negative for arthralgias and joint swelling.   Skin: Negative for color change, pallor and rash.   Neurological: Positive for weakness. Negative for dizziness, seizures, numbness and headaches.   Psychiatric/Behavioral: Negative for agitation. The patient is not nervous/anxious.      Objective:     Vital Signs (Most Recent):  Temp: 98 °F (36.7 °C) (05/19/18 0658)  Pulse: (!) 57 (05/19/18 1300)  Resp: 19 (05/19/18 1300)  BP: (!) 174/72 (05/19/18 1300)  SpO2: 99 % (05/19/18 1300) Vital Signs (24h Range):  Temp:  [98 °F (36.7 °C)] 98 °F (36.7 °C)  Pulse:  [55-85] 57  Resp:  [15-24] 19  SpO2:  [91 %-99 %] 99 %  BP: (152-188)/(63-77) 174/72     Weight: 53.5 kg (118 lb)  Body mass index is 21.58 kg/m².    Physical Exam   Constitutional: She is oriented to person, place, and time. No distress.   HENT:   Head: Normocephalic and atraumatic.   Eyes: Right eye exhibits no discharge. Left eye exhibits no discharge.   Cardiovascular: Exam reveals no gallop and no friction rub.    Murmur heard.   Systolic murmur is present   Pulmonary/Chest: No respiratory distress. She has no wheezes. She has no rales. She exhibits no tenderness.   Abdominal: She exhibits distension. She exhibits no mass. There is no tenderness. There is no rebound and no guarding. No hernia.   Musculoskeletal: She exhibits no edema or deformity.   Neurological: She is alert and oriented to person, place, and time.   Skin: She is not diaphoretic.   Nursing note and vitals reviewed.          Significant Labs:   CBC:   Recent Labs  Lab 05/19/18  0745 05/19/18  1212   WBC 10.47  --    HGB 9.6* 9.2*   HCT 30.2* 29.6*   *  --       CMP:   Recent Labs  Lab 05/19/18  0745      K 5.3*      CO2 28   *   BUN 34*   CREATININE 1.6*   CALCIUM 9.7   PROT 7.1   ALBUMIN 3.1*   BILITOT 0.9   ALKPHOS 352*   AST 56*   ALT 45*   ANIONGAP 9   EGFRNONAA 30*       Significant Imaging:     Imaging Results          X-Ray Lumbar Spine 2 Or 3 Views (Final result)  Result time 05/19/18 12:42:24    Final result by Wyatt Powell MD (05/19/18 12:42:24)                 Impression:      Lumbar spine is intact and in good alignment.  Some mild chronic degenerative change is present.      Electronically signed by: Bimal Powell MD  Date:    05/19/2018  Time:    12:42             Narrative:    EXAMINATION:  XR LUMBAR SPINE 2 OR 3 VIEWS    CLINICAL HISTORY:  Low back pain, risk factors (osteoporosis or chronic steroid use or elderly);    TECHNIQUE:  AP, lateral and spot images were performed of the lumbar spine.    COMPARISON:  None    FINDINGS:  The lumbar spine appears intact in good alignment.  Some mild disc space narrowing with spurring is present at the L2-3 and L3-4 levels.                               X-Ray Hip 2 or 3 views Right (Final result)  Result time 05/19/18 12:43:01    Final result by Wyatt Powell MD (05/19/18 12:43:01)                 Impression:      Negative exam.      Electronically signed by: Bimal Powell MD  Date:    05/19/2018  Time:    12:43             Narrative:    EXAMINATION:  XR HIP 2 VIEW RIGHT    CLINICAL HISTORY:  right hip pain;    TECHNIQUE:  Standard radiography performed.    COMPARISON:  None    FINDINGS:  Bone density and architecture are normal.  No acute findings.                               CT Abdomen Pelvis  Without Contrast (Final result)  Result time 05/19/18 08:50:47    Final result by Jayjay Roberts MD (05/19/18 08:50:47)                 Impression:      1. Moderate to large volume of ascites throughout the abdomen and pelvis.  2. Cardiomegaly.  3. No bowel obstruction.  All CT scans at  this facility use dose modulation, iterative reconstruction and/or weight based dosing when appropriate to reduce radiation dose to as low as reasonably achievable.      Electronically signed by: Jayjay Roberts MD  Date:    05/19/2018  Time:    08:50             Narrative:    EXAMINATION:  CT ABDOMEN PELVIS WITHOUT CONTRAST    CLINICAL HISTORY:  Rectal bleeding 7 onset 3 days ago.    TECHNIQUE:  Axial CT images performed through the abdomen and pelvis without intravenous contrast. Multiplanar reformats were performed and interpreted.    COMPARISON:  CT abdomen and pelvis, 01/16/2018.    FINDINGS:  There is cardiomegaly.  There is centrilobular emphysema in the lung bases and bilateral basilar mild atelectasis.    No urolithiasis, hydronephrosis, or perinephric stranding.    There is no bowel obstruction.  No pneumoperitoneum.    There is a moderate to large volume of ascites in the abdomen and pelvis.    The urinary bladder is unremarkable. The patient has had a hysterectomy.  There is marked calcific atherosclerotic plaque throughout the abdominal aorta.    No significant osseous abnormality is identified.

## 2018-05-19 NOTE — H&P
"[facility] Hospital Medicine  History & Physical    Patient Name: Marcel Montalvo  MRN: [id number]  Admission Date: 5/19/2018  Attending Physician: Moy Sanderson MD  Primary Care Provider: Alexandra Moreno MD         Patient information was obtained from patient, spouse/SO and ER records.     Subjective:     Principal Problem:Rectal bleeding    Chief Complaint:   Chief Complaint   Patient presents with    Rectal Bleeding     pt states, "I've been bleeding for 3 days, I thought it was hemorrhoids," with associated rectal pain; pt c/o blood noted in toilet any time after using restroom, pt states she takes xarelto        HPI: Ms Montalvo is a 78 year old female with PMHx of CAD, CABG, A Fib on Xarelto, pulmonary HTN on home O 2 at 2 L, fatty liver disease and abnormal LFT. She presented to [facility] with complaints of rectal bleeding that started about 3 days ago. Initially thought it was hemorrhoids. Associated symptoms include mild dizziness with position changes. Patient reports have lower back pain radiating to Rt hip as well with difficult walking.  Denies associated symptoms of chest pain, shortness of breath, fever, chills, nausea, vomiting or syncope. Vital signs stable. Last episode of rectal bleeding was this morning.  Patient reports has not taken Xarelto or atorvastatin in 2-3 days. She has continued to take ASA. CT of Abdomen with contrast showed moderate to large volume of ascites throughout the abdomen and pelvis, cardiomegaly and no bowel obstruction. Two months ago H & H 10.6/35.4 today H & H 9.6/30.2. Case reviewed with Dr De La Fuente, will continue monitor for now.       Past Medical History:   Diagnosis Date    *Atrial fibrillation     AF (atrial fibrillation)     Palpation: atrial fibrillation, on Coumadin and followed by cardiologist.    Anticoagulant long-term use     Anxiety     Aortic insufficiency 3/19/2014    Aortic regurgitation     Aortic regurgitation/tricuspid regurgitation " per MARIO in April 2007.     Asthma     Carotid artery stenosis     CEA on the left by Dr. Maciel    Coronary artery disease     status post CABG X 3.     Diabetes mellitus     GERD (gastroesophageal reflux disease)     H. pylori infection     treated    Hyperlipidemia     intolerant to statins.    Hypertension 8/7/2013    IBS (irritable bowel syndrome)     Leg pain 3/19/2014    Long-term (current) use of anticoagulants 8/7/2013    Osteoarthritis     Osteoarthritis     Osteopenia     DEXA scan done on 06/20/12     Pancreatitis     resolved    S/P CABG (coronary artery bypass graft) 8/7/2013    S/P carotid endarterectomy 8/7/2013    S/P PTCA (percutaneous transluminal coronary angioplasty) 3/19/2014    Sjogren's syndrome     Stroke     Vitamin D deficiency disease        Past Surgical History:   Procedure Laterality Date    ABDOMINAL SURGERY      APPENDECTOMY      bilateral cataract surgery      CARPAL TUNNEL RELEASE      RT    CATARACT EXTRACTION      CEA      left    CHOLECYSTECTOMY      CORONARY ARTERY BYPASS GRAFT      2 stents    EYE SURGERY      HYSTERECTOMY      TONSILLECTOMY         Review of patient's allergies indicates:   Allergen Reactions    Codeine Nausea And Vomiting    Lisinopril      Other reaction(s): cough    Pacerone [amiodarone] Nausea And Vomiting    Sulfa (sulfonamide antibiotics) Nausea And Vomiting    Celecoxib Palpitations    Ciprofloxacin Hives, Itching and Rash    Colesevelam Rash and Nausea And Vomiting    Doxycycline Rash    Statins-hmg-coa reductase inhibitors Rash     Myalgia(can take atorvastatin ok -no rhabdo)per nurse josé antonio and patient  / stomach upset       Current Facility-Administered Medications on File Prior to Encounter   Medication    denosumab (PROLIA) injection 60 mg     Current Outpatient Prescriptions on File Prior to Encounter   Medication Sig    albuterol (PROAIR HFA) 90 mcg/actuation inhaler Inhale 2 puffs into the lungs every 6  (six) hours as needed.    albuterol (PROVENTIL) 2.5 mg /3 mL (0.083 %) nebulizer solution Take 3 mLs (2.5 mg total) by nebulization every 6 (six) hours as needed for Wheezing or Shortness of Breath. Rescue    aspirin (ECOTRIN) 81 MG EC tablet Take 1 tablet (81 mg total) by mouth once daily.    atorvastatin (LIPITOR) 40 MG tablet TAKE ONE TABLET BY MOUTH NIGHTLY    azelastine (ASTELIN) 137 mcg (0.1 %) nasal spray 1 spray by Nasal route 2 (two) times daily.    biotin 1 mg tablet Take 1,000 mcg by mouth 3 (three) times daily.    budesonide-formoterol 160-4.5 mcg (SYMBICORT) 160-4.5 mcg/actuation HFAA Inhale 2 puffs into the lungs every 12 (twelve) hours. Wash out mouth after using    carboxymethylcellulose (REFRESH PLUS) 0.5 % Dpet Place 1 drop into both eyes 3 (three) times daily as needed.    diclofenac sodium (VOLTAREN) 1 % Gel Apply 2 g topically once daily.    escitalopram oxalate (LEXAPRO) 10 MG tablet TAKE ONE-HALF TO ONE TABLET BY MOUTH EVERY DAY    flecainide (TAMBOCOR) 100 MG Tab TAKE ONE TABLET BY MOUTH TWICE DAILY STARTING SUNDAY. (DISCONTINUE RYTHMOL)    fluticasone (FLONASE) 50 mcg/actuation nasal spray 2 sprays by Each Nare route once daily.    furosemide (LASIX) 20 MG tablet Take 1 tablet (20 mg total) by mouth 2 (two) times daily. As of 8/4/17    nebivolol (BYSTOLIC) 5 MG Tab TAKE ONE TABLET BY MOUTH TWICE DAILY    nitroGLYCERIN 0.2 mg/hr TD PT24 (NITRODUR) 0.2 mg/hr Place 1 patch onto the skin once daily.    pantoprazole (PROTONIX) 40 MG tablet Take 1 tablet (40 mg total) by mouth 2 (two) times daily. As of 8/4/17    phenobarb-hyoscy-atropine-scop (BELLADONNA-PHENOBARBITAL) 16.2-0.1037 -0.0194 mg/5 mL Elix Take 5 mLs by mouth daily as needed (abdominal pain).    potassium chloride (KLOR-CON) 10 MEQ TbSR Take 1 tablet (10 mEq total) by mouth once daily.    pramoxine 1 % Foam Place rectally 3 (three) times daily as needed.    RESTASIS 0.05 % ophthalmic emulsion INSTILL ONE DROP INTO  EACH EYE TWICE DAILY    rivaroxaban (XARELTO) 20 mg Tab Take 1 tablet (20 mg total) by mouth daily with dinner or evening meal.    vitamin D 1000 units Tab Take 2,000 Units by mouth once daily.    ambrisentan (LETAIRIS) 5 MG Tab Take 1 tablet (5 mg total) by mouth once daily.    nitroGLYCERIN (NITROSTAT) 0.4 MG SL tablet Place 1 tablet (0.4 mg total) under the tongue every 5 (five) minutes as needed for Chest pain.    PREMARIN vaginal cream PLACE 1 GRAM VAGINALLY TWICE A WEEK    triamcinolone acetonide 0.1% (KENALOG) 0.1 % ointment AAA bid prn    [DISCONTINUED] guaifenesin (MUCINEX) 600 mg 12 hr tablet Take 2 tablets (1,200 mg total) by mouth 2 (two) times daily. As needed for flares    [DISCONTINUED] hydroxychloroquine (PLAQUENIL) 200 mg tablet Take 200 mg by mouth once daily.     Family History     Problem Relation (Age of Onset)    Alzheimer's disease Mother    Cancer Father, Brother, Paternal Grandmother, Paternal Grandfather    Heart disease Maternal Uncle    Hyperlipidemia Mother        Social History Main Topics    Smoking status: Former Smoker     Packs/day: 0.50     Years: 25.00     Quit date: 12/17/1988    Smokeless tobacco: Never Used    Alcohol use 0.0 oz/week      Comment: wine occasionally    Drug use: No    Sexual activity: Yes     Partners: Male     Review of Systems   Constitutional: Positive for fatigue. Negative for chills and fever.   HENT: Negative for congestion, rhinorrhea and sinus pressure.    Respiratory: Negative for apnea, cough, choking, chest tightness, shortness of breath, wheezing and stridor.    Cardiovascular: Negative for chest pain, palpitations and leg swelling.   Gastrointestinal: Positive for abdominal distention, anal bleeding and rectal pain. Negative for abdominal pain, diarrhea, nausea and vomiting.   Endocrine: Negative for cold intolerance and heat intolerance.   Genitourinary: Negative for difficulty urinating and hematuria.   Musculoskeletal: Negative for  arthralgias and joint swelling.   Skin: Negative for color change, pallor and rash.   Neurological: Positive for weakness. Negative for dizziness, seizures, numbness and headaches.   Psychiatric/Behavioral: Negative for agitation. The patient is not nervous/anxious.      Objective:     Vital Signs (Most Recent):  Temp: 98 °F (36.7 °C) (05/19/18 0658)  Pulse: (!) 57 (05/19/18 1300)  Resp: 19 (05/19/18 1300)  BP: (!) 174/72 (05/19/18 1300)  SpO2: 99 % (05/19/18 1300) Vital Signs (24h Range):  Temp:  [98 °F (36.7 °C)] 98 °F (36.7 °C)  Pulse:  [55-85] 57  Resp:  [15-24] 19  SpO2:  [91 %-99 %] 99 %  BP: (152-188)/(63-77) 174/72     Weight: 53.5 kg (118 lb)  Body mass index is 21.58 kg/m².    Physical Exam   Constitutional: She is oriented to person, place, and time. No distress.   HENT:   Head: Normocephalic and atraumatic.   Eyes: Right eye exhibits no discharge. Left eye exhibits no discharge.   Cardiovascular: Exam reveals no gallop and no friction rub.    Murmur heard.   Systolic murmur is present   Pulmonary/Chest: No respiratory distress. She has no wheezes. She has no rales. She exhibits no tenderness.   Abdominal: She exhibits distension. She exhibits no mass. There is no tenderness. There is no rebound and no guarding. No hernia.   Musculoskeletal: She exhibits no edema or deformity.   Neurological: She is alert and oriented to person, place, and time.   Skin: She is not diaphoretic.   Nursing note and vitals reviewed.          Significant Labs:   CBC:   Recent Labs  Lab 05/19/18  0745 05/19/18  1212   WBC 10.47  --    HGB 9.6* 9.2*   HCT 30.2* 29.6*   *  --      CMP:   Recent Labs  Lab 05/19/18  0745      K 5.3*      CO2 28   *   BUN 34*   CREATININE 1.6*   CALCIUM 9.7   PROT 7.1   ALBUMIN 3.1*   BILITOT 0.9   ALKPHOS 352*   AST 56*   ALT 45*   ANIONGAP 9   EGFRNONAA 30*       Significant Imaging:     Imaging Results          X-Ray Lumbar Spine 2 Or 3 Views (Final result)  Result time  05/19/18 12:42:24    Final result by Wyatt Powell MD (05/19/18 12:42:24)                 Impression:      Lumbar spine is intact and in good alignment.  Some mild chronic degenerative change is present.      Electronically signed by: Bimal Powell MD  Date:    05/19/2018  Time:    12:42             Narrative:    EXAMINATION:  XR LUMBAR SPINE 2 OR 3 VIEWS    CLINICAL HISTORY:  Low back pain, risk factors (osteoporosis or chronic steroid use or elderly);    TECHNIQUE:  AP, lateral and spot images were performed of the lumbar spine.    COMPARISON:  None    FINDINGS:  The lumbar spine appears intact in good alignment.  Some mild disc space narrowing with spurring is present at the L2-3 and L3-4 levels.                               X-Ray Hip 2 or 3 views Right (Final result)  Result time 05/19/18 12:43:01    Final result by Wyatt Powell MD (05/19/18 12:43:01)                 Impression:      Negative exam.      Electronically signed by: Bimal Powell MD  Date:    05/19/2018  Time:    12:43             Narrative:    EXAMINATION:  XR HIP 2 VIEW RIGHT    CLINICAL HISTORY:  right hip pain;    TECHNIQUE:  Standard radiography performed.    COMPARISON:  None    FINDINGS:  Bone density and architecture are normal.  No acute findings.                               CT Abdomen Pelvis  Without Contrast (Final result)  Result time 05/19/18 08:50:47    Final result by Jayjay Roberts MD (05/19/18 08:50:47)                 Impression:      1. Moderate to large volume of ascites throughout the abdomen and pelvis.  2. Cardiomegaly.  3. No bowel obstruction.  All CT scans at this facility use dose modulation, iterative reconstruction and/or weight based dosing when appropriate to reduce radiation dose to as low as reasonably achievable.      Electronically signed by: Jayjay Roberts MD  Date:    05/19/2018  Time:    08:50             Narrative:    EXAMINATION:  CT ABDOMEN PELVIS WITHOUT CONTRAST    CLINICAL HISTORY:  Rectal  bleeding 7 onset 3 days ago.    TECHNIQUE:  Axial CT images performed through the abdomen and pelvis without intravenous contrast. Multiplanar reformats were performed and interpreted.    COMPARISON:  CT abdomen and pelvis, 01/16/2018.    FINDINGS:  There is cardiomegaly.  There is centrilobular emphysema in the lung bases and bilateral basilar mild atelectasis.    No urolithiasis, hydronephrosis, or perinephric stranding.    There is no bowel obstruction.  No pneumoperitoneum.    There is a moderate to large volume of ascites in the abdomen and pelvis.    The urinary bladder is unremarkable. The patient has had a hysterectomy.  There is marked calcific atherosclerotic plaque throughout the abdominal aorta.    No significant osseous abnormality is identified.                              Assessment/Plan:     * Rectal bleeding    Admit to Observation  Serial H & H   IV fluids   Consult GI   Occult stools   Type & Screen   Hold Xarelto and ASA   Protonix 40 mg IV daily           Chronic atrial fibrillation    Continue Flecainide and B blocker  Hold Xarelto and ASA due to bleeding   Cardilogy consult due to holding anticoagulation          DOMINGO (acute kidney injury)    Creatinine 1.6 today   Potassium 5.3   1 Liter fluid bolus given in ED   Hold home dose Lasix and continue IVF   Recheck potassium level           Essential hypertension      Continue B blocker        Coronary artery disease involving coronary bypass graft without angina pectoris      Continue B Blocker and Flecainide   Hold ASA and Xarelto due to bleeding         Pulmonary HTN      Continue ambrisentan         Type 2 diabetes mellitus without complication      Accu check ACHS   Low dose SS         On anticoagulant therapy      Xarelto on hold due to bleeding         Right hip pain    X ray of lower and Rt hip   Patient has multiple allergies to pain medications  Tylenol   Continue to monitor for now   Can follow up PCP or orthopedic pending results                Low back pain    X ray of lower and Rt hip   Patient has multiple allergies to pain medications  Tylenol   Continue to monitor for now           Abnormal liver enzymes    Monitor CmP daily                           VTE Risk Mitigation         Ordered     Place sequential compression device  Until discontinued      05/19/18 1311             Bob Morgan NP  Department of Hospital Medicine   [facility]

## 2018-05-19 NOTE — ASSESSMENT & PLAN NOTE
Creatinine 1.6 today   Potassium 5.3   Hold home dose Lasix and continue IVF   Recheck potassium level

## 2018-05-19 NOTE — HPI
Ms Montalvo is a 78 year old female with PMHx of CAD, CABG, A Fib on Xarelto, pulmonary HTN on home O 2 at 2 L, fatty liver disease and abnormal LFT. She presented to [facility] with complaints of rectal bleeding that started about 3 days ago. Initially thought it was hemorrhoids. Associated symptoms include mild dizziness with position changes. Patient reports have lower back pain radiating to Lt hip as well with difficult walking.  Denies associated symptoms of chest pain, shortness of breath, fever, chills, nausea, vomiting or syncope. Vital signs stable. Last episode of rectal bleeding was this morning.  Patient reports has not taken Xarelto or atorvastatin in 2-3 days. She has continued to take ASA. CT of Abdomen with contrast showed moderate to large volume of ascites throughout the abdomen and pelvis, cardiomegaly and no bowel obstruction. Two months ago H & H 10.6/35.4 today H & H 9.6/30.2. Case reviewed with Dr De La Fuente, will continue monitor for now.

## 2018-05-20 PROBLEM — R18.8 OTHER ASCITES: Status: ACTIVE | Noted: 2018-01-01

## 2018-05-20 PROBLEM — E87.5 HYPERKALEMIA: Status: ACTIVE | Noted: 2018-01-01

## 2018-05-20 PROBLEM — M25.551 RIGHT HIP PAIN: Status: RESOLVED | Noted: 2018-01-01 | Resolved: 2018-01-01

## 2018-05-20 PROBLEM — M54.50 LOW BACK PAIN: Status: RESOLVED | Noted: 2018-01-01 | Resolved: 2018-01-01

## 2018-05-20 NOTE — ASSESSMENT & PLAN NOTE
- Continue Flecainide and B blocker  - Continue Xarelto and ASA post paracentesis tomorrow, 05/21/18  - Cardiology following -- Ok to re-start anticoagulation, bleeding likely secondary to hemorrhoids

## 2018-05-20 NOTE — PROGRESS NOTES
[facility] Hospital Medicine  Progress Note    Patient Name: Marcel Montalvo  MRN: [id number]  Patient Class: OP- Observation   Admission Date: 5/19/2018  Length of Stay: 0 days  Attending Physician: Moy Sanderson MD  Primary Care Provider: Alexandra Moreno MD        Subjective:     Principal Problem:Rectal bleeding    HPI:  Ms Montalvo is a 78 year old female with PMHx of CAD, CABG, A Fib on Xarelto, pulmonary HTN on home O 2 at 2 L, fatty liver disease and abnormal LFT. She presented to [facility] with complaints of rectal bleeding that started about 3 days ago. Initially thought it was hemorrhoids. Associated symptoms include mild dizziness with position changes. Patient reports have lower back pain radiating to Lt hip as well with difficult walking.  Denies associated symptoms of chest pain, shortness of breath, fever, chills, nausea, vomiting or syncope. Vital signs stable. Last episode of rectal bleeding was this morning.  Patient reports has not taken Xarelto or atorvastatin in 2-3 days. She has continued to take ASA. CT of Abdomen with contrast showed moderate to large volume of ascites throughout the abdomen and pelvis, cardiomegaly and no bowel obstruction. Two months ago H & H 10.6/35.4 today H & H 9.6/30.2. Case reviewed with Dr De La Fuente, will continue monitor for now.       Hospital Course:  Marcel Montalvo is a 78 year old female admitted for Rectal bleeding. PMHx of A-fib on Xarelto and ASA. Pt reports she last took Xarelto Tuesday/Wednesday of last week, pt reports stopping when she noticed blood in her stool. Hgb/Hct upon admit 9.6/30.2. GI and Cardiology consulted. Rectal bleeding likely secondary to hemorrhoids given the presentation and stable Hgb. GI recommended starting Anusol supporitories BID and daily Colace. Unsedated flex sig is an option to exclude other etiologies for rectal bleeding. Colonoscopy had been recently deferred to due co morbidities. CT abdomen/pelvis with contrast showed  moderate ascites. Recent US was negative for fluid so paracentesis was not performed. Diagnostic paracentesis to be performed tomorrow, 05/21/18. Hgb/Hct has remained stable, anticoagulation will be resumed once paracentesis done.      Review of Systems   Constitutional: Positive for activity change.   HENT: Negative.    Eyes: Negative.    Respiratory: Negative for apnea, cough, choking, chest tightness, shortness of breath, wheezing and stridor.    Cardiovascular: Negative for chest pain, palpitations and leg swelling.   Gastrointestinal: Positive for blood in stool. Negative for abdominal pain, diarrhea, nausea and vomiting.   Endocrine: Negative.    Genitourinary: Negative.    Musculoskeletal: Negative.    Skin: Negative.    Allergic/Immunologic: Negative.    Neurological: Negative for dizziness, tremors, seizures, syncope, facial asymmetry, speech difficulty, weakness, light-headedness, numbness and headaches.   Hematological: Negative.    Psychiatric/Behavioral: Negative.      Objective:     Vital Signs (Most Recent):  Temp: 97.7 °F (36.5 °C) (05/20/18 1158)  Pulse: (!) 54 (05/20/18 1158)  Resp: 18 (05/20/18 1158)  BP: 138/60 (05/20/18 1158)  SpO2: (!) 92 % (05/20/18 1158) Vital Signs (24h Range):  Temp:  [97.5 °F (36.4 °C)-98.2 °F (36.8 °C)] 97.7 °F (36.5 °C)  Pulse:  [52-72] 54  Resp:  [18] 18  SpO2:  [92 %-97 %] 92 %  BP: (121-163)/(56-71) 138/60     Weight: 56 kg (123 lb 7.3 oz)  Body mass index is 22.58 kg/m².    Intake/Output Summary (Last 24 hours) at 05/20/18 1542  Last data filed at 05/20/18 0900   Gross per 24 hour   Intake             2010 ml   Output             1250 ml   Net              760 ml      Physical Exam   Constitutional: She is oriented to person, place, and time. She appears well-developed and well-nourished. No distress.   HENT:   Head: Normocephalic and atraumatic.   Eyes: EOM are normal.   Neck: Normal range of motion. Neck supple.   Cardiovascular: Intact distal pulses.  Bradycardia  present.    Murmur heard.  Pulmonary/Chest: Effort normal and breath sounds normal. No respiratory distress. She has no wheezes. She has no rales. She exhibits no tenderness.   Abdominal: Soft. Bowel sounds are normal. She exhibits distension. There is no tenderness. There is no rebound and no guarding.   Genitourinary:   Genitourinary Comments: Deferred   Musculoskeletal: Normal range of motion.   Neurological: She is alert and oriented to person, place, and time. No sensory deficit.   Skin: Skin is warm and dry. Capillary refill takes less than 2 seconds.   Psychiatric: She has a normal mood and affect. Her behavior is normal. Judgment and thought content normal.   Nursing note and vitals reviewed.      Significant Labs:   CBC:   Recent Labs  Lab 05/19/18  0745  05/20/18  0011 05/20/18  0409 05/20/18  1123   WBC 10.47  --   --  11.20  --    HGB 9.6*  < > 9.2* 9.2*  9.2* 8.7*   HCT 30.2*  < > 29.0* 29.3*  29.3* 28.0*   *  --   --  329  --    < > = values in this interval not displayed.  CMP:   Recent Labs  Lab 05/19/18  0745 05/19/18  1333 05/20/18  0409     --  136   K 5.3* 4.9 5.4*     --  106   CO2 28  --  18*   *  --  121*   BUN 34*  --  30*   CREATININE 1.6*  --  1.3   CALCIUM 9.7  --  9.1   PROT 7.1  --  7.0   ALBUMIN 3.1*  --  2.9*   BILITOT 0.9  --  1.2*   ALKPHOS 352*  --  327*   AST 56*  --  67*   ALT 45*  --  41   ANIONGAP 9  --  12   EGFRNONAA 30*  --  39*     POCT Glucose:   Recent Labs  Lab 05/19/18  1359 05/19/18  2103 05/20/18  0554   POCTGLUCOSE 124* 134* 110       Significant Imaging:   Imaging Results          X-Ray Lumbar Spine 2 Or 3 Views (Final result)  Result time 05/19/18 12:42:24    Final result by Wyatt Powell MD (05/19/18 12:42:24)                 Impression:      Lumbar spine is intact and in good alignment.  Some mild chronic degenerative change is present.      Electronically signed by: Bimal Powell MD  Date:    05/19/2018  Time:    12:42              Narrative:    EXAMINATION:  XR LUMBAR SPINE 2 OR 3 VIEWS    CLINICAL HISTORY:  Low back pain, risk factors (osteoporosis or chronic steroid use or elderly);    TECHNIQUE:  AP, lateral and spot images were performed of the lumbar spine.    COMPARISON:  None    FINDINGS:  The lumbar spine appears intact in good alignment.  Some mild disc space narrowing with spurring is present at the L2-3 and L3-4 levels.                               X-Ray Hip 2 or 3 views Right (Final result)  Result time 05/19/18 12:43:01    Final result by Wyatt Powell MD (05/19/18 12:43:01)                 Impression:      Negative exam.      Electronically signed by: Bimal Powell MD  Date:    05/19/2018  Time:    12:43             Narrative:    EXAMINATION:  XR HIP 2 VIEW RIGHT    CLINICAL HISTORY:  right hip pain;    TECHNIQUE:  Standard radiography performed.    COMPARISON:  None    FINDINGS:  Bone density and architecture are normal.  No acute findings.                               CT Abdomen Pelvis  Without Contrast (Final result)  Result time 05/19/18 08:50:47    Final result by Jayjay Roberts MD (05/19/18 08:50:47)                 Impression:      1. Moderate to large volume of ascites throughout the abdomen and pelvis.  2. Cardiomegaly.  3. No bowel obstruction.  All CT scans at this facility use dose modulation, iterative reconstruction and/or weight based dosing when appropriate to reduce radiation dose to as low as reasonably achievable.      Electronically signed by: Jayjay Roberts MD  Date:    05/19/2018  Time:    08:50             Narrative:    EXAMINATION:  CT ABDOMEN PELVIS WITHOUT CONTRAST    CLINICAL HISTORY:  Rectal bleeding 7 onset 3 days ago.    TECHNIQUE:  Axial CT images performed through the abdomen and pelvis without intravenous contrast. Multiplanar reformats were performed and interpreted.    COMPARISON:  CT abdomen and pelvis, 01/16/2018.    FINDINGS:  There is cardiomegaly.  There is centrilobular  emphysema in the lung bases and bilateral basilar mild atelectasis.    No urolithiasis, hydronephrosis, or perinephric stranding.    There is no bowel obstruction.  No pneumoperitoneum.    There is a moderate to large volume of ascites in the abdomen and pelvis.    The urinary bladder is unremarkable. The patient has had a hysterectomy.  There is marked calcific atherosclerotic plaque throughout the abdominal aorta.    No significant osseous abnormality is identified.                              Assessment/Plan:      * Rectal bleeding    - GI following  - Serial Hgb/Hct stable  - Rectal bleeding likely secondary to hemorrhoids given the presentation and stable Hgb. GI recommended starting Anusol supporitories BID and daily Colace. Unsedated flex sig is an option to exclude other etiologies for rectal bleeding.  - Continue PPI  - Resume Xarelto and ASA upon discharge        Hyperkalemia    - K+ 5.4  - Restarted diuretics due to ascites  - Telemetry monitor  - BMP in AM          Other ascites    - CT abdomen/pelvis with contrast showed moderate amt of ascites  - Will obtain diagnostic paracentesis tomorrow, 05/21/18  - Restart diuretics        Pulmonary HTN    - Continue ambrisentan         Abnormal liver enzymes    - Improving  - Will continue to monitor          DOMINGO (acute kidney injury)    - Improving  - Currently 1.3 from 1.6 s/p IVF  - D/C IVF  - Will restart Lasix due to ascites, will continue to monitor kidney function          Chronic atrial fibrillation    - Continue Flecainide and B blocker  - Continue Xarelto and ASA post paracentesis tomorrow, 05/21/18  - Cardiology following -- Ok to re-start anticoagulation, bleeding likely secondary to hemorrhoids          Essential hypertension    - Continue B blocker        Coronary artery disease involving coronary bypass graft without angina pectoris    - No complaints of angina  - Resume home medications  - Continue ASA and Xarelto post paracentesis tomorrow,  05/21/18        Type 2 diabetes mellitus without complication    - Accuchecks and low dose SSI          VTE Risk Mitigation         Ordered     Place sequential compression device  Until discontinued      05/19/18 5041              RADHIKA Curry  Department of Hospital Medicine   [facility]

## 2018-05-20 NOTE — ASSESSMENT & PLAN NOTE
- No complaints of angina  - Resume home medications  - Continue ASA and Xarelto post paracentesis tomorrow, 05/21/18

## 2018-05-20 NOTE — ASSESSMENT & PLAN NOTE
-CT of abdomen shower moderate to large amount of ascites within abdomen and pelvis  -Stop IV fluids  -Resume diuretics  -Discussed with GI and hospital medicine, diagnostic paracentesis planned for today

## 2018-05-20 NOTE — ASSESSMENT & PLAN NOTE
- Improving  - Currently 1.3 from 1.6 s/p IVF  - D/C IVF  - Will restart Lasix due to ascites, will continue to monitor kidney function

## 2018-05-20 NOTE — PLAN OF CARE
Problem: Patient Care Overview  Goal: Plan of Care Review  Outcome: Ongoing (interventions implemented as appropriate)  Pt AAO x 4   VSS.  Pt remained free of falls this shift.   Pt has no c/o of pain this shift.  Plan of care reviewed. Patient verbalizes understanding.    Patient Afib and bradycardic on monitor.   Bed low, side rails up x 2, wheels locked, call light in reach.   Patient instructed to call for assistance.   Hourly rounding completed.   Will continue to monitor.    2 episodes of rectal bleeding today.

## 2018-05-20 NOTE — ASSESSMENT & PLAN NOTE
Likely secondary to hemorrhoids given the presentation and stable Hgb.   Start Anusol Suppositories BID  Daily colace  Unsedated flex sig is an option to exclude other etiologies for rectal bleeding.   Colonoscopy had been recently deferred to due co morbidities.

## 2018-05-20 NOTE — ASSESSMENT & PLAN NOTE
- CT abdomen/pelvis with contrast showed moderate amt of ascites  - Will obtain diagnostic paracentesis tomorrow, 05/21/18  - Restart diuretics

## 2018-05-20 NOTE — ASSESSMENT & PLAN NOTE
- GI following  - Serial Hgb/Hct stable  - Rectal bleeding likely secondary to hemorrhoids given the presentation and stable Hgb. GI recommended starting Anusol supporitories BID and daily Colace. Unsedated flex sig is an option to exclude other etiologies for rectal bleeding.  - Continue PPI  - Resume Xarelto and ASA upon discharge

## 2018-05-20 NOTE — SUBJECTIVE & OBJECTIVE
Past Medical History:   Diagnosis Date    *Atrial fibrillation     AF (atrial fibrillation)     Palpation: atrial fibrillation, on Coumadin and followed by cardiologist.    Anticoagulant long-term use     Anxiety     Aortic insufficiency 3/19/2014    Aortic regurgitation     Aortic regurgitation/tricuspid regurgitation per MARIO in April 2007.     Asthma     Carotid artery stenosis     CEA on the left by Dr. Maciel    Coronary artery disease     status post CABG X 3.     Diabetes mellitus     GERD (gastroesophageal reflux disease)     H. pylori infection     treated    Hyperlipidemia     intolerant to statins.    Hypertension 8/7/2013    IBS (irritable bowel syndrome)     Leg pain 3/19/2014    Long-term (current) use of anticoagulants 8/7/2013    Osteoarthritis     Osteoarthritis     Osteopenia     DEXA scan done on 06/20/12     Pancreatitis     resolved    S/P CABG (coronary artery bypass graft) 8/7/2013    S/P carotid endarterectomy 8/7/2013    S/P PTCA (percutaneous transluminal coronary angioplasty) 3/19/2014    Sjogren's syndrome     Stroke     Vitamin D deficiency disease        Past Surgical History:   Procedure Laterality Date    ABDOMINAL SURGERY      APPENDECTOMY      bilateral cataract surgery      CARPAL TUNNEL RELEASE      RT    CATARACT EXTRACTION      CEA      left    CHOLECYSTECTOMY      CORONARY ARTERY BYPASS GRAFT      2 stents    EYE SURGERY      HYSTERECTOMY      TONSILLECTOMY         Review of patient's allergies indicates:   Allergen Reactions    Codeine Nausea And Vomiting    Lisinopril      Other reaction(s): cough    Pacerone [amiodarone] Nausea And Vomiting    Sulfa (sulfonamide antibiotics) Nausea And Vomiting    Celecoxib Palpitations    Ciprofloxacin Hives, Itching and Rash    Colesevelam Rash and Nausea And Vomiting    Doxycycline Rash    Statins-hmg-coa reductase inhibitors Rash     Myalgia(can take atorvastatin ok -no rhabdo)per nurse josé antonio  and patient  / stomach upset       No current facility-administered medications on file prior to encounter.      Current Outpatient Prescriptions on File Prior to Encounter   Medication Sig    albuterol (PROAIR HFA) 90 mcg/actuation inhaler Inhale 2 puffs into the lungs every 6 (six) hours as needed.    albuterol (PROVENTIL) 2.5 mg /3 mL (0.083 %) nebulizer solution Take 3 mLs (2.5 mg total) by nebulization every 6 (six) hours as needed for Wheezing or Shortness of Breath. Rescue    ambrisentan (LETAIRIS) 5 MG Tab Take 1 tablet (5 mg total) by mouth once daily.    aspirin (ECOTRIN) 81 MG EC tablet Take 1 tablet (81 mg total) by mouth once daily.    atorvastatin (LIPITOR) 40 MG tablet TAKE ONE TABLET BY MOUTH NIGHTLY    azelastine (ASTELIN) 137 mcg (0.1 %) nasal spray 1 spray by Nasal route 2 (two) times daily.    biotin 1 mg tablet Take 1,000 mcg by mouth 3 (three) times daily.    budesonide-formoterol 160-4.5 mcg (SYMBICORT) 160-4.5 mcg/actuation HFAA Inhale 2 puffs into the lungs every 12 (twelve) hours. Wash out mouth after using    carboxymethylcellulose (REFRESH PLUS) 0.5 % Dpet Place 1 drop into both eyes 3 (three) times daily as needed.    diclofenac sodium (VOLTAREN) 1 % Gel Apply 2 g topically once daily.    escitalopram oxalate (LEXAPRO) 10 MG tablet TAKE ONE-HALF TO ONE TABLET BY MOUTH EVERY DAY    flecainide (TAMBOCOR) 100 MG Tab TAKE ONE TABLET BY MOUTH TWICE DAILY STARTING SUNDAY. (DISCONTINUE RYTHMOL)    fluticasone (FLONASE) 50 mcg/actuation nasal spray 2 sprays by Each Nare route once daily.    furosemide (LASIX) 20 MG tablet Take 1 tablet (20 mg total) by mouth 2 (two) times daily. As of 8/4/17    nebivolol (BYSTOLIC) 5 MG Tab TAKE ONE TABLET BY MOUTH TWICE DAILY    nitroGLYCERIN 0.2 mg/hr TD PT24 (NITRODUR) 0.2 mg/hr Place 1 patch onto the skin once daily.    pantoprazole (PROTONIX) 40 MG tablet Take 1 tablet (40 mg total) by mouth 2 (two) times daily. As of 8/4/17     phenobarb-hyoscy-atropine-scop (BELLADONNA-PHENOBARBITAL) 16.2-0.1037 -0.0194 mg/5 mL Elix Take 5 mLs by mouth daily as needed (abdominal pain).    potassium chloride (KLOR-CON) 10 MEQ TbSR Take 1 tablet (10 mEq total) by mouth once daily.    pramoxine 1 % Foam Place rectally 3 (three) times daily as needed.    RESTASIS 0.05 % ophthalmic emulsion INSTILL ONE DROP INTO EACH EYE TWICE DAILY    rivaroxaban (XARELTO) 20 mg Tab Take 1 tablet (20 mg total) by mouth daily with dinner or evening meal.    vitamin D 1000 units Tab Take 2,000 Units by mouth once daily.    nitroGLYCERIN (NITROSTAT) 0.4 MG SL tablet Place 1 tablet (0.4 mg total) under the tongue every 5 (five) minutes as needed for Chest pain.    PREMARIN vaginal cream PLACE 1 GRAM VAGINALLY TWICE A WEEK    triamcinolone acetonide 0.1% (KENALOG) 0.1 % ointment AAA bid prn     Family History     Problem Relation (Age of Onset)    Alzheimer's disease Mother    Cancer Father, Brother, Paternal Grandmother, Paternal Grandfather    Heart disease Maternal Uncle    Hyperlipidemia Mother        Social History Main Topics    Smoking status: Former Smoker     Packs/day: 0.50     Years: 25.00     Quit date: 12/17/1988    Smokeless tobacco: Never Used    Alcohol use 0.0 oz/week      Comment: wine occasionally    Drug use: No    Sexual activity: Yes     Partners: Male     Review of Systems   Constitution: Positive for malaise/fatigue.   HENT: Negative.    Eyes: Negative.    Cardiovascular: Positive for dyspnea on exertion (chronic).   Respiratory: Positive for shortness of breath (chronic).    Endocrine: Negative.    Hematologic/Lymphatic: Bruises/bleeds easily.   Skin: Negative.    Musculoskeletal: Negative.    Gastrointestinal: Positive for hematochezia and hemorrhoids.   Genitourinary: Negative.    Neurological: Positive for dizziness.   Psychiatric/Behavioral: Negative.    Allergic/Immunologic: Negative.      Objective:     Vital Signs (Most Recent):  Temp:  97.9 °F (36.6 °C) (05/20/18 0745)  Pulse: (!) 52 (05/20/18 0901)  Resp: 18 (05/20/18 0748)  BP: (!) 163/71 (05/20/18 0745)  SpO2: 97 % (05/20/18 0748) Vital Signs (24h Range):  Temp:  [97.5 °F (36.4 °C)-98.5 °F (36.9 °C)] 97.9 °F (36.6 °C)  Pulse:  [52-72] 52  Resp:  [17-23] 18  SpO2:  [92 %-99 %] 97 %  BP: (117-183)/(56-76) 163/71     Weight: 56 kg (123 lb 7.3 oz)  Body mass index is 22.58 kg/m².    SpO2: 97 %  O2 Device (Oxygen Therapy): nasal cannula      Intake/Output Summary (Last 24 hours) at 05/20/18 1059  Last data filed at 05/20/18 0900   Gross per 24 hour   Intake             2010 ml   Output             1250 ml   Net              760 ml       Lines/Drains/Airways     Peripheral Intravenous Line                 Peripheral IV - Single Lumen 05/20/18 0230 Right Hand less than 1 day                Physical Exam   Constitutional: She is oriented to person, place, and time. She appears well-developed and well-nourished. No distress.   On O2   HENT:   Head: Normocephalic and atraumatic.   Eyes: Pupils are equal, round, and reactive to light. Right eye exhibits no discharge. Left eye exhibits no discharge.   Neck: Neck supple. JVD present.   Cardiovascular: S1 normal and S2 normal.  An irregularly irregular rhythm present. Bradycardia present.    Murmur heard.   Harsh midsystolic murmur is present  at the upper right sternal border radiating to the neck   Diastolic murmur is present with a grade of 1/6   Pulmonary/Chest: Effort normal.   Coarse BS at bases   Abdominal: Soft. She exhibits distension.   +ascites  +hepatomegaly   Musculoskeletal: She exhibits no edema.   Neurological: She is alert and oriented to person, place, and time.   Skin: Skin is warm and dry. She is not diaphoretic.   Psychiatric: She has a normal mood and affect. Her behavior is normal. Thought content normal.   Nursing note and vitals reviewed.      Significant Labs:   CMP   Recent Labs  Lab 05/19/18  0745 05/19/18  1333 05/20/18  0402      --  136   K 5.3* 4.9 5.4*     --  106   CO2 28  --  18*   *  --  121*   BUN 34*  --  30*   CREATININE 1.6*  --  1.3   CALCIUM 9.7  --  9.1   PROT 7.1  --  7.0   ALBUMIN 3.1*  --  2.9*   BILITOT 0.9  --  1.2*   ALKPHOS 352*  --  327*   AST 56*  --  67*   ALT 45*  --  41   ANIONGAP 9  --  12   ESTGFRAFRICA 35*  --  45*   EGFRNONAA 30*  --  39*   , CBC   Recent Labs  Lab 05/19/18  0745  05/19/18  1752 05/20/18  0011 05/20/18  0409   WBC 10.47  --   --   --  11.20   HGB 9.6*  < > 10.3* 9.2* 9.2*  9.2*   HCT 30.2*  < > 32.8* 29.0* 29.3*  29.3*   *  --   --   --  329   < > = values in this interval not displayed., Troponin No results for input(s): TROPONINI in the last 48 hours. and All pertinent lab results from the last 24 hours have been reviewed.    Significant Imaging: Echocardiogram:   2D echo with color flow doppler:   Results for orders placed or performed in visit on 08/16/17   2D Echo w/ Color Flow Doppler   Result Value Ref Range    EF 55 55 - 65    Mitral Valve Regurgitation MILD     Diastolic Dysfunction Yes (A)     Aortic Valve Regurgitation MILD     Est. PA Systolic Pressure 49.73 (A)     Tricuspid Valve Regurgitation MODERATE (A)     and X-Ray: CXR: X-Ray Chest 1 View (CXR): No results found for this visit on 05/19/18. and X-Ray Chest PA and Lateral (CXR): No results found for this visit on 05/19/18.

## 2018-05-20 NOTE — CONSULTS
"[facility]  Cardiology  Consult Note    Patient Name: Marcel Montalvo  MRN: [id number]  Admission Date: 5/19/2018  Hospital Length of Stay: 0 days  Code Status: Full Code   Attending Provider: Moy Sanderson MD   Consulting Provider: Maribel Michel PA-C  Primary Care Physician: Alexandra Moreno MD  Principal Problem:Rectal bleeding    Patient information was obtained from patient, past medical records and ER records.     Inpatient consult to Cardiology  Consult performed by: MARIBEL MICHEL.  Consult ordered by: AVTAR DAILEY        Subjective:     Chief Complaint: Rectal bleeding    HPI:   Ms. Montalvo is a 78 year old female patient with a PMHx of CAD s/p CAGB, atrial fibrillation (on Xarelto), pulmonary HTN (on supplemental O2), fatty liver, hyperlipidemia, and carotid artery disease who presented to [facility] ED yesterday with a chief complaint of bleeding with bowel movements for the past 3 days. Patient reported "moderate blood loss" that was bright red in color. Associated symptoms included mild dizziness and right sided back pain. Patient denied any associated SOB, chest pain, palpitations, near syncope, or syncope. Initial workup in ED revealed anemia (9.6/30/2) and patient subsequently admitted for further evaluation and treatment. Cardiology consulted to assist with management. Patient seen and examined today, resting in bed. Feels ok. Reports blood in stool yesterday while in hospital after a BM. No recurrence since that time. Complains of right lower back pain and abdominal bloating. Denies chest pain. Reports SOB seems at baseline. Compliant with her medications. Chart reviewed. H and H stable overnight. CT of abdomen showed moderate to large amount of ascites throughout abdomen and pelvis. GI consulted, note reviewed. Bleeding likely secondary to hemorrhoids, ok to resume anticoagulation. Diagnostic paracentesis planned for today. EKG this AM reviewed by Dr. Mcnally, probable sinus " arrhythmia with slow response.           Past Medical History:   Diagnosis Date    *Atrial fibrillation     AF (atrial fibrillation)     Palpation: atrial fibrillation, on Coumadin and followed by cardiologist.    Anticoagulant long-term use     Anxiety     Aortic insufficiency 3/19/2014    Aortic regurgitation     Aortic regurgitation/tricuspid regurgitation per MARIO in April 2007.     Asthma     Carotid artery stenosis     CEA on the left by Dr. Maciel    Coronary artery disease     status post CABG X 3.     Diabetes mellitus     GERD (gastroesophageal reflux disease)     H. pylori infection     treated    Hyperlipidemia     intolerant to statins.    Hypertension 8/7/2013    IBS (irritable bowel syndrome)     Leg pain 3/19/2014    Long-term (current) use of anticoagulants 8/7/2013    Osteoarthritis     Osteoarthritis     Osteopenia     DEXA scan done on 06/20/12     Pancreatitis     resolved    S/P CABG (coronary artery bypass graft) 8/7/2013    S/P carotid endarterectomy 8/7/2013    S/P PTCA (percutaneous transluminal coronary angioplasty) 3/19/2014    Sjogren's syndrome     Stroke     Vitamin D deficiency disease        Past Surgical History:   Procedure Laterality Date    ABDOMINAL SURGERY      APPENDECTOMY      bilateral cataract surgery      CARPAL TUNNEL RELEASE      RT    CATARACT EXTRACTION      CEA      left    CHOLECYSTECTOMY      CORONARY ARTERY BYPASS GRAFT      2 stents    EYE SURGERY      HYSTERECTOMY      TONSILLECTOMY         Review of patient's allergies indicates:   Allergen Reactions    Codeine Nausea And Vomiting    Lisinopril      Other reaction(s): cough    Pacerone [amiodarone] Nausea And Vomiting    Sulfa (sulfonamide antibiotics) Nausea And Vomiting    Celecoxib Palpitations    Ciprofloxacin Hives, Itching and Rash    Colesevelam Rash and Nausea And Vomiting    Doxycycline Rash    Statins-hmg-coa reductase inhibitors Rash     Myalgia(can take  atorvastatin ok -no rhabdo)per nurse josé antonio and patient  / stomach upset       No current facility-administered medications on file prior to encounter.      Current Outpatient Prescriptions on File Prior to Encounter   Medication Sig    albuterol (PROAIR HFA) 90 mcg/actuation inhaler Inhale 2 puffs into the lungs every 6 (six) hours as needed.    albuterol (PROVENTIL) 2.5 mg /3 mL (0.083 %) nebulizer solution Take 3 mLs (2.5 mg total) by nebulization every 6 (six) hours as needed for Wheezing or Shortness of Breath. Rescue    ambrisentan (LETAIRIS) 5 MG Tab Take 1 tablet (5 mg total) by mouth once daily.    aspirin (ECOTRIN) 81 MG EC tablet Take 1 tablet (81 mg total) by mouth once daily.    atorvastatin (LIPITOR) 40 MG tablet TAKE ONE TABLET BY MOUTH NIGHTLY    azelastine (ASTELIN) 137 mcg (0.1 %) nasal spray 1 spray by Nasal route 2 (two) times daily.    biotin 1 mg tablet Take 1,000 mcg by mouth 3 (three) times daily.    budesonide-formoterol 160-4.5 mcg (SYMBICORT) 160-4.5 mcg/actuation HFAA Inhale 2 puffs into the lungs every 12 (twelve) hours. Wash out mouth after using    carboxymethylcellulose (REFRESH PLUS) 0.5 % Dpet Place 1 drop into both eyes 3 (three) times daily as needed.    diclofenac sodium (VOLTAREN) 1 % Gel Apply 2 g topically once daily.    escitalopram oxalate (LEXAPRO) 10 MG tablet TAKE ONE-HALF TO ONE TABLET BY MOUTH EVERY DAY    flecainide (TAMBOCOR) 100 MG Tab TAKE ONE TABLET BY MOUTH TWICE DAILY STARTING SUNDAY. (DISCONTINUE RYTHMOL)    fluticasone (FLONASE) 50 mcg/actuation nasal spray 2 sprays by Each Nare route once daily.    furosemide (LASIX) 20 MG tablet Take 1 tablet (20 mg total) by mouth 2 (two) times daily. As of 8/4/17    nebivolol (BYSTOLIC) 5 MG Tab TAKE ONE TABLET BY MOUTH TWICE DAILY    nitroGLYCERIN 0.2 mg/hr TD PT24 (NITRODUR) 0.2 mg/hr Place 1 patch onto the skin once daily.    pantoprazole (PROTONIX) 40 MG tablet Take 1 tablet (40 mg total) by mouth 2  (two) times daily. As of 8/4/17    phenobarb-hyoscy-atropine-scop (BELLADONNA-PHENOBARBITAL) 16.2-0.1037 -0.0194 mg/5 mL Elix Take 5 mLs by mouth daily as needed (abdominal pain).    potassium chloride (KLOR-CON) 10 MEQ TbSR Take 1 tablet (10 mEq total) by mouth once daily.    pramoxine 1 % Foam Place rectally 3 (three) times daily as needed.    RESTASIS 0.05 % ophthalmic emulsion INSTILL ONE DROP INTO EACH EYE TWICE DAILY    rivaroxaban (XARELTO) 20 mg Tab Take 1 tablet (20 mg total) by mouth daily with dinner or evening meal.    vitamin D 1000 units Tab Take 2,000 Units by mouth once daily.    nitroGLYCERIN (NITROSTAT) 0.4 MG SL tablet Place 1 tablet (0.4 mg total) under the tongue every 5 (five) minutes as needed for Chest pain.    PREMARIN vaginal cream PLACE 1 GRAM VAGINALLY TWICE A WEEK    triamcinolone acetonide 0.1% (KENALOG) 0.1 % ointment AAA bid prn     Family History     Problem Relation (Age of Onset)    Alzheimer's disease Mother    Cancer Father, Brother, Paternal Grandmother, Paternal Grandfather    Heart disease Maternal Uncle    Hyperlipidemia Mother        Social History Main Topics    Smoking status: Former Smoker     Packs/day: 0.50     Years: 25.00     Quit date: 12/17/1988    Smokeless tobacco: Never Used    Alcohol use 0.0 oz/week      Comment: wine occasionally    Drug use: No    Sexual activity: Yes     Partners: Male     Review of Systems   Constitution: Positive for malaise/fatigue.   HENT: Negative.    Eyes: Negative.    Cardiovascular: Positive for dyspnea on exertion (chronic).   Respiratory: Positive for shortness of breath (chronic).    Endocrine: Negative.    Hematologic/Lymphatic: Bruises/bleeds easily.   Skin: Negative.    Musculoskeletal: Negative.    Gastrointestinal: Positive for hematochezia and hemorrhoids.   Genitourinary: Negative.    Neurological: Positive for dizziness.   Psychiatric/Behavioral: Negative.    Allergic/Immunologic: Negative.      Objective:      Vital Signs (Most Recent):  Temp: 97.9 °F (36.6 °C) (05/20/18 0745)  Pulse: (!) 52 (05/20/18 0901)  Resp: 18 (05/20/18 0748)  BP: (!) 163/71 (05/20/18 0745)  SpO2: 97 % (05/20/18 0748) Vital Signs (24h Range):  Temp:  [97.5 °F (36.4 °C)-98.5 °F (36.9 °C)] 97.9 °F (36.6 °C)  Pulse:  [52-72] 52  Resp:  [17-23] 18  SpO2:  [92 %-99 %] 97 %  BP: (117-183)/(56-76) 163/71     Weight: 56 kg (123 lb 7.3 oz)  Body mass index is 22.58 kg/m².    SpO2: 97 %  O2 Device (Oxygen Therapy): nasal cannula      Intake/Output Summary (Last 24 hours) at 05/20/18 1059  Last data filed at 05/20/18 0900   Gross per 24 hour   Intake             2010 ml   Output             1250 ml   Net              760 ml       Lines/Drains/Airways     Peripheral Intravenous Line                 Peripheral IV - Single Lumen 05/20/18 0230 Right Hand less than 1 day                Physical Exam   Constitutional: She is oriented to person, place, and time. She appears well-developed and well-nourished. No distress.   On O2   HENT:   Head: Normocephalic and atraumatic.   Eyes: Pupils are equal, round, and reactive to light. Right eye exhibits no discharge. Left eye exhibits no discharge.   Neck: Neck supple. JVD present.   Cardiovascular: S1 normal and S2 normal. Regular rhythm present. Bradycardia present.    Murmur heard.   Harsh midsystolic murmur is present  at the upper right sternal border radiating to the neck   Diastolic murmur is present with a grade of 1/6   Pulmonary/Chest: Effort normal.   Coarse BS at bases   Abdominal: Soft. She exhibits distension.   +ascites  +hepatomegaly   Musculoskeletal: She exhibits no edema.   Neurological: She is alert and oriented to person, place, and time.   Skin: Skin is warm and dry. She is not diaphoretic.   Psychiatric: She has a normal mood and affect. Her behavior is normal. Thought content normal.   Nursing note and vitals reviewed.      Significant Labs:   [facility]   Recent Labs  Lab 05/19/18  0786  05/19/18  1333 05/20/18  0409     --  136   K 5.3* 4.9 5.4*     --  106   CO2 28  --  18*   *  --  121*   BUN 34*  --  30*   CREATININE 1.6*  --  1.3   CALCIUM 9.7  --  9.1   PROT 7.1  --  7.0   ALBUMIN 3.1*  --  2.9*   BILITOT 0.9  --  1.2*   ALKPHOS 352*  --  327*   AST 56*  --  67*   ALT 45*  --  41   ANIONGAP 9  --  12   ESTGFRAFRICA 35*  --  45*   EGFRNONAA 30*  --  39*   , CBC   Recent Labs  Lab 05/19/18  0745  05/19/18  1752 05/20/18  0011 05/20/18  0409   WBC 10.47  --   --   --  11.20   HGB 9.6*  < > 10.3* 9.2* 9.2*  9.2*   HCT 30.2*  < > 32.8* 29.0* 29.3*  29.3*   *  --   --   --  329   < > = values in this interval not displayed., Troponin No results for input(s): TROPONINI in the last 48 hours. and All pertinent lab results from the last 24 hours have been reviewed.    Significant Imaging: Echocardiogram:   2D echo with color flow doppler:   Results for orders placed or performed in visit on 08/16/17   2D Echo w/ Color Flow Doppler   Result Value Ref Range    EF 55 55 - 65    Mitral Valve Regurgitation MILD     Diastolic Dysfunction Yes (A)     Aortic Valve Regurgitation MILD     Est. PA Systolic Pressure 49.73 (A)     Tricuspid Valve Regurgitation MODERATE (A)     and X-Ray: CXR: X-Ray Chest 1 View (CXR): No results found for this visit on 05/19/18. and X-Ray Chest PA and Lateral (CXR): No results found for this visit on 05/19/18.    Assessment and Plan:   Patient with history of afib, on Xarelto, who presents with rectal bleeding. Stable overnight, GI rec's noted and appreciated. Ok to re-start anticoagulation. Diagnostic paracentesis planned for today to evaluate ascites.     * Rectal bleeding    -Likely due to hemorrhoids  -H and H stable overnight  -Ok to re-start anticoagulation per discussion with GI        Other ascites    -CT of abdomen shower moderate to large amount of ascites within abdomen and pelvis  -Stop IV fluids  -Resume diuretics  -Discussed with GI and  hospital medicine, diagnostic paracentesis planned for today        On anticoagulant therapy    -GI note reviewed, rec's appreciated  -Ok to re-start anticoagulation, bleeding likely secondary to hemorrhoids  -H and H stable overnight        Chronic atrial fibrillation    -Continue Flecainide and Nebivolol  -Continue Xarelto for CVA prophylaxis         Essential hypertension    -Continue home medications        Coronary artery disease involving coronary bypass graft without angina pectoris    -Stable no angina  -Continue current meds            VTE Risk Mitigation         Ordered     Place sequential compression device  Until discontinued      05/19/18 5081          Thank you for your consult. I will follow-up with patient. Please contact us if you have any additional questions.    Maribel Beal PA-C  Cardiology   [facility]    Chart reviewed. Dr. Mcnally examined patient and agrees with plan as outlined above.

## 2018-05-20 NOTE — HPI
78yof with A fib, AR, CAD, DM on anticoagulation with Xarelto who presented to ER with blood in stool.   She has had bright red blood in toilet bowel with each bowel movement for 3 days. Stool is brown. No melena or hematemesis  She has one to two bowel movements a day sometimes with straining.   Last colonoscopy in 8/2014 with internal hemorrhoids and banding was reccommended. Has known history diverticula and polyps. Repeat colonoscopy was recommended in 3 yrs but was not scheduled due to cardiopulmonary risk factors.     She had chronic right back/RLQ pain. No fever, chills. Denies NSAID intake. Has been on ASA and Xarelto for A fib but stopped it 3 days ago when she noticed blood in stool.

## 2018-05-20 NOTE — CONSULTS
[facility]  Gastroenterology  Consult Note    Patient Name: Marcel Montalvo  MRN: [id number]  Admission Date: 5/19/2018  Hospital Length of Stay: 0 days  Code Status: Full Code   Attending Provider: Moy Sanderson MD   Consulting Provider: Aden Mckenna MD  Primary Care Physician: Alexandra Moreno MD  Principal Problem:Rectal bleeding    Inpatient consult to Gastroenterology  Consult performed by: ADEN MCKENNA.  Consult ordered by: ELLA TORRES JR  Reason for consult: Rectal bleeding      DUPLICATE. PLEASE SEE FULL CONSULT FROM 10:13    Subjective:     HPI:  78yof with A fib, AR, CAD, DM on anticoagulation with Xarelto who presented to ER with blood in stool.   She has had bright red blood in toilet bowel with each bowel movement for 3 days. Stool is brown. No melena or hematemesis  She has one to two bowel movements a day sometimes with straining.   Last colonoscopy in 8/2014 with internal hemorrhoids and banding was reccommended. Has known history diverticula and polyps. Repeat colonoscopy was recommended in 3 yrs but was not scheduled due to cardiopulmonary risk factors.     She had chronic right back/RLQ pain. No fever, chills. Denies NSAID intake. Has been on ASA and Xarelto for A fib but stopped it 3 days ago when she noticed blood in stool.    No new subjective & objective note has been filed under this hospital service since the last note was generated.    Assessment/Plan:     No new Assessment & Plan notes have been filed under this hospital service since the last note was generated.  Service: Gastroenterology      Thank you for your consult. I will follow-up with patient. Please contact us if you have any additional questions.    Aden Mckenna MD  Gastroenterology  [facility]

## 2018-05-20 NOTE — CONSULTS
[facility] -   Gastroenterology  Consult Note    Patient Name: Marcel Montalvo  MRN: [id number]  Admission Date: 5/19/2018  Hospital Length of Stay: 0 days  Code Status: Full Code   Attending Provider: Moy Sanderson MD   Consulting Provider: Anisa De La Fuente MD  Primary Care Physician: Alexandra Moreno MD  Principal Problem:Rectal bleeding    Consults  Subjective:     HPI:  78yof with A fib, AR, CAD, DM on anticoagulation with Xarelto who presented to ER with blood in stool.   She has had bright red blood in toilet bowel with each bowel movement for 3 days. Stool is brown. No melena or hematemesis  She has one to two bowel movements a day sometimes with straining.   Last colonoscopy in 8/2014 with internal hemorrhoids and banding was reccommended. Has known history diverticula and polyps. Repeat colonoscopy was recommended in 3 yrs but was not scheduled due to cardiopulmonary risk factors.     She had chronic right back/RLQ pain. No fever, chills. Denies NSAID intake. Has been on ASA and Xarelto for A fib but stopped it 3 days ago when she noticed blood in stool.    Past Medical History:   Diagnosis Date    *Atrial fibrillation     AF (atrial fibrillation)     Palpation: atrial fibrillation, on Coumadin and followed by cardiologist.    Anticoagulant long-term use     Anxiety     Aortic insufficiency 3/19/2014    Aortic regurgitation     Aortic regurgitation/tricuspid regurgitation per MARIO in April 2007.     Asthma     Carotid artery stenosis     CEA on the left by Dr. Maciel    Coronary artery disease     status post CABG X 3.     Diabetes mellitus     GERD (gastroesophageal reflux disease)     H. pylori infection     treated    Hyperlipidemia     intolerant to statins.    Hypertension 8/7/2013    IBS (irritable bowel syndrome)     Leg pain 3/19/2014    Long-term (current) use of anticoagulants 8/7/2013    Osteoarthritis     Osteoarthritis     Osteopenia     DEXA scan done on 06/20/12      Pancreatitis     resolved    S/P CABG (coronary artery bypass graft) 8/7/2013    S/P carotid endarterectomy 8/7/2013    S/P PTCA (percutaneous transluminal coronary angioplasty) 3/19/2014    Sjogren's syndrome     Stroke     Vitamin D deficiency disease        Past Surgical History:   Procedure Laterality Date    ABDOMINAL SURGERY      APPENDECTOMY      bilateral cataract surgery      CARPAL TUNNEL RELEASE      RT    CATARACT EXTRACTION      CEA      left    CHOLECYSTECTOMY      CORONARY ARTERY BYPASS GRAFT      2 stents    EYE SURGERY      HYSTERECTOMY      TONSILLECTOMY         Review of patient's allergies indicates:   Allergen Reactions    Codeine Nausea And Vomiting    Lisinopril      Other reaction(s): cough    Pacerone [amiodarone] Nausea And Vomiting    Sulfa (sulfonamide antibiotics) Nausea And Vomiting    Celecoxib Palpitations    Ciprofloxacin Hives, Itching and Rash    Colesevelam Rash and Nausea And Vomiting    Doxycycline Rash    Statins-hmg-coa reductase inhibitors Rash     Myalgia(can take atorvastatin ok -no rhabdo)per nurse josé antonio and patient  / stomach upset     Family History     Problem Relation (Age of Onset)    Alzheimer's disease Mother    Cancer Father, Brother, Paternal Grandmother, Paternal Grandfather    Heart disease Maternal Uncle    Hyperlipidemia Mother        Social History Main Topics    Smoking status: Former Smoker     Packs/day: 0.50     Years: 25.00     Quit date: 12/17/1988    Smokeless tobacco: Never Used    Alcohol use 0.0 oz/week      Comment: wine occasionally    Drug use: No    Sexual activity: Yes     Partners: Male     Review of Systems   Constitutional: Negative for activity change, appetite change, chills, diaphoresis, fatigue and fever.   HENT: Negative for congestion, ear pain, facial swelling, mouth sores, nosebleeds, rhinorrhea, sore throat and voice change.    Eyes: Negative for photophobia, pain, discharge, redness and visual  disturbance.   Respiratory: Positive for shortness of breath. Negative for apnea, cough, choking, chest tightness and wheezing.    Cardiovascular: Negative for chest pain, palpitations and leg swelling.   Gastrointestinal:        As per HPI   Genitourinary: Negative for decreased urine volume, difficulty urinating, dysuria, frequency and hematuria.   Musculoskeletal: Positive for arthralgias and back pain. Negative for myalgias, neck pain and neck stiffness.   Skin: Negative for pallor and rash.   Neurological: Positive for numbness. Negative for tremors, seizures, syncope, weakness and headaches.   Hematological: Negative.  Negative for adenopathy. Does not bruise/bleed easily.   Psychiatric/Behavioral: Negative for behavioral problems, confusion, hallucinations and sleep disturbance. The patient is not nervous/anxious.      Objective:     Vital Signs (Most Recent):  Temp: 97.9 °F (36.6 °C) (05/20/18 0745)  Pulse: (!) 52 (05/20/18 0901)  Resp: 18 (05/20/18 0748)  BP: (!) 163/71 (05/20/18 0745)  SpO2: 97 % (05/20/18 0748) Vital Signs (24h Range):  Temp:  [97.5 °F (36.4 °C)-98.5 °F (36.9 °C)] 97.9 °F (36.6 °C)  Pulse:  [52-72] 52  Resp:  [17-24] 18  SpO2:  [92 %-99 %] 97 %  BP: (117-183)/(56-76) 163/71     Weight: 56 kg (123 lb 7.3 oz) (05/20/18 0745)  Body mass index is 22.58 kg/m².      Intake/Output Summary (Last 24 hours) at 05/20/18 0948  Last data filed at 05/20/18 0900   Gross per 24 hour   Intake          2951.67 ml   Output             1250 ml   Net          1701.67 ml       Lines/Drains/Airways     Peripheral Intravenous Line                 Peripheral IV - Single Lumen 05/20/18 0230 Right Hand less than 1 day                Physical Exam   Constitutional: She is oriented to person, place, and time. She appears well-developed and well-nourished. No distress.   HENT:   Head: Normocephalic and atraumatic.   Nose: Nose normal.   Mouth/Throat: Oropharynx is clear and moist.   Eyes: EOM are normal. Pupils are  equal, round, and reactive to light. Right eye exhibits no discharge. Left eye exhibits no discharge. No scleral icterus.   Neck: Normal range of motion. Neck supple. No tracheal deviation present.   Cardiovascular: Normal rate and intact distal pulses.    Murmur heard.  Pulmonary/Chest: Effort normal and breath sounds normal. No stridor. No respiratory distress. She has no wheezes. She has no rales. She exhibits no tenderness.   Abdominal: Soft. Bowel sounds are normal. She exhibits distension. She exhibits no mass. There is no tenderness. There is no rebound and no guarding. No hernia.   Mild distention   Musculoskeletal: Normal range of motion. She exhibits no edema or tenderness.   Lymphadenopathy:     She has no cervical adenopathy.   Neurological: She is alert and oriented to person, place, and time. She has normal reflexes.   Skin: Skin is warm and dry. She is not diaphoretic.   Psychiatric: She has a normal mood and affect. Her behavior is normal. Judgment and thought content normal.       Significant Labs:  CBC:   Recent Labs  Lab 05/19/18  0745  05/19/18  1752 05/20/18  0011 05/20/18  0409   WBC 10.47  --   --   --  11.20   HGB 9.6*  < > 10.3* 9.2* 9.2*  9.2*   HCT 30.2*  < > 32.8* 29.0* 29.3*  29.3*   *  --   --   --  329   < > = values in this interval not displayed.  CMP:   Recent Labs  Lab 05/20/18  0409   *   CALCIUM 9.1   ALBUMIN 2.9*   PROT 7.0      K 5.4*   CO2 18*      BUN 30*   CREATININE 1.3   ALKPHOS 327*   ALT 41   AST 67*   BILITOT 1.2*       Significant Imaging:  Imaging results within the past 24 hours have been reviewed.       Current Facility-Administered Medications:     0.9%  NaCl infusion, , Intravenous, Continuous, Bob Morgan NP, Last Rate: 75 mL/hr at 05/20/18 0829    acetaminophen tablet 650 mg, 650 mg, Oral, Q6H PRN, Alvaro Ahuja MD, 650 mg at 05/20/18 0822    ambrisentan tablet 5 mg, 5 mg, Oral, Daily, Cameron Blanco Jr., MD    atorvastatin  tablet 40 mg, 40 mg, Oral, Nightly, Bob Morgan NP, 40 mg at 05/19/18 2003    dextrose 50% injection 12.5 g, 12.5 g, Intravenous, PRN, Bob Morgan NP    dextrose 50% injection 25 g, 25 g, Intravenous, PRN, Bob Morgan NP    flecainide tablet 100 mg, 100 mg, Oral, Q12H, Bob Morgan NP, 100 mg at 05/20/18 0822    glucagon (human recombinant) injection 1 mg, 1 mg, Intramuscular, PRN, Bob Morgan NP    glucose chewable tablet 16 g, 16 g, Oral, PRN, Bob Morgan NP    glucose chewable tablet 24 g, 24 g, Oral, PRN, Bob Morgan NP    insulin aspart U-100 pen 0-5 Units, 0-5 Units, Subcutaneous, QID (AC + HS) PRN, Bob Morgan NP    nebivolol tablet 5 mg, 5 mg, Oral, BID, Bob Morgan NP, 5 mg at 05/20/18 0822    nitroGLYCERIN 0.2 mg/hr TD PT24 1 patch, 1 patch, Transdermal, Daily, Cameron Blanco Jr., MD, 1 patch at 05/20/18 0822    ondansetron injection 4 mg, 4 mg, Intravenous, Q8H PRN, Cameron Blanco Jr., MD    pantoprazole 40 mg in dextrose 5 % 100 mL IVPB (over 15 minutes) (ready to mix system), 40 mg, Intravenous, Daily, Cameron Blanco Jr., MD, 40 mg at 05/20/18 0822      Assessment/Plan:     * Rectal bleeding    Likely secondary to hemorrhoids given the presentation and stable Hgb.   Start Anusol Suppositories BID  Daily colace  Unsedated flex sig is an option to exclude other etiologies for rectal bleeding.   Colonoscopy had been recently deferred to due co morbidities.         Abnormal liver enzymes    DESOUZA is probable etiology vs congestive hepatopathy   Liver biopsy was deferred due to co morbidities  CT with mod ascites, recent US was negative for fluid so paracentesis was not performed.   Recommend diagnostic paracentesis and obtain cultures, cell count, total protein and Albumin, while off anticoagulation and can resume after.   Resume outpatient diuretics.   Hold IVF            On anticoagulant therapy    Hgb stable  OK to resume anticoagulation                Thank you for your consult. I will follow-up with patient. Please contact us if you have any additional questions.    Anisa De La Fuente MD  Gastroenterology  [facility] [place]

## 2018-05-20 NOTE — ASSESSMENT & PLAN NOTE
-Likely due to hemorrhoids  -H and H stable overnight  -Ok to re-start anticoagulation per discussion with GI

## 2018-05-20 NOTE — ASSESSMENT & PLAN NOTE
DESOUZA is probable etiology vs congestive hepatopathy   Liver biopsy was deferred due to co morbidities  CT with mod ascites, recent US was negative for fluid so paracentesis was not performed.   Recommend diagnostic paracentesis and obtain cultures, cell count, total protein and Albumin, while off anticoagulation and can resume after.   Resume outpatient diuretics.   Hold IVF

## 2018-05-20 NOTE — HPI
"Ms. Montalvo is a 78 year old female patient with a PMHx of CAD s/p CAGB, atrial fibrillation (on Xarelto), pulmonary HTN (on supplemental O2), fatty liver, hyperlipidemia, and carotid artery disease who presented to [facility] ED yesterday with a chief complaint of bleeding with bowel movements for the past 3 days. Patient reported "moderate blood loss" that was bright red in color. Associated symptoms included mild dizziness and right sided back pain. Patient denied any associated SOB, chest pain, palpitations, near syncope, or syncope. Initial workup in ED revealed anemia (9.6/30/2) and patient subsequently admitted for further evaluation and treatment. Cardiology consulted to assist with management. Patient seen and examined today, resting in bed. Feels ok. Reports blood in stool yesterday while in hospital after a BM. No recurrence since that time. Complains of right lower back pain and abdominal bloating. Denies chest pain. Reports SOB seems at baseline. Compliant with her medications. Chart reviewed. H and H stable overnight. CT of abdomen showed moderate to large amount of ascites throughout abdomen and pelvis. GI consulted, note reviewed. Bleeding likely secondary to hemorrhoids, ok to resume anticoagulation. Diagnostic paracentesis planned for today.        "

## 2018-05-20 NOTE — HOSPITAL COURSE
Marcel Montalvo is a 78 year old female admitted for Rectal bleeding. PMHx of A-fib on Xarelto and ASA. Pt reports she last took Xarelto Tuesday/Wednesday of last week, pt reports stopping when she noticed blood in her stool. Hgb/Hct upon admit 9.6/30.2. GI and Cardiology consulted. Rectal bleeding likely secondary to hemorrhoids given the presentation and stable Hgb. GI recommended starting Anusol supporitories BID and daily Colace. Unsedated flex sig is an option to exclude other etiologies for rectal bleeding. Colonoscopy had been recently deferred to due co morbidities. CT abdomen/pelvis with contrast showed moderate ascites. Recent US was negative for fluid so paracentesis was not performed. Diagnostic paracentesis to be performed tomorrow, 05/21/18. Hgb/Hct has remained stable, anticoagulation will be resumed once paracentesis done. Paracentesis obtained today, 05/21/18, removed 2250 mL of julia fluid. Fluid sent for analysis revealed  -- SBP. Gram stain pending. Pt transferred inpatient for antibiotic therapy to treat SBP.  Patient with greatly improved breathing post paracentesis. H/H stable at 8.5/27.1. Gram stain shows no organisms, fluid shows 638WBC, with segs 7, lymphs 25 & monocytes 66. GI cleared for dc home on oral Augmentin X 7 days.  Patient was seen and examined today and deemed stable for discharge home.

## 2018-05-20 NOTE — ASSESSMENT & PLAN NOTE
-GI note reviewed, rec's appreciated  -Ok to re-start anticoagulation, bleeding likely secondary to hemorrhoids  -H and H stable overnight

## 2018-05-20 NOTE — NURSING
Per Sheryl CORTÉS it is not necessary to call radiologist in for paracentesis today, ok to wait until the am. All needed supplies are in the Obs med room.

## 2018-05-20 NOTE — PLAN OF CARE
Problem: Patient Care Overview  Goal: Plan of Care Review  New pt to OBS from ER. 2 small episodes of rectal bleeding, dark brown with some red. Spouse at bedside. Chronic back pain. Afib on the monitor, 50's. Clear liquid diet. Bed low and call light within reach.

## 2018-05-20 NOTE — SUBJECTIVE & OBJECTIVE
Review of Systems   Constitutional: Positive for activity change.   HENT: Negative.    Eyes: Negative.    Respiratory: Negative for apnea, cough, choking, chest tightness, shortness of breath, wheezing and stridor.    Cardiovascular: Negative for chest pain, palpitations and leg swelling.   Gastrointestinal: Positive for blood in stool. Negative for abdominal pain, diarrhea, nausea and vomiting.   Endocrine: Negative.    Genitourinary: Negative.    Musculoskeletal: Negative.    Skin: Negative.    Allergic/Immunologic: Negative.    Neurological: Negative for dizziness, tremors, seizures, syncope, facial asymmetry, speech difficulty, weakness, light-headedness, numbness and headaches.   Hematological: Negative.    Psychiatric/Behavioral: Negative.      Objective:     Vital Signs (Most Recent):  Temp: 97.7 °F (36.5 °C) (05/20/18 1158)  Pulse: (!) 54 (05/20/18 1158)  Resp: 18 (05/20/18 1158)  BP: 138/60 (05/20/18 1158)  SpO2: (!) 92 % (05/20/18 1158) Vital Signs (24h Range):  Temp:  [97.5 °F (36.4 °C)-98.2 °F (36.8 °C)] 97.7 °F (36.5 °C)  Pulse:  [52-72] 54  Resp:  [18] 18  SpO2:  [92 %-97 %] 92 %  BP: (121-163)/(56-71) 138/60     Weight: 56 kg (123 lb 7.3 oz)  Body mass index is 22.58 kg/m².    Intake/Output Summary (Last 24 hours) at 05/20/18 1542  Last data filed at 05/20/18 0900   Gross per 24 hour   Intake             2010 ml   Output             1250 ml   Net              760 ml      Physical Exam   Constitutional: She is oriented to person, place, and time. She appears well-developed and well-nourished. No distress.   HENT:   Head: Normocephalic and atraumatic.   Eyes: EOM are normal.   Neck: Normal range of motion. Neck supple.   Cardiovascular: Intact distal pulses.  Bradycardia present.    Murmur heard.  Pulmonary/Chest: Effort normal and breath sounds normal. No respiratory distress. She has no wheezes. She has no rales. She exhibits no tenderness.   Abdominal: Soft. Bowel sounds are normal. She exhibits  distension. There is no tenderness. There is no rebound and no guarding.   Genitourinary:   Genitourinary Comments: Deferred   Musculoskeletal: Normal range of motion.   Neurological: She is alert and oriented to person, place, and time. No sensory deficit.   Skin: Skin is warm and dry. Capillary refill takes less than 2 seconds.   Psychiatric: She has a normal mood and affect. Her behavior is normal. Judgment and thought content normal.   Nursing note and vitals reviewed.      Significant Labs:   CBC:   Recent Labs  Lab 05/19/18  0745  05/20/18  0011 05/20/18  0409 05/20/18  1123   WBC 10.47  --   --  11.20  --    HGB 9.6*  < > 9.2* 9.2*  9.2* 8.7*   HCT 30.2*  < > 29.0* 29.3*  29.3* 28.0*   *  --   --  329  --    < > = values in this interval not displayed.  CMP:   Recent Labs  Lab 05/19/18  0745 05/19/18  1333 05/20/18  0409     --  136   K 5.3* 4.9 5.4*     --  106   CO2 28  --  18*   *  --  121*   BUN 34*  --  30*   CREATININE 1.6*  --  1.3   CALCIUM 9.7  --  9.1   PROT 7.1  --  7.0   ALBUMIN 3.1*  --  2.9*   BILITOT 0.9  --  1.2*   ALKPHOS 352*  --  327*   AST 56*  --  67*   ALT 45*  --  41   ANIONGAP 9  --  12   EGFRNONAA 30*  --  39*     POCT Glucose:   Recent Labs  Lab 05/19/18  1359 05/19/18  2103 05/20/18  0554   POCTGLUCOSE 124* 134* 110       Significant Imaging:   Imaging Results          X-Ray Lumbar Spine 2 Or 3 Views (Final result)  Result time 05/19/18 12:42:24    Final result by Wyatt Powell MD (05/19/18 12:42:24)                 Impression:      Lumbar spine is intact and in good alignment.  Some mild chronic degenerative change is present.      Electronically signed by: Bimal Powell MD  Date:    05/19/2018  Time:    12:42             Narrative:    EXAMINATION:  XR LUMBAR SPINE 2 OR 3 VIEWS    CLINICAL HISTORY:  Low back pain, risk factors (osteoporosis or chronic steroid use or elderly);    TECHNIQUE:  AP, lateral and spot images were performed of the lumbar  spine.    COMPARISON:  None    FINDINGS:  The lumbar spine appears intact in good alignment.  Some mild disc space narrowing with spurring is present at the L2-3 and L3-4 levels.                               X-Ray Hip 2 or 3 views Right (Final result)  Result time 05/19/18 12:43:01    Final result by Wyatt Powell MD (05/19/18 12:43:01)                 Impression:      Negative exam.      Electronically signed by: Bimal Powell MD  Date:    05/19/2018  Time:    12:43             Narrative:    EXAMINATION:  XR HIP 2 VIEW RIGHT    CLINICAL HISTORY:  right hip pain;    TECHNIQUE:  Standard radiography performed.    COMPARISON:  None    FINDINGS:  Bone density and architecture are normal.  No acute findings.                               CT Abdomen Pelvis  Without Contrast (Final result)  Result time 05/19/18 08:50:47    Final result by Jayjay Roberts MD (05/19/18 08:50:47)                 Impression:      1. Moderate to large volume of ascites throughout the abdomen and pelvis.  2. Cardiomegaly.  3. No bowel obstruction.  All CT scans at this facility use dose modulation, iterative reconstruction and/or weight based dosing when appropriate to reduce radiation dose to as low as reasonably achievable.      Electronically signed by: Jayjay Roberts MD  Date:    05/19/2018  Time:    08:50             Narrative:    EXAMINATION:  CT ABDOMEN PELVIS WITHOUT CONTRAST    CLINICAL HISTORY:  Rectal bleeding 7 onset 3 days ago.    TECHNIQUE:  Axial CT images performed through the abdomen and pelvis without intravenous contrast. Multiplanar reformats were performed and interpreted.    COMPARISON:  CT abdomen and pelvis, 01/16/2018.    FINDINGS:  There is cardiomegaly.  There is centrilobular emphysema in the lung bases and bilateral basilar mild atelectasis.    No urolithiasis, hydronephrosis, or perinephric stranding.    There is no bowel obstruction.  No pneumoperitoneum.    There is a moderate to large volume of ascites in  the abdomen and pelvis.    The urinary bladder is unremarkable. The patient has had a hysterectomy.  There is marked calcific atherosclerotic plaque throughout the abdominal aorta.    No significant osseous abnormality is identified.

## 2018-05-20 NOTE — SUBJECTIVE & OBJECTIVE
Past Medical History:   Diagnosis Date    *Atrial fibrillation     AF (atrial fibrillation)     Palpation: atrial fibrillation, on Coumadin and followed by cardiologist.    Anticoagulant long-term use     Anxiety     Aortic insufficiency 3/19/2014    Aortic regurgitation     Aortic regurgitation/tricuspid regurgitation per MARIO in April 2007.     Asthma     Carotid artery stenosis     CEA on the left by Dr. Maciel    Coronary artery disease     status post CABG X 3.     Diabetes mellitus     GERD (gastroesophageal reflux disease)     H. pylori infection     treated    Hyperlipidemia     intolerant to statins.    Hypertension 8/7/2013    IBS (irritable bowel syndrome)     Leg pain 3/19/2014    Long-term (current) use of anticoagulants 8/7/2013    Osteoarthritis     Osteoarthritis     Osteopenia     DEXA scan done on 06/20/12     Pancreatitis     resolved    S/P CABG (coronary artery bypass graft) 8/7/2013    S/P carotid endarterectomy 8/7/2013    S/P PTCA (percutaneous transluminal coronary angioplasty) 3/19/2014    Sjogren's syndrome     Stroke     Vitamin D deficiency disease        Past Surgical History:   Procedure Laterality Date    ABDOMINAL SURGERY      APPENDECTOMY      bilateral cataract surgery      CARPAL TUNNEL RELEASE      RT    CATARACT EXTRACTION      CEA      left    CHOLECYSTECTOMY      CORONARY ARTERY BYPASS GRAFT      2 stents    EYE SURGERY      HYSTERECTOMY      TONSILLECTOMY         Review of patient's allergies indicates:   Allergen Reactions    Codeine Nausea And Vomiting    Lisinopril      Other reaction(s): cough    Pacerone [amiodarone] Nausea And Vomiting    Sulfa (sulfonamide antibiotics) Nausea And Vomiting    Celecoxib Palpitations    Ciprofloxacin Hives, Itching and Rash    Colesevelam Rash and Nausea And Vomiting    Doxycycline Rash    Statins-hmg-coa reductase inhibitors Rash     Myalgia(can take atorvastatin ok -no rhabdo)per nurse josé antonio  and patient  / stomach upset     Family History     Problem Relation (Age of Onset)    Alzheimer's disease Mother    Cancer Father, Brother, Paternal Grandmother, Paternal Grandfather    Heart disease Maternal Uncle    Hyperlipidemia Mother        Social History Main Topics    Smoking status: Former Smoker     Packs/day: 0.50     Years: 25.00     Quit date: 12/17/1988    Smokeless tobacco: Never Used    Alcohol use 0.0 oz/week      Comment: wine occasionally    Drug use: No    Sexual activity: Yes     Partners: Male     Review of Systems   Constitutional: Negative for activity change, appetite change, chills, diaphoresis, fatigue and fever.   HENT: Negative for congestion, ear pain, facial swelling, mouth sores, nosebleeds, rhinorrhea, sore throat and voice change.    Eyes: Negative for photophobia, pain, discharge, redness and visual disturbance.   Respiratory: Positive for shortness of breath. Negative for apnea, cough, choking, chest tightness and wheezing.    Cardiovascular: Negative for chest pain, palpitations and leg swelling.   Gastrointestinal:        As per HPI   Genitourinary: Negative for decreased urine volume, difficulty urinating, dysuria, frequency and hematuria.   Musculoskeletal: Positive for arthralgias and back pain. Negative for myalgias, neck pain and neck stiffness.   Skin: Negative for pallor and rash.   Neurological: Positive for numbness. Negative for tremors, seizures, syncope, weakness and headaches.   Hematological: Negative.  Negative for adenopathy. Does not bruise/bleed easily.   Psychiatric/Behavioral: Negative for behavioral problems, confusion, hallucinations and sleep disturbance. The patient is not nervous/anxious.      Objective:     Vital Signs (Most Recent):  Temp: 97.9 °F (36.6 °C) (05/20/18 0745)  Pulse: (!) 52 (05/20/18 0901)  Resp: 18 (05/20/18 0748)  BP: (!) 163/71 (05/20/18 0745)  SpO2: 97 % (05/20/18 0748) Vital Signs (24h Range):  Temp:  [97.5 °F (36.4 °C)-98.5 °F  (36.9 °C)] 97.9 °F (36.6 °C)  Pulse:  [52-72] 52  Resp:  [17-24] 18  SpO2:  [92 %-99 %] 97 %  BP: (117-183)/(56-76) 163/71     Weight: 56 kg (123 lb 7.3 oz) (05/20/18 0745)  Body mass index is 22.58 kg/m².      Intake/Output Summary (Last 24 hours) at 05/20/18 0948  Last data filed at 05/20/18 0900   Gross per 24 hour   Intake          2951.67 ml   Output             1250 ml   Net          1701.67 ml       Lines/Drains/Airways     Peripheral Intravenous Line                 Peripheral IV - Single Lumen 05/20/18 0230 Right Hand less than 1 day                Physical Exam   Constitutional: She is oriented to person, place, and time. She appears well-developed and well-nourished. No distress.   HENT:   Head: Normocephalic and atraumatic.   Nose: Nose normal.   Mouth/Throat: Oropharynx is clear and moist.   Eyes: EOM are normal. Pupils are equal, round, and reactive to light. Right eye exhibits no discharge. Left eye exhibits no discharge. No scleral icterus.   Neck: Normal range of motion. Neck supple. No tracheal deviation present.   Cardiovascular: Normal rate and intact distal pulses.    Murmur heard.  Pulmonary/Chest: Effort normal and breath sounds normal. No stridor. No respiratory distress. She has no wheezes. She has no rales. She exhibits no tenderness.   Abdominal: Soft. Bowel sounds are normal. She exhibits distension. She exhibits no mass. There is no tenderness. There is no rebound and no guarding. No hernia.   Mild distention   Musculoskeletal: Normal range of motion. She exhibits no edema or tenderness.   Lymphadenopathy:     She has no cervical adenopathy.   Neurological: She is alert and oriented to person, place, and time. She has normal reflexes.   Skin: Skin is warm and dry. She is not diaphoretic.   Psychiatric: She has a normal mood and affect. Her behavior is normal. Judgment and thought content normal.       Significant Labs:  CBC:   Recent Labs  Lab 05/19/18  0745  05/19/18  3036  05/20/18  0011 05/20/18  0409   WBC 10.47  --   --   --  11.20   HGB 9.6*  < > 10.3* 9.2* 9.2*  9.2*   HCT 30.2*  < > 32.8* 29.0* 29.3*  29.3*   *  --   --   --  329   < > = values in this interval not displayed.  CMP:   Recent Labs  Lab 05/20/18  0409   *   CALCIUM 9.1   ALBUMIN 2.9*   PROT 7.0      K 5.4*   CO2 18*      BUN 30*   CREATININE 1.3   ALKPHOS 327*   ALT 41   AST 67*   BILITOT 1.2*       Significant Imaging:  Imaging results within the past 24 hours have been reviewed.       Current Facility-Administered Medications:     0.9%  NaCl infusion, , Intravenous, Continuous, Bob Morgan NP, Last Rate: 75 mL/hr at 05/20/18 0829    acetaminophen tablet 650 mg, 650 mg, Oral, Q6H PRN, Alvaro Ahuja MD, 650 mg at 05/20/18 0822    ambrisentan tablet 5 mg, 5 mg, Oral, Daily, Cameron Blanco Jr., MD    atorvastatin tablet 40 mg, 40 mg, Oral, Nightly, Bob Morgan NP, 40 mg at 05/19/18 2003    dextrose 50% injection 12.5 g, 12.5 g, Intravenous, PRN, Bob Morgan NP    dextrose 50% injection 25 g, 25 g, Intravenous, PRN, Bob Morgan NP    flecainide tablet 100 mg, 100 mg, Oral, Q12H, Bob Morgan NP, 100 mg at 05/20/18 0822    glucagon (human recombinant) injection 1 mg, 1 mg, Intramuscular, PRN, Bob Morgan NP    glucose chewable tablet 16 g, 16 g, Oral, PRN, Bob Morgan NP    glucose chewable tablet 24 g, 24 g, Oral, PRN, Bob Morgan NP    insulin aspart U-100 pen 0-5 Units, 0-5 Units, Subcutaneous, QID (AC + HS) PRN, Bob Morgan NP    nebivolol tablet 5 mg, 5 mg, Oral, BID, Bob Morgan NP, 5 mg at 05/20/18 0822    nitroGLYCERIN 0.2 mg/hr TD PT24 1 patch, 1 patch, Transdermal, Daily, Cameron Blanco Jr., MD, 1 patch at 05/20/18 0822    ondansetron injection 4 mg, 4 mg, Intravenous, Q8H PRN, Cameron Blanco Jr., MD    pantoprazole 40 mg in dextrose 5 % 100 mL IVPB (over 15 minutes) (ready to mix system), 40 mg, Intravenous,  Daily, Cameron Blanco Jr., MD, 40 mg at 05/20/18 0894

## 2018-05-20 NOTE — PLAN OF CARE
Problem: Patient Care Overview  Goal: Plan of Care Review  Outcome: Ongoing (interventions implemented as appropriate)  patient had a restful night, no active signs of bleeding, soft formed stool overnight, IV fluids infusing, Afib  On monitor, will continue to Monitor.

## 2018-05-21 PROBLEM — D64.9 ANEMIA: Status: ACTIVE | Noted: 2018-01-01

## 2018-05-21 PROBLEM — E87.5 HYPERKALEMIA: Status: RESOLVED | Noted: 2018-01-01 | Resolved: 2018-01-01

## 2018-05-21 PROBLEM — K65.2 SBP (SPONTANEOUS BACTERIAL PERITONITIS): Status: ACTIVE | Noted: 2018-01-01

## 2018-05-21 PROBLEM — R18.8 OTHER ASCITES: Status: RESOLVED | Noted: 2018-01-01 | Resolved: 2018-01-01

## 2018-05-21 NOTE — PLAN OF CARE
CM met with patient. Discharge Planning Assessment completed. D/C needs to be determined pending determination of treatment plan. CM contact information provided for questions or concerns. Payor: Medicare Part A & B.       05/21/18 4951   Discharge Assessment   Assessment Type Discharge Planning Assessment   Assessment information obtained from? Patient   Prior to hospitilization cognitive status: Alert/Oriented   Current cognitive status: Alert/Oriented   Able to Return to Prior Arrangements unable to determine at this time (comments)   Is patient able to care for self after discharge? Unable to determine at this time (comments)   Who are your caregiver(s) and their phone number(s)? Anibal Montalvo (spouse) [phone]   Patient currently being followed by outpatient case management? Unable to determine (comments)   Patient currently receives any other outside agency services? Yes   Name and contact number of agency or person providing outside services [facility] program with STAT   Is it the patient/care giver preference to resume care with the current outside agency? Yes   Equipment Currently Used at Home other (see comments)  (Trilogy)   Do you have any problems affording any of your prescribed medications? No   Is the patient taking medications as prescribed? yes   Discharge Plan A Other  (To be determined)   Patient/Family In Agreement With Plan unable to assess

## 2018-05-21 NOTE — OP NOTE
Sterile technique was performed in the RLQ, lidocaine was used as a local anesthetic.  2250 ccs of julia fluid removed and sent to lab for eval.  Pt tolerated the procedure well without immediate complications.  Please see radiologist report for details. F/u with PCP and/or ordering physician.

## 2018-05-21 NOTE — INTERVAL H&P NOTE
The patient has been examined and the H&P has been reviewed:    I concur with the findings and no changes have occurred since H&P was written.        Active Hospital Problems    Diagnosis  POA    *Rectal bleeding [K62.5]  Yes    Other ascites [R18.8]  Yes    Hyperkalemia [E87.5]  Yes    Pulmonary HTN [I27.20]  Yes    Abnormal liver enzymes [R74.8]  Yes    DOMINGO (acute kidney injury) [N17.9]  Yes    Chronic atrial fibrillation [I48.2]  Yes     Chronic    Essential hypertension [I10]  Yes     Chronic    Coronary artery disease involving coronary bypass graft without angina pectoris [I25.810]  Yes     Chronic     status post CABG X 3.       Type 2 diabetes mellitus without complication [E11.9]  Yes      Resolved Hospital Problems    Diagnosis Date Resolved POA    Low back pain [M54.5] 05/20/2018 Unknown    Right hip pain [M25.551] 05/20/2018 Unknown    On anticoagulant therapy [Z79.01] 05/20/2018 Not Applicable

## 2018-05-21 NOTE — SUBJECTIVE & OBJECTIVE
Review of Systems   Constitutional: Positive for activity change.   HENT: Negative.    Eyes: Negative.    Respiratory: Negative for apnea, cough, choking, chest tightness, shortness of breath, wheezing and stridor.    Cardiovascular: Negative for chest pain, palpitations and leg swelling.   Gastrointestinal: Positive for blood in stool. Negative for abdominal pain, diarrhea, nausea and vomiting.   Endocrine: Negative.    Genitourinary: Negative.    Musculoskeletal: Negative.    Skin: Negative.    Allergic/Immunologic: Negative.    Neurological: Negative for dizziness, tremors, seizures, syncope, facial asymmetry, speech difficulty, weakness, light-headedness, numbness and headaches.   Hematological: Negative.    Psychiatric/Behavioral: Negative.      Objective:     Vital Signs (Most Recent):  Temp: 98.1 °F (36.7 °C) (05/21/18 1625)  Pulse: 61 (05/21/18 1625)  Resp: 20 (05/21/18 1625)  BP: (!) 108/51 (05/21/18 1625)  SpO2: 96 % (05/21/18 1625) Vital Signs (24h Range):  Temp:  [96.2 °F (35.7 °C)-98.8 °F (37.1 °C)] 98.1 °F (36.7 °C)  Pulse:  [55-62] 61  Resp:  [18-20] 20  SpO2:  [92 %-98 %] 96 %  BP: (108-138)/(51-65) 108/51     Weight: 56 kg (123 lb 7.3 oz)  Body mass index is 22.58 kg/m².    Intake/Output Summary (Last 24 hours) at 05/21/18 1625  Last data filed at 05/21/18 1200   Gross per 24 hour   Intake              700 ml   Output              675 ml   Net               25 ml      Physical Exam   Constitutional: She is oriented to person, place, and time. She appears well-developed and well-nourished. No distress.   HENT:   Head: Normocephalic and atraumatic.   Eyes: EOM are normal.   Neck: Normal range of motion. Neck supple.   Cardiovascular: Intact distal pulses.  Bradycardia present.    Murmur heard.  Pulmonary/Chest: Effort normal and breath sounds normal. No respiratory distress. She has no wheezes. She has no rales. She exhibits no tenderness.   Abdominal: Soft. Bowel sounds are normal. She exhibits  distension. There is no tenderness. There is no rebound and no guarding.   Genitourinary:   Genitourinary Comments: Deferred   Musculoskeletal: Normal range of motion.   Neurological: She is alert and oriented to person, place, and time. No sensory deficit.   Skin: Skin is warm and dry. Capillary refill takes less than 2 seconds.   Psychiatric: She has a normal mood and affect. Her behavior is normal. Judgment and thought content normal.   Nursing note and vitals reviewed.      Significant Labs:   CBC:   Recent Labs  Lab 05/20/18  0409 05/20/18  1123 05/21/18  0003 05/21/18  0518   WBC 11.20  --   --  8.99   HGB 9.2*  9.2* 8.7* 8.8* 8.4*  8.4*   HCT 29.3*  29.3* 28.0* 28.6* 26.9*  26.9*     --   --  382*     CMP:   Recent Labs  Lab 05/20/18  0409 05/21/18 0518    137   K 5.4* 4.7    105   CO2 18* 23   * 134*   BUN 30* 34*   CREATININE 1.3 1.5*   CALCIUM 9.1 8.8   PROT 7.0 6.4   ALBUMIN 2.9* 2.8*   BILITOT 1.2* 0.9   ALKPHOS 327* 319*   AST 67* 47*   ALT 41 35   ANIONGAP 12 9   EGFRNONAA 39* 33*     POCT Glucose:   Recent Labs  Lab 05/20/18  2053 05/21/18  0534 05/21/18  1116   POCTGLUCOSE 179* 135* 115*       Significant Imaging:   Imaging Results          X-Ray Lumbar Spine 2 Or 3 Views (Final result)  Result time 05/19/18 12:42:24    Final result by Wyatt Powell MD (05/19/18 12:42:24)                 Impression:      Lumbar spine is intact and in good alignment.  Some mild chronic degenerative change is present.      Electronically signed by: Bimal Powell MD  Date:    05/19/2018  Time:    12:42             Narrative:    EXAMINATION:  XR LUMBAR SPINE 2 OR 3 VIEWS    CLINICAL HISTORY:  Low back pain, risk factors (osteoporosis or chronic steroid use or elderly);    TECHNIQUE:  AP, lateral and spot images were performed of the lumbar spine.    COMPARISON:  None    FINDINGS:  The lumbar spine appears intact in good alignment.  Some mild disc space narrowing with spurring is  present at the L2-3 and L3-4 levels.                               X-Ray Hip 2 or 3 views Right (Final result)  Result time 05/19/18 12:43:01    Final result by Wyatt Powell MD (05/19/18 12:43:01)                 Impression:      Negative exam.      Electronically signed by: Bimal Powell MD  Date:    05/19/2018  Time:    12:43             Narrative:    EXAMINATION:  XR HIP 2 VIEW RIGHT    CLINICAL HISTORY:  right hip pain;    TECHNIQUE:  Standard radiography performed.    COMPARISON:  None    FINDINGS:  Bone density and architecture are normal.  No acute findings.                               CT Abdomen Pelvis  Without Contrast (Final result)  Result time 05/19/18 08:50:47    Final result by Jayjay Roberts MD (05/19/18 08:50:47)                 Impression:      1. Moderate to large volume of ascites throughout the abdomen and pelvis.  2. Cardiomegaly.  3. No bowel obstruction.  All CT scans at this facility use dose modulation, iterative reconstruction and/or weight based dosing when appropriate to reduce radiation dose to as low as reasonably achievable.      Electronically signed by: Jayjay Roberts MD  Date:    05/19/2018  Time:    08:50             Narrative:    EXAMINATION:  CT ABDOMEN PELVIS WITHOUT CONTRAST    CLINICAL HISTORY:  Rectal bleeding 7 onset 3 days ago.    TECHNIQUE:  Axial CT images performed through the abdomen and pelvis without intravenous contrast. Multiplanar reformats were performed and interpreted.    COMPARISON:  CT abdomen and pelvis, 01/16/2018.    FINDINGS:  There is cardiomegaly.  There is centrilobular emphysema in the lung bases and bilateral basilar mild atelectasis.    No urolithiasis, hydronephrosis, or perinephric stranding.    There is no bowel obstruction.  No pneumoperitoneum.    There is a moderate to large volume of ascites in the abdomen and pelvis.    The urinary bladder is unremarkable. The patient has had a hysterectomy.  There is marked calcific atherosclerotic  plaque throughout the abdominal aorta.    No significant osseous abnormality is identified.

## 2018-05-21 NOTE — PLAN OF CARE
05/21/18 1053   PHILLIP Message   Medicare Outpatient and Observation Notification regarding financial responsibility Given to patient/caregiver;Explained to patient/caregiver;Signed/date by patient/caregiver   Date PHILLIP was signed 05/21/18   Time PHILLIP was signed 1054

## 2018-05-21 NOTE — PLAN OF CARE
Problem: Patient Care Overview  Goal: Plan of Care Review  Pt AAO x4.  VSS.  Pt remained afebrile throughout this shift.   Medications administered per order.   Pt remained free of falls this shift.   Pt complained of pain rating 8/10 this shift to the right hip.  Plan of care reviewed. Patient verbalizes understanding.   Pain meds administered as ordered.   Pt moving/turing with 1 person assist.   Patient Afib/SB/NSR on monitor.   Bed low, side rails up x 2, wheels locked, call light in reach.   Patient instructed to call for assistance.   Hourly rounding completed.   Will continue to monitor.

## 2018-05-21 NOTE — ASSESSMENT & PLAN NOTE
- GI following  - Hgb/Hct slightly dropped on AM labs -- had two bloody stools yesterday  - Rectal bleeding likely secondary to hemorrhoids given the presentation and stable Hgb. GI recommended starting Anusol supporitories BID and daily Colace. Unsedated flex sig is an option to exclude other etiologies for rectal bleeding.  - Continue PPI  - Discussed case with Odette Guillermo NP (GI) -- will likely have unsedated flex sig tomorrow, 05/22/18. Continue to hold Xarelto and ASA

## 2018-05-21 NOTE — ASSESSMENT & PLAN NOTE
- Diagnostic paracentesis performed today -- 2250 mL of julia fluid removed  -   - GI following -- will start antibiotics for tx of SBP   - Gram stain pending  - Continue Lasix 20 mg PO BID

## 2018-05-21 NOTE — PROGRESS NOTES
[facility] Hospital Medicine  Progress Note    Patient Name: Marcel Montalvo  MRN: [id number]  Patient Class: IP- Inpatient   Admission Date: 5/19/2018  Length of Stay: 0 days  Attending Physician: Moy Sanderson MD  Primary Care Provider: Alexandra Moreno MD        Subjective:     Principal Problem:SBP (spontaneous bacterial peritonitis)    HPI:  Ms Montalvo is a 78 year old female with PMHx of CAD, CABG, A Fib on Xarelto, pulmonary HTN on home O 2 at 2 L, fatty liver disease and abnormal LFT. She presented to [facility] with complaints of rectal bleeding that started about 3 days ago. Initially thought it was hemorrhoids. Associated symptoms include mild dizziness with position changes. Patient reports have lower back pain radiating to Lt hip as well with difficult walking.  Denies associated symptoms of chest pain, shortness of breath, fever, chills, nausea, vomiting or syncope. Vital signs stable. Last episode of rectal bleeding was this morning.  Patient reports has not taken Xarelto or atorvastatin in 2-3 days. She has continued to take ASA. CT of Abdomen with contrast showed moderate to large volume of ascites throughout the abdomen and pelvis, cardiomegaly and no bowel obstruction. Two months ago H & H 10.6/35.4 today H & H 9.6/30.2. Case reviewed with Dr De La Fuente, will continue monitor for now.       Hospital Course:  Marcel Montalvo is a 78 year old female admitted for Rectal bleeding. PMHx of A-fib on Xarelto and ASA. Pt reports she last took Xarelto Tuesday/Wednesday of last week, pt reports stopping when she noticed blood in her stool. Hgb/Hct upon admit 9.6/30.2. GI and Cardiology consulted. Rectal bleeding likely secondary to hemorrhoids given the presentation and stable Hgb. GI recommended starting Anusol supporitories BID and daily Colace. Unsedated flex sig is an option to exclude other etiologies for rectal bleeding. Colonoscopy had been recently deferred to due co morbidities. CT abdomen/pelvis  with contrast showed moderate ascites. Recent US was negative for fluid so paracentesis was not performed. Diagnostic paracentesis to be performed tomorrow, 05/21/18. Hgb/Hct has remained stable, anticoagulation will be resumed once paracentesis done. Paracentesis obtained today, 05/21/18, removed 2250 mL of julia fluid. Fluid sent for analysis revealed  -- SBP. Gram stain pending. Pt transferred inpatient for antibiotic therapy to treat SBP.      Review of Systems   Constitutional: Positive for activity change.   HENT: Negative.    Eyes: Negative.    Respiratory: Negative for apnea, cough, choking, chest tightness, shortness of breath, wheezing and stridor.    Cardiovascular: Negative for chest pain, palpitations and leg swelling.   Gastrointestinal: Positive for blood in stool. Negative for abdominal pain, diarrhea, nausea and vomiting.   Endocrine: Negative.    Genitourinary: Negative.    Musculoskeletal: Negative.    Skin: Negative.    Allergic/Immunologic: Negative.    Neurological: Negative for dizziness, tremors, seizures, syncope, facial asymmetry, speech difficulty, weakness, light-headedness, numbness and headaches.   Hematological: Negative.    Psychiatric/Behavioral: Negative.      Objective:     Vital Signs (Most Recent):  Temp: 98.1 °F (36.7 °C) (05/21/18 1625)  Pulse: 61 (05/21/18 1625)  Resp: 20 (05/21/18 1625)  BP: (!) 108/51 (05/21/18 1625)  SpO2: 96 % (05/21/18 1625) Vital Signs (24h Range):  Temp:  [96.2 °F (35.7 °C)-98.8 °F (37.1 °C)] 98.1 °F (36.7 °C)  Pulse:  [55-62] 61  Resp:  [18-20] 20  SpO2:  [92 %-98 %] 96 %  BP: (108-138)/(51-65) 108/51     Weight: 56 kg (123 lb 7.3 oz)  Body mass index is 22.58 kg/m².    Intake/Output Summary (Last 24 hours) at 05/21/18 1625  Last data filed at 05/21/18 1200   Gross per 24 hour   Intake              700 ml   Output              675 ml   Net               25 ml      Physical Exam   Constitutional: She is oriented to person, place, and time. She  appears well-developed and well-nourished. No distress.   HENT:   Head: Normocephalic and atraumatic.   Eyes: EOM are normal.   Neck: Normal range of motion. Neck supple.   Cardiovascular: Intact distal pulses.  Bradycardia present.    Murmur heard.  Pulmonary/Chest: Effort normal and breath sounds normal. No respiratory distress. She has no wheezes. She has no rales. She exhibits no tenderness.   Abdominal: Soft. Bowel sounds are normal. She exhibits distension. There is no tenderness. There is no rebound and no guarding.   Genitourinary:   Genitourinary Comments: Deferred   Musculoskeletal: Normal range of motion.   Neurological: She is alert and oriented to person, place, and time. No sensory deficit.   Skin: Skin is warm and dry. Capillary refill takes less than 2 seconds.   Psychiatric: She has a normal mood and affect. Her behavior is normal. Judgment and thought content normal.   Nursing note and vitals reviewed.      Significant Labs:   CBC:   Recent Labs  Lab 05/20/18  0409 05/20/18  1123 05/21/18  0003 05/21/18  0518   WBC 11.20  --   --  8.99   HGB 9.2*  9.2* 8.7* 8.8* 8.4*  8.4*   HCT 29.3*  29.3* 28.0* 28.6* 26.9*  26.9*     --   --  382*     CMP:   Recent Labs  Lab 05/20/18  0409 05/21/18 0518    137   K 5.4* 4.7    105   CO2 18* 23   * 134*   BUN 30* 34*   CREATININE 1.3 1.5*   CALCIUM 9.1 8.8   PROT 7.0 6.4   ALBUMIN 2.9* 2.8*   BILITOT 1.2* 0.9   ALKPHOS 327* 319*   AST 67* 47*   ALT 41 35   ANIONGAP 12 9   EGFRNONAA 39* 33*     POCT Glucose:   Recent Labs  Lab 05/20/18  2053 05/21/18  0534 05/21/18  1116   POCTGLUCOSE 179* 135* 115*       Significant Imaging:   Imaging Results          X-Ray Lumbar Spine 2 Or 3 Views (Final result)  Result time 05/19/18 12:42:24    Final result by Wyatt Daniels MD (05/19/18 12:42:24)                 Impression:      Lumbar spine is intact and in good alignment.  Some mild chronic degenerative change is  present.      Electronically signed by: Bimal Powell MD  Date:    05/19/2018  Time:    12:42             Narrative:    EXAMINATION:  XR LUMBAR SPINE 2 OR 3 VIEWS    CLINICAL HISTORY:  Low back pain, risk factors (osteoporosis or chronic steroid use or elderly);    TECHNIQUE:  AP, lateral and spot images were performed of the lumbar spine.    COMPARISON:  None    FINDINGS:  The lumbar spine appears intact in good alignment.  Some mild disc space narrowing with spurring is present at the L2-3 and L3-4 levels.                               X-Ray Hip 2 or 3 views Right (Final result)  Result time 05/19/18 12:43:01    Final result by Wyatt Powell MD (05/19/18 12:43:01)                 Impression:      Negative exam.      Electronically signed by: Bimal Powell MD  Date:    05/19/2018  Time:    12:43             Narrative:    EXAMINATION:  XR HIP 2 VIEW RIGHT    CLINICAL HISTORY:  right hip pain;    TECHNIQUE:  Standard radiography performed.    COMPARISON:  None    FINDINGS:  Bone density and architecture are normal.  No acute findings.                               CT Abdomen Pelvis  Without Contrast (Final result)  Result time 05/19/18 08:50:47    Final result by Jayjay Roberts MD (05/19/18 08:50:47)                 Impression:      1. Moderate to large volume of ascites throughout the abdomen and pelvis.  2. Cardiomegaly.  3. No bowel obstruction.  All CT scans at this facility use dose modulation, iterative reconstruction and/or weight based dosing when appropriate to reduce radiation dose to as low as reasonably achievable.      Electronically signed by: Jayjay Roberts MD  Date:    05/19/2018  Time:    08:50             Narrative:    EXAMINATION:  CT ABDOMEN PELVIS WITHOUT CONTRAST    CLINICAL HISTORY:  Rectal bleeding 7 onset 3 days ago.    TECHNIQUE:  Axial CT images performed through the abdomen and pelvis without intravenous contrast. Multiplanar reformats were performed and  interpreted.    COMPARISON:  CT abdomen and pelvis, 01/16/2018.    FINDINGS:  There is cardiomegaly.  There is centrilobular emphysema in the lung bases and bilateral basilar mild atelectasis.    No urolithiasis, hydronephrosis, or perinephric stranding.    There is no bowel obstruction.  No pneumoperitoneum.    There is a moderate to large volume of ascites in the abdomen and pelvis.    The urinary bladder is unremarkable. The patient has had a hysterectomy.  There is marked calcific atherosclerotic plaque throughout the abdominal aorta.    No significant osseous abnormality is identified.                              Assessment/Plan:      * SBP (spontaneous bacterial peritonitis)    - Diagnostic paracentesis performed today -- 2250 mL of julia fluid removed  -   - GI following -- will start antibiotics for tx of SBP   - Gram stain pending  - Continue Lasix 20 mg PO BID          Rectal bleeding    - GI following  - Hgb/Hct slightly dropped on AM labs -- had two bloody stools yesterday  - Rectal bleeding likely secondary to hemorrhoids given the presentation and stable Hgb. GI recommended starting Anusol supporitories BID and daily Colace. Unsedated flex sig is an option to exclude other etiologies for rectal bleeding.  - Continue PPI  - Discussed case with Odette Guillermo NP (GI) -- will likely have unsedated flex sig tomorrow, 05/22/18. Continue to hold Xarelto and ASA           Pulmonary HTN    - Continue Ambrisentan         Abnormal liver enzymes    - Improving  - Will continue to monitor          DOMINGO (acute kidney injury)    - Creatinine slightly increased from 1.3 to 1.5  - Restarted Lasix yesterday, 05/20/18, due to ascites  - Will continue to monitor          Chronic atrial fibrillation    - Continue Flecainide and B blocker  - Cardiology following -- Ok to re-start anticoagulation, bleeding likely secondary to hemorrhoids  - Continue Xarelto and ASA after flex sig tomorrow, 05/22/18           Essential hypertension    - Continue B blocker        Coronary artery disease involving coronary bypass graft without angina pectoris    - No complaints of angina  - Resume home medications  - Continue ASA and Xarelto upon discharge (Cardiology and GI have agreed to resume anticoagulation)        Type 2 diabetes mellitus without complication    - Accuchecks and low dose SSI          VTE Risk Mitigation         Ordered     Place sequential compression device  Until discontinued      05/19/18 6747              Sheryl Mobley, RADHIKA  Department of Hospital Medicine   [facility]

## 2018-05-21 NOTE — PLAN OF CARE
Problem: Patient Care Overview  Goal: Plan of Care Review  Outcome: Ongoing (interventions implemented as appropriate)  Patient had a restful night, still complains of chronic pain to right hip. Ambulates with assistance to restroom, o2 @ 2 liters, remains afib on monitor, one soft formed brown bowel movement noted overnight.  No signs of bleeding.

## 2018-05-21 NOTE — NURSING
Chart screened for diabetes  patient has been seen by this nurse on previous admit  Please consult if any diabetes educational needs are identified

## 2018-05-21 NOTE — ASSESSMENT & PLAN NOTE
Likely secondary to hemorrhoids given the presentation    Bleeding slowed on Anusol Suppositories BID  Daily colace  Unsedated flex sig is an option to exclude other etiologies for rectal bleeding, given SBP will hold off on flex sig.   Colonoscopy had been recently deferred to due co morbidities.

## 2018-05-21 NOTE — ASSESSMENT & PLAN NOTE
- Continue Flecainide and B blocker  - Cardiology following -- Ok to re-start anticoagulation, bleeding likely secondary to hemorrhoids  - Continue Xarelto and ASA after flex sig tomorrow, 05/22/18

## 2018-05-21 NOTE — ASSESSMENT & PLAN NOTE
- No complaints of angina  - Resume home medications  - Continue ASA and Xarelto upon discharge (Cardiology and GI have agreed to resume anticoagulation)

## 2018-05-21 NOTE — ASSESSMENT & PLAN NOTE
DESOUZA is probable etiology vs congestive hepatopathy   Liver biopsy was deferred due to co morbidities  CT with mod ascites, recent US was negative for fluid so paracentesis was not performed.   Diagnostic paracentesis with SBP  Resume outpatient diuretics.

## 2018-05-21 NOTE — H&P (VIEW-ONLY)
"[facility]  Cardiology  Consult Note    Patient Name: Marcel Montalvo  MRN: [id number]  Admission Date: 5/19/2018  Hospital Length of Stay: 0 days  Code Status: Full Code   Attending Provider: Moy Sanderson MD   Consulting Provider: Maribel Michel PA-C  Primary Care Physician: Alexandra Moreno MD  Principal Problem:Rectal bleeding    Patient information was obtained from patient, past medical records and ER records.     Inpatient consult to Cardiology  Consult performed by: MARIBEL MICHEL.  Consult ordered by: AVTAR DAILEY        Subjective:     Chief Complaint: Rectal bleeding    HPI:   Ms. Montalvo is a 78 year old female patient with a PMHx of CAD s/p CAGB, atrial fibrillation (on Xarelto), pulmonary HTN (on supplemental O2), fatty liver, hyperlipidemia, and carotid artery disease who presented to [facility] ED yesterday with a chief complaint of bleeding with bowel movements for the past 3 days. Patient reported "moderate blood loss" that was bright red in color. Associated symptoms included mild dizziness and right sided back pain. Patient denied any associated SOB, chest pain, palpitations, near syncope, or syncope. Initial workup in ED revealed anemia (9.6/30/2) and patient subsequently admitted for further evaluation and treatment. Cardiology consulted to assist with management. Patient seen and examined today, resting in bed. Feels ok. Reports blood in stool yesterday while in hospital after a BM. No recurrence since that time. Complains of right lower back pain and abdominal bloating. Denies chest pain. Reports SOB seems at baseline. Compliant with her medications. Chart reviewed. H and H stable overnight. CT of abdomen showed moderate to large amount of ascites throughout abdomen and pelvis. GI consulted, note reviewed. Bleeding likely secondary to hemorrhoids, ok to resume anticoagulation. Diagnostic paracentesis planned for today. EKG this AM reviewed by Dr. Mcnally, probable sinus " arrhythmia with slow response.           Past Medical History:   Diagnosis Date    *Atrial fibrillation     AF (atrial fibrillation)     Palpation: atrial fibrillation, on Coumadin and followed by cardiologist.    Anticoagulant long-term use     Anxiety     Aortic insufficiency 3/19/2014    Aortic regurgitation     Aortic regurgitation/tricuspid regurgitation per MARIO in April 2007.     Asthma     Carotid artery stenosis     CEA on the left by Dr. Maciel    Coronary artery disease     status post CABG X 3.     Diabetes mellitus     GERD (gastroesophageal reflux disease)     H. pylori infection     treated    Hyperlipidemia     intolerant to statins.    Hypertension 8/7/2013    IBS (irritable bowel syndrome)     Leg pain 3/19/2014    Long-term (current) use of anticoagulants 8/7/2013    Osteoarthritis     Osteoarthritis     Osteopenia     DEXA scan done on 06/20/12     Pancreatitis     resolved    S/P CABG (coronary artery bypass graft) 8/7/2013    S/P carotid endarterectomy 8/7/2013    S/P PTCA (percutaneous transluminal coronary angioplasty) 3/19/2014    Sjogren's syndrome     Stroke     Vitamin D deficiency disease        Past Surgical History:   Procedure Laterality Date    ABDOMINAL SURGERY      APPENDECTOMY      bilateral cataract surgery      CARPAL TUNNEL RELEASE      RT    CATARACT EXTRACTION      CEA      left    CHOLECYSTECTOMY      CORONARY ARTERY BYPASS GRAFT      2 stents    EYE SURGERY      HYSTERECTOMY      TONSILLECTOMY         Review of patient's allergies indicates:   Allergen Reactions    Codeine Nausea And Vomiting    Lisinopril      Other reaction(s): cough    Pacerone [amiodarone] Nausea And Vomiting    Sulfa (sulfonamide antibiotics) Nausea And Vomiting    Celecoxib Palpitations    Ciprofloxacin Hives, Itching and Rash    Colesevelam Rash and Nausea And Vomiting    Doxycycline Rash    Statins-hmg-coa reductase inhibitors Rash     Myalgia(can take  atorvastatin ok -no rhabdo)per nurse josé antonio and patient  / stomach upset       No current facility-administered medications on file prior to encounter.      Current Outpatient Prescriptions on File Prior to Encounter   Medication Sig    albuterol (PROAIR HFA) 90 mcg/actuation inhaler Inhale 2 puffs into the lungs every 6 (six) hours as needed.    albuterol (PROVENTIL) 2.5 mg /3 mL (0.083 %) nebulizer solution Take 3 mLs (2.5 mg total) by nebulization every 6 (six) hours as needed for Wheezing or Shortness of Breath. Rescue    ambrisentan (LETAIRIS) 5 MG Tab Take 1 tablet (5 mg total) by mouth once daily.    aspirin (ECOTRIN) 81 MG EC tablet Take 1 tablet (81 mg total) by mouth once daily.    atorvastatin (LIPITOR) 40 MG tablet TAKE ONE TABLET BY MOUTH NIGHTLY    azelastine (ASTELIN) 137 mcg (0.1 %) nasal spray 1 spray by Nasal route 2 (two) times daily.    biotin 1 mg tablet Take 1,000 mcg by mouth 3 (three) times daily.    budesonide-formoterol 160-4.5 mcg (SYMBICORT) 160-4.5 mcg/actuation HFAA Inhale 2 puffs into the lungs every 12 (twelve) hours. Wash out mouth after using    carboxymethylcellulose (REFRESH PLUS) 0.5 % Dpet Place 1 drop into both eyes 3 (three) times daily as needed.    diclofenac sodium (VOLTAREN) 1 % Gel Apply 2 g topically once daily.    escitalopram oxalate (LEXAPRO) 10 MG tablet TAKE ONE-HALF TO ONE TABLET BY MOUTH EVERY DAY    flecainide (TAMBOCOR) 100 MG Tab TAKE ONE TABLET BY MOUTH TWICE DAILY STARTING SUNDAY. (DISCONTINUE RYTHMOL)    fluticasone (FLONASE) 50 mcg/actuation nasal spray 2 sprays by Each Nare route once daily.    furosemide (LASIX) 20 MG tablet Take 1 tablet (20 mg total) by mouth 2 (two) times daily. As of 8/4/17    nebivolol (BYSTOLIC) 5 MG Tab TAKE ONE TABLET BY MOUTH TWICE DAILY    nitroGLYCERIN 0.2 mg/hr TD PT24 (NITRODUR) 0.2 mg/hr Place 1 patch onto the skin once daily.    pantoprazole (PROTONIX) 40 MG tablet Take 1 tablet (40 mg total) by mouth 2  (two) times daily. As of 8/4/17    phenobarb-hyoscy-atropine-scop (BELLADONNA-PHENOBARBITAL) 16.2-0.1037 -0.0194 mg/5 mL Elix Take 5 mLs by mouth daily as needed (abdominal pain).    potassium chloride (KLOR-CON) 10 MEQ TbSR Take 1 tablet (10 mEq total) by mouth once daily.    pramoxine 1 % Foam Place rectally 3 (three) times daily as needed.    RESTASIS 0.05 % ophthalmic emulsion INSTILL ONE DROP INTO EACH EYE TWICE DAILY    rivaroxaban (XARELTO) 20 mg Tab Take 1 tablet (20 mg total) by mouth daily with dinner or evening meal.    vitamin D 1000 units Tab Take 2,000 Units by mouth once daily.    nitroGLYCERIN (NITROSTAT) 0.4 MG SL tablet Place 1 tablet (0.4 mg total) under the tongue every 5 (five) minutes as needed for Chest pain.    PREMARIN vaginal cream PLACE 1 GRAM VAGINALLY TWICE A WEEK    triamcinolone acetonide 0.1% (KENALOG) 0.1 % ointment AAA bid prn     Family History     Problem Relation (Age of Onset)    Alzheimer's disease Mother    Cancer Father, Brother, Paternal Grandmother, Paternal Grandfather    Heart disease Maternal Uncle    Hyperlipidemia Mother        Social History Main Topics    Smoking status: Former Smoker     Packs/day: 0.50     Years: 25.00     Quit date: 12/17/1988    Smokeless tobacco: Never Used    Alcohol use 0.0 oz/week      Comment: wine occasionally    Drug use: No    Sexual activity: Yes     Partners: Male     Review of Systems   Constitution: Positive for malaise/fatigue.   HENT: Negative.    Eyes: Negative.    Cardiovascular: Positive for dyspnea on exertion (chronic).   Respiratory: Positive for shortness of breath (chronic).    Endocrine: Negative.    Hematologic/Lymphatic: Bruises/bleeds easily.   Skin: Negative.    Musculoskeletal: Negative.    Gastrointestinal: Positive for hematochezia and hemorrhoids.   Genitourinary: Negative.    Neurological: Positive for dizziness.   Psychiatric/Behavioral: Negative.    Allergic/Immunologic: Negative.      Objective:      Vital Signs (Most Recent):  Temp: 97.9 °F (36.6 °C) (05/20/18 0745)  Pulse: (!) 52 (05/20/18 0901)  Resp: 18 (05/20/18 0748)  BP: (!) 163/71 (05/20/18 0745)  SpO2: 97 % (05/20/18 0748) Vital Signs (24h Range):  Temp:  [97.5 °F (36.4 °C)-98.5 °F (36.9 °C)] 97.9 °F (36.6 °C)  Pulse:  [52-72] 52  Resp:  [17-23] 18  SpO2:  [92 %-99 %] 97 %  BP: (117-183)/(56-76) 163/71     Weight: 56 kg (123 lb 7.3 oz)  Body mass index is 22.58 kg/m².    SpO2: 97 %  O2 Device (Oxygen Therapy): nasal cannula      Intake/Output Summary (Last 24 hours) at 05/20/18 1059  Last data filed at 05/20/18 0900   Gross per 24 hour   Intake             2010 ml   Output             1250 ml   Net              760 ml       Lines/Drains/Airways     Peripheral Intravenous Line                 Peripheral IV - Single Lumen 05/20/18 0230 Right Hand less than 1 day                Physical Exam   Constitutional: She is oriented to person, place, and time. She appears well-developed and well-nourished. No distress.   On O2   HENT:   Head: Normocephalic and atraumatic.   Eyes: Pupils are equal, round, and reactive to light. Right eye exhibits no discharge. Left eye exhibits no discharge.   Neck: Neck supple. JVD present.   Cardiovascular: S1 normal and S2 normal. Regular rhythm present. Bradycardia present.    Murmur heard.   Harsh midsystolic murmur is present  at the upper right sternal border radiating to the neck   Diastolic murmur is present with a grade of 1/6   Pulmonary/Chest: Effort normal.   Coarse BS at bases   Abdominal: Soft. She exhibits distension.   +ascites  +hepatomegaly   Musculoskeletal: She exhibits no edema.   Neurological: She is alert and oriented to person, place, and time.   Skin: Skin is warm and dry. She is not diaphoretic.   Psychiatric: She has a normal mood and affect. Her behavior is normal. Thought content normal.   Nursing note and vitals reviewed.      Significant Labs:   [facility]   Recent Labs  Lab 05/19/18  0736  05/19/18  1333 05/20/18  0409     --  136   K 5.3* 4.9 5.4*     --  106   CO2 28  --  18*   *  --  121*   BUN 34*  --  30*   CREATININE 1.6*  --  1.3   CALCIUM 9.7  --  9.1   PROT 7.1  --  7.0   ALBUMIN 3.1*  --  2.9*   BILITOT 0.9  --  1.2*   ALKPHOS 352*  --  327*   AST 56*  --  67*   ALT 45*  --  41   ANIONGAP 9  --  12   ESTGFRAFRICA 35*  --  45*   EGFRNONAA 30*  --  39*   , CBC   Recent Labs  Lab 05/19/18  0745  05/19/18  1752 05/20/18  0011 05/20/18  0409   WBC 10.47  --   --   --  11.20   HGB 9.6*  < > 10.3* 9.2* 9.2*  9.2*   HCT 30.2*  < > 32.8* 29.0* 29.3*  29.3*   *  --   --   --  329   < > = values in this interval not displayed., Troponin No results for input(s): TROPONINI in the last 48 hours. and All pertinent lab results from the last 24 hours have been reviewed.    Significant Imaging: Echocardiogram:   2D echo with color flow doppler:   Results for orders placed or performed in visit on 08/16/17   2D Echo w/ Color Flow Doppler   Result Value Ref Range    EF 55 55 - 65    Mitral Valve Regurgitation MILD     Diastolic Dysfunction Yes (A)     Aortic Valve Regurgitation MILD     Est. PA Systolic Pressure 49.73 (A)     Tricuspid Valve Regurgitation MODERATE (A)     and X-Ray: CXR: X-Ray Chest 1 View (CXR): No results found for this visit on 05/19/18. and X-Ray Chest PA and Lateral (CXR): No results found for this visit on 05/19/18.    Assessment and Plan:   Patient with history of afib, on Xarelto, who presents with rectal bleeding. Stable overnight, GI rec's noted and appreciated. Ok to re-start anticoagulation. Diagnostic paracentesis planned for today to evaluate ascites.     * Rectal bleeding    -Likely due to hemorrhoids  -H and H stable overnight  -Ok to re-start anticoagulation per discussion with GI        Other ascites    -CT of abdomen shower moderate to large amount of ascites within abdomen and pelvis  -Stop IV fluids  -Resume diuretics  -Discussed with GI and  hospital medicine, diagnostic paracentesis planned for today        On anticoagulant therapy    -GI note reviewed, rec's appreciated  -Ok to re-start anticoagulation, bleeding likely secondary to hemorrhoids  -H and H stable overnight        Chronic atrial fibrillation    -Continue Flecainide and Nebivolol  -Continue Xarelto for CVA prophylaxis         Essential hypertension    -Continue home medications        Coronary artery disease involving coronary bypass graft without angina pectoris    -Stable no angina  -Continue current meds            VTE Risk Mitigation         Ordered     Place sequential compression device  Until discontinued      05/19/18 9841          Thank you for your consult. I will follow-up with patient. Please contact us if you have any additional questions.    Maribel Beal PA-C  Cardiology   [facility]    Chart reviewed. Dr. Mcnally examined patient and agrees with plan as outlined above.

## 2018-05-21 NOTE — SUBJECTIVE & OBJECTIVE
Subjective:     Interval History:    Patient with mild abdominal discomfort  No fever  Had one bowel movement with small streak of blood. States it is significantly decreased as compared to past few days    Review of Systems   Constitutional: Negative for activity change, appetite change, chills, diaphoresis, fatigue and fever.   HENT: Negative for congestion, ear pain, facial swelling, mouth sores, nosebleeds, rhinorrhea, sore throat and voice change.    Eyes: Negative for photophobia, pain, discharge, redness and visual disturbance.   Respiratory: Negative for apnea, cough, choking, chest tightness and wheezing.    Cardiovascular: Negative for chest pain, palpitations and leg swelling.   Gastrointestinal:        As per HPI   Genitourinary: Negative for decreased urine volume, difficulty urinating, dysuria, frequency and hematuria.   Musculoskeletal: Positive for arthralgias and back pain. Negative for myalgias, neck pain and neck stiffness.   Skin: Negative for pallor and rash.   Neurological: Positive for numbness. Negative for tremors, seizures, syncope, weakness and headaches.   Hematological: Negative.  Negative for adenopathy. Does not bruise/bleed easily.   Psychiatric/Behavioral: Negative for behavioral problems, confusion, hallucinations and sleep disturbance. The patient is not nervous/anxious.      Objective:     Vital Signs (Most Recent):  Temp: 98.1 °F (36.7 °C) (05/21/18 1625)  Pulse: 61 (05/21/18 1625)  Resp: 20 (05/21/18 1625)  BP: (!) 108/51 (05/21/18 1625)  SpO2: 96 % (05/21/18 1625) Vital Signs (24h Range):  Temp:  [96.2 °F (35.7 °C)-98.8 °F (37.1 °C)] 98.1 °F (36.7 °C)  Pulse:  [57-62] 61  Resp:  [18-20] 20  SpO2:  [92 %-98 %] 96 %  BP: (108-138)/(51-65) 108/51     Weight: 56 kg (123 lb 7.3 oz) (05/20/18 0745)  Body mass index is 22.58 kg/m².      Intake/Output Summary (Last 24 hours) at 05/21/18 1846  Last data filed at 05/21/18 1800   Gross per 24 hour   Intake             1180 ml   Output               850 ml   Net              330 ml       Lines/Drains/Airways     Peripheral Intravenous Line                 Peripheral IV - Single Lumen 05/20/18 0230 Right Hand 1 day                Physical Exam   Constitutional: She is oriented to person, place, and time. She appears well-developed and well-nourished. No distress.   HENT:   Head: Normocephalic and atraumatic.   Nose: Nose normal.   Mouth/Throat: Oropharynx is clear and moist.   Eyes: EOM are normal. Pupils are equal, round, and reactive to light. Right eye exhibits no discharge. Left eye exhibits no discharge. No scleral icterus.   Neck: Normal range of motion. Neck supple. No tracheal deviation present.   Cardiovascular: Normal rate and intact distal pulses.    Murmur heard.  Pulmonary/Chest: Effort normal and breath sounds normal. No stridor. No respiratory distress. She has no wheezes. She has no rales. She exhibits no tenderness.   Abdominal: Soft. Bowel sounds are normal. She exhibits distension. She exhibits no mass. There is no tenderness. There is no rebound and no guarding. No hernia.   Mild distention   Musculoskeletal: Normal range of motion. She exhibits no edema or tenderness.   Lymphadenopathy:     She has no cervical adenopathy.   Neurological: She is alert and oriented to person, place, and time. She has normal reflexes.   Skin: Skin is warm and dry. She is not diaphoretic.   Psychiatric: She has a normal mood and affect. Her behavior is normal. Judgment and thought content normal.       Significant Labs:  CBC:   Recent Labs  Lab 05/20/18  0409  05/21/18  0003 05/21/18  0518 05/21/18  1653   WBC 11.20  --   --  8.99  --    HGB 9.2*  9.2*  < > 8.8* 8.4*  8.4* 8.7*   HCT 29.3*  29.3*  < > 28.6* 26.9*  26.9* 27.6*     --   --  382*  --    < > = values in this interval not displayed.  CMP:   Recent Labs  Lab 05/21/18 0518   *   CALCIUM 8.8   ALBUMIN 2.8*   PROT 6.4      K 4.7   CO2 23      BUN 34*   CREATININE  1.5*   ALKPHOS 319*   ALT 35   AST 47*   BILITOT 0.9     Coagulation: No results for input(s): PT, INR, APTT in the last 48 hours.  Peritoneal Fluid Cultures: No results for input(s): AEROBICCULTU, LABGRAM in the last 48 hours.      Significant Imaging:  Imaging results within the past 24 hours have been reviewed.       Current Facility-Administered Medications:     acetaminophen tablet 650 mg, 650 mg, Oral, Q6H PRN, Alvaro Ahuja MD, 650 mg at 05/21/18 1539    atorvastatin tablet 40 mg, 40 mg, Oral, Nightly, Bob Morgan NP, 40 mg at 05/20/18 2004    dextrose 50% injection 12.5 g, 12.5 g, Intravenous, PRN, Bob Morgan NP    dextrose 50% injection 25 g, 25 g, Intravenous, PRN, Bob Morgan NP    docusate sodium capsule 100 mg, 100 mg, Oral, Daily, RADHIKA Beasley, 100 mg at 05/21/18 0840    flecainide tablet 100 mg, 100 mg, Oral, Q12H, Bob Morgan NP, 100 mg at 05/21/18 0840    furosemide tablet 20 mg, 20 mg, Oral, BID, RADHIKA Beasley, 20 mg at 05/21/18 1754    glucagon (human recombinant) injection 1 mg, 1 mg, Intramuscular, PRN, Bob Morgan NP    glucose chewable tablet 16 g, 16 g, Oral, PRN, Bob Morgan NP    glucose chewable tablet 24 g, 24 g, Oral, PRN, Bob Morgan NP    hydrocortisone suppository 25 mg, 25 mg, Rectal, BID, CARLITOS BeasleyP, 25 mg at 05/21/18 0840    insulin aspart U-100 pen 0-5 Units, 0-5 Units, Subcutaneous, QID (AC + HS) PRN, Bob Morgan NP    nebivolol tablet 5 mg, 5 mg, Oral, BID, Bob Morgan NP, 5 mg at 05/21/18 0841    nitroGLYCERIN 0.2 mg/hr TD PT24 1 patch, 1 patch, Transdermal, Daily, Cameron Blanco Jr., MD, 1 patch at 05/21/18 0841    ondansetron injection 4 mg, 4 mg, Intravenous, Q8H PRN, Cameron Blanco Jr., MD    pantoprazole 40 mg in dextrose 5 % 100 mL IVPB (over 15 minutes) (ready to mix system), 40 mg, Intravenous, Daily, Cameron Blanco Jr., MD, 40 mg at 05/21/18 0840

## 2018-05-21 NOTE — ASSESSMENT & PLAN NOTE
- Creatinine slightly increased from 1.3 to 1.5  - Restarted Lasix yesterday, 05/20/18, due to ascites  - Will continue to monitor

## 2018-05-22 NOTE — PROGRESS NOTES
[facility]  Gastroenterology  Progress Note    Patient Name: Marcel Montalvo  MRN: [id number]  Admission Date: 5/19/2018  Hospital Length of Stay: 1 days  Code Status: Full Code   Attending Provider: Moy Sanderson MD  Consulting Provider: Emeka Alejo PA-C  Primary Care Physician: Alexandra Moreno MD  Principal Problem: SBP (spontaneous bacterial peritonitis)      Subjective:     Interval History:   The patient is feeling better after paracentesis. She feels she can breath better. She still has some pain in the RLQ which is chronic. She is eating well. She had a BM with some blood but she says it is getting darker and less volume. She started a medicated suppository last night.     Review of Systems   Constitutional:        See Interval History for daily ROS.      Objective:     Vital Signs (Most Recent):  Temp: 98.7 °F (37.1 °C) (05/22/18 1137)  Pulse: 64 (05/22/18 1137)  Resp: 18 (05/22/18 0802)  BP: (!) 121/56 (05/22/18 1137)  SpO2: 95 % (05/22/18 1137) Vital Signs (24h Range):  Temp:  [97.7 °F (36.5 °C)-98.7 °F (37.1 °C)] 98.7 °F (37.1 °C)  Pulse:  [55-65] 64  Resp:  [18] 18  SpO2:  [94 %-99 %] 95 %  BP: (121-165)/(56-70) 121/56     Weight: 56 kg (123 lb 7.3 oz) (05/20/18 0745)  Body mass index is 22.58 kg/m².      Intake/Output Summary (Last 24 hours) at 05/22/18 1644  Last data filed at 05/22/18 1230   Gross per 24 hour   Intake              770 ml   Output              900 ml   Net             -130 ml       Lines/Drains/Airways     Peripheral Intravenous Line                 Peripheral IV - Single Lumen 05/20/18 0230 Right Hand 2 days                Physical Exam   Constitutional: She is oriented to person, place, and time. She appears well-developed and well-nourished. No distress.   HENT:   Head: Normocephalic and atraumatic.   Eyes: EOM are normal.   Cardiovascular: Normal rate and regular rhythm.    Pulmonary/Chest: Effort normal and breath sounds normal. No respiratory distress. She has no  wheezes.   Abdominal: Soft. Bowel sounds are normal. She exhibits no distension. There is no tenderness.   Neurological: She is alert and oriented to person, place, and time. No cranial nerve deficit.   Skin: She is not diaphoretic.   Psychiatric: Her behavior is normal.       Significant Labs:  CBC:   Recent Labs  Lab 05/21/18  0518 05/21/18  1653 05/22/18  0437   WBC 8.99  --  9.16   HGB 8.4*  8.4* 8.7* 8.6*   HCT 26.9*  26.9* 27.6* 27.9*   *  --  399*     CMP:   Recent Labs  Lab 05/22/18  0437   *   CALCIUM 9.1   ALBUMIN 2.7*   PROT 6.4   *   K 4.8   CO2 28      BUN 38*   CREATININE 1.7*   ALKPHOS 334*   ALT 38   AST 51*   BILITOT 0.6     Coagulation: No results for input(s): PT, INR, APTT in the last 48 hours.      Significant Imaging:  Imaging results within the past 24 hours have been reviewed.    Assessment/Plan:     * SBP (spontaneous bacterial peritonitis)    S/p Paracentesis with WBC count consistent with SBP.   Continue Ceftriaxone 2g IV daily.   She can likely be discharged tomorrow on Augmentin 875 mg bid x 7 days.           Abnormal liver enzymes    DESOUZA is probable etiology vs congestive hepatopathy               Rectal bleeding    Likely secondary to hemorrhoids given the presentation    Continue with medicated suppositories bid.   Daily colace        Anemia    Stable overnight.   Continue to monitor.         Chronic atrial fibrillation    Continue anticoagulation.             Thank you for your consult. I will follow-up with patient. Please contact us if you have any additional questions.    Emeka Alejo PA-C  Gastroenterology  [facility]

## 2018-05-22 NOTE — PROGRESS NOTES
Pharmacist Intervention IV to PO Note    Marcel Montalvo is a 78 y.o. female being treated with IV medication Pantoprazole 40 mg Daily    Patient Data:    Vital Signs (Most Recent):  Temp: 97.7 °F (36.5 °C) (05/21/18 2359)  Pulse: (!) 55 (05/21/18 2359)  Resp: 18 (05/21/18 2359)  BP: 135/60 (05/21/18 2359)  SpO2: 96 % (05/21/18 2359)   Vital Signs (72h Range):  Temp:  [96.2 °F (35.7 °C)-98.8 °F (37.1 °C)]   Pulse:  [49-85]   Resp:  [15-24]   BP: (108-188)/(51-77)   SpO2:  [91 %-99 %]      CBC:    Recent Labs     Lab 05/20/18  0409  05/21/18  0518 05/21/18  1653 05/22/18  0437   WBC 11.20 --  8.99 --  9.16   RBC 3.36* --  3.06* --  3.12*   HGB 9.2*  9.2* < > 8.4*  8.4* 8.7* 8.6*   HCT 29.3*  29.3* < > 26.9*  26.9* 27.6* 27.9*    --  382* --  399*   MCV 87 --  88 --  89   MCH 27.4 --  27.5 --  27.6   MCHC 31.4* --  31.2* --  30.8*   < > = values in this interval not displayed.     CMP:     Recent Labs     Lab 05/19/18  0745 05/19/18  1333 05/20/18  0409 05/21/18 0518   * --  121* 134*   CALCIUM 9.7 --  9.1 8.8   ALBUMIN 3.1* --  2.9* 2.8*   PROT 7.1 --  7.0 6.4    --  136 137   K 5.3* 4.9 5.4* 4.7   CO2 28 --  18* 23    --  106 105   BUN 34* --  30* 34*   CREATININE 1.6* --  1.3 1.5*   ALKPHOS 352* --  327* 319*   ALT 45* --  41 35   AST 56* --  67* 47*   BILITOT 0.9 --  1.2* 0.9       Dietary Orders:  Diet Orders            Diet diabetic [facility]; 1500 Calorie: Diabetic starting at 05/21 1329            Based on the following criteria, this patient qualifies for intravenous to oral conversion:  [x] The patients gastrointestinal tract is functioning (tolerating medications via oral or enteral route for 24 hours and tolerating food or enteral feeds for 24 hours).  [x] The patient is hemodynamically stable for 24 hours (heart rate <100 beats per minute, systolic blood pressure >99 mm Hg, and respiratory rate <20 breaths per minute).  [x] The patient shows clinical improvement  (afebrile for at least 24 hours and white blood cell count downtrending or normalized). Additionally, the patient must be non-neutropenic (absolute neutrophil count >500 cells/mm3).  [x] For antimicrobials, the patient has received IV therapy for at least 24 hours.    IV medication Pantoprazole 40 mg  will be changed to oral medication Pantoprazole 40 mg PO    Pharmacist's Name: Memo Riley  Pharmacist's Extension: [phone]

## 2018-05-22 NOTE — ASSESSMENT & PLAN NOTE
- Continue Flecainide and B blocker  - Cardiology following -- Ok to re-start anticoagulation, bleeding likely secondary to hemorrhoids  - Continue Xarelto and ASA   --no flex sig until SBP resolved

## 2018-05-22 NOTE — ASSESSMENT & PLAN NOTE
- No complaints of angina  - Resume home medications  - Continue ASA and Xarelto - bleeding likely hemorrhids

## 2018-05-22 NOTE — ASSESSMENT & PLAN NOTE
- Diagnostic paracentesis performed today -- 2250 mL of julia fluid removed  -   - GI following -- will start antibiotics for tx of SBP   --IV rocephin  --Gram Stain with no WBC's and no organisms seen  - Continue Lasix 20 mg PO BID

## 2018-05-22 NOTE — SUBJECTIVE & OBJECTIVE
Interval History: Patient had paracentesis yesterday with removal of 2250 cc's of julia fluid. Pt reports she is breathing much better. Continues to have some BRB per rectum. Hgb stable at 8.6. Will continue IV abx and suppositories with dc home once stable.    Review of Systems   Constitutional: Positive for activity change. Negative for chills, diaphoresis and fever.   HENT: Negative for congestion, rhinorrhea and sore throat.    Eyes: Negative for pain and visual disturbance.   Respiratory: Negative for apnea, cough, choking, chest tightness, shortness of breath, wheezing and stridor.    Cardiovascular: Negative for chest pain, palpitations and leg swelling.   Gastrointestinal: Positive for blood in stool. Negative for abdominal pain, diarrhea, nausea and vomiting.   Endocrine: Negative for cold intolerance and heat intolerance.   Genitourinary: Negative for difficulty urinating, dysuria and hematuria.   Musculoskeletal: Negative for arthralgias, back pain and myalgias.   Skin: Negative for color change and wound.   Allergic/Immunologic: Negative.    Neurological: Positive for numbness (to right leg 2/2 sciatica). Negative for dizziness, speech difficulty, weakness and headaches.   Hematological: Bruises/bleeds easily (2/2 anticoagulation).   Psychiatric/Behavioral: Negative for agitation, behavioral problems and confusion.     Objective:     Vital Signs (Most Recent):  Temp: 98.7 °F (37.1 °C) (05/22/18 1137)  Pulse: 64 (05/22/18 1137)  Resp: 18 (05/22/18 0802)  BP: (!) 121/56 (05/22/18 1137)  SpO2: 95 % (05/22/18 1137) Vital Signs (24h Range):  Temp:  [97.7 °F (36.5 °C)-98.7 °F (37.1 °C)] 98.7 °F (37.1 °C)  Pulse:  [55-65] 64  Resp:  [18] 18  SpO2:  [94 %-99 %] 95 %  BP: (121-165)/(56-70) 121/56     Weight: 56 kg (123 lb 7.3 oz)  Body mass index is 22.58 kg/m².    Intake/Output Summary (Last 24 hours) at 05/22/18 1659  Last data filed at 05/22/18 1230   Gross per 24 hour   Intake              770 ml   Output               900 ml   Net             -130 ml      Physical Exam   Constitutional: She is oriented to person, place, and time. She appears well-developed and well-nourished. No distress.   HENT:   Head: Normocephalic and atraumatic.   Nose: Nose normal.   Mouth/Throat: Oropharynx is clear and moist.   Eyes: Conjunctivae and EOM are normal. Pupils are equal, round, and reactive to light.   Neck: Normal range of motion. Neck supple. No JVD present.   Cardiovascular: Normal rate, regular rhythm and intact distal pulses.    Murmur heard.  Pulmonary/Chest: Effort normal and breath sounds normal. No respiratory distress. She has no wheezes.   Abdominal: Soft. Bowel sounds are normal. She exhibits no distension. There is no tenderness. There is no rebound and no guarding.   Genitourinary:   Genitourinary Comments: Deferred   Musculoskeletal: Normal range of motion.   Neurological: She is alert and oriented to person, place, and time. No sensory deficit.   Skin: Skin is warm and dry. Capillary refill takes less than 2 seconds.   Psychiatric: She has a normal mood and affect. Her behavior is normal. Judgment and thought content normal.   Nursing note and vitals reviewed.      Significant Labs:   Recent Lab Results       05/22/18  1132 05/22/18  0601 05/22/18  0437 05/21/18  2236      Albumin   2.7(L)      Alkaline Phosphatase   334(H)      ALT   38      Anion Gap   6(L)      AST   51(H)      Baso #   0.03      Basophil%   0.3      Total Bilirubin   0.6  Comment:  For infants and newborns, interpretation of results should be based  on gestational age, weight and in agreement with clinical  observations.  Premature Infant recommended reference ranges:  Up to 24 hours.............<8.0 mg/dL  Up to 48 hours............<12.0 mg/dL  3-5 days..................<15.0 mg/dL  6-29 days.................<15.0 mg/dL        BUN, Bld   38(H)      Calcium   9.1      Chloride   101      CO2   28      Creatinine   1.7(H)      Differential Method    Automated      eGFR if    33(A)      eGFR if non    28  Comment:  Calculation used to obtain the estimated glomerular filtration  rate (eGFR) is the CKD-EPI equation.   (A)      Eos #   0.6(H)      Eosinophil%   6.0      Glucose   157(H)      Gran # (ANC)   6.8      Gran%   74.2(H)      Hematocrit   27.9(L)      Hemoglobin   8.6(L)      Lymph #   0.9(L)      Lymph%   9.9(L)      MCH   27.6      MCHC   30.8(L)      MCV   89      Mono #   0.9      Mono%   9.6      MPV   10.5      Platelets   399(H)      POCT Glucose 165(H) 166(H)  171(H)     Potassium   4.8      Total Protein   6.4      RBC   3.12(L)      RDW   15.9(H)      Sodium   135(L)      WBC   9.16          All pertinent labs within the past 24 hours have been reviewed.    Significant Imaging: I have reviewed all pertinent imaging results/findings within the past 24 hours.

## 2018-05-22 NOTE — ASSESSMENT & PLAN NOTE
S/p Paracentesis with WBC count consistent with SBP.   Continue Ceftriaxone 2g IV daily.   She can likely be discharged tomorrow on Augmentin 875 mg bid x 7 days.

## 2018-05-22 NOTE — PROGRESS NOTES
"Patient shared concerns about taking too much Tylenol and "hurting her liver". States she would like to stretch out the time between doses if she can. Nurse teachings completed on Tylenol and max dose allowed in 24 hours. Patient completed teach back and thankful for information.   "

## 2018-05-22 NOTE — SUBJECTIVE & OBJECTIVE
Subjective:     Interval History:   The patient is feeling better after paracentesis. She feels she can breath better. She still has some pain in the RLQ which is chronic. She is eating well. She had a BM with some blood but she says it is getting darker and less volume. She started a medicated suppository last night.     Review of Systems   Constitutional:        See Interval History for daily ROS.      Objective:     Vital Signs (Most Recent):  Temp: 98.7 °F (37.1 °C) (05/22/18 1137)  Pulse: 64 (05/22/18 1137)  Resp: 18 (05/22/18 0802)  BP: (!) 121/56 (05/22/18 1137)  SpO2: 95 % (05/22/18 1137) Vital Signs (24h Range):  Temp:  [97.7 °F (36.5 °C)-98.7 °F (37.1 °C)] 98.7 °F (37.1 °C)  Pulse:  [55-65] 64  Resp:  [18] 18  SpO2:  [94 %-99 %] 95 %  BP: (121-165)/(56-70) 121/56     Weight: 56 kg (123 lb 7.3 oz) (05/20/18 0745)  Body mass index is 22.58 kg/m².      Intake/Output Summary (Last 24 hours) at 05/22/18 1644  Last data filed at 05/22/18 1230   Gross per 24 hour   Intake              770 ml   Output              900 ml   Net             -130 ml       Lines/Drains/Airways     Peripheral Intravenous Line                 Peripheral IV - Single Lumen 05/20/18 0230 Right Hand 2 days                Physical Exam   Constitutional: She is oriented to person, place, and time. She appears well-developed and well-nourished. No distress.   HENT:   Head: Normocephalic and atraumatic.   Eyes: EOM are normal.   Cardiovascular: Normal rate and regular rhythm.    Pulmonary/Chest: Effort normal and breath sounds normal. No respiratory distress. She has no wheezes.   Abdominal: Soft. Bowel sounds are normal. She exhibits no distension. There is no tenderness.   Neurological: She is alert and oriented to person, place, and time. No cranial nerve deficit.   Skin: She is not diaphoretic.   Psychiatric: Her behavior is normal.       Significant Labs:  CBC:   Recent Labs  Lab 05/21/18  0518 05/21/18  1653 05/22/18  0437   WBC 8.99   --  9.16   HGB 8.4*  8.4* 8.7* 8.6*   HCT 26.9*  26.9* 27.6* 27.9*   *  --  399*     CMP:   Recent Labs  Lab 05/22/18  0437   *   CALCIUM 9.1   ALBUMIN 2.7*   PROT 6.4   *   K 4.8   CO2 28      BUN 38*   CREATININE 1.7*   ALKPHOS 334*   ALT 38   AST 51*   BILITOT 0.6     Coagulation: No results for input(s): PT, INR, APTT in the last 48 hours.      Significant Imaging:  Imaging results within the past 24 hours have been reviewed.

## 2018-05-22 NOTE — PLAN OF CARE
Problem: Patient Care Overview  Goal: Plan of Care Review  Outcome: Ongoing (interventions implemented as appropriate)  Patient stable. Plan of care reviewed. Patient verbalizes understanding. Labs monitored. Bed low, wheels locked, bed alarm on, call light in reach. Patient instructed to call for assistance. Will continue to monitor.

## 2018-05-22 NOTE — PROGRESS NOTES
Discussed plan of care with Dr. De La Fuente -- flex sig will not be performed tomorrow, 05/22/18 or most likely not this admission. Will not perform in the setting of SBP. Will restart Xarelto and ASA as per GI/Cardiology recommendations as Hgb is stable and pt symptoms are improving.

## 2018-05-22 NOTE — PROGRESS NOTES
[facility] Hospital Medicine  Progress Note    Patient Name: Marcel Montalvo  MRN: [id number]  Patient Class: IP- Inpatient   Admission Date: 5/19/2018  Length of Stay: 1 days  Attending Physician: Moy Sanderson MD  Primary Care Provider: Alexandra Moreno MD        Subjective:     Principal Problem:SBP (spontaneous bacterial peritonitis)    HPI:  Ms Montalvo is a 78 year old female with PMHx of CAD, CABG, A Fib on Xarelto, pulmonary HTN on home O 2 at 2 L, fatty liver disease and abnormal LFT. She presented to [facility] with complaints of rectal bleeding that started about 3 days ago. Initially thought it was hemorrhoids. Associated symptoms include mild dizziness with position changes. Patient reports have lower back pain radiating to Lt hip as well with difficult walking.  Denies associated symptoms of chest pain, shortness of breath, fever, chills, nausea, vomiting or syncope. Vital signs stable. Last episode of rectal bleeding was this morning.  Patient reports has not taken Xarelto or atorvastatin in 2-3 days. She has continued to take ASA. CT of Abdomen with contrast showed moderate to large volume of ascites throughout the abdomen and pelvis, cardiomegaly and no bowel obstruction. Two months ago H & H 10.6/35.4 today H & H 9.6/30.2. Case reviewed with Dr De La Fuente, will continue monitor for now.       Hospital Course:  Marcel Montalvo is a 78 year old female admitted for Rectal bleeding. PMHx of A-fib on Xarelto and ASA. Pt reports she last took Xarelto Tuesday/Wednesday of last week, pt reports stopping when she noticed blood in her stool. Hgb/Hct upon admit 9.6/30.2. GI and Cardiology consulted. Rectal bleeding likely secondary to hemorrhoids given the presentation and stable Hgb. GI recommended starting Anusol supporitories BID and daily Colace. Unsedated flex sig is an option to exclude other etiologies for rectal bleeding. Colonoscopy had been recently deferred to due co morbidities. CT abdomen/pelvis  with contrast showed moderate ascites. Recent US was negative for fluid so paracentesis was not performed. Diagnostic paracentesis to be performed tomorrow, 05/21/18. Hgb/Hct has remained stable, anticoagulation will be resumed once paracentesis done. Paracentesis obtained today, 05/21/18, removed 2250 mL of julia fluid. Fluid sent for analysis revealed  -- SBP. Gram stain pending. Pt transferred inpatient for antibiotic therapy to treat SBP.    Interval History: Patient had paracentesis yesterday with removal of 2250 cc's of julia fluid. Pt reports she is breathing much better. Continues to have some BRB per rectum. Hgb stable at 8.6. Will continue IV abx and suppositories with dc home once stable.    Review of Systems   Constitutional: Positive for activity change. Negative for chills, diaphoresis and fever.   HENT: Negative for congestion, rhinorrhea and sore throat.    Eyes: Negative for pain and visual disturbance.   Respiratory: Negative for apnea, cough, choking, chest tightness, shortness of breath, wheezing and stridor.    Cardiovascular: Negative for chest pain, palpitations and leg swelling.   Gastrointestinal: Positive for blood in stool. Negative for abdominal pain, diarrhea, nausea and vomiting.   Endocrine: Negative for cold intolerance and heat intolerance.   Genitourinary: Negative for difficulty urinating, dysuria and hematuria.   Musculoskeletal: Negative for arthralgias, back pain and myalgias.   Skin: Negative for color change and wound.   Allergic/Immunologic: Negative.    Neurological: Positive for numbness (to right leg 2/2 sciatica). Negative for dizziness, speech difficulty, weakness and headaches.   Hematological: Bruises/bleeds easily (2/2 anticoagulation).   Psychiatric/Behavioral: Negative for agitation, behavioral problems and confusion.     Objective:     Vital Signs (Most Recent):  Temp: 98.7 °F (37.1 °C) (05/22/18 1137)  Pulse: 64 (05/22/18 1137)  Resp: 18 (05/22/18  0802)  BP: (!) 121/56 (05/22/18 1137)  SpO2: 95 % (05/22/18 1137) Vital Signs (24h Range):  Temp:  [97.7 °F (36.5 °C)-98.7 °F (37.1 °C)] 98.7 °F (37.1 °C)  Pulse:  [55-65] 64  Resp:  [18] 18  SpO2:  [94 %-99 %] 95 %  BP: (121-165)/(56-70) 121/56     Weight: 56 kg (123 lb 7.3 oz)  Body mass index is 22.58 kg/m².    Intake/Output Summary (Last 24 hours) at 05/22/18 1659  Last data filed at 05/22/18 1230   Gross per 24 hour   Intake              770 ml   Output              900 ml   Net             -130 ml      Physical Exam   Constitutional: She is oriented to person, place, and time. She appears well-developed and well-nourished. No distress.   HENT:   Head: Normocephalic and atraumatic.   Nose: Nose normal.   Mouth/Throat: Oropharynx is clear and moist.   Eyes: Conjunctivae and EOM are normal. Pupils are equal, round, and reactive to light.   Neck: Normal range of motion. Neck supple. No JVD present.   Cardiovascular: Normal rate, regular rhythm and intact distal pulses.    Murmur heard.  Pulmonary/Chest: Effort normal and breath sounds normal. No respiratory distress. She has no wheezes.   Abdominal: Soft. Bowel sounds are normal. She exhibits no distension. There is no tenderness. There is no rebound and no guarding.   Genitourinary:   Genitourinary Comments: Deferred   Musculoskeletal: Normal range of motion.   Neurological: She is alert and oriented to person, place, and time. No sensory deficit.   Skin: Skin is warm and dry. Capillary refill takes less than 2 seconds.   Psychiatric: She has a normal mood and affect. Her behavior is normal. Judgment and thought content normal.   Nursing note and vitals reviewed.      Significant Labs:   Recent Lab Results       05/22/18  1132 05/22/18  0601 05/22/18  0437 05/21/18  2236      Albumin   2.7(L)      Alkaline Phosphatase   334(H)      ALT   38      Anion Gap   6(L)      AST   51(H)      Baso #   0.03      Basophil%   0.3      Total Bilirubin   0.6  Comment:  For  infants and newborns, interpretation of results should be based  on gestational age, weight and in agreement with clinical  observations.  Premature Infant recommended reference ranges:  Up to 24 hours.............<8.0 mg/dL  Up to 48 hours............<12.0 mg/dL  3-5 days..................<15.0 mg/dL  6-29 days.................<15.0 mg/dL        BUN, Bld   38(H)      Calcium   9.1      Chloride   101      CO2   28      Creatinine   1.7(H)      Differential Method   Automated      eGFR if    33(A)      eGFR if non    28  Comment:  Calculation used to obtain the estimated glomerular filtration  rate (eGFR) is the CKD-EPI equation.   (A)      Eos #   0.6(H)      Eosinophil%   6.0      Glucose   157(H)      Gran # (ANC)   6.8      Gran%   74.2(H)      Hematocrit   27.9(L)      Hemoglobin   8.6(L)      Lymph #   0.9(L)      Lymph%   9.9(L)      MCH   27.6      MCHC   30.8(L)      MCV   89      Mono #   0.9      Mono%   9.6      MPV   10.5      Platelets   399(H)      POCT Glucose 165(H) 166(H)  171(H)     Potassium   4.8      Total Protein   6.4      RBC   3.12(L)      RDW   15.9(H)      Sodium   135(L)      WBC   9.16          All pertinent labs within the past 24 hours have been reviewed.    Significant Imaging: I have reviewed all pertinent imaging results/findings within the past 24 hours.    Assessment/Plan:      * SBP (spontaneous bacterial peritonitis)    - Diagnostic paracentesis performed today -- 2250 mL of julia fluid removed  -   - GI following -- will start antibiotics for tx of SBP   --IV rocephin  --Gram Stain with no WBC's and no organisms seen  - Continue Lasix 20 mg PO BID          Anemia    --stable  --monitor and transfuse if indicated          Pulmonary HTN    - Continue Ambrisentan         Abnormal liver enzymes    - Improving  - Will continue to monitor  --GI following          DOMINGO (acute kidney injury)    - Creatinine slightly increased from 1.3 to 1.5  -  Restarted Lasix yesterday, 05/20/18, due to ascites  - Will continue to monitor          Chronic atrial fibrillation    - Continue Flecainide and B blocker  - Cardiology following -- Ok to re-start anticoagulation, bleeding likely secondary to hemorrhoids  - Continue Xarelto and ASA   --no flex sig until SBP resolved            Rectal bleeding    - GI following  - Hgb/Hct slightly dropped on AM labs -- had two bloody stools yesterday  - Rectal bleeding likely secondary to hemorrhoids given the presentation and stable Hgb.   --Anusol supporitories BID   --daily Colace.  - Continue PPI            Essential hypertension    - Continue B blocker  --cardiac diet        Coronary artery disease involving coronary bypass graft without angina pectoris    - No complaints of angina  - Resume home medications  - Continue ASA and Xarelto - bleeding likely hemorrhids          Type 2 diabetes mellitus without complication    - Accuchecks and low dose SSI  --diabetic diet          VTE Risk Mitigation         Ordered     rivaroxaban tablet 20 mg  With dinner      05/21/18 1918     Place sequential compression device  Until discontinued      05/19/18 1311              RADHIKA Cain-C  Department of Hospital Medicine   [facility]

## 2018-05-22 NOTE — ASSESSMENT & PLAN NOTE
- GI following  - Hgb/Hct slightly dropped on AM labs -- had two bloody stools yesterday  - Rectal bleeding likely secondary to hemorrhoids given the presentation and stable Hgb.   --Anusol supporitories BID   --daily Colace.  - Continue PPI

## 2018-05-22 NOTE — ASSESSMENT & PLAN NOTE
Likely secondary to hemorrhoids given the presentation    Continue with medicated suppositories bid.   Daily colace

## 2018-05-23 NOTE — PLAN OF CARE
Problem: Patient Care Overview  Goal: Plan of Care Review  Outcome: Ongoing (interventions implemented as appropriate)  Patient doing well this shift. Free from falls and injury. VSS, a-fib on telemetry monitor. IV antibiotics administered as ordered. Blood glucose checked and controlled with sliding scale insulin. No complaints of pain. POC reviewed, will continue to monitor.

## 2018-05-23 NOTE — PROGRESS NOTES
5/23/18 - Pt is scheduled for a follow up contact today, but noted on chart review she has been d/c'd from the hospital today. She was admitted on 5/19/18 for rectal bleeding and had paracentesis for ascites removal as well as IV antibiotic therapy for spontaneous bacterial peritonitis. CM will attempt to follow up in one week. Amnia Chance RN

## 2018-05-23 NOTE — DISCHARGE SUMMARY
[facility]  Hospital Medicine  Discharge Summary      Patient Name: Marcel Montalvo  MRN: [id number]  Admission Date: 5/19/2018  Hospital Length of Stay: 2 days  Discharge Date and Time:  05/23/2018 1:48 PM  Attending Physician: Natalia att. providers found   Discharging Provider: ROBYN Cain  Primary Care Provider: Alexandra Moreno MD      HPI:   Ms Montalvo is a 78 year old female with PMHx of CAD, CABG, A Fib on Xarelto, pulmonary HTN on home O 2 at 2 L, fatty liver disease and abnormal LFT. She presented to [facility] with complaints of rectal bleeding that started about 3 days ago. Initially thought it was hemorrhoids. Associated symptoms include mild dizziness with position changes. Patient reports have lower back pain radiating to Lt hip as well with difficult walking.  Denies associated symptoms of chest pain, shortness of breath, fever, chills, nausea, vomiting or syncope. Vital signs stable. Last episode of rectal bleeding was this morning.  Patient reports has not taken Xarelto or atorvastatin in 2-3 days. She has continued to take ASA. CT of Abdomen with contrast showed moderate to large volume of ascites throughout the abdomen and pelvis, cardiomegaly and no bowel obstruction. Two months ago H & H 10.6/35.4 today H & H 9.6/30.2. Case reviewed with Dr De La Fuente, will continue monitor for now.       * No surgery found *      Hospital Course:   Marcel Montalvo is a 78 year old female admitted for Rectal bleeding. PMHx of A-fib on Xarelto and ASA. Pt reports she last took Xarelto Tuesday/Wednesday of last week, pt reports stopping when she noticed blood in her stool. Hgb/Hct upon admit 9.6/30.2. GI and Cardiology consulted. Rectal bleeding likely secondary to hemorrhoids given the presentation and stable Hgb. GI recommended starting Anusol supporitories BID and daily Colace. Unsedated flex sig is an option to exclude other etiologies for rectal bleeding. Colonoscopy had been recently deferred to  due co morbidities. CT abdomen/pelvis with contrast showed moderate ascites. Recent US was negative for fluid so paracentesis was not performed. Diagnostic paracentesis to be performed tomorrow, 05/21/18. Hgb/Hct has remained stable, anticoagulation will be resumed once paracentesis done. Paracentesis obtained today, 05/21/18, removed 2250 mL of julia fluid. Fluid sent for analysis revealed  -- SBP. Gram stain pending. Pt transferred inpatient for antibiotic therapy to treat SBP.  Patient with greatly improved breathing post paracentesis. H/H stable at 8.5/27.1. Gram stain shows no organisms, fluid shows 638WBC, with segs 7, lymphs 25 & monocytes 66. GI cleared for dc home on oral Augmentin X 7 days.  Patient was seen and examined today and deemed stable for discharge home.     Consults:   Consults         Status Ordering Provider     Inpatient consult to Cardiology  Once     Provider:  (Not yet assigned)    Completed AVTAR DAILEY     Inpatient consult to Gastroenterology  Once     Provider:  Anisa De La Fuente MD    Completed ELLA TORRES JR     Inpatient consult to Social Work  Once     Provider:  (Not yet assigned)    Completed AVTAR DAILEY          No new Assessment & Plan notes have been filed under this hospital service since the last note was generated.  Service: Hospital Medicine    Final Active Diagnoses:    Diagnosis Date Noted POA    PRINCIPAL PROBLEM:  SBP (spontaneous bacterial peritonitis) [K65.2] 05/21/2018 Yes    Anemia [D64.9] 05/21/2018 Yes    Pulmonary HTN [I27.20] 03/19/2018 Yes    Abnormal liver enzymes [R74.8] 03/24/2017 Yes    DOMINGO (acute kidney injury) [N17.9] 03/24/2017 Yes    Chronic atrial fibrillation [I48.2] 09/02/2016 Yes     Chronic    Rectal bleeding [K62.5] 08/26/2014 Yes    Essential hypertension [I10] 08/07/2013 Yes     Chronic    Coronary artery disease involving coronary bypass graft without angina pectoris [I25.810]  Yes     Chronic    Type 2  diabetes mellitus without complication [E11.9]  Yes      Problems Resolved During this Admission:    Diagnosis Date Noted Date Resolved POA    Other ascites [R18.8] 05/20/2018 05/21/2018 Yes    Hyperkalemia [E87.5] 05/20/2018 05/21/2018 Yes    Low back pain [M54.5] 05/19/2018 05/20/2018 Unknown    Right hip pain [M25.551] 05/19/2018 05/20/2018 Unknown    On anticoagulant therapy [Z79.01] 09/15/2016 05/20/2018 Not Applicable       Discharged Condition: stable    Disposition: Home or Self Care    Follow Up:  Follow-up Information     Emeka Alejo PA-C In 1 week.    Specialty:  Gastroenterology  Why:  hospital follow up  Contact information:  [id number]  [place] [address] [phone]             Follow up In 3 days.    Why:  hospital follow up               Patient Instructions:     Diet Cardiac     Diet diabetic         Significant Diagnostic Studies: Labs:   BMP:   Recent Labs  Lab 05/22/18  0437 05/23/18  0442   * 139*   * 136   K 4.8 4.5    103   CO2 28 26   BUN 38* 41*   CREATININE 1.7* 1.5*   CALCIUM 9.1 9.1   , CMP   Recent Labs  Lab 05/22/18  0437 05/23/18  0442   * 136   K 4.8 4.5    103   CO2 28 26   * 139*   BUN 38* 41*   CREATININE 1.7* 1.5*   CALCIUM 9.1 9.1   PROT 6.4 6.1   ALBUMIN 2.7* 2.6*   BILITOT 0.6 0.5   ALKPHOS 334* 294*   AST 51* 41*   ALT 38 34   ANIONGAP 6* 7*   ESTGFRAFRICA 33* 38*   EGFRNONAA 28* 33*   , CBC   Recent Labs  Lab 05/21/18  1653 05/22/18  0437 05/23/18  0442   WBC  --  9.16 8.58   HGB 8.7* 8.6* 8.5*   HCT 27.6* 27.9* 27.1*   PLT  --  399* 378*   , INR   Lab Results   Component Value Date    INR 1.2 05/19/2018    INR 1.3 (H) 02/09/2018    INR 1.7 (H) 02/06/2018    and All labs within the past 24 hours have been reviewed  Microbiology:   Blood Culture   Lab Results   Component Value Date    LABBLOO No growth after 5 days. 03/23/2017        Pending Diagnostic Studies:     None         Medications:  Reconciled Home Medications:       Medication List      START taking these medications    amoxicillin-clavulanate 875-125mg 875-125 mg per tablet  Commonly known as:  AUGMENTIN  Take 1 tablet by mouth 2 (two) times daily.     Bifidobacterium infantis 4 mg Cap  Commonly known as:  ALIGN  Take 1 capsule by mouth 2 (two) times daily.     docusate sodium 100 MG capsule  Commonly known as:  COLACE  Take 1 capsule (100 mg total) by mouth once daily.  Start taking on:  5/24/2018     hydrocortisone 25 mg suppository  Commonly known as:  ANUSOL-HC  Place 1 suppository (25 mg total) rectally 2 (two) times daily.        CONTINUE taking these medications    * albuterol 2.5 mg /3 mL (0.083 %) nebulizer solution  Commonly known as:  PROVENTIL  Take 3 mLs (2.5 mg total) by nebulization every 6 (six) hours as needed for Wheezing or Shortness of Breath. Rescue     * albuterol 90 mcg/actuation inhaler  Commonly known as:  PROAIR HFA  Inhale 2 puffs into the lungs every 6 (six) hours as needed.     ambrisentan 5 MG Tab  Commonly known as:  LETAIRIS  Take 1 tablet (5 mg total) by mouth once daily.     aspirin 81 MG EC tablet  Commonly known as:  ECOTRIN  Take 1 tablet (81 mg total) by mouth once daily.     atorvastatin 40 MG tablet  Commonly known as:  LIPITOR  TAKE ONE TABLET BY MOUTH NIGHTLY     azelastine 137 mcg (0.1 %) nasal spray  Commonly known as:  ASTELIN  1 spray by Nasal route 2 (two) times daily.     biotin 1 mg tablet  Take 1,000 mcg by mouth 3 (three) times daily.     budesonide-formoterol 160-4.5 mcg 160-4.5 mcg/actuation Hfaa  Commonly known as:  SYMBICORT  Inhale 2 puffs into the lungs every 12 (twelve) hours. Wash out mouth after using     carboxymethylcellulose 0.5 % Dpet  Commonly known as:  REFRESH PLUS  Place 1 drop into both eyes 3 (three) times daily as needed.     diclofenac sodium 1 % Gel  Commonly known as:  VOLTAREN  Apply 2 g topically once daily.     escitalopram oxalate 10 MG tablet  Commonly known as:  LEXAPRO  TAKE ONE-HALF TO ONE  TABLET BY MOUTH EVERY DAY     flecainide 100 MG Tab  Commonly known as:  TAMBOCOR  TAKE ONE TABLET BY MOUTH TWICE DAILY STARTING SUNDAY. (DISCONTINUE RYTHMOL)     fluticasone 50 mcg/actuation nasal spray  Commonly known as:  FLONASE  2 sprays by Each Nare route once daily.     furosemide 20 MG tablet  Commonly known as:  LASIX  Take 1 tablet (20 mg total) by mouth 2 (two) times daily. As of 8/4/17     nebivolol 5 MG Tab  Commonly known as:  BYSTOLIC  TAKE ONE TABLET BY MOUTH TWICE DAILY     * nitroGLYCERIN 0.4 MG SL tablet  Commonly known as:  NITROSTAT  Place 1 tablet (0.4 mg total) under the tongue every 5 (five) minutes as needed for Chest pain.     * nitroGLYCERIN 0.2 mg/hr TD PT24 0.2 mg/hr  Commonly known as:  NITRODUR  Place 1 patch onto the skin once daily.     pantoprazole 40 MG tablet  Commonly known as:  PROTONIX  Take 1 tablet (40 mg total) by mouth 2 (two) times daily. As of 8/4/17     phenobarb-hyoscy-atropine-scop 16.2-0.1037 -0.0194 mg/5 mL Elix  Commonly known as:  BELLADONNA-PHENOBARBITAL  Take 5 mLs by mouth daily as needed (abdominal pain).     potassium chloride 10 MEQ Tbsr  Commonly known as:  KLOR-CON  Take 1 tablet (10 mEq total) by mouth once daily.     pramoxine 1 % Foam  Place rectally 3 (three) times daily as needed.     PREMARIN vaginal cream  Generic drug:  conjugated estrogens  PLACE 1 GRAM VAGINALLY TWICE A WEEK     RESTASIS 0.05 % ophthalmic emulsion  Generic drug:  cycloSPORINE  INSTILL ONE DROP INTO EACH EYE TWICE DAILY     rivaroxaban 20 mg Tab  Commonly known as:  XARELTO  Take 1 tablet (20 mg total) by mouth daily with dinner or evening meal.     triamcinolone acetonide 0.1% 0.1 % ointment  Commonly known as:  KENALOG  AAA bid prn     vitamin D 1000 units Tab  Take 2,000 Units by mouth once daily.        * This list has 4 medication(s) that are the same as other medications prescribed for you. Read the directions carefully, and ask your doctor or other care provider to review  them with you.            STOP taking these medications    guaiFENesin 600 mg 12 hr tablet  Commonly known as:  MUCINEX     hydroxychloroquine 200 mg tablet  Commonly known as:  PLAQUENIL            Indwelling Lines/Drains at time of discharge:   Lines/Drains/Airways          No matching active lines, drains, or airways          Time spent on the discharge of patient: 45 minutes  Patient was seen and examined on the date of discharge and determined to be suitable for discharge.         RADHIKA Cain-DARNELL  Department of Hospital Medicine  [facility]

## 2018-05-23 NOTE — NURSING
Discharged orders received and reviewed with family and pt. Pt instructed when to take each medication next dose. IV removed, telemetry removed. Pt assisted with dressing by staff. Pt transported to [facility] via w/c by staff for family to transport home.

## 2018-05-25 NOTE — PATIENT INSTRUCTIONS
Sepsis     To treat sepsis, antibiotics and fluids may by given through an intravenous (IV) line.     Sepsis happens when your body responds with widespread inflammation to a bad infection or bacteremia--the presence of bacteria in your bloodstream. Sepsis can be deadly. Blood pressure may drop and the lungs and kidneys may start to fail. Emergency care for sepsis is crucial.  Risk factors  Those most at risk for sepsis are:  · Infants or older adults  · People who have an illness that weakens their immune system, such as cancer, AIDS, or diabetes  · People being treated with chemotherapy medicines or radiation, which weakens the immune system  · People who have had a transplant  · People with a very severe infection such as pneumonia, meningitis, or a urinary tract infection  When to go to the emergency department (ED)  Sepsis is an emergency. Go to the nearest ED if you have a fever with any of these symptoms:  · Chills and shaking  · Rapid heartbeat and breathing  · Trouble breathing  · Severe nausea or uncontrolled vomiting  · Confusion, disorientation, drowsiness, or dizziness  · Decreased urination  · Severe pain, including in the back or joints   What to expect in the ED  · Blood and urine tests are done to look for bacteria. They also check for organ failure.  · Blood, urine, or sputum cultures may be taken. The samples are sent to a lab. They are placed in a special container. Any bacteria should grow in 24 hours.  · X-rays or other imaging tests may be done.  A person with sepsis will be admitted to the hospital and treated with antibiotics. Treatment may also include oxygen and intravenous fluids.  Date Last Reviewed: 10/1/2016  © 7693-5855 [employer]. [address] [place] [address], [place] [address]. All rights reserved. This information is not intended as a substitute for professional medical care. Always follow your healthcare professional's instructions.        Sepsis     To treat sepsis,  antibiotics and fluids may by given through an intravenous (IV) line.     Sepsis happens when your body responds with widespread inflammation to a bad infection or bacteremia--the presence of bacteria in your bloodstream. Sepsis can be deadly. Blood pressure may drop and the lungs and kidneys may start to fail. Emergency care for sepsis is crucial.  Risk factors  Those most at risk for sepsis are:  · Infants or older adults  · People who have an illness that weakens their immune system, such as cancer, AIDS, or diabetes  · People being treated with chemotherapy medicines or radiation, which weakens the immune system  · People who have had a transplant  · People with a very severe infection such as pneumonia, meningitis, or a urinary tract infection  When to go to the emergency department (ED)  Sepsis is an emergency. Go to the nearest ED if you have a fever with any of these symptoms:  · Chills and shaking  · Rapid heartbeat and breathing  · Trouble breathing  · Severe nausea or uncontrolled vomiting  · Confusion, disorientation, drowsiness, or dizziness  · Decreased urination  · Severe pain, including in the back or joints   What to expect in the ED  · Blood and urine tests are done to look for bacteria. They also check for organ failure.  · Blood, urine, or sputum cultures may be taken. The samples are sent to a lab. They are placed in a special container. Any bacteria should grow in 24 hours.  · X-rays or other imaging tests may be done.  A person with sepsis will be admitted to the hospital and treated with antibiotics. Treatment may also include oxygen and intravenous fluids.  Date Last Reviewed: 10/1/2016  © 7854-9087 [employer]. [address] [place] [address], [place] [address]. All rights reserved. This information is not intended as a substitute for professional medical care. Always follow your healthcare professional's instructions.

## 2018-05-25 NOTE — PROGRESS NOTES
Patient's Med Rec states to take Potassium daily. She stated that she was told by her MD to take it bid. I told her to follow up with the MD who prescribes that med and clarify if it needs to be taken daily or bid. I instructed her that any time she gets med updates to update her list and carry to all appointments so there are no issues later about dosage.

## 2018-05-29 NOTE — Clinical Note
NIMA I saw her today, getting labs. Reviewed path with her on fluid, no malignancy cells noted. Having leg swelling increasing lasix to 20mg bid, adding back in the ambrisentan, she didn't start yet on outpt side. Has one more day of augmentin for sbp. Call me for questions or concerns. [phone]

## 2018-05-29 NOTE — PROGRESS NOTES
Subjective:      Patient ID: Marcel Montalvo is a 78 y.o. female.    Chief Complaint: Hospital Follow Up    Disclaimer:  This note is prepared using voice recognition software and as such is likely to have errors and has not been proof read. Please contact me for questions.     Transitional Care Note    Family and/or Caretaker present at visit?  Yes.  Diagnostic tests reviewed/disposition: No diagnosic tests pending after this hospitalization.  Disease/illness education: sbp, pulm htn, ascites  Home health/community services discussion/referrals: Patient does not have home health established from hospital visit.  They do not need home health.  If needed, we will set up home health for the patient.   Establishment or re-establishment of referral orders for community resources: No other necessary community resources.   Discussion with other health care providers: yes with Emeka Wang in GI.         Patient Name: Marcel Montalvo  MRN: [id number]  Admission Date: 5/19/2018  Hospital Length of Stay: 2 days  Discharge Date and Time:  05/23/2018 1:48 PM  Attending Physician: No att. providers found   Discharging Provider: RADHIKA Cain-C  Primary Care Provider: Alexandra Moreno MD        HPI:   Ms Montalvo is a 78 year old female with PMHx of CAD, CABG, A Fib on Xarelto, pulmonary HTN on home O 2 at 2 L, fatty liver disease and abnormal LFT. She presented to [facility] with complaints of rectal bleeding that started about 3 days ago. Initially thought it was hemorrhoids. Associated symptoms include mild dizziness with position changes. Patient reports have lower back pain radiating to Lt hip as well with difficult walking.  Denies associated symptoms of chest pain, shortness of breath, fever, chills, nausea, vomiting or syncope. Vital signs stable. Last episode of rectal bleeding was this morning.  Patient reports has not taken Xarelto or atorvastatin in 2-3 days. She has continued to take ASA. CT of Abdomen with contrast  showed moderate to large volume of ascites throughout the abdomen and pelvis, cardiomegaly and no bowel obstruction. Two months ago H & H 10.6/35.4 today H & H 9.6/30.2. Case reviewed with Dr De La Fuente, will continue monitor for now.         * No surgery found *       Hospital Course:   Marcel Montalvo is a 78 year old female admitted for Rectal bleeding. PMHx of A-fib on Xarelto and ASA. Pt reports she last took Xarelto Tuesday/Wednesday of last week, pt reports stopping when she noticed blood in her stool. Hgb/Hct upon admit 9.6/30.2. GI and Cardiology consulted. Rectal bleeding likely secondary to hemorrhoids given the presentation and stable Hgb. GI recommended starting Anusol supporitories BID and daily Colace. Unsedated flex sig is an option to exclude other etiologies for rectal bleeding. Colonoscopy had been recently deferred to due co morbidities. CT abdomen/pelvis with contrast showed moderate ascites. Recent US was negative for fluid so paracentesis was not performed. Diagnostic paracentesis to be performed tomorrow, 05/21/18. Hgb/Hct has remained stable, anticoagulation will be resumed once paracentesis done. Paracentesis obtained today, 05/21/18, removed 2250 mL of julia fluid. Fluid sent for analysis revealed  -- SBP. Gram stain pending. Pt transferred inpatient for antibiotic therapy to treat SBP.  Patient with greatly improved breathing post paracentesis. H/H stable at 8.5/27.1. Gram stain shows no organisms, fluid shows 638WBC, with segs 7, lymphs 25 & monocytes 66. GI cleared for dc home on oral Augmentin X 7 days.  Patient was seen and examined today and deemed stable for discharge home.         Lab Results   Component Value Date    WBC 8.58 05/23/2018    HGB 8.5 (L) 05/23/2018    HCT 27.1 (L) 05/23/2018     (H) 05/23/2018    CHOL 192 05/02/2017    TRIG 164 (H) 05/02/2017    HDL 42 05/02/2017    ALT 34 05/23/2018    AST 41 (H) 05/23/2018     05/23/2018    K 4.5 05/23/2018    CL  "103 05/23/2018    CREATININE 1.5 (H) 05/23/2018    BUN 41 (H) 05/23/2018    CO2 26 05/23/2018    TSH 3.535 05/02/2017    INR 1.2 05/19/2018    HGBA1C 6.6 (H) 05/19/2018       Review of Systems   Constitutional: Negative for activity change, appetite change, fatigue and unexpected weight change.   HENT: Negative for trouble swallowing and voice change.    Respiratory: Negative for cough and shortness of breath.    Cardiovascular: Positive for leg swelling. Negative for chest pain and palpitations.   Gastrointestinal: Positive for diarrhea. Negative for abdominal distention, abdominal pain, constipation and nausea.   Musculoskeletal: Positive for arthralgias and gait problem.   Skin: Negative for color change and wound.   Neurological: Negative for weakness, light-headedness and headaches.   Psychiatric/Behavioral: The patient is not nervous/anxious.      Objective:     Vitals:    05/29/18 1222   BP: (!) 160/62   Pulse: 66   Temp: 97.4 °F (36.3 °C)   TempSrc: Tympanic   Weight: 54.9 kg (121 lb 0.5 oz)   Height: 5' 2" (1.575 m)     Physical Exam   Constitutional: She appears well-developed and well-nourished.   HENT:   Head: Normocephalic and atraumatic.   Right Ear: Tympanic membrane normal.   Left Ear: Tympanic membrane normal.   Mouth/Throat: Oropharynx is clear and moist.   Eyes: Conjunctivae and EOM are normal.   Neck: Normal range of motion. Neck supple.   Cardiovascular: Normal rate, regular rhythm and normal heart sounds.    +4 pitting edema bilateral leg edema   Pulmonary/Chest: Effort normal and breath sounds normal.   Abdominal: Soft. Bowel sounds are normal. She exhibits no distension and no mass. There is no tenderness. There is no rebound and no guarding. No hernia.   Musculoskeletal:        Right hip: She exhibits decreased range of motion, decreased strength, tenderness and bony tenderness.        Lumbar back: She exhibits tenderness and bony tenderness.   Psychiatric: She has a normal mood and affect. " Her speech is normal and behavior is normal.   Nursing note and vitals reviewed.    Assessment:     1. PAH (pulmonary artery hypertension)    2. Anemia, unspecified type    3. Elevated brain natriuretic peptide (BNP) level    4. Leg edema    5. Gastroesophageal reflux disease, esophagitis presence not specified    6. SBP (spontaneous bacterial peritonitis)    7. Stage 3 chronic kidney disease    8. Right hip pain      Plan:   Marcel was seen today for hospital follow up.    Diagnoses and all orders for this visit:    PAH (pulmonary artery hypertension)  Comments:  restart the ambrisentan 5mg for PAH. noted to have +3-4 pitting edema in bilateral legs today, but no sob.   Orders:  -     CBC auto differential; Future  -     Comprehensive metabolic panel; Future  -     Brain natriuretic peptide; Future    Anemia, unspecified type  Comments:  off plavix. on xarelto, repeating today.   Orders:  -     CBC auto differential; Future  -     Comprehensive metabolic panel; Future  -     Brain natriuretic peptide; Future    Elevated brain natriuretic peptide (BNP) level  Comments:  repeat today, with PAH, increase lasix from 20mg daily to bid, ok for pot at 10 meq daily. compression hose as well.   Orders:  -     CBC auto differential; Future  -     Comprehensive metabolic panel; Future  -     Brain natriuretic peptide; Future    Leg edema  Comments:  repeat today, with PAH, increase lasix from 20mg daily to bid, ok for pot at 10 meq daily. compression hose as well  Orders:  -     CBC auto differential; Future  -     Comprehensive metabolic panel; Future  -     Brain natriuretic peptide; Future    Gastroesophageal reflux disease, esophagitis presence not specified  Comments:  finds that omeprazole 20mg works better than protonix. off plavix. ok to change. will start daily.   Orders:  -     CBC auto differential; Future  -     Comprehensive metabolic panel; Future  -     Brain natriuretic peptide; Future    SBP (spontaneous  bacterial peritonitis)  Comments:  completing augmentin tomorrow, seeing Emeka on Friday. labs today. reviewed path today with patient from peritoneal fluid.   Orders:  -     CBC auto differential; Future  -     Comprehensive metabolic panel; Future  -     Brain natriuretic peptide; Future    Stage 3 chronic kidney disease  Comments:  last gfr at 33, repeat today. ok for diclofenac gel.   Orders:  -     CBC auto differential; Future  -     Comprehensive metabolic panel; Future  -     Brain natriuretic peptide; Future    Right hip pain  Comments:  ok for diclofenac gel, reviewed imaging from hospital.     Other orders  -     omeprazole (PRILOSEC) 20 MG capsule; Take 1 capsule (20 mg total) by mouth once daily.  -     diclofenac sodium (VOLTAREN) 1 % Gel; Apply 4 g topically 2 (two) times daily. Topically for hip pain            Follow-up if symptoms worsen or fail to improve.

## 2018-06-01 NOTE — PATIENT INSTRUCTIONS
Call clinic if continued diarrhea.   Use suppository for a total of 14 days. Then use as needed.   Consider physical therapy.

## 2018-06-01 NOTE — PROGRESS NOTES
Subjective:       Patient ID: Marcel Montalvo is a 78 y.o. female.    Chief Complaint: Abdominal Pain    HPI   The patient is here for a hospital follow up. She has a history of abnormal liver enzymes, ascites, SBP and bleeding hemorrhoids. She was recently admitted for SBP and bleeding hemorrhoids. The bleeding has improved since she started the hydrocortisone suppositories. She was also discharged with Augmentin for the SBP. She finished the antibiotic yesterday. SAAG in the hospital was calculated at 1.0. She was having diarrhea when she was on the antibiotic but she says it is improving.     Review of Systems    As per HPI. She also reports chronic right inguinal pain, hip and buttock pain which she describes as sciatica.     Objective:      Physical Exam   Constitutional: She is oriented to person, place, and time. She appears well-developed and well-nourished. No distress.   HENT:   Head: Normocephalic and atraumatic.   Eyes: EOM are normal.   Cardiovascular: Normal rate and regular rhythm.    Pulmonary/Chest: Effort normal and breath sounds normal. No respiratory distress. She has no wheezes.   Abdominal: Soft. Bowel sounds are normal. She exhibits distension (mild distention. ). There is no tenderness.   Neurological: She is alert and oriented to person, place, and time. No cranial nerve deficit.   Skin: She is not diaphoretic.   Psychiatric: Her behavior is normal.       Assessment:       1. DESOUZA (nonalcoholic steatohepatitis)    2. Other ascites    3. SBP (spontaneous bacterial peritonitis)    4. Pulmonary hypertension    5. Hemorrhoids, unspecified hemorrhoid type        Plan:      We discussed using the suppositories. She can use intermittently as needed.   She should contact the office if diarrhea doesn't improve. If it continues, we would need to check for C. Diff.   I told the patient that I wanted to discuss her case with Dr. Nelson. I want to discuss the merit of prophylaxis and the possibility of  adding Aldactone.     Follow-up in about 3 months (around 9/12/2018).    Thank you for the opportunity to participate in the care of this patient.   Emeka Alejo PA-C.      ADDENDUM:  Dr. Nelson agrees with adding Aldactone 50 mg daily. Stop Potassium supplement. Recheck chemistry panel in five days. No prophylaxis at this time. She also recommends transjugular liver biopsy with pressor measurements. At this point, biopsy may affect her management.

## 2018-06-04 NOTE — TELEPHONE ENCOUNTER
I talked to the patient about recommendation sent through [employer]. Her questions were answered and she reported understanding of the recommendations. However, she wanted to talk with Dr. Fletcher first. She is scheduled to see him this week. We also talked about a liver biopsy. I will await her message following her Cardiology appt.

## 2018-06-04 NOTE — TELEPHONE ENCOUNTER
Spoke with patient and she is having left side pain it is a 4-5.  And she is having diarrhea 3-4 time daily.  She would like to know what you suggest?

## 2018-06-04 NOTE — TELEPHONE ENCOUNTER
----- Message from Roma Le MA sent at 6/4/2018  3:30 PM CDT -----      ----- Message -----  From: Emeka Alejo PA-C  Sent: 6/3/2018   1:35 PM  To: Sapna Dominique

## 2018-06-05 NOTE — TELEPHONE ENCOUNTER
Spoke with patient and she will be seeing cardio tomorrow and doing blood work.  And she will wait to see what the labs show and she will decide if she is going to do stools.

## 2018-06-06 NOTE — PROGRESS NOTES
Subjective:   Patient ID:  Marcel Montalvo is a 78 y.o. female who presents for follow up of Leg Swelling (numbing (feeling) in thighs) and Shortness of Breath      HPI  A 77 yo female with diastolic dysfunction pulmonary htn ascites  Leg edema cad s/p cabg is her fort f/u. Has afib on chronic anticoagulation. She has ahd an admission with ascites volume overload sbp is here for f/u had paracentesis her breathing improved. She has still using  o2 at nite she has no other issues clinically he rleg swelling is improving she has her aldactone dose adjusted. Has bloating and abdominal fullness. Has no syncope near  syncope. She is compliant with salt intake.  Past Medical History:   Diagnosis Date    AF (atrial fibrillation)     Palpation: atrial fibrillation, on Coumadin and followed by cardiologist.    Anticoagulant long-term use     Anxiety     Aortic insufficiency 3/19/2014    Aortic regurgitation     Aortic regurgitation/tricuspid regurgitation per MARIO in April 2007.     Asthma     Carotid artery stenosis     CEA on the left by Dr. Maciel    Coronary artery disease     status post CABG X 3.     Diabetes mellitus     GERD (gastroesophageal reflux disease)     H. pylori infection     treated    Hyperlipidemia     intolerant to statins.    Hypertension 8/7/2013    IBS (irritable bowel syndrome)     Leg pain 3/19/2014    Long-term (current) use of anticoagulants 8/7/2013    Osteoarthritis     Osteopenia     DEXA scan done on 06/20/12     Pancreatitis     resolved    S/P CABG (coronary artery bypass graft) 8/7/2013    S/P carotid endarterectomy 8/7/2013    S/P PTCA (percutaneous transluminal coronary angioplasty) 3/19/2014    Sjogren's syndrome     Stroke     Vitamin D deficiency disease        Past Surgical History:   Procedure Laterality Date    ABDOMINAL SURGERY      APPENDECTOMY      bilateral cataract surgery      CARPAL TUNNEL RELEASE      RT    CATARACT EXTRACTION      CEA      left     CHOLECYSTECTOMY      CORONARY ARTERY BYPASS GRAFT      2 stents    EYE SURGERY      HYSTERECTOMY      TONSILLECTOMY         Social History   Substance Use Topics    Smoking status: Former Smoker     Packs/day: 0.50     Years: 25.00     Quit date: 12/17/1988    Smokeless tobacco: Never Used    Alcohol use 0.0 oz/week      Comment: wine occasionally       Family History   Problem Relation Age of Onset    Alzheimer's disease Mother     Hyperlipidemia Mother     Cancer Father         lung    Heart disease Maternal Uncle     Cancer Brother         stomach    Cancer Paternal Grandmother     Cancer Paternal Grandfather         stomach       Current Outpatient Prescriptions   Medication Sig    albuterol (PROAIR HFA) 90 mcg/actuation inhaler Inhale 2 puffs into the lungs every 6 (six) hours as needed.    ambrisentan (LETAIRIS) 5 MG Tab Take 5 mg by mouth once daily.    aspirin (ECOTRIN) 81 MG EC tablet Take 1 tablet (81 mg total) by mouth once daily.    atorvastatin (LIPITOR) 40 MG tablet TAKE ONE TABLET BY MOUTH NIGHTLY    azelastine (ASTELIN) 137 mcg (0.1 %) nasal spray 1 spray by Nasal route 2 (two) times daily.    biotin 1 mg tablet Take 1,000 mcg by mouth 3 (three) times daily.    budesonide-formoterol 160-4.5 mcg (SYMBICORT) 160-4.5 mcg/actuation HFAA Inhale 2 puffs into the lungs every 12 (twelve) hours. Wash out mouth after using    carboxymethylcellulose (REFRESH PLUS) 0.5 % Dpet Place 1 drop into both eyes 3 (three) times daily as needed.    diclofenac sodium (VOLTAREN) 1 % Gel Apply 4 g topically 2 (two) times daily. Topically for hip pain    escitalopram oxalate (LEXAPRO) 10 MG tablet TAKE ONE-HALF TO ONE TABLET BY MOUTH EVERY DAY    flecainide (TAMBOCOR) 100 MG Tab TAKE ONE TABLET BY MOUTH TWICE DAILY STARTING SUNDAY. (DISCONTINUE RYTHMOL)    fluticasone (FLONASE) 50 mcg/actuation nasal spray 2 sprays by Each Nare route once daily.    nebivolol (BYSTOLIC) 5 MG Tab TAKE ONE TABLET  BY MOUTH TWICE DAILY    nitroGLYCERIN (NITROSTAT) 0.4 MG SL tablet Place 1 tablet (0.4 mg total) under the tongue every 5 (five) minutes as needed for Chest pain.    nitroGLYCERIN 0.2 mg/hr TD PT24 (NITRODUR) 0.2 mg/hr Place 1 patch onto the skin once daily.    omeprazole (PRILOSEC) 20 MG capsule Take 1 capsule (20 mg total) by mouth once daily.    phenobarb-hyoscy-atropine-scop (BELLADONNA-PHENOBARBITAL) 16.2-0.1037 -0.0194 mg/5 mL Elix Take 5 mLs by mouth daily as needed (abdominal pain).    potassium chloride (KLOR-CON) 10 MEQ TbSR Take 1 tablet (10 mEq total) by mouth once daily.    pramoxine 1 % Foam Place rectally 3 (three) times daily as needed.    PREMARIN vaginal cream PLACE 1 GRAM VAGINALLY TWICE A WEEK    RESTASIS 0.05 % ophthalmic emulsion INSTILL ONE DROP INTO EACH EYE TWICE DAILY    rivaroxaban (XARELTO) 20 mg Tab Take 1 tablet (20 mg total) by mouth daily with dinner or evening meal.    triamcinolone acetonide 0.1% (KENALOG) 0.1 % ointment AAA bid prn    vitamin D 1000 units Tab Take 2,000 Units by mouth once daily.    albuterol (PROVENTIL) 2.5 mg /3 mL (0.083 %) nebulizer solution Take 3 mLs (2.5 mg total) by nebulization every 6 (six) hours as needed for Wheezing or Shortness of Breath. Rescue    docusate sodium (COLACE) 100 MG capsule Take 1 capsule (100 mg total) by mouth once daily.    furosemide (LASIX) 20 MG tablet Take 1 tablet (20 mg total) by mouth 2 (two) times daily. As of 8/4/17    spironolactone (ALDACTONE) 50 MG tablet Take 1 tablet (50 mg total) by mouth once daily.     Current Facility-Administered Medications   Medication    denosumab (PROLIA) injection 60 mg     Current Outpatient Prescriptions on File Prior to Visit   Medication Sig    albuterol (PROAIR HFA) 90 mcg/actuation inhaler Inhale 2 puffs into the lungs every 6 (six) hours as needed.    ambrisentan (LETAIRIS) 5 MG Tab Take 5 mg by mouth once daily.    aspirin (ECOTRIN) 81 MG EC tablet Take 1 tablet (81  mg total) by mouth once daily.    atorvastatin (LIPITOR) 40 MG tablet TAKE ONE TABLET BY MOUTH NIGHTLY    azelastine (ASTELIN) 137 mcg (0.1 %) nasal spray 1 spray by Nasal route 2 (two) times daily.    biotin 1 mg tablet Take 1,000 mcg by mouth 3 (three) times daily.    budesonide-formoterol 160-4.5 mcg (SYMBICORT) 160-4.5 mcg/actuation HFAA Inhale 2 puffs into the lungs every 12 (twelve) hours. Wash out mouth after using    carboxymethylcellulose (REFRESH PLUS) 0.5 % Dpet Place 1 drop into both eyes 3 (three) times daily as needed.    diclofenac sodium (VOLTAREN) 1 % Gel Apply 4 g topically 2 (two) times daily. Topically for hip pain    escitalopram oxalate (LEXAPRO) 10 MG tablet TAKE ONE-HALF TO ONE TABLET BY MOUTH EVERY DAY    flecainide (TAMBOCOR) 100 MG Tab TAKE ONE TABLET BY MOUTH TWICE DAILY STARTING SUNDAY. (DISCONTINUE RYTHMOL)    fluticasone (FLONASE) 50 mcg/actuation nasal spray 2 sprays by Each Nare route once daily.    nebivolol (BYSTOLIC) 5 MG Tab TAKE ONE TABLET BY MOUTH TWICE DAILY    nitroGLYCERIN (NITROSTAT) 0.4 MG SL tablet Place 1 tablet (0.4 mg total) under the tongue every 5 (five) minutes as needed for Chest pain.    nitroGLYCERIN 0.2 mg/hr TD PT24 (NITRODUR) 0.2 mg/hr Place 1 patch onto the skin once daily.    omeprazole (PRILOSEC) 20 MG capsule Take 1 capsule (20 mg total) by mouth once daily.    phenobarb-hyoscy-atropine-scop (BELLADONNA-PHENOBARBITAL) 16.2-0.1037 -0.0194 mg/5 mL Elix Take 5 mLs by mouth daily as needed (abdominal pain).    potassium chloride (KLOR-CON) 10 MEQ TbSR Take 1 tablet (10 mEq total) by mouth once daily.    pramoxine 1 % Foam Place rectally 3 (three) times daily as needed.    PREMARIN vaginal cream PLACE 1 GRAM VAGINALLY TWICE A WEEK    RESTASIS 0.05 % ophthalmic emulsion INSTILL ONE DROP INTO EACH EYE TWICE DAILY    rivaroxaban (XARELTO) 20 mg Tab Take 1 tablet (20 mg total) by mouth daily with dinner or evening meal.    triamcinolone  acetonide 0.1% (KENALOG) 0.1 % ointment AAA bid prn    vitamin D 1000 units Tab Take 2,000 Units by mouth once daily.    albuterol (PROVENTIL) 2.5 mg /3 mL (0.083 %) nebulizer solution Take 3 mLs (2.5 mg total) by nebulization every 6 (six) hours as needed for Wheezing or Shortness of Breath. Rescue    docusate sodium (COLACE) 100 MG capsule Take 1 capsule (100 mg total) by mouth once daily.    furosemide (LASIX) 20 MG tablet Take 1 tablet (20 mg total) by mouth 2 (two) times daily. As of 8/4/17    spironolactone (ALDACTONE) 50 MG tablet Take 1 tablet (50 mg total) by mouth once daily.    [DISCONTINUED] amoxicillin-clavulanate 875-125mg (AUGMENTIN) 875-125 mg per tablet Take 1 tablet by mouth 2 (two) times daily.    [DISCONTINUED] Bifidobacterium infantis (ALIGN) 4 mg Cap Take 1 capsule by mouth 2 (two) times daily.     Current Facility-Administered Medications on File Prior to Visit   Medication    denosumab (PROLIA) injection 60 mg     Review of patient's allergies indicates:   Allergen Reactions    Codeine Nausea And Vomiting    Lisinopril      Other reaction(s): cough    Pacerone [amiodarone] Nausea And Vomiting    Sulfa (sulfonamide antibiotics) Nausea And Vomiting    Celecoxib Palpitations    Ciprofloxacin Hives, Itching and Rash    Colesevelam Rash and Nausea And Vomiting    Doxycycline Rash    Statins-hmg-coa reductase inhibitors Rash     Myalgia(can take atorvastatin ok -no rhabdo)per nurse josé antonio and patient  / stomach upset       Review of Systems   Constitution: Negative for diaphoresis, weakness, malaise/fatigue and weight gain.   HENT: Negative for hoarse voice.    Eyes: Negative for double vision and visual disturbance.   Cardiovascular: Positive for dyspnea on exertion and leg swelling. Negative for chest pain, claudication, cyanosis, irregular heartbeat, near-syncope, orthopnea, palpitations, paroxysmal nocturnal dyspnea and syncope.   Respiratory: Positive for shortness of breath.  Negative for cough, hemoptysis and snoring.    Hematologic/Lymphatic: Negative for bleeding problem. Does not bruise/bleed easily.   Skin: Negative for color change and poor wound healing.   Musculoskeletal: Negative for muscle cramps, muscle weakness and myalgias.   Gastrointestinal: Positive for bloating and abdominal pain. Negative for change in bowel habit, diarrhea, heartburn, hematemesis, hematochezia, melena and nausea.   Neurological: Negative for excessive daytime sleepiness, dizziness, headaches, light-headedness, loss of balance and numbness.   Psychiatric/Behavioral: Negative for memory loss. The patient does not have insomnia.    Allergic/Immunologic: Negative for hives.       Objective:   Physical Exam  Vitals:    06/06/18 1649   BP: (!) 126/42   Pulse: 63   Weight: 57 kg (125 lb 10.6 oz)   Constitutional: She is oriented to person, place, and time. She appears well-developed and well-nourished. She does not appear ill. No distress.   HENT:   Head: Normocephalic and atraumatic.   Eyes: EOM are normal. Pupils are equal, round, and reactive to light. No scleral icterus.   Neck: Normal range of motion. Neck supple. JVD present. Normal carotid pulses and no hepatojugular reflux present. Carotid bruit is not present. No tracheal deviation present. No thyromegaly present.   Cardiovascular: Normal rate, regular rhythm, intact distal pulses and normal pulses.  Exam reveals no gallop and no friction rub.    Murmur heard.   Harsh midsystolic murmur is present with a grade of 2/6  at the upper right sternal border radiating to the neck   Medium-pitched midsystolic murmur of grade 2/6 is also present at the lower left sternal border.   Diastolic murmur is present with a grade of 1/6   Pulses:       Carotid pulses are 2+ on the right side, and 2+ on the left side.       Radial pulses are 2+ on the right side, and 2+ on the left side.        Femoral pulses are 2+ on the right side, and 2+ on the left  side.       Popliteal pulses are 2+ on the right side, and 2+ on the left side.        Dorsalis pedis pulses are 2+ on the right side, and 2+ on the left side.        Posterior tibial pulses are 2+ on the right side, and 2+ on the left side.   Pulmonary/Chest: Effort normal and breath sounds normal. No respiratory distress. She has no wheezes. She has no rhonchi. She has no rales. She exhibits no tenderness.   Abdominal: Soft. Normal appearance, normal aorta and bowel sounds are normal. She exhibits distension AND POSITIVE ASCITES. She exhibits no abdominal bruit,  and no pulsatile midline mass. There is no hepatomegaly. There is no tenderness.   Hepatomegaly/pulsatile liver.   Musculoskeletal: She exhibits 1+ edema  Neurological: She is alert and oriented to person, place, and time. She has normal strength. No cranial nerve deficit. Coordination normal.   Skin: Skin is warm and dry. No rash noted. She is not diaphoretic. No cyanosis or erythema. Nails show no clubbing.   Bruises noted    Psychiatric: She has a normal mood and affect. Her speech is normal and behavior is normal.   Lab Results   Component Value Date    CHOL 192 05/02/2017    CHOL 105 (L) 04/03/2017    CHOL 105 (L) 04/03/2017     Lab Results   Component Value Date    HDL 42 05/02/2017    HDL 41 04/03/2017    HDL 41 04/03/2017     Lab Results   Component Value Date    LDLCALC 117.2 05/02/2017    LDLCALC 49.6 (L) 04/03/2017    LDLCALC 49.6 (L) 04/03/2017     Lab Results   Component Value Date    TRIG 164 (H) 05/02/2017    TRIG 72 04/03/2017    TRIG 72 04/03/2017     Lab Results   Component Value Date    CHOLHDL 21.9 05/02/2017    CHOLHDL 39.0 04/03/2017    CHOLHDL 39.0 04/03/2017       Chemistry        Component Value Date/Time     05/29/2018 1311    K 4.2 05/29/2018 1311     05/29/2018 1311    CO2 27 05/29/2018 1311    BUN 31 (H) 05/29/2018 1311    CREATININE 1.3 05/29/2018 1311     (H) 05/29/2018 1311        Component Value  Date/Time    CALCIUM 10.0 05/29/2018 1311    ALKPHOS 280 (H) 05/29/2018 1311    AST 47 (H) 05/29/2018 1311    ALT 46 (H) 05/29/2018 1311    BILITOT 0.7 05/29/2018 1311    ESTGFRAFRICA 45.1 (A) 05/29/2018 1311    EGFRNONAA 39.1 (A) 05/29/2018 1311          Lab Results   Component Value Date    TSH 3.535 05/02/2017     Lab Results   Component Value Date    INR 1.2 05/19/2018    INR 1.3 (H) 02/09/2018    INR 1.7 (H) 02/06/2018     Lab Results   Component Value Date    WBC 11.85 05/29/2018    HGB 9.5 (L) 05/29/2018    HCT 31.7 (L) 05/29/2018    MCV 94 05/29/2018     05/29/2018     BMP  Sodium   Date Value Ref Range Status   05/29/2018 139 136 - 145 mmol/L Final     Potassium   Date Value Ref Range Status   05/29/2018 4.2 3.5 - 5.1 mmol/L Final     Chloride   Date Value Ref Range Status   05/29/2018 100 95 - 110 mmol/L Final     CO2   Date Value Ref Range Status   05/29/2018 27 23 - 29 mmol/L Final     BUN, Bld   Date Value Ref Range Status   05/29/2018 31 (H) 8 - 23 mg/dL Final     Creatinine   Date Value Ref Range Status   05/29/2018 1.3 0.5 - 1.4 mg/dL Final     Calcium   Date Value Ref Range Status   05/29/2018 10.0 8.7 - 10.5 mg/dL Final     Anion Gap   Date Value Ref Range Status   05/29/2018 12 8 - 16 mmol/L Final     eGFR if    Date Value Ref Range Status   05/29/2018 45.1 (A) >60 mL/min/1.73 m^2 Final     eGFR if non    Date Value Ref Range Status   05/29/2018 39.1 (A) >60 mL/min/1.73 m^2 Final     Comment:     Calculation used to obtain the estimated glomerular filtration  rate (eGFR) is the CKD-EPI equation.        CrCl cannot be calculated (Patient's most recent lab result is older than the maximum 7 days allowed.).    Assessment:     1. Coronary artery disease involving coronary bypass graft of native heart without angina pectoris    2. Essential hypertension    3. Chronic atrial fibrillation    4. Chronic diastolic heart failure    5. Atrial fibrillation, unspecified  type    6. Pulmonary HTN    7. Type 2 diabetes mellitus without complication, without long-term current use of insulin    8. Sjogren's syndrome with other organ involvement    9. Bilateral carotid artery stenosis    10. S/P CABG (coronary artery bypass graft)    11. S/P carotid endarterectomy    12. Long term current use of anticoagulant therapy    13. S/P PTCA (percutaneous transluminal coronary angioplasty)    14. Nonrheumatic aortic valve insufficiency    15. Asthma with COPD    16. Abnormal liver enzymes    17. Hypoxemia requiring supplemental oxygen    18. Nocturnal hypoxemia    19. PAH (pulmonary artery hypertension)    20. Anemia, unspecified type      Has improved clinically after paracentesis however she still overloaded she needs combination with lasix and aldactone to get diuresis on board. In conjunction to letairis   Plan:   Continue current therapy   Diurese   Low salt diet  Continue anticoagulation   F/u in advanced chf clinic with DR MEDINA.

## 2018-06-11 NOTE — TELEPHONE ENCOUNTER
Spoke with patient and advised that medication had been call to her pharmacy, and she states she will call back with any addition questions.

## 2018-06-11 NOTE — TELEPHONE ENCOUNTER
----- Message from Jany Massey sent at 6/11/2018  8:58 AM CDT -----  Contact: Pt.   Pt is calling regarding test results, to determine  what has to be don Next.  Pt stated that these test was done on her stomach troubles. ..[phone] (home)

## 2018-06-12 NOTE — PROGRESS NOTES
Subjective:       HPI:  Ms. Montalvo is a very pleasant 78 year old female with CAD s/p CABG (most recent angiogram showed severe 3 vessel disease (with patent LIMA-LAD but occluded SVG's to D1 and PDA), Atrial fibrillation on xarelto, severe pulmonary HTN in the setting of elevated PCWP=24 mm of Hg (though with a TPG of 16) on home O 2 at 2 L, fatty liver disease and abnormal LFT who is refrred by Dr Fletcher for evaluation and management of PH. Current regimen includes; Nebivolol 5 mg daily, lasix 20 mg BID, aldactone 50 mg daily and letairis 5 mg daily.     2D echo with CFD 8/16/2017  1 - Concentric remodeling.     2 - No wall motion abnormalities.     3 - Normal left ventricular systolic function (EF 55-60%).     4 - Restrictive LV filling pattern, indicating markedly elevated LAP (grade 3 diastolic dysfunction).     5 - Right ventricular enlargement with low normal to mildly depressed systolic function.     6 - Pulmonary hypertension. The estimated PA systolic pressure is 50 mmHg.     7 - Mild aortic regurgitation.     8 - Mild mitral regurgitation.     9 - Moderate tricuspid regurgitation.     10 - Intermediate central venous pressure.     RHC performed on 3/19/2018  RA:  (24)  RV: 80  RVEDP: 19   PA: 74/23 (40)  PW:  (24)  LVEDP: 28   AOP: 167/53    Past Medical History:   Diagnosis Date    AF (atrial fibrillation)     Palpation: atrial fibrillation, on Coumadin and followed by cardiologist.    Anticoagulant long-term use     Anxiety     Aortic insufficiency 3/19/2014    Aortic regurgitation     Aortic regurgitation/tricuspid regurgitation per MARIO in April 2007.     Asthma     Carotid artery stenosis     CEA on the left by Dr. Maciel    Coronary artery disease     status post CABG X 3.     Diabetes mellitus     GERD (gastroesophageal reflux disease)     H. pylori infection     treated    Hyperlipidemia     intolerant to statins.    Hypertension 8/7/2013    IBS (irritable bowel syndrome)     Leg pain  "3/19/2014    Long-term (current) use of anticoagulants 8/7/2013    Osteoarthritis     Osteopenia     DEXA scan done on 06/20/12     Pancreatitis     resolved    S/P CABG (coronary artery bypass graft) 8/7/2013    S/P carotid endarterectomy 8/7/2013    S/P PTCA (percutaneous transluminal coronary angioplasty) 3/19/2014    Sjogren's syndrome     Stroke     Vitamin D deficiency disease      Past Surgical History:   Procedure Laterality Date    ABDOMINAL SURGERY      APPENDECTOMY      bilateral cataract surgery      CARPAL TUNNEL RELEASE      RT    CATARACT EXTRACTION      CEA      left    CHOLECYSTECTOMY      CORONARY ARTERY BYPASS GRAFT      2 stents    EYE SURGERY      HYSTERECTOMY      TONSILLECTOMY         Family History   Problem Relation Age of Onset    Alzheimer's disease Mother     Hyperlipidemia Mother     Cancer Father         lung    Heart disease Maternal Uncle     Cancer Brother         stomach    Cancer Paternal Grandmother     Cancer Paternal Grandfather         stomach       Review of Systems   Constitution: Positive for malaise/fatigue and weight gain. Negative for chills, decreased appetite, diaphoresis, fever, weakness, night sweats and weight loss.   Eyes: Negative.    Cardiovascular: Positive for dyspnea on exertion, leg swelling, orthopnea and paroxysmal nocturnal dyspnea. Negative for chest pain, claudication, cyanosis, irregular heartbeat, near-syncope, palpitations and syncope.   Respiratory: Negative for cough, hemoptysis and shortness of breath.    Endocrine: Negative.    Hematologic/Lymphatic: Negative.    Skin: Negative for color change, dry skin and nail changes.   Musculoskeletal: Negative.    Gastrointestinal: Positive for bloating.   Genitourinary: Negative.        Objective:   Blood pressure (!) 118/56, pulse 84, height 5' 2" (1.575 m), weight 57.2 kg (126 lb 1.7 oz).body mass index is 23.06 kg/m².    Physical Exam   Constitutional: She is oriented to " person, place, and time. Vital signs are normal. She appears well-developed and well-nourished.   HENT:   Head: Normocephalic.   Eyes: Pupils are equal, round, and reactive to light.   Neck: Neck supple. JVD present.   Cardiovascular: Normal rate, regular rhythm and intact distal pulses.  PMI is not displaced.  Exam reveals no gallop and no friction rub.    Murmur heard.   Systolic murmur is present with a grade of 2/6  at the upper left sternal border  Pulmonary/Chest: Effort normal and breath sounds normal. She has no wheezes. She has no rales.   Abdominal: Soft. Bowel sounds are normal. She exhibits distension. There is no tenderness. There is no rebound.   Musculoskeletal: She exhibits edema.   Neurological: She is alert and oriented to person, place, and time.   Nursing note and vitals reviewed.      Labs:      Chemistry        Component Value Date/Time     (L) 06/12/2018 0808    K 5.5 (H) 06/12/2018 0808    CL 97 06/12/2018 0808    CO2 25 06/12/2018 0808    BUN 53 (H) 06/12/2018 0808    CREATININE 2.0 (H) 06/12/2018 0808     (H) 06/12/2018 0808        Component Value Date/Time    CALCIUM 9.6 06/06/2018 1526    ALKPHOS 280 (H) 05/29/2018 1311    AST 47 (H) 05/29/2018 1311    ALT 46 (H) 05/29/2018 1311    BILITOT 0.7 05/29/2018 1311    ESTGFRAFRICA 28.5 (A) 06/06/2018 1526    EGFRNONAA 24.7 (A) 06/06/2018 1526          Magnesium   Date Value Ref Range Status   03/28/2017 1.7 1.6 - 2.6 mg/dL Final     Lab Results   Component Value Date    WBC 11.85 05/29/2018    HGB 9.5 (L) 05/29/2018    HCT 31.7 (L) 05/29/2018    MCV 94 05/29/2018     05/29/2018     BNP   Date Value Ref Range Status   05/29/2018 3,839 (H) 0 - 99 pg/mL Final     Comment:     Values of less than 100 pg/ml are consistent with non-CHF populations.   02/12/2018 2,112 (H) 0 - 99 pg/mL Final     Comment:     Values of less than 100 pg/ml are consistent with non-CHF populations.   07/28/2017 2,662 (H) 0 - 99 pg/mL Final     Comment:      Values of less than 100 pg/ml are consistent with non-CHF populations.         Labs were reviewed with the patient.    Assessment:      1. Chronic diastolic congestive heart failure    2. Coronary artery disease involving native coronary artery of native heart, angina presence unspecified    3. Atrial fibrillation, unspecified type    4. Long term current use of anticoagulant therapy        Plan:   In summary, Ms. Montalvo is a very pleasant 77 y/o female with severe pulmonary HTN that is secondary to significant diastolic dysfunction and restrictive cardiomyopathy PH WHO group 2) and . Unfortunately current evidence-based guidelines do not recommend the use of pulmonary vasodilators in patients with PH in the setting of chronic lung disease or chronic left-sided heart disease. These medications have not only be shown to have no clinical benefit, but may in fact worsen the clinical picture by causing increased V/Q mismatch/shunting and increased LV preload. The focus of treatment going forward should be treating the underlying lung and/or heart disease.   I recommend discontinuing Letairis.  In view of her hyperkalemia, would consider decreasing her dose of aldactone  Repeat 2D echo with CFD to reassess LV EF and PASP  Consider repeating her RHC once C diff colitis has resolved  Recommend 2 gram sodium restriction and 1500cc fluid restriction.  Encourage physical activity with graded exercise program.  Requested patient to weigh themselves daily, and to notify us if their weight increases by more than 3 lbs in 1 day or 5 lbs in 1 week.   RTC in 2  Months with echo and labs  Please send a copy of my note to Dr. Chance Dickey MD

## 2018-06-13 NOTE — PROGRESS NOTES
Please schedule her for stat labs tomorrow at [facility] for cbc, cmet, and bnp.     Called and spoke with patient regarding results.  Has had significant drop in hemoglobin levels from 9-7 in the  last 1 week.  Patient reported external hemorrhoids with bleeding yesterday bleeding has subsided some.  Sodium levels also lower at 130 a.m. with elevated potassium level at 5.5 which is a change in 1 week.  Currently she is breathing okay just feeling that her legs are weak.  Currently on antibiotics for C diff as well.  On Flagyl.  Met with specialist cardiologist today.  Recommend patient if has significant decline in shortness of breath or worsening abdominal pain or any further bleeding from her hemorrhoids needs to go into the emergency room for further evaluation and possible transfusion.  If not then she can come to per the location tomorrow to have stat blood work drawn for reports and results to be able to be read by tomorrow afternoon.  I will make her other providers aware.  She is not needing oxygen at this time.  Advised her to lay down and elevate legs  through the evening.   Alexandra Moreno MD

## 2018-06-13 NOTE — TELEPHONE ENCOUNTER
Please schedule her for stat labs tomorrow at [facility] for cbc, cmet, and bnp.     Called and spoke with patient regarding results.  Has had significant drop in hemoglobin levels from 9-7 in the  last 1 week.  Patient reported external hemorrhoids with bleeding yesterday bleeding has subsided some.  Sodium levels also lower at 130 a.m. with elevated potassium level at 5.5 which is a change in 1 week.  Currently she is breathing okay just feeling that her legs are weak.  Currently on antibiotics for C diff as well.  On Flagyl.  Met with specialist cardiologist today.  Recommend patient if has significant decline in shortness of breath or worsening abdominal pain or any further bleeding from her hemorrhoids needs to go into the emergency room for further evaluation and possible transfusion.  If not then she can come to per the location tomorrow to have stat blood work drawn for reports and results to be able to be read by tomorrow afternoon.  I will make her other providers aware.  She is not needing oxygen at this time.  Advised her to lay down and elevate legs through the evening.

## 2018-06-14 NOTE — TELEPHONE ENCOUNTER
----- Message from Melly Padilla sent at 6/14/2018 10:18 AM CDT -----  Pt is requesting a call from nurse to discuss labs.        Please call pt back at [phone] or daughter Chandan Montalvo [phone]

## 2018-06-14 NOTE — TELEPHONE ENCOUNTER
So her anemia has stablizied at 7.7 and her sodium and potassium are slightly better, but her kidney function is worse and more dehydrated. I would suggest we get info from Dr Fletcher regarding patient on medications, however I feel she needs IV hydration and possible pericentesis. Please attempt to contact Dr Fletcher, but if not response, then will just have my staff advise patient to go to ED for further evaluation.

## 2018-06-14 NOTE — CODE DOCUMENTATION
Code blue called over head. Once I arrived to room compressions were being done. I set up for intubation while Dr. Moore bagged patient with ambu. successfully intubated with size 8 tube at 22 at the lip . Once intubated and pulse returned we transfused patient to trama room 3 where we had to start compressions again , code was called.

## 2018-06-14 NOTE — TELEPHONE ENCOUNTER
Attempted to call Dr. Fletcher's office. No answer. Reached out to Alan in Cardiology. She stated that Dr. Fletcher is out on vacation till July 2nd. Informed Dr Moreno of this. She stated since he is out then we would recommend that pt go on to ED.     Called and spoke with pt, gave information from below that Dr. Moreno gave on her lab work. She verbalized understanding. Advised per Dr. Moreno to go on to ED to address the dehydration. She stated that they would go in.

## 2018-06-14 NOTE — ED NOTES
Pt daughter Chandan ([phone]) leaving and says she will call either [facility] or ER back tomorrow morning with information regarding nursing home information. Fax information to [phone]

## 2018-06-14 NOTE — TELEPHONE ENCOUNTER
Called and notified that we are still waiting on the labs at this time. I see that only one thing has not come back yet.

## 2018-06-14 NOTE — ED PROVIDER NOTES
"SCRIBE #1 NOTE: I, Louise Vázquez, am scribing for, and in the presence of, Varghese Moore MD. I have scribed the entire note.      History      Chief Complaint   Patient presents with    Dehydration     sent from Dr. Moreno's ofc for low sodium and high potassium       Review of patient's allergies indicates:   Allergen Reactions    Codeine Nausea And Vomiting    Lisinopril      Other reaction(s): cough    Pacerone [amiodarone] Nausea And Vomiting    Sulfa (sulfonamide antibiotics) Nausea And Vomiting    Celecoxib Palpitations    Ciprofloxacin Hives, Itching and Rash    Colesevelam Rash and Nausea And Vomiting    Doxycycline Rash    Statins-hmg-coa reductase inhibitors Rash     Myalgia(can take atorvastatin ok -no rhabdo)per nurse josé antonio and patient  / stomach upset        HPI   HPI    6/14/2018, 3:21 PM   History obtained from the patient and spouse      History of Present Illness: Marcel Montalvo is a 78 y.o. female patient who presents to the Emergency Department for dehydration after being seen at Dr. Moreno's office today. Pt c/o abdominal distention. Symptoms are constant and moderate in severity. No mitigating or exacerbating factors reported. Patient denies any nausea, vomiting, abdominal pain, headache, lightheadedness, weakness, fever, chills, fatigue, and all other sxs at this time. No prior Tx included. Pt was also seen in ED a few weeks ago for hemorrhoids and states sx have not improved. Pt mentions taking flagyl x3 a day for a "bowel infection". No further complaints or concerns at this time.           Arrival mode:  Personal vehicle     PCP: Alexandra Moreno MD       Past Medical History:  Past Medical History:   Diagnosis Date    AF (atrial fibrillation)     Palpation: atrial fibrillation, on Coumadin and followed by cardiologist.    Anticoagulant long-term use     Anxiety     Aortic insufficiency 3/19/2014    Aortic regurgitation     Aortic regurgitation/tricuspid regurgitation per " MARIO in April 2007.     Asthma     Carotid artery stenosis     CEA on the left by Dr. Maciel    Coronary artery disease     status post CABG X 3.     Diabetes mellitus     GERD (gastroesophageal reflux disease)     H. pylori infection     treated    Hyperlipidemia     intolerant to statins.    Hypertension 8/7/2013    IBS (irritable bowel syndrome)     Leg pain 3/19/2014    Long-term (current) use of anticoagulants 8/7/2013    Osteoarthritis     Osteopenia     DEXA scan done on 06/20/12     Pancreatitis     resolved    S/P CABG (coronary artery bypass graft) 8/7/2013    S/P carotid endarterectomy 8/7/2013    S/P PTCA (percutaneous transluminal coronary angioplasty) 3/19/2014    Sjogren's syndrome     Stroke     Vitamin D deficiency disease        Past Surgical History:  Past Surgical History:   Procedure Laterality Date    ABDOMINAL SURGERY      APPENDECTOMY      bilateral cataract surgery      CARPAL TUNNEL RELEASE      RT    CATARACT EXTRACTION      CEA      left    CHOLECYSTECTOMY      CORONARY ARTERY BYPASS GRAFT      2 stents    EYE SURGERY      HYSTERECTOMY      TONSILLECTOMY           Family History:  Family History   Problem Relation Age of Onset    Alzheimer's disease Mother     Hyperlipidemia Mother     Cancer Father         lung    Heart disease Maternal Uncle     Cancer Brother         stomach    Cancer Paternal Grandmother     Cancer Paternal Grandfather         stomach       Social History:  Social History     Social History Main Topics    Smoking status: Former Smoker     Packs/day: 0.50     Years: 25.00     Quit date: 12/17/1988    Smokeless tobacco: Never Used    Alcohol use 0.0 oz/week      Comment: wine occasionally    Drug use: No    Sexual activity: Yes     Partners: Male       ROS   Review of Systems   Constitutional: Negative for appetite change, chills, diaphoresis, fatigue and fever.   HENT: Negative for congestion and sore throat.    Respiratory:  Negative for cough, chest tightness and shortness of breath.    Cardiovascular: Negative for chest pain and leg swelling.   Gastrointestinal: Positive for abdominal distention. Negative for abdominal pain, anal bleeding, blood in stool, constipation, diarrhea, nausea and vomiting.        (+) hemorrhoids   Genitourinary: Negative for dysuria and pelvic pain.   Musculoskeletal: Negative for back pain and myalgias.   Skin: Negative for rash.   Neurological: Negative for dizziness, syncope, weakness, light-headedness and headaches.   Hematological: Does not bruise/bleed easily.       Physical Exam      Initial Vitals [06/14/18 1510]   BP Pulse Resp Temp SpO2   (!) 106/55 75 18 98.3 °F (36.8 °C) (!) 93 %      MAP       --          Physical Exam  Nursing Notes and Vital Signs Reviewed.  Constitutional: Patient is in no acute distress. Well-developed and well-nourished.  Head: Atraumatic. Normocephalic.  Eyes: PERRL. EOM intact. Conjunctivae are not pale. No scleral icterus.  ENT: Mucous membranes are dry. Oropharynx is clear and symmetric.    Neck: Supple. Full ROM. No lymphadenopathy.  Cardiovascular: Regular rate. Regular rhythm. No murmurs, rubs, or gallops. Distal pulses are 2+ and symmetric.  Pulmonary/Chest: No respiratory distress. Clear to auscultation bilaterally. No wheezing or rales.  Abdominal: Soft and  lightly distended.  There is no tenderness.  No rebound, guarding, or rigidity. Good bowel sounds.  Genitourinary: No CVA tenderness  Musculoskeletal: Moves all extremities. No obvious deformities.  Skin: Warm and dry.  Neurological:  Alert, awake, and appropriate.  Normal speech.  No acute focal neurological deficits are appreciated.  Psychiatric: Normal affect. Good eye contact. Appropriate in content.    ED Course    Critical Care  Date/Time: 6/14/2018 4:40 PM  Performed by: BRAN GRIFFIN  Authorized by: BRAN GRIFFIN   Direct patient critical care time: 25 minutes  Additional history  critical care time: 5 minutes  Ordering / reviewing critical care time: 5 minutes  Documentation critical care time: 5 minutes  Consulting other physicians critical care time: 5 minutes  Consult with family critical care time: 5 minutes  Total critical care time (exclusive of procedural time) : 50 minutes  Critical care time was exclusive of separately billable procedures and treating other patients and teaching time.  Critical care was necessary to treat or prevent imminent or life-threatening deterioration of the following conditions: cardiac failure.  Critical care was time spent personally by me on the following activities: blood draw for specimens, development of treatment plan with patient or surrogate, discussions with consultants, discussions with primary provider, gastric intubation, interpretation of cardiac output measurements, evaluation of patient's response to treatment, examination of patient, obtaining history from patient or surrogate, ordering and performing treatments and interventions, ordering and review of laboratory studies, ordering and review of radiographic studies, pulse oximetry, re-evaluation of patient's condition, review of old charts and transcutaneous pacing.    Intubation  Date/Time: 6/14/2018 4:15 PM  Performed by: BRAN GRIFFIN  Authorized by: BRAN GRIFFIN   Consent Done: Emergent Situation  Indications: respiratory distress (cardiac arrest)  Intubation method: oral  Patient status: awake  Preoxygenation: bag valve mask  Paralytic: none  Laryngoscope size: Mac 4  Tube size: 8.0 (Bengali) mm  Tube type: cuffed  Number of attempts: 1  Post-procedure assessment: ETCO2 monitor  ETT to lip: 22 cm  Patient tolerance: Patient tolerated the procedure well with no immediate complications  Complications: No        ED Vital Signs:  Vitals:    06/14/18 1510 06/14/18 1533 06/14/18 1535 06/14/18 1537   BP: (!) 106/55 (!) 106/46 (!) 100/45 (!) 105/46   Pulse: 75 76 76 77  "  Resp: 18      Temp: 98.3 °F (36.8 °C)      TempSrc: Oral      SpO2: (!) 93%      Weight: 57.2 kg (126 lb)      Height: 5' 2" (1.575 m)          Abnormal Lab Results:  Labs Reviewed   CBC W/ AUTO DIFFERENTIAL - Abnormal; Notable for the following:        Result Value    RBC 2.91 (*)     Hemoglobin 8.0 (*)     Hematocrit 25.7 (*)     MCHC 31.1 (*)     RDW 17.8 (*)     Platelets 361 (*)     Lymph # 0.9 (*)     Gran% 74.1 (*)     Lymph% 12.2 (*)     All other components within normal limits   COMPREHENSIVE METABOLIC PANEL - Abnormal; Notable for the following:     Sodium 134 (*)     Potassium 5.5 (*)     Glucose 211 (*)     BUN, Bld 57 (*)     Creatinine 2.6 (*)     Albumin 3.2 (*)     Alkaline Phosphatase 224 (*)     eGFR if  20 (*)     eGFR if non  17 (*)     All other components within normal limits   POCT GLUCOSE - Abnormal; Notable for the following:     POCT Glucose 196 (*)     All other components within normal limits   B-TYPE NATRIURETIC PEPTIDE   URINALYSIS        All Lab Results:  Results for orders placed or performed during the hospital encounter of 06/14/18   CBC auto differential   Result Value Ref Range    WBC 7.45 3.90 - 12.70 K/uL    RBC 2.91 (L) 4.00 - 5.40 M/uL    Hemoglobin 8.0 (L) 12.0 - 16.0 g/dL    Hematocrit 25.7 (L) 37.0 - 48.5 %    MCV 88 82 - 98 fL    MCH 27.5 27.0 - 31.0 pg    MCHC 31.1 (L) 32.0 - 36.0 g/dL    RDW 17.8 (H) 11.5 - 14.5 %    Platelets 361 (H) 150 - 350 K/uL    MPV 10.3 9.2 - 12.9 fL    Gran # (ANC) 5.5 1.8 - 7.7 K/uL    Lymph # 0.9 (L) 1.0 - 4.8 K/uL    Mono # 0.9 0.3 - 1.0 K/uL    Eos # 0.1 0.0 - 0.5 K/uL    Baso # 0.03 0.00 - 0.20 K/uL    Gran% 74.1 (H) 38.0 - 73.0 %    Lymph% 12.2 (L) 18.0 - 48.0 %    Mono% 12.6 4.0 - 15.0 %    Eosinophil% 0.7 0.0 - 8.0 %    Basophil% 0.4 0.0 - 1.9 %    Differential Method Automated    Comprehensive metabolic panel   Result Value Ref Range    Sodium 134 (L) 136 - 145 mmol/L    Potassium 5.5 (H) 3.5 - 5.1 " mmol/L    Chloride 100 95 - 110 mmol/L    CO2 23 23 - 29 mmol/L    Glucose 211 (H) 70 - 110 mg/dL    BUN, Bld 57 (H) 8 - 23 mg/dL    Creatinine 2.6 (H) 0.5 - 1.4 mg/dL    Calcium 10.0 8.7 - 10.5 mg/dL    Total Protein 7.1 6.0 - 8.4 g/dL    Albumin 3.2 (L) 3.5 - 5.2 g/dL    Total Bilirubin 0.9 0.1 - 1.0 mg/dL    Alkaline Phosphatase 224 (H) 55 - 135 U/L    AST 39 10 - 40 U/L    ALT 34 10 - 44 U/L    Anion Gap 11 8 - 16 mmol/L    eGFR if African American 20 (A) >60 mL/min/1.73 m^2    eGFR if non African American 17 (A) >60 mL/min/1.73 m^2   POCT glucose   Result Value Ref Range    POCT Glucose 196 (H) 70 - 110 mg/dL         Imaging Results:  Imaging Results          X-Ray Abdomen Flat And Erect (Final result)  Result time 06/14/18 16:00:37    Final result by Memo Jensen MD (06/14/18 16:00:37)                 Impression:      No acute abnormality.      Electronically signed by: Memo Jensen MD  Date:    06/14/2018  Time:    16:00             Narrative:    EXAMINATION:  XR ABDOMEN FLAT AND ERECT    CLINICAL HISTORY:  Abdominal distension (gaseous)    TECHNIQUE:  2 view of the abdomen was performed.    COMPARISON:  None    FINDINGS:  Nonobstructive bowel gas pattern.    No obvious free air.  No portal venous gas.    No acute fracture. Moderate DJD.  Lung bases are clear.                               X-Ray Chest 1 View (Final result)  Result time 06/14/18 16:01:08    Final result by Memo Jensen MD (06/14/18 16:01:08)                 Impression:      No acute process seen.      Electronically signed by: Memo Jensen MD  Date:    06/14/2018  Time:    16:01             Narrative:    EXAMINATION:  XR CHEST 1 VIEW    CLINICAL HISTORY:  dehydration;    FINDINGS:  Single view of the chest.    Cardiac silhouette is enlarged.  No evidence of consolidation.  Atelectasis seen within the left mid and lower lung zone.  Sternotomy wires are intact.  Aorta demonstrates atherosclerotic disease..  No evidence of pleural  effusion or pneumothorax.  Bones appear intact.                               The EKG was ordered, reviewed, and independently interpreted by the ED provider.  Interpretation time: 15:29  Rate: 76 BPM  Rhythm: normal sinus rhythm  Interpretation: RBBB. Septal infarct. T wave abnormality. No STEMI.         The Emergency Provider reviewed the vital signs and test results, which are outlined above.    ED Discussion     4:10 PM: ACLS protocol initiated w/ mulpiple rounds of epinephrine administered. No return of circulation achieved    4:31 PM: Time of death. Patient .        ED Medication(s):  Medications   sodium chloride 0.9% bolus 1,000 mL (1,000 mLs Intravenous New Bag 18 1559)       New Prescriptions    No medications on file             Medical Decision Making    Medical Decision Making:   Clinical Tests:   Lab Tests: Ordered and Reviewed  Radiological Study: Ordered and Reviewed  Medical Tests: Ordered and Reviewed           Scribe Attestation:   Scribe #1: I performed the above scribed service and the documentation accurately describes the services I performed. I attest to the accuracy of the note.    Attending:   Physician Attestation Statement for Scribe #1: I, Varghese Moore MD, personally performed the services described in this documentation, as scribed by Louise Vázquez, in my presence, and it is both accurate and complete.          Clinical Impression       ICD-10-CM ICD-9-CM   1. Cardiac arrest I46.9 427.5   2. Dehydration E86.0 276.51   3. Abdominal distension R14.0 787.3       Disposition:   Disposition:          Varghese Moore MD  06/15/18 4180

## 2018-06-15 ENCOUNTER — TELEPHONE (OUTPATIENT)
Dept: INTERNAL MEDICINE | Facility: CLINIC | Age: 78
End: 2018-06-15

## 2018-06-15 NOTE — TELEPHONE ENCOUNTER
Dr. Moreno wanted me to let you know that pt passed away on yesterday 6/14/18 at [facility] ED.  Patients daughter Chandan sent [facility] message yesterday evening informing us that pt passed away about 15 minutes after arriving at the ED on yesterday.

## 2018-06-18 ENCOUNTER — TELEPHONE (OUTPATIENT)
Dept: CARDIOLOGY | Facility: CLINIC | Age: 78
End: 2018-06-18

## 2018-06-18 NOTE — TELEPHONE ENCOUNTER
Received phone call from daughter, Chandan who does she contact for oxygen   Contacted Pulmonary Dept then [facility] and was told [phone] was her supplier and notified them will  on Thursday.

## 2019-01-26 NOTE — PROGRESS NOTES
[facility]  Gastroenterology  Progress Note    Patient Name: Marcel Montalvo  MRN: [id number]  Admission Date: 5/19/2018  Hospital Length of Stay: 0 days  Code Status: Full Code   Attending Provider: Moy Sanderson MD  Consulting Provider: Anisa De La Fuente MD  Primary Care Physician: Alexandra Moreno MD  Principal Problem: SBP (spontaneous bacterial peritonitis)      Subjective:     Interval History:    Patient with mild abdominal discomfort  No fever  Had one bowel movement with small streak of blood. States it is significantly decreased as compared to past few days    Review of Systems   Constitutional: Negative for activity change, appetite change, chills, diaphoresis, fatigue and fever.   HENT: Negative for congestion, ear pain, facial swelling, mouth sores, nosebleeds, rhinorrhea, sore throat and voice change.    Eyes: Negative for photophobia, pain, discharge, redness and visual disturbance.   Respiratory: Negative for apnea, cough, choking, chest tightness and wheezing.    Cardiovascular: Negative for chest pain, palpitations and leg swelling.   Gastrointestinal:        As per HPI   Genitourinary: Negative for decreased urine volume, difficulty urinating, dysuria, frequency and hematuria.   Musculoskeletal: Positive for arthralgias and back pain. Negative for myalgias, neck pain and neck stiffness.   Skin: Negative for pallor and rash.   Neurological: Positive for numbness. Negative for tremors, seizures, syncope, weakness and headaches.   Hematological: Negative.  Negative for adenopathy. Does not bruise/bleed easily.   Psychiatric/Behavioral: Negative for behavioral problems, confusion, hallucinations and sleep disturbance. The patient is not nervous/anxious.      Objective:     Vital Signs (Most Recent):  Temp: 98.1 °F (36.7 °C) (05/21/18 1625)  Pulse: 61 (05/21/18 1625)  Resp: 20 (05/21/18 1625)  BP: (!) 108/51 (05/21/18 1625)  SpO2: 96 % (05/21/18 1625) Vital Signs (24h Range):  Temp:  [96.2  °F (35.7 °C)-98.8 °F (37.1 °C)] 98.1 °F (36.7 °C)  Pulse:  [57-62] 61  Resp:  [18-20] 20  SpO2:  [92 %-98 %] 96 %  BP: (108-138)/(51-65) 108/51     Weight: 56 kg (123 lb 7.3 oz) (05/20/18 0745)  Body mass index is 22.58 kg/m².      Intake/Output Summary (Last 24 hours) at 05/21/18 1846  Last data filed at 05/21/18 1800   Gross per 24 hour   Intake             1180 ml   Output              850 ml   Net              330 ml       Lines/Drains/Airways     Peripheral Intravenous Line                 Peripheral IV - Single Lumen 05/20/18 0230 Right Hand 1 day                Physical Exam   Constitutional: She is oriented to person, place, and time. She appears well-developed and well-nourished. No distress.   HENT:   Head: Normocephalic and atraumatic.   Nose: Nose normal.   Mouth/Throat: Oropharynx is clear and moist.   Eyes: EOM are normal. Pupils are equal, round, and reactive to light. Right eye exhibits no discharge. Left eye exhibits no discharge. No scleral icterus.   Neck: Normal range of motion. Neck supple. No tracheal deviation present.   Cardiovascular: Normal rate and intact distal pulses.    Murmur heard.  Pulmonary/Chest: Effort normal and breath sounds normal. No stridor. No respiratory distress. She has no wheezes. She has no rales. She exhibits no tenderness.   Abdominal: Soft. Bowel sounds are normal. She exhibits distension. She exhibits no mass. There is no tenderness. There is no rebound and no guarding. No hernia.   Mild distention   Musculoskeletal: Normal range of motion. She exhibits no edema or tenderness.   Lymphadenopathy:     She has no cervical adenopathy.   Neurological: She is alert and oriented to person, place, and time. She has normal reflexes.   Skin: Skin is warm and dry. She is not diaphoretic.   Psychiatric: She has a normal mood and affect. Her behavior is normal. Judgment and thought content normal.       Significant Labs:  CBC:   Recent Labs  Lab 05/20/18  0409  05/21/18  0003  05/21/18 0518 05/21/18  1653   WBC 11.20  --   --  8.99  --    HGB 9.2*  9.2*  < > 8.8* 8.4*  8.4* 8.7*   HCT 29.3*  29.3*  < > 28.6* 26.9*  26.9* 27.6*     --   --  382*  --    < > = values in this interval not displayed.  CMP:   Recent Labs  Lab 05/21/18 0518   *   CALCIUM 8.8   ALBUMIN 2.8*   PROT 6.4      K 4.7   CO2 23      BUN 34*   CREATININE 1.5*   ALKPHOS 319*   ALT 35   AST 47*   BILITOT 0.9     Coagulation: No results for input(s): PT, INR, APTT in the last 48 hours.  Peritoneal Fluid Cultures: No results for input(s): AEROBICCULTU, LABGRAM in the last 48 hours.      Significant Imaging:  Imaging results within the past 24 hours have been reviewed.       Current Facility-Administered Medications:     acetaminophen tablet 650 mg, 650 mg, Oral, Q6H PRN, Alvaro Ahuja MD, 650 mg at 05/21/18 1539    atorvastatin tablet 40 mg, 40 mg, Oral, Nightly, Bob Morgan NP, 40 mg at 05/20/18 2004    dextrose 50% injection 12.5 g, 12.5 g, Intravenous, PRN, Bob Morgan NP    dextrose 50% injection 25 g, 25 g, Intravenous, PRN, Bob Morgan NP    docusate sodium capsule 100 mg, 100 mg, Oral, Daily, RADHIKA Beasley, 100 mg at 05/21/18 0840    flecainide tablet 100 mg, 100 mg, Oral, Q12H, Bob Morgan NP, 100 mg at 05/21/18 0840    furosemide tablet 20 mg, 20 mg, Oral, BID, RADHIKA Beasley, 20 mg at 05/21/18 1754    glucagon (human recombinant) injection 1 mg, 1 mg, Intramuscular, PRN, Bob Morgan NP    glucose chewable tablet 16 g, 16 g, Oral, PRN, Bob Morgan NP    glucose chewable tablet 24 g, 24 g, Oral, PRN, Bob Morgan NP    hydrocortisone suppository 25 mg, 25 mg, Rectal, BID, Sheryl Mobley, FNP, 25 mg at 05/21/18 0840    insulin aspart U-100 pen 0-5 Units, 0-5 Units, Subcutaneous, QID (AC + HS) PRN, Bob Morgan NP    nebivolol tablet 5 mg, 5 mg, Oral, BID, Bob Morgan NP, 5 mg at 05/21/18 0841     nitroGLYCERIN 0.2 mg/hr TD PT24 1 patch, 1 patch, Transdermal, Daily, Cameron Blanco Jr., MD, 1 patch at 05/21/18 0841    ondansetron injection 4 mg, 4 mg, Intravenous, Q8H PRN, Cameron Blanco Jr., MD    pantoprazole 40 mg in dextrose 5 % 100 mL IVPB (over 15 minutes) (ready to mix system), 40 mg, Intravenous, Daily, Cameron Blanco Jr., MD, 40 mg at 05/21/18 0840      Assessment/Plan:     * SBP (spontaneous bacterial peritonitis)    Paracentesis today with note of SBP  Start Ceftriaxone 2g IV daily          Abnormal liver enzymes    DESOUZA is probable etiology vs congestive hepatopathy   Liver biopsy was deferred due to co morbidities  CT with mod ascites, recent US was negative for fluid so paracentesis was not performed.   Diagnostic paracentesis with SBP  Resume outpatient diuretics.               Rectal bleeding    Likely secondary to hemorrhoids given the presentation    Bleeding slowed on Anusol Suppositories BID  Daily colace  Unsedated flex sig is an option to exclude other etiologies for rectal bleeding, given SBP will hold off on flex sig.   Colonoscopy had been recently deferred to due co morbidities.             Thank you for your consult. I will follow-up with patient. Please contact us if you have any additional questions.    Anisa De La Fuente MD  Gastroenterology  [facility]   normal (ped)...

## 2021-10-04 NOTE — PROGRESS NOTES
RT called to ER for eval on patient.   On arrival, spo2 93% on RA. Looked through flowsheet and it looks like she was about 88% spo2 on RA for a short time.   Pt states she is on 2L NC at home. Placed patient on her 2L NC and spo2 now is 97%. Patient is not wheezing and is not in any distress.    Azathioprine Pregnancy And Lactation Text: This medication is Pregnancy Category D and isn't considered safe during pregnancy. It is unknown if this medication is excreted in breast milk.

## 2022-03-16 NOTE — PROGRESS NOTES
5/30/18 - S - Phone contact made with pt today for follow up and d/c from [facility]. Observed on chart review prior to contact that pt was admitted from 5/19/18-5/23/18 for rectal bleeding. She was treated for spontaneous bacterial peritonitis and also had a paracentesis with removal of over 2 L of ascites. She had a follow up with her PCP yesterday and reviewed all meds, per her report. She advised she has rec'd Letairis and started it yesterday. She also advised she feels much better today than she has following hospitalizations in the recent past, and thinks it is related to the paracentesis for removal of excess fluid in her abdomen. She reports immediately feeling better after the procedure. Advised CM will D/C from [facility] today related to goals met now that she has rec'd Letairis. Reviewed upcoming appts and she is aware of all scheduled appts and plans to attend. Amina Chance RN     [de-identified] : none

## 2022-11-03 NOTE — TELEPHONE ENCOUNTER
Contacted patient to confirm she has received Letairis with assistance from . She states she received the medication last week and started this week.   Olanzapine Pregnancy And Lactation Text: This medication is pregnancy category C.   There are no adequate and well controlled trials with olanzapine in pregnant females.  Olanzapine should be used during pregnancy only if the potential benefit justifies the potential risk to the fetus.   In a study in lactating healthy women, olanzapine was excreted in breast milk.  It is recommended that women taking olanzapine should not breast feed.

## 2023-05-16 NOTE — TELEPHONE ENCOUNTER
Telephoned patient to advise of plan and check if she still had Tikosyn 125 mg, scheduled and confirmed appointments for Monday through Wednesday.   Cimzia Pregnancy And Lactation Text: This medication crosses the placenta but can be considered safe in certain situations. Cimzia may be excreted in breast milk.